# Patient Record
Sex: MALE | Race: ASIAN | NOT HISPANIC OR LATINO | URBAN - METROPOLITAN AREA
[De-identification: names, ages, dates, MRNs, and addresses within clinical notes are randomized per-mention and may not be internally consistent; named-entity substitution may affect disease eponyms.]

---

## 2017-07-10 ENCOUNTER — EMERGENCY (EMERGENCY)
Facility: HOSPITAL | Age: 52
LOS: 0 days | Discharge: HOME | End: 2017-07-10

## 2017-07-10 DIAGNOSIS — R50.9 FEVER, UNSPECIFIED: ICD-10-CM

## 2017-07-10 DIAGNOSIS — I10 ESSENTIAL (PRIMARY) HYPERTENSION: ICD-10-CM

## 2017-07-10 DIAGNOSIS — Z79.899 OTHER LONG TERM (CURRENT) DRUG THERAPY: ICD-10-CM

## 2017-07-10 DIAGNOSIS — S30.860A INSECT BITE (NONVENOMOUS) OF LOWER BACK AND PELVIS, INITIAL ENCOUNTER: ICD-10-CM
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
## 2017-07-10 DIAGNOSIS — Z79.84 LONG TERM (CURRENT) USE OF ORAL HYPOGLYCEMIC DRUGS: ICD-10-CM
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
## 2017-07-10 DIAGNOSIS — E11.9 TYPE 2 DIABETES MELLITUS WITHOUT COMPLICATIONS: ICD-10-CM

## 2020-01-01 ENCOUNTER — INPATIENT (INPATIENT)
Facility: HOSPITAL | Age: 55
LOS: 30 days | End: 2020-05-14
Attending: INTERNAL MEDICINE | Admitting: INTERNAL MEDICINE
Payer: COMMERCIAL

## 2020-01-01 VITALS
HEART RATE: 104 BPM | TEMPERATURE: 98 F | SYSTOLIC BLOOD PRESSURE: 154 MMHG | RESPIRATION RATE: 28 BRPM | DIASTOLIC BLOOD PRESSURE: 78 MMHG | OXYGEN SATURATION: 92 %

## 2020-01-01 VITALS — HEART RATE: 90 BPM | OXYGEN SATURATION: 97 %

## 2020-01-01 DIAGNOSIS — Z51.5 ENCOUNTER FOR PALLIATIVE CARE: ICD-10-CM

## 2020-01-01 DIAGNOSIS — A41.89 OTHER SPECIFIED SEPSIS: ICD-10-CM

## 2020-01-01 DIAGNOSIS — R65.21 SEVERE SEPSIS WITH SEPTIC SHOCK: ICD-10-CM

## 2020-01-01 DIAGNOSIS — Z00.6 ENCOUNTER FOR EXAMINATION FOR NORMAL COMPARISON AND CONTROL IN CLINICAL RESEARCH PROGRAM: ICD-10-CM

## 2020-01-01 DIAGNOSIS — U07.1 COVID-19: ICD-10-CM

## 2020-01-01 DIAGNOSIS — J80 ACUTE RESPIRATORY DISTRESS SYNDROME: ICD-10-CM

## 2020-01-01 DIAGNOSIS — E87.2 ACIDOSIS: ICD-10-CM

## 2020-01-01 DIAGNOSIS — Z87.891 PERSONAL HISTORY OF NICOTINE DEPENDENCE: ICD-10-CM

## 2020-01-01 DIAGNOSIS — J93.9 PNEUMOTHORAX, UNSPECIFIED: ICD-10-CM

## 2020-01-01 DIAGNOSIS — Z79.84 LONG TERM (CURRENT) USE OF ORAL HYPOGLYCEMIC DRUGS: ICD-10-CM

## 2020-01-01 DIAGNOSIS — E11.9 TYPE 2 DIABETES MELLITUS WITHOUT COMPLICATIONS: ICD-10-CM

## 2020-01-01 DIAGNOSIS — E87.5 HYPERKALEMIA: ICD-10-CM

## 2020-01-01 DIAGNOSIS — Z66 DO NOT RESUSCITATE: ICD-10-CM

## 2020-01-01 DIAGNOSIS — R74.0 NONSPECIFIC ELEVATION OF LEVELS OF TRANSAMINASE AND LACTIC ACID DEHYDROGENASE [LDH]: ICD-10-CM

## 2020-01-01 DIAGNOSIS — E87.0 HYPEROSMOLALITY AND HYPERNATREMIA: ICD-10-CM

## 2020-01-01 DIAGNOSIS — J98.2 INTERSTITIAL EMPHYSEMA: ICD-10-CM

## 2020-01-01 DIAGNOSIS — N17.9 ACUTE KIDNEY FAILURE, UNSPECIFIED: ICD-10-CM

## 2020-01-01 DIAGNOSIS — J12.89 OTHER VIRAL PNEUMONIA: ICD-10-CM

## 2020-01-01 DIAGNOSIS — R06.02 SHORTNESS OF BREATH: ICD-10-CM

## 2020-01-01 DIAGNOSIS — I10 ESSENTIAL (PRIMARY) HYPERTENSION: ICD-10-CM

## 2020-01-01 LAB
-  COAGULASE NEGATIVE STAPHYLOCOCCUS: SIGNIFICANT CHANGE UP
A1C WITH ESTIMATED AVERAGE GLUCOSE RESULT: 11.6 % — HIGH (ref 4–5.6)
ALBUMIN SERPL ELPH-MCNC: 1.5 G/DL — LOW (ref 3.5–5.2)
ALBUMIN SERPL ELPH-MCNC: 1.7 G/DL — LOW (ref 3.5–5.2)
ALBUMIN SERPL ELPH-MCNC: 1.8 G/DL — LOW (ref 3.5–5.2)
ALBUMIN SERPL ELPH-MCNC: 1.9 G/DL — LOW (ref 3.5–5.2)
ALBUMIN SERPL ELPH-MCNC: 1.9 G/DL — LOW (ref 3.5–5.2)
ALBUMIN SERPL ELPH-MCNC: 2 G/DL — LOW (ref 3.5–5.2)
ALBUMIN SERPL ELPH-MCNC: 2.1 G/DL — LOW (ref 3.5–5.2)
ALBUMIN SERPL ELPH-MCNC: 2.2 G/DL — LOW (ref 3.5–5.2)
ALBUMIN SERPL ELPH-MCNC: 2.3 G/DL — LOW (ref 3.5–5.2)
ALBUMIN SERPL ELPH-MCNC: 2.3 G/DL — LOW (ref 3.5–5.2)
ALBUMIN SERPL ELPH-MCNC: 2.4 G/DL — LOW (ref 3.5–5.2)
ALBUMIN SERPL ELPH-MCNC: 2.4 G/DL — LOW (ref 3.5–5.2)
ALBUMIN SERPL ELPH-MCNC: 2.5 G/DL — LOW (ref 3.5–5.2)
ALBUMIN SERPL ELPH-MCNC: 2.6 G/DL — LOW (ref 3.5–5.2)
ALBUMIN SERPL ELPH-MCNC: 2.7 G/DL — LOW (ref 3.5–5.2)
ALBUMIN SERPL ELPH-MCNC: 2.9 G/DL — LOW (ref 3.5–5.2)
ALBUMIN SERPL ELPH-MCNC: 3 G/DL — LOW (ref 3.5–5.2)
ALBUMIN SERPL ELPH-MCNC: 3.2 G/DL — LOW (ref 3.5–5.2)
ALBUMIN SERPL ELPH-MCNC: 3.2 G/DL — LOW (ref 3.5–5.2)
ALBUMIN SERPL ELPH-MCNC: 3.4 G/DL — LOW (ref 3.5–5.2)
ALBUMIN SERPL ELPH-MCNC: 3.6 G/DL — SIGNIFICANT CHANGE UP (ref 3.5–5.2)
ALP SERPL-CCNC: 100 U/L — SIGNIFICANT CHANGE UP (ref 30–115)
ALP SERPL-CCNC: 100 U/L — SIGNIFICANT CHANGE UP (ref 30–115)
ALP SERPL-CCNC: 107 U/L — SIGNIFICANT CHANGE UP (ref 30–115)
ALP SERPL-CCNC: 108 U/L — SIGNIFICANT CHANGE UP (ref 30–115)
ALP SERPL-CCNC: 112 U/L — SIGNIFICANT CHANGE UP (ref 30–115)
ALP SERPL-CCNC: 116 U/L — HIGH (ref 30–115)
ALP SERPL-CCNC: 122 U/L — HIGH (ref 30–115)
ALP SERPL-CCNC: 124 U/L — HIGH (ref 30–115)
ALP SERPL-CCNC: 124 U/L — HIGH (ref 30–115)
ALP SERPL-CCNC: 159 U/L — HIGH (ref 30–115)
ALP SERPL-CCNC: 167 U/L — HIGH (ref 30–115)
ALP SERPL-CCNC: 176 U/L — HIGH (ref 30–115)
ALP SERPL-CCNC: 188 U/L — HIGH (ref 30–115)
ALP SERPL-CCNC: 237 U/L — HIGH (ref 30–115)
ALP SERPL-CCNC: 290 U/L — HIGH (ref 30–115)
ALP SERPL-CCNC: 291 U/L — HIGH (ref 30–115)
ALP SERPL-CCNC: 305 U/L — HIGH (ref 30–115)
ALP SERPL-CCNC: 62 U/L — SIGNIFICANT CHANGE UP (ref 30–115)
ALP SERPL-CCNC: 65 U/L — SIGNIFICANT CHANGE UP (ref 30–115)
ALP SERPL-CCNC: 72 U/L — SIGNIFICANT CHANGE UP (ref 30–115)
ALP SERPL-CCNC: 76 U/L — SIGNIFICANT CHANGE UP (ref 30–115)
ALP SERPL-CCNC: 76 U/L — SIGNIFICANT CHANGE UP (ref 30–115)
ALP SERPL-CCNC: 77 U/L — SIGNIFICANT CHANGE UP (ref 30–115)
ALP SERPL-CCNC: 78 U/L — SIGNIFICANT CHANGE UP (ref 30–115)
ALP SERPL-CCNC: 80 U/L — SIGNIFICANT CHANGE UP (ref 30–115)
ALP SERPL-CCNC: 80 U/L — SIGNIFICANT CHANGE UP (ref 30–115)
ALP SERPL-CCNC: 83 U/L — SIGNIFICANT CHANGE UP (ref 30–115)
ALP SERPL-CCNC: 88 U/L — SIGNIFICANT CHANGE UP (ref 30–115)
ALP SERPL-CCNC: 92 U/L — SIGNIFICANT CHANGE UP (ref 30–115)
ALP SERPL-CCNC: 92 U/L — SIGNIFICANT CHANGE UP (ref 30–115)
ALP SERPL-CCNC: 95 U/L — SIGNIFICANT CHANGE UP (ref 30–115)
ALP SERPL-CCNC: 97 U/L — SIGNIFICANT CHANGE UP (ref 30–115)
ALT FLD-CCNC: 106 U/L — HIGH (ref 0–41)
ALT FLD-CCNC: 110 U/L — HIGH (ref 0–41)
ALT FLD-CCNC: 117 U/L — HIGH (ref 0–41)
ALT FLD-CCNC: 118 U/L — HIGH (ref 0–41)
ALT FLD-CCNC: 120 U/L — HIGH (ref 0–41)
ALT FLD-CCNC: 122 U/L — HIGH (ref 0–41)
ALT FLD-CCNC: 154 U/L — HIGH (ref 0–41)
ALT FLD-CCNC: 164 U/L — HIGH (ref 0–41)
ALT FLD-CCNC: 168 U/L — HIGH (ref 0–41)
ALT FLD-CCNC: 19 U/L — SIGNIFICANT CHANGE UP (ref 0–41)
ALT FLD-CCNC: 20 U/L — SIGNIFICANT CHANGE UP (ref 0–41)
ALT FLD-CCNC: 20 U/L — SIGNIFICANT CHANGE UP (ref 0–41)
ALT FLD-CCNC: 21 U/L — SIGNIFICANT CHANGE UP (ref 0–41)
ALT FLD-CCNC: 22 U/L — SIGNIFICANT CHANGE UP (ref 0–41)
ALT FLD-CCNC: 22 U/L — SIGNIFICANT CHANGE UP (ref 0–41)
ALT FLD-CCNC: 28 U/L — SIGNIFICANT CHANGE UP (ref 0–41)
ALT FLD-CCNC: 28 U/L — SIGNIFICANT CHANGE UP (ref 0–41)
ALT FLD-CCNC: 30 U/L — SIGNIFICANT CHANGE UP (ref 0–41)
ALT FLD-CCNC: 35 U/L — SIGNIFICANT CHANGE UP (ref 0–41)
ALT FLD-CCNC: 35 U/L — SIGNIFICANT CHANGE UP (ref 0–41)
ALT FLD-CCNC: 38 U/L — SIGNIFICANT CHANGE UP (ref 0–41)
ALT FLD-CCNC: 44 U/L — HIGH (ref 0–41)
ALT FLD-CCNC: 47 U/L — HIGH (ref 0–41)
ALT FLD-CCNC: 63 U/L — HIGH (ref 0–41)
ALT FLD-CCNC: 73 U/L — HIGH (ref 0–41)
ALT FLD-CCNC: 76 U/L — HIGH (ref 0–41)
ALT FLD-CCNC: 84 U/L — HIGH (ref 0–41)
ALT FLD-CCNC: 84 U/L — HIGH (ref 0–41)
ALT FLD-CCNC: 85 U/L — HIGH (ref 0–41)
ALT FLD-CCNC: 97 U/L — HIGH (ref 0–41)
ALT FLD-CCNC: 97 U/L — HIGH (ref 0–41)
ALT FLD-CCNC: 99 U/L — HIGH (ref 0–41)
ANION GAP SERPL CALC-SCNC: 11 MMOL/L — SIGNIFICANT CHANGE UP (ref 7–14)
ANION GAP SERPL CALC-SCNC: 11 MMOL/L — SIGNIFICANT CHANGE UP (ref 7–14)
ANION GAP SERPL CALC-SCNC: 12 MMOL/L — SIGNIFICANT CHANGE UP (ref 7–14)
ANION GAP SERPL CALC-SCNC: 13 MMOL/L — SIGNIFICANT CHANGE UP (ref 7–14)
ANION GAP SERPL CALC-SCNC: 14 MMOL/L — SIGNIFICANT CHANGE UP (ref 7–14)
ANION GAP SERPL CALC-SCNC: 15 MMOL/L — HIGH (ref 7–14)
ANION GAP SERPL CALC-SCNC: 16 MMOL/L — HIGH (ref 7–14)
ANION GAP SERPL CALC-SCNC: 17 MMOL/L — HIGH (ref 7–14)
ANION GAP SERPL CALC-SCNC: 18 MMOL/L — HIGH (ref 7–14)
ANION GAP SERPL CALC-SCNC: 19 MMOL/L — HIGH (ref 7–14)
ANION GAP SERPL CALC-SCNC: 20 MMOL/L — HIGH (ref 7–14)
ANION GAP SERPL CALC-SCNC: 21 MMOL/L — HIGH (ref 7–14)
ANION GAP SERPL CALC-SCNC: 21 MMOL/L — HIGH (ref 7–14)
ANION GAP SERPL CALC-SCNC: 22 MMOL/L — HIGH (ref 7–14)
ANION GAP SERPL CALC-SCNC: 22 MMOL/L — HIGH (ref 7–14)
ANION GAP SERPL CALC-SCNC: 23 MMOL/L — HIGH (ref 7–14)
ANION GAP SERPL CALC-SCNC: 24 MMOL/L — HIGH (ref 7–14)
ANION GAP SERPL CALC-SCNC: 25 MMOL/L — HIGH (ref 7–14)
ANION GAP SERPL CALC-SCNC: 28 MMOL/L — HIGH (ref 7–14)
ANISOCYTOSIS BLD QL: SLIGHT — SIGNIFICANT CHANGE UP
APPEARANCE UR: ABNORMAL
APTT BLD: 111.8 SEC — CRITICAL HIGH (ref 27–39.2)
APTT BLD: 124.8 SEC — CRITICAL HIGH (ref 27–39.2)
APTT BLD: 29.5 SEC — SIGNIFICANT CHANGE UP (ref 27–39.2)
APTT BLD: 33 SEC — SIGNIFICANT CHANGE UP (ref 27–39.2)
APTT BLD: 35.2 SEC — SIGNIFICANT CHANGE UP (ref 27–39.2)
APTT BLD: 35.2 SEC — SIGNIFICANT CHANGE UP (ref 27–39.2)
APTT BLD: 36.1 SEC — SIGNIFICANT CHANGE UP (ref 27–39.2)
APTT BLD: 46.2 SEC — HIGH (ref 27–39.2)
APTT BLD: 48.9 SEC — HIGH (ref 27–39.2)
APTT BLD: 49.1 SEC — HIGH (ref 27–39.2)
APTT BLD: 53.6 SEC — HIGH (ref 27–39.2)
APTT BLD: 54.8 SEC — HIGH (ref 27–39.2)
APTT BLD: 55 SEC — HIGH (ref 27–39.2)
APTT BLD: 56.6 SEC — HIGH (ref 27–39.2)
APTT BLD: 58.9 SEC — HIGH (ref 27–39.2)
APTT BLD: 64.7 SEC — HIGH (ref 27–39.2)
APTT BLD: 67 SEC — HIGH (ref 27–39.2)
APTT BLD: 67.7 SEC — HIGH (ref 27–39.2)
APTT BLD: 70.1 SEC — CRITICAL HIGH (ref 27–39.2)
APTT BLD: 75.3 SEC — CRITICAL HIGH (ref 27–39.2)
APTT BLD: 75.3 SEC — CRITICAL HIGH (ref 27–39.2)
APTT BLD: 75.7 SEC — CRITICAL HIGH (ref 27–39.2)
APTT BLD: 78.2 SEC — CRITICAL HIGH (ref 27–39.2)
APTT BLD: 79.7 SEC — CRITICAL HIGH (ref 27–39.2)
APTT BLD: 82.4 SEC — CRITICAL HIGH (ref 27–39.2)
APTT BLD: 83.5 SEC — CRITICAL HIGH (ref 27–39.2)
APTT BLD: 83.9 SEC — CRITICAL HIGH (ref 27–39.2)
APTT BLD: 90.1 SEC — CRITICAL HIGH (ref 27–39.2)
APTT BLD: 92.1 SEC — CRITICAL HIGH (ref 27–39.2)
AST SERPL-CCNC: 100 U/L — HIGH (ref 0–41)
AST SERPL-CCNC: 102 U/L — HIGH (ref 0–41)
AST SERPL-CCNC: 117 U/L — HIGH (ref 0–41)
AST SERPL-CCNC: 120 U/L — HIGH (ref 0–41)
AST SERPL-CCNC: 141 U/L — HIGH (ref 0–41)
AST SERPL-CCNC: 143 U/L — HIGH (ref 0–41)
AST SERPL-CCNC: 150 U/L — HIGH (ref 0–41)
AST SERPL-CCNC: 156 U/L — HIGH (ref 0–41)
AST SERPL-CCNC: 186 U/L — HIGH (ref 0–41)
AST SERPL-CCNC: 27 U/L — SIGNIFICANT CHANGE UP (ref 0–41)
AST SERPL-CCNC: 31 U/L — SIGNIFICANT CHANGE UP (ref 0–41)
AST SERPL-CCNC: 32 U/L — SIGNIFICANT CHANGE UP (ref 0–41)
AST SERPL-CCNC: 32 U/L — SIGNIFICANT CHANGE UP (ref 0–41)
AST SERPL-CCNC: 33 U/L — SIGNIFICANT CHANGE UP (ref 0–41)
AST SERPL-CCNC: 35 U/L — SIGNIFICANT CHANGE UP (ref 0–41)
AST SERPL-CCNC: 35 U/L — SIGNIFICANT CHANGE UP (ref 0–41)
AST SERPL-CCNC: 38 U/L — SIGNIFICANT CHANGE UP (ref 0–41)
AST SERPL-CCNC: 39 U/L — SIGNIFICANT CHANGE UP (ref 0–41)
AST SERPL-CCNC: 40 U/L — SIGNIFICANT CHANGE UP (ref 0–41)
AST SERPL-CCNC: 45 U/L — HIGH (ref 0–41)
AST SERPL-CCNC: 47 U/L — HIGH (ref 0–41)
AST SERPL-CCNC: 51 U/L — HIGH (ref 0–41)
AST SERPL-CCNC: 52 U/L — HIGH (ref 0–41)
AST SERPL-CCNC: 53 U/L — HIGH (ref 0–41)
AST SERPL-CCNC: 55 U/L — HIGH (ref 0–41)
AST SERPL-CCNC: 66 U/L — HIGH (ref 0–41)
AST SERPL-CCNC: 75 U/L — HIGH (ref 0–41)
AST SERPL-CCNC: 77 U/L — HIGH (ref 0–41)
AST SERPL-CCNC: 83 U/L — HIGH (ref 0–41)
AST SERPL-CCNC: 89 U/L — HIGH (ref 0–41)
AST SERPL-CCNC: 95 U/L — HIGH (ref 0–41)
AST SERPL-CCNC: 97 U/L — HIGH (ref 0–41)
BACTERIA # UR AUTO: ABNORMAL
BASE EXCESS BLDA CALC-SCNC: -0.2 MMOL/L — SIGNIFICANT CHANGE UP (ref -2–2)
BASE EXCESS BLDA CALC-SCNC: -0.6 MMOL/L — SIGNIFICANT CHANGE UP (ref -2–2)
BASE EXCESS BLDA CALC-SCNC: -2 MMOL/L — SIGNIFICANT CHANGE UP (ref -2–2)
BASE EXCESS BLDA CALC-SCNC: -2.8 MMOL/L — LOW (ref -2–2)
BASE EXCESS BLDA CALC-SCNC: -6 MMOL/L — LOW (ref -2–2)
BASE EXCESS BLDA CALC-SCNC: -6.8 MMOL/L — LOW (ref -2–2)
BASE EXCESS BLDA CALC-SCNC: 0.2 MMOL/L — SIGNIFICANT CHANGE UP (ref -2–2)
BASE EXCESS BLDA CALC-SCNC: 1 MMOL/L — SIGNIFICANT CHANGE UP (ref -2–2)
BASE EXCESS BLDA CALC-SCNC: 1.5 MMOL/L — SIGNIFICANT CHANGE UP (ref -2–2)
BASE EXCESS BLDA CALC-SCNC: 3.6 MMOL/L — HIGH (ref -2–2)
BASOPHILS # BLD AUTO: 0 K/UL — SIGNIFICANT CHANGE UP (ref 0–0.2)
BASOPHILS # BLD AUTO: 0.01 K/UL — SIGNIFICANT CHANGE UP (ref 0–0.2)
BASOPHILS # BLD AUTO: 0.02 K/UL — SIGNIFICANT CHANGE UP (ref 0–0.2)
BASOPHILS # BLD AUTO: 0.03 K/UL — SIGNIFICANT CHANGE UP (ref 0–0.2)
BASOPHILS # BLD AUTO: 0.04 K/UL — SIGNIFICANT CHANGE UP (ref 0–0.2)
BASOPHILS # BLD AUTO: 0.04 K/UL — SIGNIFICANT CHANGE UP (ref 0–0.2)
BASOPHILS # BLD AUTO: 0.05 K/UL — SIGNIFICANT CHANGE UP (ref 0–0.2)
BASOPHILS # BLD AUTO: 0.06 K/UL — SIGNIFICANT CHANGE UP (ref 0–0.2)
BASOPHILS # BLD AUTO: 0.06 K/UL — SIGNIFICANT CHANGE UP (ref 0–0.2)
BASOPHILS # BLD AUTO: 0.07 K/UL — SIGNIFICANT CHANGE UP (ref 0–0.2)
BASOPHILS # BLD AUTO: 0.08 K/UL — SIGNIFICANT CHANGE UP (ref 0–0.2)
BASOPHILS # BLD AUTO: 0.09 K/UL — SIGNIFICANT CHANGE UP (ref 0–0.2)
BASOPHILS NFR BLD AUTO: 0 % — SIGNIFICANT CHANGE UP (ref 0–1)
BASOPHILS NFR BLD AUTO: 0.1 % — SIGNIFICANT CHANGE UP (ref 0–1)
BASOPHILS NFR BLD AUTO: 0.2 % — SIGNIFICANT CHANGE UP (ref 0–1)
BASOPHILS NFR BLD AUTO: 0.3 % — SIGNIFICANT CHANGE UP (ref 0–1)
BASOPHILS NFR BLD AUTO: 0.4 % — SIGNIFICANT CHANGE UP (ref 0–1)
BASOPHILS NFR BLD AUTO: 0.4 % — SIGNIFICANT CHANGE UP (ref 0–1)
BASOPHILS NFR BLD AUTO: 0.5 % — SIGNIFICANT CHANGE UP (ref 0–1)
BASOPHILS NFR BLD AUTO: 0.5 % — SIGNIFICANT CHANGE UP (ref 0–1)
BILIRUB SERPL-MCNC: 0.3 MG/DL — SIGNIFICANT CHANGE UP (ref 0.2–1.2)
BILIRUB SERPL-MCNC: 0.3 MG/DL — SIGNIFICANT CHANGE UP (ref 0.2–1.2)
BILIRUB SERPL-MCNC: 0.4 MG/DL — SIGNIFICANT CHANGE UP (ref 0.2–1.2)
BILIRUB SERPL-MCNC: 0.5 MG/DL — SIGNIFICANT CHANGE UP (ref 0.2–1.2)
BILIRUB SERPL-MCNC: 0.6 MG/DL — SIGNIFICANT CHANGE UP (ref 0.2–1.2)
BILIRUB SERPL-MCNC: 0.7 MG/DL — SIGNIFICANT CHANGE UP (ref 0.2–1.2)
BILIRUB SERPL-MCNC: 0.8 MG/DL — SIGNIFICANT CHANGE UP (ref 0.2–1.2)
BILIRUB SERPL-MCNC: 1 MG/DL — SIGNIFICANT CHANGE UP (ref 0.2–1.2)
BILIRUB SERPL-MCNC: 1.4 MG/DL — HIGH (ref 0.2–1.2)
BILIRUB SERPL-MCNC: 1.9 MG/DL — HIGH (ref 0.2–1.2)
BILIRUB SERPL-MCNC: 2.4 MG/DL — HIGH (ref 0.2–1.2)
BILIRUB UR-MCNC: NEGATIVE — SIGNIFICANT CHANGE UP
BLD GP AB SCN SERPL QL: SIGNIFICANT CHANGE UP
BUN SERPL-MCNC: 107 MG/DL — CRITICAL HIGH (ref 10–20)
BUN SERPL-MCNC: 115 MG/DL — CRITICAL HIGH (ref 10–20)
BUN SERPL-MCNC: 122 MG/DL — CRITICAL HIGH (ref 10–20)
BUN SERPL-MCNC: 125 MG/DL — CRITICAL HIGH (ref 10–20)
BUN SERPL-MCNC: 132 MG/DL — CRITICAL HIGH (ref 10–20)
BUN SERPL-MCNC: 134 MG/DL — CRITICAL HIGH (ref 10–20)
BUN SERPL-MCNC: 136 MG/DL — CRITICAL HIGH (ref 10–20)
BUN SERPL-MCNC: 138 MG/DL — CRITICAL HIGH (ref 10–20)
BUN SERPL-MCNC: 14 MG/DL — SIGNIFICANT CHANGE UP (ref 10–20)
BUN SERPL-MCNC: 141 MG/DL — CRITICAL HIGH (ref 10–20)
BUN SERPL-MCNC: 144 MG/DL — CRITICAL HIGH (ref 10–20)
BUN SERPL-MCNC: 146 MG/DL — CRITICAL HIGH (ref 10–20)
BUN SERPL-MCNC: 148 MG/DL — CRITICAL HIGH (ref 10–20)
BUN SERPL-MCNC: 150 MG/DL — CRITICAL HIGH (ref 10–20)
BUN SERPL-MCNC: 151 MG/DL — CRITICAL HIGH (ref 10–20)
BUN SERPL-MCNC: 154 MG/DL — CRITICAL HIGH (ref 10–20)
BUN SERPL-MCNC: 155 MG/DL — CRITICAL HIGH (ref 10–20)
BUN SERPL-MCNC: 157 MG/DL — CRITICAL HIGH (ref 10–20)
BUN SERPL-MCNC: 158 MG/DL — CRITICAL HIGH (ref 10–20)
BUN SERPL-MCNC: 16 MG/DL — SIGNIFICANT CHANGE UP (ref 10–20)
BUN SERPL-MCNC: 160 MG/DL — CRITICAL HIGH (ref 10–20)
BUN SERPL-MCNC: 160 MG/DL — CRITICAL HIGH (ref 10–20)
BUN SERPL-MCNC: 164 MG/DL — CRITICAL HIGH (ref 10–20)
BUN SERPL-MCNC: 166 MG/DL — CRITICAL HIGH (ref 10–20)
BUN SERPL-MCNC: 169 MG/DL — CRITICAL HIGH (ref 10–20)
BUN SERPL-MCNC: 17 MG/DL — SIGNIFICANT CHANGE UP (ref 10–20)
BUN SERPL-MCNC: 170 MG/DL — CRITICAL HIGH (ref 10–20)
BUN SERPL-MCNC: 173 MG/DL — CRITICAL HIGH (ref 10–20)
BUN SERPL-MCNC: 176 MG/DL — CRITICAL HIGH (ref 10–20)
BUN SERPL-MCNC: 178 MG/DL — CRITICAL HIGH (ref 10–20)
BUN SERPL-MCNC: 190 MG/DL — CRITICAL HIGH (ref 10–20)
BUN SERPL-MCNC: 191 MG/DL — CRITICAL HIGH (ref 10–20)
BUN SERPL-MCNC: 24 MG/DL — HIGH (ref 10–20)
BUN SERPL-MCNC: 25 MG/DL — HIGH (ref 10–20)
BUN SERPL-MCNC: 35 MG/DL — HIGH (ref 10–20)
BUN SERPL-MCNC: 39 MG/DL — HIGH (ref 10–20)
BUN SERPL-MCNC: 45 MG/DL — HIGH (ref 10–20)
BUN SERPL-MCNC: 48 MG/DL — HIGH (ref 10–20)
BUN SERPL-MCNC: 60 MG/DL — HIGH (ref 10–20)
BUN SERPL-MCNC: 65 MG/DL — CRITICAL HIGH (ref 10–20)
BUN SERPL-MCNC: 71 MG/DL — CRITICAL HIGH (ref 10–20)
BUN SERPL-MCNC: 80 MG/DL — CRITICAL HIGH (ref 10–20)
BUN SERPL-MCNC: 80 MG/DL — CRITICAL HIGH (ref 10–20)
BUN SERPL-MCNC: 95 MG/DL — CRITICAL HIGH (ref 10–20)
BUN SERPL-MCNC: 96 MG/DL — CRITICAL HIGH (ref 10–20)
C DIFF BY PCR RESULT: NEGATIVE — SIGNIFICANT CHANGE UP
C DIFF TOX GENS STL QL NAA+PROBE: SIGNIFICANT CHANGE UP
CALCIUM SERPL-MCNC: 6.4 MG/DL — LOW (ref 8.5–10.1)
CALCIUM SERPL-MCNC: 6.4 MG/DL — LOW (ref 8.5–10.1)
CALCIUM SERPL-MCNC: 6.7 MG/DL — LOW (ref 8.5–10.1)
CALCIUM SERPL-MCNC: 6.7 MG/DL — LOW (ref 8.5–10.1)
CALCIUM SERPL-MCNC: 6.8 MG/DL — LOW (ref 8.5–10.1)
CALCIUM SERPL-MCNC: 7 MG/DL — LOW (ref 8.5–10.1)
CALCIUM SERPL-MCNC: 7 MG/DL — LOW (ref 8.5–10.1)
CALCIUM SERPL-MCNC: 7.1 MG/DL — LOW (ref 8.5–10.1)
CALCIUM SERPL-MCNC: 7.2 MG/DL — LOW (ref 8.5–10.1)
CALCIUM SERPL-MCNC: 7.3 MG/DL — LOW (ref 8.5–10.1)
CALCIUM SERPL-MCNC: 7.5 MG/DL — LOW (ref 8.5–10.1)
CALCIUM SERPL-MCNC: 7.5 MG/DL — LOW (ref 8.5–10.1)
CALCIUM SERPL-MCNC: 7.6 MG/DL — LOW (ref 8.5–10.1)
CALCIUM SERPL-MCNC: 7.7 MG/DL — LOW (ref 8.5–10.1)
CALCIUM SERPL-MCNC: 7.8 MG/DL — LOW (ref 8.5–10.1)
CALCIUM SERPL-MCNC: 7.8 MG/DL — LOW (ref 8.5–10.1)
CALCIUM SERPL-MCNC: 7.9 MG/DL — LOW (ref 8.5–10.1)
CALCIUM SERPL-MCNC: 8 MG/DL — LOW (ref 8.5–10.1)
CALCIUM SERPL-MCNC: 8.1 MG/DL — LOW (ref 8.5–10.1)
CALCIUM SERPL-MCNC: 8.2 MG/DL — LOW (ref 8.5–10.1)
CALCIUM SERPL-MCNC: 8.3 MG/DL — LOW (ref 8.5–10.1)
CALCIUM SERPL-MCNC: 8.3 MG/DL — LOW (ref 8.5–10.1)
CALCIUM SERPL-MCNC: 8.4 MG/DL — LOW (ref 8.5–10.1)
CALCIUM SERPL-MCNC: 8.4 MG/DL — LOW (ref 8.5–10.1)
CALCIUM SERPL-MCNC: 8.5 MG/DL — SIGNIFICANT CHANGE UP (ref 8.5–10.1)
CALCIUM SERPL-MCNC: 8.6 MG/DL — SIGNIFICANT CHANGE UP (ref 8.5–10.1)
CALCIUM SERPL-MCNC: 8.8 MG/DL — SIGNIFICANT CHANGE UP (ref 8.5–10.1)
CALCIUM SERPL-MCNC: 8.8 MG/DL — SIGNIFICANT CHANGE UP (ref 8.5–10.1)
CALCIUM SERPL-MCNC: 8.9 MG/DL — SIGNIFICANT CHANGE UP (ref 8.5–10.1)
CHLORIDE SERPL-SCNC: 100 MMOL/L — SIGNIFICANT CHANGE UP (ref 98–110)
CHLORIDE SERPL-SCNC: 100 MMOL/L — SIGNIFICANT CHANGE UP (ref 98–110)
CHLORIDE SERPL-SCNC: 101 MMOL/L — SIGNIFICANT CHANGE UP (ref 98–110)
CHLORIDE SERPL-SCNC: 102 MMOL/L — SIGNIFICANT CHANGE UP (ref 98–110)
CHLORIDE SERPL-SCNC: 103 MMOL/L — SIGNIFICANT CHANGE UP (ref 98–110)
CHLORIDE SERPL-SCNC: 104 MMOL/L — SIGNIFICANT CHANGE UP (ref 98–110)
CHLORIDE SERPL-SCNC: 104 MMOL/L — SIGNIFICANT CHANGE UP (ref 98–110)
CHLORIDE SERPL-SCNC: 106 MMOL/L — SIGNIFICANT CHANGE UP (ref 98–110)
CHLORIDE SERPL-SCNC: 108 MMOL/L — SIGNIFICANT CHANGE UP (ref 98–110)
CHLORIDE SERPL-SCNC: 110 MMOL/L — SIGNIFICANT CHANGE UP (ref 98–110)
CHLORIDE SERPL-SCNC: 110 MMOL/L — SIGNIFICANT CHANGE UP (ref 98–110)
CHLORIDE SERPL-SCNC: 112 MMOL/L — HIGH (ref 98–110)
CHLORIDE SERPL-SCNC: 93 MMOL/L — LOW (ref 98–110)
CHLORIDE SERPL-SCNC: 93 MMOL/L — LOW (ref 98–110)
CHLORIDE SERPL-SCNC: 95 MMOL/L — LOW (ref 98–110)
CHLORIDE SERPL-SCNC: 96 MMOL/L — LOW (ref 98–110)
CHLORIDE SERPL-SCNC: 97 MMOL/L — LOW (ref 98–110)
CHLORIDE SERPL-SCNC: 98 MMOL/L — SIGNIFICANT CHANGE UP (ref 98–110)
CHLORIDE SERPL-SCNC: 99 MMOL/L — SIGNIFICANT CHANGE UP (ref 98–110)
CK SERPL-CCNC: 227 U/L — HIGH (ref 0–225)
CK SERPL-CCNC: 384 U/L — HIGH (ref 0–225)
CK SERPL-CCNC: 390 U/L — HIGH (ref 0–225)
CO2 SERPL-SCNC: 17 MMOL/L — SIGNIFICANT CHANGE UP (ref 17–32)
CO2 SERPL-SCNC: 17 MMOL/L — SIGNIFICANT CHANGE UP (ref 17–32)
CO2 SERPL-SCNC: 18 MMOL/L — SIGNIFICANT CHANGE UP (ref 17–32)
CO2 SERPL-SCNC: 19 MMOL/L — SIGNIFICANT CHANGE UP (ref 17–32)
CO2 SERPL-SCNC: 21 MMOL/L — SIGNIFICANT CHANGE UP (ref 17–32)
CO2 SERPL-SCNC: 22 MMOL/L — SIGNIFICANT CHANGE UP (ref 17–32)
CO2 SERPL-SCNC: 23 MMOL/L — SIGNIFICANT CHANGE UP (ref 17–32)
CO2 SERPL-SCNC: 24 MMOL/L — SIGNIFICANT CHANGE UP (ref 17–32)
CO2 SERPL-SCNC: 25 MMOL/L — SIGNIFICANT CHANGE UP (ref 17–32)
CO2 SERPL-SCNC: 26 MMOL/L — SIGNIFICANT CHANGE UP (ref 17–32)
CO2 SERPL-SCNC: 27 MMOL/L — SIGNIFICANT CHANGE UP (ref 17–32)
CO2 SERPL-SCNC: 28 MMOL/L — SIGNIFICANT CHANGE UP (ref 17–32)
CO2 SERPL-SCNC: 28 MMOL/L — SIGNIFICANT CHANGE UP (ref 17–32)
CO2 SERPL-SCNC: 30 MMOL/L — SIGNIFICANT CHANGE UP (ref 17–32)
CO2 SERPL-SCNC: 31 MMOL/L — SIGNIFICANT CHANGE UP (ref 17–32)
COLOR SPEC: YELLOW — SIGNIFICANT CHANGE UP
CREAT ?TM UR-MCNC: 135 MG/DL — SIGNIFICANT CHANGE UP
CREAT SERPL-MCNC: 0.7 MG/DL — SIGNIFICANT CHANGE UP (ref 0.7–1.5)
CREAT SERPL-MCNC: 0.7 MG/DL — SIGNIFICANT CHANGE UP (ref 0.7–1.5)
CREAT SERPL-MCNC: 0.8 MG/DL — SIGNIFICANT CHANGE UP (ref 0.7–1.5)
CREAT SERPL-MCNC: 0.8 MG/DL — SIGNIFICANT CHANGE UP (ref 0.7–1.5)
CREAT SERPL-MCNC: 1.1 MG/DL — SIGNIFICANT CHANGE UP (ref 0.7–1.5)
CREAT SERPL-MCNC: 1.4 MG/DL — SIGNIFICANT CHANGE UP (ref 0.7–1.5)
CREAT SERPL-MCNC: 1.6 MG/DL — HIGH (ref 0.7–1.5)
CREAT SERPL-MCNC: 1.7 MG/DL — HIGH (ref 0.7–1.5)
CREAT SERPL-MCNC: 1.9 MG/DL — HIGH (ref 0.7–1.5)
CREAT SERPL-MCNC: 10.4 MG/DL — CRITICAL HIGH (ref 0.7–1.5)
CREAT SERPL-MCNC: 2 MG/DL — HIGH (ref 0.7–1.5)
CREAT SERPL-MCNC: 2.5 MG/DL — HIGH (ref 0.7–1.5)
CREAT SERPL-MCNC: 2.5 MG/DL — HIGH (ref 0.7–1.5)
CREAT SERPL-MCNC: 2.8 MG/DL — HIGH (ref 0.7–1.5)
CREAT SERPL-MCNC: 2.9 MG/DL — HIGH (ref 0.7–1.5)
CREAT SERPL-MCNC: 2.9 MG/DL — HIGH (ref 0.7–1.5)
CREAT SERPL-MCNC: 3 MG/DL — HIGH (ref 0.7–1.5)
CREAT SERPL-MCNC: 3.1 MG/DL — HIGH (ref 0.7–1.5)
CREAT SERPL-MCNC: 3.4 MG/DL — HIGH (ref 0.7–1.5)
CREAT SERPL-MCNC: 3.6 MG/DL — HIGH (ref 0.7–1.5)
CREAT SERPL-MCNC: 3.6 MG/DL — HIGH (ref 0.7–1.5)
CREAT SERPL-MCNC: 3.9 MG/DL — HIGH (ref 0.7–1.5)
CREAT SERPL-MCNC: 4.1 MG/DL — CRITICAL HIGH (ref 0.7–1.5)
CREAT SERPL-MCNC: 4.2 MG/DL — CRITICAL HIGH (ref 0.7–1.5)
CREAT SERPL-MCNC: 4.3 MG/DL — CRITICAL HIGH (ref 0.7–1.5)
CREAT SERPL-MCNC: 4.4 MG/DL — CRITICAL HIGH (ref 0.7–1.5)
CREAT SERPL-MCNC: 4.5 MG/DL — CRITICAL HIGH (ref 0.7–1.5)
CREAT SERPL-MCNC: 4.6 MG/DL — CRITICAL HIGH (ref 0.7–1.5)
CREAT SERPL-MCNC: 4.7 MG/DL — CRITICAL HIGH (ref 0.7–1.5)
CREAT SERPL-MCNC: 4.7 MG/DL — CRITICAL HIGH (ref 0.7–1.5)
CREAT SERPL-MCNC: 4.9 MG/DL — CRITICAL HIGH (ref 0.7–1.5)
CREAT SERPL-MCNC: 4.9 MG/DL — CRITICAL HIGH (ref 0.7–1.5)
CREAT SERPL-MCNC: 5.1 MG/DL — CRITICAL HIGH (ref 0.7–1.5)
CREAT SERPL-MCNC: 5.2 MG/DL — CRITICAL HIGH (ref 0.7–1.5)
CREAT SERPL-MCNC: 5.3 MG/DL — CRITICAL HIGH (ref 0.7–1.5)
CREAT SERPL-MCNC: 6.1 MG/DL — CRITICAL HIGH (ref 0.7–1.5)
CREAT SERPL-MCNC: 6.5 MG/DL — CRITICAL HIGH (ref 0.7–1.5)
CREAT SERPL-MCNC: 6.7 MG/DL — CRITICAL HIGH (ref 0.7–1.5)
CREAT SERPL-MCNC: 7.3 MG/DL — CRITICAL HIGH (ref 0.7–1.5)
CREAT SERPL-MCNC: 7.4 MG/DL — CRITICAL HIGH (ref 0.7–1.5)
CREAT SERPL-MCNC: 8 MG/DL — CRITICAL HIGH (ref 0.7–1.5)
CREAT SERPL-MCNC: 8.9 MG/DL — CRITICAL HIGH (ref 0.7–1.5)
CREAT SERPL-MCNC: 9 MG/DL — CRITICAL HIGH (ref 0.7–1.5)
CREAT SERPL-MCNC: 9.4 MG/DL — CRITICAL HIGH (ref 0.7–1.5)
CREAT SERPL-MCNC: 9.7 MG/DL — CRITICAL HIGH (ref 0.7–1.5)
CRP SERPL-MCNC: 11.42 MG/DL — HIGH (ref 0–0.4)
CRP SERPL-MCNC: 11.81 MG/DL — HIGH (ref 0–0.4)
CRP SERPL-MCNC: 12.99 MG/DL — HIGH (ref 0–0.4)
CRP SERPL-MCNC: 17.38 MG/DL — HIGH (ref 0–0.4)
CRP SERPL-MCNC: 21.48 MG/DL — HIGH (ref 0–0.4)
CRP SERPL-MCNC: 21.57 MG/DL — HIGH (ref 0–0.4)
CRP SERPL-MCNC: 25.87 MG/DL — HIGH (ref 0–0.4)
CRP SERPL-MCNC: 30.75 MG/DL — HIGH (ref 0–0.4)
CRP SERPL-MCNC: 36.04 MG/DL — HIGH (ref 0–0.4)
CRP SERPL-MCNC: 5.08 MG/DL — HIGH (ref 0–0.4)
CRP SERPL-MCNC: 6.77 MG/DL — HIGH (ref 0–0.4)
CRP SERPL-MCNC: 9.68 MG/DL — HIGH (ref 0–0.4)
CULTURE RESULTS: NO GROWTH — SIGNIFICANT CHANGE UP
CULTURE RESULTS: SIGNIFICANT CHANGE UP
D DIMER BLD IA.RAPID-MCNC: 1950 NG/ML DDU — HIGH (ref 0–230)
D DIMER BLD IA.RAPID-MCNC: 2026 NG/ML DDU — HIGH (ref 0–230)
D DIMER BLD IA.RAPID-MCNC: 2976 NG/ML DDU — HIGH (ref 0–230)
D DIMER BLD IA.RAPID-MCNC: 3068 NG/ML DDU — HIGH (ref 0–230)
D DIMER BLD IA.RAPID-MCNC: 3483 NG/ML DDU — HIGH (ref 0–230)
D DIMER BLD IA.RAPID-MCNC: 3548 NG/ML DDU — HIGH (ref 0–230)
D DIMER BLD IA.RAPID-MCNC: 3944 NG/ML DDU — HIGH (ref 0–230)
D DIMER BLD IA.RAPID-MCNC: 4178 NG/ML DDU — HIGH (ref 0–230)
D DIMER BLD IA.RAPID-MCNC: 626 NG/ML DDU — HIGH (ref 0–230)
D DIMER BLD IA.RAPID-MCNC: 7612 NG/ML DDU — HIGH (ref 0–230)
D DIMER BLD IA.RAPID-MCNC: 9235 NG/ML DDU — HIGH (ref 0–230)
D DIMER BLD IA.RAPID-MCNC: 940 NG/ML DDU — HIGH (ref 0–230)
DIFF PNL FLD: ABNORMAL
EOSINOPHIL # BLD AUTO: 0 K/UL — SIGNIFICANT CHANGE UP (ref 0–0.7)
EOSINOPHIL # BLD AUTO: 0.01 K/UL — SIGNIFICANT CHANGE UP (ref 0–0.7)
EOSINOPHIL # BLD AUTO: 0.01 K/UL — SIGNIFICANT CHANGE UP (ref 0–0.7)
EOSINOPHIL # BLD AUTO: 0.03 K/UL — SIGNIFICANT CHANGE UP (ref 0–0.7)
EOSINOPHIL # BLD AUTO: 0.06 K/UL — SIGNIFICANT CHANGE UP (ref 0–0.7)
EOSINOPHIL # BLD AUTO: 0.13 K/UL — SIGNIFICANT CHANGE UP (ref 0–0.7)
EOSINOPHIL # BLD AUTO: 0.14 K/UL — SIGNIFICANT CHANGE UP (ref 0–0.7)
EOSINOPHIL # BLD AUTO: 0.18 K/UL — SIGNIFICANT CHANGE UP (ref 0–0.7)
EOSINOPHIL # BLD AUTO: 0.19 K/UL — SIGNIFICANT CHANGE UP (ref 0–0.7)
EOSINOPHIL # BLD AUTO: 0.2 K/UL — SIGNIFICANT CHANGE UP (ref 0–0.7)
EOSINOPHIL # BLD AUTO: 0.38 K/UL — SIGNIFICANT CHANGE UP (ref 0–0.7)
EOSINOPHIL # BLD AUTO: 0.39 K/UL — SIGNIFICANT CHANGE UP (ref 0–0.7)
EOSINOPHIL # BLD AUTO: 0.41 K/UL — SIGNIFICANT CHANGE UP (ref 0–0.7)
EOSINOPHIL # BLD AUTO: 0.44 K/UL — SIGNIFICANT CHANGE UP (ref 0–0.7)
EOSINOPHIL # BLD AUTO: 1.21 K/UL — HIGH (ref 0–0.7)
EOSINOPHIL NFR BLD AUTO: 0 % — SIGNIFICANT CHANGE UP (ref 0–8)
EOSINOPHIL NFR BLD AUTO: 0.2 % — SIGNIFICANT CHANGE UP (ref 0–8)
EOSINOPHIL NFR BLD AUTO: 0.3 % — SIGNIFICANT CHANGE UP (ref 0–8)
EOSINOPHIL NFR BLD AUTO: 0.7 % — SIGNIFICANT CHANGE UP (ref 0–8)
EOSINOPHIL NFR BLD AUTO: 0.7 % — SIGNIFICANT CHANGE UP (ref 0–8)
EOSINOPHIL NFR BLD AUTO: 1.1 % — SIGNIFICANT CHANGE UP (ref 0–8)
EOSINOPHIL NFR BLD AUTO: 1.3 % — SIGNIFICANT CHANGE UP (ref 0–8)
EOSINOPHIL NFR BLD AUTO: 1.4 % — SIGNIFICANT CHANGE UP (ref 0–8)
EOSINOPHIL NFR BLD AUTO: 2 % — SIGNIFICANT CHANGE UP (ref 0–8)
EOSINOPHIL NFR BLD AUTO: 2.7 % — SIGNIFICANT CHANGE UP (ref 0–8)
EOSINOPHIL NFR BLD AUTO: 2.8 % — SIGNIFICANT CHANGE UP (ref 0–8)
EOSINOPHIL NFR BLD AUTO: 3.4 % — SIGNIFICANT CHANGE UP (ref 0–8)
EOSINOPHIL NFR BLD AUTO: 4.4 % — SIGNIFICANT CHANGE UP (ref 0–8)
EPI CELLS # UR: 6 /HPF — HIGH (ref 0–5)
ESTIMATED AVERAGE GLUCOSE: 286 MG/DL — HIGH (ref 68–114)
FERRITIN SERPL-MCNC: 1375 NG/ML — HIGH (ref 30–400)
FERRITIN SERPL-MCNC: 1787 NG/ML — HIGH (ref 30–400)
FERRITIN SERPL-MCNC: 2335 NG/ML — HIGH (ref 30–400)
FERRITIN SERPL-MCNC: 4158 NG/ML — HIGH (ref 30–400)
FERRITIN SERPL-MCNC: 4770 NG/ML — HIGH (ref 30–400)
FERRITIN SERPL-MCNC: 4923 NG/ML — HIGH (ref 30–400)
FERRITIN SERPL-MCNC: 5116 NG/ML — HIGH (ref 30–400)
FERRITIN SERPL-MCNC: 5523 NG/ML — HIGH (ref 30–400)
FERRITIN SERPL-MCNC: 7678 NG/ML — HIGH (ref 30–400)
FIBRINOGEN PPP-MCNC: 583 MG/DL — HIGH (ref 204.4–570.6)
FIBRINOGEN PPP-MCNC: 604 MG/DL — HIGH (ref 204.4–570.6)
FIBRINOGEN PPP-MCNC: 626 MG/DL — HIGH (ref 204.4–570.6)
FIBRINOGEN PPP-MCNC: 661 MG/DL — HIGH (ref 204.4–570.6)
FIBRINOGEN PPP-MCNC: 663 MG/DL — HIGH (ref 204.4–570.6)
FIBRINOGEN PPP-MCNC: >700 MG/DL — HIGH (ref 204.4–570.6)
FIBRINOGEN PPP-MCNC: >700 MG/DL — HIGH (ref 204.4–570.6)
FUNGITELL: 58 PG/ML — SIGNIFICANT CHANGE UP
FUNGITELL: 60 PG/ML — SIGNIFICANT CHANGE UP
GAS PNL BLDA: SIGNIFICANT CHANGE UP
GIANT PLATELETS BLD QL SMEAR: PRESENT — SIGNIFICANT CHANGE UP
GLUCOSE BLDC GLUCOMTR-MCNC: 101 MG/DL — HIGH (ref 70–99)
GLUCOSE BLDC GLUCOMTR-MCNC: 101 MG/DL — HIGH (ref 70–99)
GLUCOSE BLDC GLUCOMTR-MCNC: 106 MG/DL — HIGH (ref 70–99)
GLUCOSE BLDC GLUCOMTR-MCNC: 106 MG/DL — HIGH (ref 70–99)
GLUCOSE BLDC GLUCOMTR-MCNC: 107 MG/DL — HIGH (ref 70–99)
GLUCOSE BLDC GLUCOMTR-MCNC: 107 MG/DL — HIGH (ref 70–99)
GLUCOSE BLDC GLUCOMTR-MCNC: 108 MG/DL — HIGH (ref 70–99)
GLUCOSE BLDC GLUCOMTR-MCNC: 108 MG/DL — HIGH (ref 70–99)
GLUCOSE BLDC GLUCOMTR-MCNC: 109 MG/DL — HIGH (ref 70–99)
GLUCOSE BLDC GLUCOMTR-MCNC: 109 MG/DL — HIGH (ref 70–99)
GLUCOSE BLDC GLUCOMTR-MCNC: 110 MG/DL — HIGH (ref 70–99)
GLUCOSE BLDC GLUCOMTR-MCNC: 110 MG/DL — HIGH (ref 70–99)
GLUCOSE BLDC GLUCOMTR-MCNC: 111 MG/DL — HIGH (ref 70–99)
GLUCOSE BLDC GLUCOMTR-MCNC: 112 MG/DL — HIGH (ref 70–99)
GLUCOSE BLDC GLUCOMTR-MCNC: 113 MG/DL — HIGH (ref 70–99)
GLUCOSE BLDC GLUCOMTR-MCNC: 114 MG/DL — HIGH (ref 70–99)
GLUCOSE BLDC GLUCOMTR-MCNC: 116 MG/DL — HIGH (ref 70–99)
GLUCOSE BLDC GLUCOMTR-MCNC: 119 MG/DL — HIGH (ref 70–99)
GLUCOSE BLDC GLUCOMTR-MCNC: 121 MG/DL — HIGH (ref 70–99)
GLUCOSE BLDC GLUCOMTR-MCNC: 122 MG/DL — HIGH (ref 70–99)
GLUCOSE BLDC GLUCOMTR-MCNC: 122 MG/DL — HIGH (ref 70–99)
GLUCOSE BLDC GLUCOMTR-MCNC: 124 MG/DL — HIGH (ref 70–99)
GLUCOSE BLDC GLUCOMTR-MCNC: 126 MG/DL — HIGH (ref 70–99)
GLUCOSE BLDC GLUCOMTR-MCNC: 127 MG/DL — HIGH (ref 70–99)
GLUCOSE BLDC GLUCOMTR-MCNC: 129 MG/DL — HIGH (ref 70–99)
GLUCOSE BLDC GLUCOMTR-MCNC: 130 MG/DL — HIGH (ref 70–99)
GLUCOSE BLDC GLUCOMTR-MCNC: 131 MG/DL — HIGH (ref 70–99)
GLUCOSE BLDC GLUCOMTR-MCNC: 131 MG/DL — HIGH (ref 70–99)
GLUCOSE BLDC GLUCOMTR-MCNC: 132 MG/DL — HIGH (ref 70–99)
GLUCOSE BLDC GLUCOMTR-MCNC: 132 MG/DL — HIGH (ref 70–99)
GLUCOSE BLDC GLUCOMTR-MCNC: 133 MG/DL — HIGH (ref 70–99)
GLUCOSE BLDC GLUCOMTR-MCNC: 134 MG/DL — HIGH (ref 70–99)
GLUCOSE BLDC GLUCOMTR-MCNC: 134 MG/DL — HIGH (ref 70–99)
GLUCOSE BLDC GLUCOMTR-MCNC: 135 MG/DL — HIGH (ref 70–99)
GLUCOSE BLDC GLUCOMTR-MCNC: 136 MG/DL — HIGH (ref 70–99)
GLUCOSE BLDC GLUCOMTR-MCNC: 137 MG/DL — HIGH (ref 70–99)
GLUCOSE BLDC GLUCOMTR-MCNC: 137 MG/DL — HIGH (ref 70–99)
GLUCOSE BLDC GLUCOMTR-MCNC: 138 MG/DL — HIGH (ref 70–99)
GLUCOSE BLDC GLUCOMTR-MCNC: 139 MG/DL — HIGH (ref 70–99)
GLUCOSE BLDC GLUCOMTR-MCNC: 139 MG/DL — HIGH (ref 70–99)
GLUCOSE BLDC GLUCOMTR-MCNC: 140 MG/DL — HIGH (ref 70–99)
GLUCOSE BLDC GLUCOMTR-MCNC: 141 MG/DL — HIGH (ref 70–99)
GLUCOSE BLDC GLUCOMTR-MCNC: 141 MG/DL — HIGH (ref 70–99)
GLUCOSE BLDC GLUCOMTR-MCNC: 142 MG/DL — HIGH (ref 70–99)
GLUCOSE BLDC GLUCOMTR-MCNC: 143 MG/DL — HIGH (ref 70–99)
GLUCOSE BLDC GLUCOMTR-MCNC: 144 MG/DL — HIGH (ref 70–99)
GLUCOSE BLDC GLUCOMTR-MCNC: 145 MG/DL — HIGH (ref 70–99)
GLUCOSE BLDC GLUCOMTR-MCNC: 146 MG/DL — HIGH (ref 70–99)
GLUCOSE BLDC GLUCOMTR-MCNC: 146 MG/DL — HIGH (ref 70–99)
GLUCOSE BLDC GLUCOMTR-MCNC: 147 MG/DL — HIGH (ref 70–99)
GLUCOSE BLDC GLUCOMTR-MCNC: 148 MG/DL — HIGH (ref 70–99)
GLUCOSE BLDC GLUCOMTR-MCNC: 149 MG/DL — HIGH (ref 70–99)
GLUCOSE BLDC GLUCOMTR-MCNC: 150 MG/DL — HIGH (ref 70–99)
GLUCOSE BLDC GLUCOMTR-MCNC: 150 MG/DL — HIGH (ref 70–99)
GLUCOSE BLDC GLUCOMTR-MCNC: 151 MG/DL — HIGH (ref 70–99)
GLUCOSE BLDC GLUCOMTR-MCNC: 152 MG/DL — HIGH (ref 70–99)
GLUCOSE BLDC GLUCOMTR-MCNC: 153 MG/DL — HIGH (ref 70–99)
GLUCOSE BLDC GLUCOMTR-MCNC: 153 MG/DL — HIGH (ref 70–99)
GLUCOSE BLDC GLUCOMTR-MCNC: 154 MG/DL — HIGH (ref 70–99)
GLUCOSE BLDC GLUCOMTR-MCNC: 156 MG/DL — HIGH (ref 70–99)
GLUCOSE BLDC GLUCOMTR-MCNC: 157 MG/DL — HIGH (ref 70–99)
GLUCOSE BLDC GLUCOMTR-MCNC: 158 MG/DL — HIGH (ref 70–99)
GLUCOSE BLDC GLUCOMTR-MCNC: 158 MG/DL — HIGH (ref 70–99)
GLUCOSE BLDC GLUCOMTR-MCNC: 159 MG/DL — HIGH (ref 70–99)
GLUCOSE BLDC GLUCOMTR-MCNC: 159 MG/DL — HIGH (ref 70–99)
GLUCOSE BLDC GLUCOMTR-MCNC: 160 MG/DL — HIGH (ref 70–99)
GLUCOSE BLDC GLUCOMTR-MCNC: 162 MG/DL — HIGH (ref 70–99)
GLUCOSE BLDC GLUCOMTR-MCNC: 163 MG/DL — HIGH (ref 70–99)
GLUCOSE BLDC GLUCOMTR-MCNC: 164 MG/DL — HIGH (ref 70–99)
GLUCOSE BLDC GLUCOMTR-MCNC: 164 MG/DL — HIGH (ref 70–99)
GLUCOSE BLDC GLUCOMTR-MCNC: 165 MG/DL — HIGH (ref 70–99)
GLUCOSE BLDC GLUCOMTR-MCNC: 167 MG/DL — HIGH (ref 70–99)
GLUCOSE BLDC GLUCOMTR-MCNC: 168 MG/DL — HIGH (ref 70–99)
GLUCOSE BLDC GLUCOMTR-MCNC: 169 MG/DL — HIGH (ref 70–99)
GLUCOSE BLDC GLUCOMTR-MCNC: 170 MG/DL — HIGH (ref 70–99)
GLUCOSE BLDC GLUCOMTR-MCNC: 171 MG/DL — HIGH (ref 70–99)
GLUCOSE BLDC GLUCOMTR-MCNC: 171 MG/DL — HIGH (ref 70–99)
GLUCOSE BLDC GLUCOMTR-MCNC: 172 MG/DL — HIGH (ref 70–99)
GLUCOSE BLDC GLUCOMTR-MCNC: 172 MG/DL — HIGH (ref 70–99)
GLUCOSE BLDC GLUCOMTR-MCNC: 173 MG/DL — HIGH (ref 70–99)
GLUCOSE BLDC GLUCOMTR-MCNC: 174 MG/DL — HIGH (ref 70–99)
GLUCOSE BLDC GLUCOMTR-MCNC: 174 MG/DL — HIGH (ref 70–99)
GLUCOSE BLDC GLUCOMTR-MCNC: 175 MG/DL — HIGH (ref 70–99)
GLUCOSE BLDC GLUCOMTR-MCNC: 176 MG/DL — HIGH (ref 70–99)
GLUCOSE BLDC GLUCOMTR-MCNC: 177 MG/DL — HIGH (ref 70–99)
GLUCOSE BLDC GLUCOMTR-MCNC: 177 MG/DL — HIGH (ref 70–99)
GLUCOSE BLDC GLUCOMTR-MCNC: 178 MG/DL — HIGH (ref 70–99)
GLUCOSE BLDC GLUCOMTR-MCNC: 179 MG/DL — HIGH (ref 70–99)
GLUCOSE BLDC GLUCOMTR-MCNC: 179 MG/DL — HIGH (ref 70–99)
GLUCOSE BLDC GLUCOMTR-MCNC: 180 MG/DL — HIGH (ref 70–99)
GLUCOSE BLDC GLUCOMTR-MCNC: 181 MG/DL — HIGH (ref 70–99)
GLUCOSE BLDC GLUCOMTR-MCNC: 182 MG/DL — HIGH (ref 70–99)
GLUCOSE BLDC GLUCOMTR-MCNC: 183 MG/DL — HIGH (ref 70–99)
GLUCOSE BLDC GLUCOMTR-MCNC: 184 MG/DL — HIGH (ref 70–99)
GLUCOSE BLDC GLUCOMTR-MCNC: 184 MG/DL — HIGH (ref 70–99)
GLUCOSE BLDC GLUCOMTR-MCNC: 185 MG/DL — HIGH (ref 70–99)
GLUCOSE BLDC GLUCOMTR-MCNC: 185 MG/DL — HIGH (ref 70–99)
GLUCOSE BLDC GLUCOMTR-MCNC: 186 MG/DL — HIGH (ref 70–99)
GLUCOSE BLDC GLUCOMTR-MCNC: 187 MG/DL — HIGH (ref 70–99)
GLUCOSE BLDC GLUCOMTR-MCNC: 188 MG/DL — HIGH (ref 70–99)
GLUCOSE BLDC GLUCOMTR-MCNC: 188 MG/DL — HIGH (ref 70–99)
GLUCOSE BLDC GLUCOMTR-MCNC: 189 MG/DL — HIGH (ref 70–99)
GLUCOSE BLDC GLUCOMTR-MCNC: 189 MG/DL — HIGH (ref 70–99)
GLUCOSE BLDC GLUCOMTR-MCNC: 190 MG/DL — HIGH (ref 70–99)
GLUCOSE BLDC GLUCOMTR-MCNC: 190 MG/DL — HIGH (ref 70–99)
GLUCOSE BLDC GLUCOMTR-MCNC: 191 MG/DL — HIGH (ref 70–99)
GLUCOSE BLDC GLUCOMTR-MCNC: 191 MG/DL — HIGH (ref 70–99)
GLUCOSE BLDC GLUCOMTR-MCNC: 192 MG/DL — HIGH (ref 70–99)
GLUCOSE BLDC GLUCOMTR-MCNC: 192 MG/DL — HIGH (ref 70–99)
GLUCOSE BLDC GLUCOMTR-MCNC: 193 MG/DL — HIGH (ref 70–99)
GLUCOSE BLDC GLUCOMTR-MCNC: 193 MG/DL — HIGH (ref 70–99)
GLUCOSE BLDC GLUCOMTR-MCNC: 194 MG/DL — HIGH (ref 70–99)
GLUCOSE BLDC GLUCOMTR-MCNC: 194 MG/DL — HIGH (ref 70–99)
GLUCOSE BLDC GLUCOMTR-MCNC: 195 MG/DL — HIGH (ref 70–99)
GLUCOSE BLDC GLUCOMTR-MCNC: 195 MG/DL — HIGH (ref 70–99)
GLUCOSE BLDC GLUCOMTR-MCNC: 196 MG/DL — HIGH (ref 70–99)
GLUCOSE BLDC GLUCOMTR-MCNC: 197 MG/DL — HIGH (ref 70–99)
GLUCOSE BLDC GLUCOMTR-MCNC: 198 MG/DL — HIGH (ref 70–99)
GLUCOSE BLDC GLUCOMTR-MCNC: 199 MG/DL — HIGH (ref 70–99)
GLUCOSE BLDC GLUCOMTR-MCNC: 200 MG/DL — HIGH (ref 70–99)
GLUCOSE BLDC GLUCOMTR-MCNC: 201 MG/DL — HIGH (ref 70–99)
GLUCOSE BLDC GLUCOMTR-MCNC: 202 MG/DL — HIGH (ref 70–99)
GLUCOSE BLDC GLUCOMTR-MCNC: 202 MG/DL — HIGH (ref 70–99)
GLUCOSE BLDC GLUCOMTR-MCNC: 203 MG/DL — HIGH (ref 70–99)
GLUCOSE BLDC GLUCOMTR-MCNC: 204 MG/DL — HIGH (ref 70–99)
GLUCOSE BLDC GLUCOMTR-MCNC: 205 MG/DL — HIGH (ref 70–99)
GLUCOSE BLDC GLUCOMTR-MCNC: 205 MG/DL — HIGH (ref 70–99)
GLUCOSE BLDC GLUCOMTR-MCNC: 206 MG/DL — HIGH (ref 70–99)
GLUCOSE BLDC GLUCOMTR-MCNC: 206 MG/DL — HIGH (ref 70–99)
GLUCOSE BLDC GLUCOMTR-MCNC: 207 MG/DL — HIGH (ref 70–99)
GLUCOSE BLDC GLUCOMTR-MCNC: 207 MG/DL — HIGH (ref 70–99)
GLUCOSE BLDC GLUCOMTR-MCNC: 208 MG/DL — HIGH (ref 70–99)
GLUCOSE BLDC GLUCOMTR-MCNC: 209 MG/DL — HIGH (ref 70–99)
GLUCOSE BLDC GLUCOMTR-MCNC: 211 MG/DL — HIGH (ref 70–99)
GLUCOSE BLDC GLUCOMTR-MCNC: 214 MG/DL — HIGH (ref 70–99)
GLUCOSE BLDC GLUCOMTR-MCNC: 214 MG/DL — HIGH (ref 70–99)
GLUCOSE BLDC GLUCOMTR-MCNC: 215 MG/DL — HIGH (ref 70–99)
GLUCOSE BLDC GLUCOMTR-MCNC: 217 MG/DL — HIGH (ref 70–99)
GLUCOSE BLDC GLUCOMTR-MCNC: 217 MG/DL — HIGH (ref 70–99)
GLUCOSE BLDC GLUCOMTR-MCNC: 218 MG/DL — HIGH (ref 70–99)
GLUCOSE BLDC GLUCOMTR-MCNC: 218 MG/DL — HIGH (ref 70–99)
GLUCOSE BLDC GLUCOMTR-MCNC: 219 MG/DL — HIGH (ref 70–99)
GLUCOSE BLDC GLUCOMTR-MCNC: 220 MG/DL — HIGH (ref 70–99)
GLUCOSE BLDC GLUCOMTR-MCNC: 222 MG/DL — HIGH (ref 70–99)
GLUCOSE BLDC GLUCOMTR-MCNC: 222 MG/DL — HIGH (ref 70–99)
GLUCOSE BLDC GLUCOMTR-MCNC: 223 MG/DL — HIGH (ref 70–99)
GLUCOSE BLDC GLUCOMTR-MCNC: 224 MG/DL — HIGH (ref 70–99)
GLUCOSE BLDC GLUCOMTR-MCNC: 224 MG/DL — HIGH (ref 70–99)
GLUCOSE BLDC GLUCOMTR-MCNC: 225 MG/DL — HIGH (ref 70–99)
GLUCOSE BLDC GLUCOMTR-MCNC: 225 MG/DL — HIGH (ref 70–99)
GLUCOSE BLDC GLUCOMTR-MCNC: 226 MG/DL — HIGH (ref 70–99)
GLUCOSE BLDC GLUCOMTR-MCNC: 226 MG/DL — HIGH (ref 70–99)
GLUCOSE BLDC GLUCOMTR-MCNC: 227 MG/DL — HIGH (ref 70–99)
GLUCOSE BLDC GLUCOMTR-MCNC: 227 MG/DL — HIGH (ref 70–99)
GLUCOSE BLDC GLUCOMTR-MCNC: 228 MG/DL — HIGH (ref 70–99)
GLUCOSE BLDC GLUCOMTR-MCNC: 228 MG/DL — HIGH (ref 70–99)
GLUCOSE BLDC GLUCOMTR-MCNC: 229 MG/DL — HIGH (ref 70–99)
GLUCOSE BLDC GLUCOMTR-MCNC: 230 MG/DL — HIGH (ref 70–99)
GLUCOSE BLDC GLUCOMTR-MCNC: 231 MG/DL — HIGH (ref 70–99)
GLUCOSE BLDC GLUCOMTR-MCNC: 231 MG/DL — HIGH (ref 70–99)
GLUCOSE BLDC GLUCOMTR-MCNC: 234 MG/DL — HIGH (ref 70–99)
GLUCOSE BLDC GLUCOMTR-MCNC: 235 MG/DL — HIGH (ref 70–99)
GLUCOSE BLDC GLUCOMTR-MCNC: 237 MG/DL — HIGH (ref 70–99)
GLUCOSE BLDC GLUCOMTR-MCNC: 237 MG/DL — HIGH (ref 70–99)
GLUCOSE BLDC GLUCOMTR-MCNC: 243 MG/DL — HIGH (ref 70–99)
GLUCOSE BLDC GLUCOMTR-MCNC: 248 MG/DL — HIGH (ref 70–99)
GLUCOSE BLDC GLUCOMTR-MCNC: 249 MG/DL — HIGH (ref 70–99)
GLUCOSE BLDC GLUCOMTR-MCNC: 250 MG/DL — HIGH (ref 70–99)
GLUCOSE BLDC GLUCOMTR-MCNC: 254 MG/DL — HIGH (ref 70–99)
GLUCOSE BLDC GLUCOMTR-MCNC: 256 MG/DL — HIGH (ref 70–99)
GLUCOSE BLDC GLUCOMTR-MCNC: 256 MG/DL — HIGH (ref 70–99)
GLUCOSE BLDC GLUCOMTR-MCNC: 258 MG/DL — HIGH (ref 70–99)
GLUCOSE BLDC GLUCOMTR-MCNC: 262 MG/DL — HIGH (ref 70–99)
GLUCOSE BLDC GLUCOMTR-MCNC: 264 MG/DL — HIGH (ref 70–99)
GLUCOSE BLDC GLUCOMTR-MCNC: 267 MG/DL — HIGH (ref 70–99)
GLUCOSE BLDC GLUCOMTR-MCNC: 268 MG/DL — HIGH (ref 70–99)
GLUCOSE BLDC GLUCOMTR-MCNC: 271 MG/DL — HIGH (ref 70–99)
GLUCOSE BLDC GLUCOMTR-MCNC: 273 MG/DL — HIGH (ref 70–99)
GLUCOSE BLDC GLUCOMTR-MCNC: 275 MG/DL — HIGH (ref 70–99)
GLUCOSE BLDC GLUCOMTR-MCNC: 277 MG/DL — HIGH (ref 70–99)
GLUCOSE BLDC GLUCOMTR-MCNC: 283 MG/DL — HIGH (ref 70–99)
GLUCOSE BLDC GLUCOMTR-MCNC: 283 MG/DL — HIGH (ref 70–99)
GLUCOSE BLDC GLUCOMTR-MCNC: 288 MG/DL — HIGH (ref 70–99)
GLUCOSE BLDC GLUCOMTR-MCNC: 289 MG/DL — HIGH (ref 70–99)
GLUCOSE BLDC GLUCOMTR-MCNC: 289 MG/DL — HIGH (ref 70–99)
GLUCOSE BLDC GLUCOMTR-MCNC: 291 MG/DL — HIGH (ref 70–99)
GLUCOSE BLDC GLUCOMTR-MCNC: 295 MG/DL — HIGH (ref 70–99)
GLUCOSE BLDC GLUCOMTR-MCNC: 297 MG/DL — HIGH (ref 70–99)
GLUCOSE BLDC GLUCOMTR-MCNC: 302 MG/DL — HIGH (ref 70–99)
GLUCOSE BLDC GLUCOMTR-MCNC: 303 MG/DL — HIGH (ref 70–99)
GLUCOSE BLDC GLUCOMTR-MCNC: 303 MG/DL — HIGH (ref 70–99)
GLUCOSE BLDC GLUCOMTR-MCNC: 308 MG/DL — HIGH (ref 70–99)
GLUCOSE BLDC GLUCOMTR-MCNC: 310 MG/DL — HIGH (ref 70–99)
GLUCOSE BLDC GLUCOMTR-MCNC: 314 MG/DL — HIGH (ref 70–99)
GLUCOSE BLDC GLUCOMTR-MCNC: 316 MG/DL — HIGH (ref 70–99)
GLUCOSE BLDC GLUCOMTR-MCNC: 317 MG/DL — HIGH (ref 70–99)
GLUCOSE BLDC GLUCOMTR-MCNC: 320 MG/DL — HIGH (ref 70–99)
GLUCOSE BLDC GLUCOMTR-MCNC: 322 MG/DL — HIGH (ref 70–99)
GLUCOSE BLDC GLUCOMTR-MCNC: 323 MG/DL — HIGH (ref 70–99)
GLUCOSE BLDC GLUCOMTR-MCNC: 340 MG/DL — HIGH (ref 70–99)
GLUCOSE BLDC GLUCOMTR-MCNC: 343 MG/DL — HIGH (ref 70–99)
GLUCOSE BLDC GLUCOMTR-MCNC: 346 MG/DL — HIGH (ref 70–99)
GLUCOSE BLDC GLUCOMTR-MCNC: 358 MG/DL — HIGH (ref 70–99)
GLUCOSE BLDC GLUCOMTR-MCNC: 373 MG/DL — HIGH (ref 70–99)
GLUCOSE BLDC GLUCOMTR-MCNC: 378 MG/DL — HIGH (ref 70–99)
GLUCOSE BLDC GLUCOMTR-MCNC: 406 MG/DL — HIGH (ref 70–99)
GLUCOSE BLDC GLUCOMTR-MCNC: 66 MG/DL — LOW (ref 70–99)
GLUCOSE BLDC GLUCOMTR-MCNC: 75 MG/DL — SIGNIFICANT CHANGE UP (ref 70–99)
GLUCOSE BLDC GLUCOMTR-MCNC: 79 MG/DL — SIGNIFICANT CHANGE UP (ref 70–99)
GLUCOSE BLDC GLUCOMTR-MCNC: 83 MG/DL — SIGNIFICANT CHANGE UP (ref 70–99)
GLUCOSE BLDC GLUCOMTR-MCNC: 89 MG/DL — SIGNIFICANT CHANGE UP (ref 70–99)
GLUCOSE BLDC GLUCOMTR-MCNC: 89 MG/DL — SIGNIFICANT CHANGE UP (ref 70–99)
GLUCOSE BLDC GLUCOMTR-MCNC: 90 MG/DL — SIGNIFICANT CHANGE UP (ref 70–99)
GLUCOSE BLDC GLUCOMTR-MCNC: 92 MG/DL — SIGNIFICANT CHANGE UP (ref 70–99)
GLUCOSE BLDC GLUCOMTR-MCNC: 95 MG/DL — SIGNIFICANT CHANGE UP (ref 70–99)
GLUCOSE BLDC GLUCOMTR-MCNC: 95 MG/DL — SIGNIFICANT CHANGE UP (ref 70–99)
GLUCOSE BLDC GLUCOMTR-MCNC: 96 MG/DL — SIGNIFICANT CHANGE UP (ref 70–99)
GLUCOSE BLDC GLUCOMTR-MCNC: 97 MG/DL — SIGNIFICANT CHANGE UP (ref 70–99)
GLUCOSE BLDC GLUCOMTR-MCNC: 97 MG/DL — SIGNIFICANT CHANGE UP (ref 70–99)
GLUCOSE BLDC GLUCOMTR-MCNC: 98 MG/DL — SIGNIFICANT CHANGE UP (ref 70–99)
GLUCOSE BLDC GLUCOMTR-MCNC: 99 MG/DL — SIGNIFICANT CHANGE UP (ref 70–99)
GLUCOSE SERPL-MCNC: 112 MG/DL — HIGH (ref 70–99)
GLUCOSE SERPL-MCNC: 115 MG/DL — HIGH (ref 70–99)
GLUCOSE SERPL-MCNC: 126 MG/DL — HIGH (ref 70–99)
GLUCOSE SERPL-MCNC: 127 MG/DL — HIGH (ref 70–99)
GLUCOSE SERPL-MCNC: 128 MG/DL — HIGH (ref 70–99)
GLUCOSE SERPL-MCNC: 144 MG/DL — HIGH (ref 70–99)
GLUCOSE SERPL-MCNC: 146 MG/DL — HIGH (ref 70–99)
GLUCOSE SERPL-MCNC: 148 MG/DL — HIGH (ref 70–99)
GLUCOSE SERPL-MCNC: 151 MG/DL — HIGH (ref 70–99)
GLUCOSE SERPL-MCNC: 159 MG/DL — HIGH (ref 70–99)
GLUCOSE SERPL-MCNC: 160 MG/DL — HIGH (ref 70–99)
GLUCOSE SERPL-MCNC: 161 MG/DL — HIGH (ref 70–99)
GLUCOSE SERPL-MCNC: 161 MG/DL — HIGH (ref 70–99)
GLUCOSE SERPL-MCNC: 162 MG/DL — HIGH (ref 70–99)
GLUCOSE SERPL-MCNC: 170 MG/DL — HIGH (ref 70–99)
GLUCOSE SERPL-MCNC: 172 MG/DL — HIGH (ref 70–99)
GLUCOSE SERPL-MCNC: 173 MG/DL — HIGH (ref 70–99)
GLUCOSE SERPL-MCNC: 176 MG/DL — HIGH (ref 70–99)
GLUCOSE SERPL-MCNC: 177 MG/DL — HIGH (ref 70–99)
GLUCOSE SERPL-MCNC: 184 MG/DL — HIGH (ref 70–99)
GLUCOSE SERPL-MCNC: 184 MG/DL — HIGH (ref 70–99)
GLUCOSE SERPL-MCNC: 186 MG/DL — HIGH (ref 70–99)
GLUCOSE SERPL-MCNC: 186 MG/DL — HIGH (ref 70–99)
GLUCOSE SERPL-MCNC: 197 MG/DL — HIGH (ref 70–99)
GLUCOSE SERPL-MCNC: 197 MG/DL — HIGH (ref 70–99)
GLUCOSE SERPL-MCNC: 213 MG/DL — HIGH (ref 70–99)
GLUCOSE SERPL-MCNC: 214 MG/DL — HIGH (ref 70–99)
GLUCOSE SERPL-MCNC: 232 MG/DL — HIGH (ref 70–99)
GLUCOSE SERPL-MCNC: 238 MG/DL — HIGH (ref 70–99)
GLUCOSE SERPL-MCNC: 239 MG/DL — HIGH (ref 70–99)
GLUCOSE SERPL-MCNC: 240 MG/DL — HIGH (ref 70–99)
GLUCOSE SERPL-MCNC: 243 MG/DL — HIGH (ref 70–99)
GLUCOSE SERPL-MCNC: 246 MG/DL — HIGH (ref 70–99)
GLUCOSE SERPL-MCNC: 254 MG/DL — HIGH (ref 70–99)
GLUCOSE SERPL-MCNC: 254 MG/DL — HIGH (ref 70–99)
GLUCOSE SERPL-MCNC: 287 MG/DL — HIGH (ref 70–99)
GLUCOSE SERPL-MCNC: 293 MG/DL — HIGH (ref 70–99)
GLUCOSE SERPL-MCNC: 302 MG/DL — HIGH (ref 70–99)
GLUCOSE SERPL-MCNC: 304 MG/DL — HIGH (ref 70–99)
GLUCOSE SERPL-MCNC: 340 MG/DL — HIGH (ref 70–99)
GLUCOSE SERPL-MCNC: 351 MG/DL — HIGH (ref 70–99)
GLUCOSE SERPL-MCNC: 351 MG/DL — HIGH (ref 70–99)
GLUCOSE SERPL-MCNC: 416 MG/DL — HIGH (ref 70–99)
GLUCOSE SERPL-MCNC: 73 MG/DL — SIGNIFICANT CHANGE UP (ref 70–99)
GLUCOSE SERPL-MCNC: 77 MG/DL — SIGNIFICANT CHANGE UP (ref 70–99)
GLUCOSE UR QL: NEGATIVE — SIGNIFICANT CHANGE UP
GRAM STN FLD: SIGNIFICANT CHANGE UP
HCO3 BLDA-SCNC: 19 MMOL/L — LOW (ref 23–27)
HCO3 BLDA-SCNC: 20 MMOL/L — LOW (ref 23–27)
HCO3 BLDA-SCNC: 22 MMOL/L — SIGNIFICANT CHANGE UP (ref 21–29)
HCO3 BLDA-SCNC: 24 MMOL/L — SIGNIFICANT CHANGE UP (ref 21–29)
HCO3 BLDA-SCNC: 24 MMOL/L — SIGNIFICANT CHANGE UP (ref 23–27)
HCO3 BLDA-SCNC: 24 MMOL/L — SIGNIFICANT CHANGE UP (ref 23–27)
HCO3 BLDA-SCNC: 26 MMOL/L — SIGNIFICANT CHANGE UP (ref 21–29)
HCO3 BLDA-SCNC: 26 MMOL/L — SIGNIFICANT CHANGE UP (ref 23–27)
HCO3 BLDA-SCNC: 28 MMOL/L — HIGH (ref 23–27)
HCO3 BLDA-SCNC: 29 MMOL/L — HIGH (ref 23–27)
HCT VFR BLD CALC: 19.4 % — LOW (ref 42–52)
HCT VFR BLD CALC: 21.3 % — LOW (ref 42–52)
HCT VFR BLD CALC: 21.8 % — LOW (ref 42–52)
HCT VFR BLD CALC: 22.2 % — LOW (ref 42–52)
HCT VFR BLD CALC: 23 % — LOW (ref 42–52)
HCT VFR BLD CALC: 23.1 % — LOW (ref 42–52)
HCT VFR BLD CALC: 23.5 % — LOW (ref 42–52)
HCT VFR BLD CALC: 23.6 % — LOW (ref 42–52)
HCT VFR BLD CALC: 24.2 % — LOW (ref 42–52)
HCT VFR BLD CALC: 24.4 % — LOW (ref 42–52)
HCT VFR BLD CALC: 24.8 % — LOW (ref 42–52)
HCT VFR BLD CALC: 25.2 % — LOW (ref 42–52)
HCT VFR BLD CALC: 25.7 % — LOW (ref 42–52)
HCT VFR BLD CALC: 26.1 % — LOW (ref 42–52)
HCT VFR BLD CALC: 26.3 % — LOW (ref 42–52)
HCT VFR BLD CALC: 26.4 % — LOW (ref 42–52)
HCT VFR BLD CALC: 26.4 % — LOW (ref 42–52)
HCT VFR BLD CALC: 26.9 % — LOW (ref 42–52)
HCT VFR BLD CALC: 27.9 % — LOW (ref 42–52)
HCT VFR BLD CALC: 27.9 % — LOW (ref 42–52)
HCT VFR BLD CALC: 29.6 % — LOW (ref 42–52)
HCT VFR BLD CALC: 30.8 % — LOW (ref 42–52)
HCT VFR BLD CALC: 32.7 % — LOW (ref 42–52)
HCT VFR BLD CALC: 33.3 % — LOW (ref 42–52)
HCT VFR BLD CALC: 33.6 % — LOW (ref 42–52)
HCT VFR BLD CALC: 34.2 % — LOW (ref 42–52)
HCT VFR BLD CALC: 34.6 % — LOW (ref 42–52)
HCT VFR BLD CALC: 35 % — LOW (ref 42–52)
HCT VFR BLD CALC: 36.6 % — LOW (ref 42–52)
HCT VFR BLD CALC: 36.6 % — LOW (ref 42–52)
HCT VFR BLD CALC: 38.7 % — LOW (ref 42–52)
HCT VFR BLD CALC: 38.7 % — LOW (ref 42–52)
HCT VFR BLD CALC: 38.8 % — LOW (ref 42–52)
HCT VFR BLD CALC: 39.4 % — LOW (ref 42–52)
HCT VFR BLD CALC: 40.3 % — LOW (ref 42–52)
HCT VFR BLD CALC: 40.6 % — LOW (ref 42–52)
HCT VFR BLD CALC: 40.6 % — LOW (ref 42–52)
HCV AB S/CO SERPL IA: 0.03 COI — SIGNIFICANT CHANGE UP
HCV AB SERPL-IMP: SIGNIFICANT CHANGE UP
HGB BLD-MCNC: 10.1 G/DL — LOW (ref 14–18)
HGB BLD-MCNC: 10.6 G/DL — LOW (ref 14–18)
HGB BLD-MCNC: 10.7 G/DL — LOW (ref 14–18)
HGB BLD-MCNC: 10.9 G/DL — LOW (ref 14–18)
HGB BLD-MCNC: 11.2 G/DL — LOW (ref 14–18)
HGB BLD-MCNC: 11.4 G/DL — LOW (ref 14–18)
HGB BLD-MCNC: 11.6 G/DL — LOW (ref 14–18)
HGB BLD-MCNC: 11.7 G/DL — LOW (ref 14–18)
HGB BLD-MCNC: 12.1 G/DL — LOW (ref 14–18)
HGB BLD-MCNC: 12.5 G/DL — LOW (ref 14–18)
HGB BLD-MCNC: 12.9 G/DL — LOW (ref 14–18)
HGB BLD-MCNC: 13.1 G/DL — LOW (ref 14–18)
HGB BLD-MCNC: 13.3 G/DL — LOW (ref 14–18)
HGB BLD-MCNC: 13.5 G/DL — LOW (ref 14–18)
HGB BLD-MCNC: 13.6 G/DL — LOW (ref 14–18)
HGB BLD-MCNC: 13.6 G/DL — LOW (ref 14–18)
HGB BLD-MCNC: 6.1 G/DL — CRITICAL LOW (ref 14–18)
HGB BLD-MCNC: 6.7 G/DL — CRITICAL LOW (ref 14–18)
HGB BLD-MCNC: 6.9 G/DL — CRITICAL LOW (ref 14–18)
HGB BLD-MCNC: 7 G/DL — LOW (ref 14–18)
HGB BLD-MCNC: 7.1 G/DL — LOW (ref 14–18)
HGB BLD-MCNC: 7.2 G/DL — LOW (ref 14–18)
HGB BLD-MCNC: 7.3 G/DL — LOW (ref 14–18)
HGB BLD-MCNC: 7.3 G/DL — LOW (ref 14–18)
HGB BLD-MCNC: 7.6 G/DL — LOW (ref 14–18)
HGB BLD-MCNC: 7.6 G/DL — LOW (ref 14–18)
HGB BLD-MCNC: 7.7 G/DL — LOW (ref 14–18)
HGB BLD-MCNC: 7.8 G/DL — LOW (ref 14–18)
HGB BLD-MCNC: 7.8 G/DL — LOW (ref 14–18)
HGB BLD-MCNC: 7.9 G/DL — LOW (ref 14–18)
HGB BLD-MCNC: 8.1 G/DL — LOW (ref 14–18)
HGB BLD-MCNC: 8.3 G/DL — LOW (ref 14–18)
HGB BLD-MCNC: 8.4 G/DL — LOW (ref 14–18)
HGB BLD-MCNC: 8.5 G/DL — LOW (ref 14–18)
HGB BLD-MCNC: 8.7 G/DL — LOW (ref 14–18)
HGB BLD-MCNC: 9.1 G/DL — LOW (ref 14–18)
HGB BLD-MCNC: 9.5 G/DL — LOW (ref 14–18)
HOROWITZ INDEX BLDA+IHG-RTO: 21 — SIGNIFICANT CHANGE UP
HOROWITZ INDEX BLDA+IHG-RTO: 50 — SIGNIFICANT CHANGE UP
HOROWITZ INDEX BLDA+IHG-RTO: 60 — SIGNIFICANT CHANGE UP
HOROWITZ INDEX BLDA+IHG-RTO: 70 — SIGNIFICANT CHANGE UP
HOROWITZ INDEX BLDA+IHG-RTO: 80 — SIGNIFICANT CHANGE UP
HYALINE CASTS # UR AUTO: 16 /LPF — HIGH (ref 0–7)
IMM GRANULOCYTES NFR BLD AUTO: 0.8 % — HIGH (ref 0.1–0.3)
IMM GRANULOCYTES NFR BLD AUTO: 0.9 % — HIGH (ref 0.1–0.3)
IMM GRANULOCYTES NFR BLD AUTO: 1 % — HIGH (ref 0.1–0.3)
IMM GRANULOCYTES NFR BLD AUTO: 1.5 % — HIGH (ref 0.1–0.3)
IMM GRANULOCYTES NFR BLD AUTO: 1.6 % — HIGH (ref 0.1–0.3)
IMM GRANULOCYTES NFR BLD AUTO: 1.7 % — HIGH (ref 0.1–0.3)
IMM GRANULOCYTES NFR BLD AUTO: 1.9 % — HIGH (ref 0.1–0.3)
IMM GRANULOCYTES NFR BLD AUTO: 11.1 % — HIGH (ref 0.1–0.3)
IMM GRANULOCYTES NFR BLD AUTO: 2.4 % — HIGH (ref 0.1–0.3)
IMM GRANULOCYTES NFR BLD AUTO: 2.4 % — HIGH (ref 0.1–0.3)
IMM GRANULOCYTES NFR BLD AUTO: 2.5 % — HIGH (ref 0.1–0.3)
IMM GRANULOCYTES NFR BLD AUTO: 2.5 % — HIGH (ref 0.1–0.3)
IMM GRANULOCYTES NFR BLD AUTO: 3.4 % — HIGH (ref 0.1–0.3)
IMM GRANULOCYTES NFR BLD AUTO: 4.7 % — HIGH (ref 0.1–0.3)
IMM GRANULOCYTES NFR BLD AUTO: 5 % — HIGH (ref 0.1–0.3)
IMM GRANULOCYTES NFR BLD AUTO: 5.6 % — HIGH (ref 0.1–0.3)
IMM GRANULOCYTES NFR BLD AUTO: 5.7 % — HIGH (ref 0.1–0.3)
IMM GRANULOCYTES NFR BLD AUTO: 6 % — HIGH (ref 0.1–0.3)
IMM GRANULOCYTES NFR BLD AUTO: 7.9 % — HIGH (ref 0.1–0.3)
IMM GRANULOCYTES NFR BLD AUTO: 9.3 % — HIGH (ref 0.1–0.3)
INR BLD: 0.97 RATIO — SIGNIFICANT CHANGE UP (ref 0.65–1.3)
INR BLD: 1 RATIO — SIGNIFICANT CHANGE UP (ref 0.65–1.3)
INR BLD: 1.05 RATIO — SIGNIFICANT CHANGE UP (ref 0.65–1.3)
INR BLD: 1.09 RATIO — SIGNIFICANT CHANGE UP (ref 0.65–1.3)
INR BLD: 1.23 RATIO — SIGNIFICANT CHANGE UP (ref 0.65–1.3)
INR BLD: 1.23 RATIO — SIGNIFICANT CHANGE UP (ref 0.65–1.3)
KETONES UR-MCNC: NEGATIVE — SIGNIFICANT CHANGE UP
LDH SERPL L TO P-CCNC: 1056 U/L — HIGH (ref 50–242)
LDH SERPL L TO P-CCNC: 1118 U/L — HIGH (ref 50–242)
LDH SERPL L TO P-CCNC: 1134 U/L — HIGH (ref 50–242)
LDH SERPL L TO P-CCNC: 1223 U/L — HIGH (ref 50–242)
LDH SERPL L TO P-CCNC: 1312 U/L — HIGH (ref 50–242)
LDH SERPL L TO P-CCNC: 1384 U/L — HIGH (ref 50–242)
LDH SERPL L TO P-CCNC: 611 U/L — HIGH (ref 50–242)
LDH SERPL L TO P-CCNC: 963 U/L — HIGH (ref 50–242)
LEUKOCYTE ESTERASE UR-ACNC: NEGATIVE — SIGNIFICANT CHANGE UP
LYMPHOCYTES # BLD AUTO: 0.23 K/UL — LOW (ref 1.2–3.4)
LYMPHOCYTES # BLD AUTO: 0.34 K/UL — LOW (ref 1.2–3.4)
LYMPHOCYTES # BLD AUTO: 0.36 K/UL — LOW (ref 1.2–3.4)
LYMPHOCYTES # BLD AUTO: 0.47 K/UL — LOW (ref 1.2–3.4)
LYMPHOCYTES # BLD AUTO: 0.5 K/UL — LOW (ref 1.2–3.4)
LYMPHOCYTES # BLD AUTO: 0.52 K/UL — LOW (ref 1.2–3.4)
LYMPHOCYTES # BLD AUTO: 0.65 K/UL — LOW (ref 1.2–3.4)
LYMPHOCYTES # BLD AUTO: 0.68 K/UL — LOW (ref 1.2–3.4)
LYMPHOCYTES # BLD AUTO: 0.69 K/UL — LOW (ref 1.2–3.4)
LYMPHOCYTES # BLD AUTO: 0.73 K/UL — LOW (ref 1.2–3.4)
LYMPHOCYTES # BLD AUTO: 0.75 K/UL — LOW (ref 1.2–3.4)
LYMPHOCYTES # BLD AUTO: 0.75 K/UL — LOW (ref 1.2–3.4)
LYMPHOCYTES # BLD AUTO: 0.82 K/UL — LOW (ref 1.2–3.4)
LYMPHOCYTES # BLD AUTO: 0.85 K/UL — LOW (ref 1.2–3.4)
LYMPHOCYTES # BLD AUTO: 0.87 K/UL — LOW (ref 1.2–3.4)
LYMPHOCYTES # BLD AUTO: 0.92 K/UL — LOW (ref 1.2–3.4)
LYMPHOCYTES # BLD AUTO: 0.93 K/UL — LOW (ref 1.2–3.4)
LYMPHOCYTES # BLD AUTO: 1.01 K/UL — LOW (ref 1.2–3.4)
LYMPHOCYTES # BLD AUTO: 1.14 K/UL — LOW (ref 1.2–3.4)
LYMPHOCYTES # BLD AUTO: 1.16 K/UL — LOW (ref 1.2–3.4)
LYMPHOCYTES # BLD AUTO: 1.19 K/UL — LOW (ref 1.2–3.4)
LYMPHOCYTES # BLD AUTO: 1.19 K/UL — LOW (ref 1.2–3.4)
LYMPHOCYTES # BLD AUTO: 1.22 K/UL — SIGNIFICANT CHANGE UP (ref 1.2–3.4)
LYMPHOCYTES # BLD AUTO: 1.51 K/UL — SIGNIFICANT CHANGE UP (ref 1.2–3.4)
LYMPHOCYTES # BLD AUTO: 1.7 K/UL — SIGNIFICANT CHANGE UP (ref 1.2–3.4)
LYMPHOCYTES # BLD AUTO: 10.2 % — LOW (ref 20.5–51.1)
LYMPHOCYTES # BLD AUTO: 12.6 % — LOW (ref 20.5–51.1)
LYMPHOCYTES # BLD AUTO: 17.2 % — LOW (ref 20.5–51.1)
LYMPHOCYTES # BLD AUTO: 2.17 K/UL — SIGNIFICANT CHANGE UP (ref 1.2–3.4)
LYMPHOCYTES # BLD AUTO: 2.6 % — LOW (ref 20.5–51.1)
LYMPHOCYTES # BLD AUTO: 3.2 % — LOW (ref 20.5–51.1)
LYMPHOCYTES # BLD AUTO: 3.2 % — LOW (ref 20.5–51.1)
LYMPHOCYTES # BLD AUTO: 3.5 % — LOW (ref 20.5–51.1)
LYMPHOCYTES # BLD AUTO: 3.7 % — LOW (ref 20.5–51.1)
LYMPHOCYTES # BLD AUTO: 3.8 % — LOW (ref 20.5–51.1)
LYMPHOCYTES # BLD AUTO: 4.3 % — LOW (ref 20.5–51.1)
LYMPHOCYTES # BLD AUTO: 4.3 % — LOW (ref 20.5–51.1)
LYMPHOCYTES # BLD AUTO: 4.4 % — LOW (ref 20.5–51.1)
LYMPHOCYTES # BLD AUTO: 4.5 % — LOW (ref 20.5–51.1)
LYMPHOCYTES # BLD AUTO: 4.6 % — LOW (ref 20.5–51.1)
LYMPHOCYTES # BLD AUTO: 4.7 % — LOW (ref 20.5–51.1)
LYMPHOCYTES # BLD AUTO: 5.2 % — LOW (ref 20.5–51.1)
LYMPHOCYTES # BLD AUTO: 5.9 % — LOW (ref 20.5–51.1)
LYMPHOCYTES # BLD AUTO: 6.2 % — LOW (ref 20.5–51.1)
LYMPHOCYTES # BLD AUTO: 6.3 % — LOW (ref 20.5–51.1)
LYMPHOCYTES # BLD AUTO: 6.9 % — LOW (ref 20.5–51.1)
LYMPHOCYTES # BLD AUTO: 7.1 % — LOW (ref 20.5–51.1)
LYMPHOCYTES # BLD AUTO: 7.7 % — LOW (ref 20.5–51.1)
LYMPHOCYTES # BLD AUTO: 8.3 % — LOW (ref 20.5–51.1)
LYMPHOCYTES # BLD AUTO: 8.7 % — LOW (ref 20.5–51.1)
MAGNESIUM SERPL-MCNC: 2.2 MG/DL — SIGNIFICANT CHANGE UP (ref 1.8–2.4)
MAGNESIUM SERPL-MCNC: 2.3 MG/DL — SIGNIFICANT CHANGE UP (ref 1.8–2.4)
MAGNESIUM SERPL-MCNC: 2.4 MG/DL — SIGNIFICANT CHANGE UP (ref 1.8–2.4)
MAGNESIUM SERPL-MCNC: 2.4 MG/DL — SIGNIFICANT CHANGE UP (ref 1.8–2.4)
MAGNESIUM SERPL-MCNC: 2.5 MG/DL — HIGH (ref 1.8–2.4)
MAGNESIUM SERPL-MCNC: 2.6 MG/DL — HIGH (ref 1.8–2.4)
MAGNESIUM SERPL-MCNC: 2.6 MG/DL — HIGH (ref 1.8–2.4)
MAGNESIUM SERPL-MCNC: 2.7 MG/DL — HIGH (ref 1.8–2.4)
MAGNESIUM SERPL-MCNC: 3 MG/DL — HIGH (ref 1.8–2.4)
MAGNESIUM SERPL-MCNC: 3.1 MG/DL — CRITICAL HIGH (ref 1.8–2.4)
MAGNESIUM SERPL-MCNC: 3.3 MG/DL — CRITICAL HIGH (ref 1.8–2.4)
MAGNESIUM SERPL-MCNC: 3.4 MG/DL — CRITICAL HIGH (ref 1.8–2.4)
MAGNESIUM SERPL-MCNC: 3.4 MG/DL — CRITICAL HIGH (ref 1.8–2.4)
MAGNESIUM SERPL-MCNC: 3.6 MG/DL — CRITICAL HIGH (ref 1.8–2.4)
MANUAL SMEAR VERIFICATION: SIGNIFICANT CHANGE UP
MCHC RBC-ENTMCNC: 27.4 PG — SIGNIFICANT CHANGE UP (ref 27–31)
MCHC RBC-ENTMCNC: 27.4 PG — SIGNIFICANT CHANGE UP (ref 27–31)
MCHC RBC-ENTMCNC: 27.5 PG — SIGNIFICANT CHANGE UP (ref 27–31)
MCHC RBC-ENTMCNC: 27.6 PG — SIGNIFICANT CHANGE UP (ref 27–31)
MCHC RBC-ENTMCNC: 27.7 PG — SIGNIFICANT CHANGE UP (ref 27–31)
MCHC RBC-ENTMCNC: 27.8 PG — SIGNIFICANT CHANGE UP (ref 27–31)
MCHC RBC-ENTMCNC: 28 PG — SIGNIFICANT CHANGE UP (ref 27–31)
MCHC RBC-ENTMCNC: 28 PG — SIGNIFICANT CHANGE UP (ref 27–31)
MCHC RBC-ENTMCNC: 28.2 PG — SIGNIFICANT CHANGE UP (ref 27–31)
MCHC RBC-ENTMCNC: 28.2 PG — SIGNIFICANT CHANGE UP (ref 27–31)
MCHC RBC-ENTMCNC: 28.3 PG — SIGNIFICANT CHANGE UP (ref 27–31)
MCHC RBC-ENTMCNC: 28.7 PG — SIGNIFICANT CHANGE UP (ref 27–31)
MCHC RBC-ENTMCNC: 28.8 PG — SIGNIFICANT CHANGE UP (ref 27–31)
MCHC RBC-ENTMCNC: 28.8 PG — SIGNIFICANT CHANGE UP (ref 27–31)
MCHC RBC-ENTMCNC: 29 PG — SIGNIFICANT CHANGE UP (ref 27–31)
MCHC RBC-ENTMCNC: 29.1 PG — SIGNIFICANT CHANGE UP (ref 27–31)
MCHC RBC-ENTMCNC: 29.2 PG — SIGNIFICANT CHANGE UP (ref 27–31)
MCHC RBC-ENTMCNC: 29.2 PG — SIGNIFICANT CHANGE UP (ref 27–31)
MCHC RBC-ENTMCNC: 29.3 PG — SIGNIFICANT CHANGE UP (ref 27–31)
MCHC RBC-ENTMCNC: 29.3 PG — SIGNIFICANT CHANGE UP (ref 27–31)
MCHC RBC-ENTMCNC: 29.4 G/DL — LOW (ref 32–37)
MCHC RBC-ENTMCNC: 29.9 G/DL — LOW (ref 32–37)
MCHC RBC-ENTMCNC: 29.9 G/DL — LOW (ref 32–37)
MCHC RBC-ENTMCNC: 30.5 G/DL — LOW (ref 32–37)
MCHC RBC-ENTMCNC: 30.5 G/DL — LOW (ref 32–37)
MCHC RBC-ENTMCNC: 30.8 G/DL — LOW (ref 32–37)
MCHC RBC-ENTMCNC: 30.9 G/DL — LOW (ref 32–37)
MCHC RBC-ENTMCNC: 31 G/DL — LOW (ref 32–37)
MCHC RBC-ENTMCNC: 31.1 G/DL — LOW (ref 32–37)
MCHC RBC-ENTMCNC: 31.1 G/DL — LOW (ref 32–37)
MCHC RBC-ENTMCNC: 31.4 G/DL — LOW (ref 32–37)
MCHC RBC-ENTMCNC: 31.5 G/DL — LOW (ref 32–37)
MCHC RBC-ENTMCNC: 31.5 G/DL — LOW (ref 32–37)
MCHC RBC-ENTMCNC: 31.7 G/DL — LOW (ref 32–37)
MCHC RBC-ENTMCNC: 31.7 G/DL — LOW (ref 32–37)
MCHC RBC-ENTMCNC: 31.8 G/DL — LOW (ref 32–37)
MCHC RBC-ENTMCNC: 32 G/DL — SIGNIFICANT CHANGE UP (ref 32–37)
MCHC RBC-ENTMCNC: 32.1 G/DL — SIGNIFICANT CHANGE UP (ref 32–37)
MCHC RBC-ENTMCNC: 32.3 G/DL — SIGNIFICANT CHANGE UP (ref 32–37)
MCHC RBC-ENTMCNC: 32.4 G/DL — SIGNIFICANT CHANGE UP (ref 32–37)
MCHC RBC-ENTMCNC: 32.4 G/DL — SIGNIFICANT CHANGE UP (ref 32–37)
MCHC RBC-ENTMCNC: 32.6 G/DL — SIGNIFICANT CHANGE UP (ref 32–37)
MCHC RBC-ENTMCNC: 32.6 G/DL — SIGNIFICANT CHANGE UP (ref 32–37)
MCHC RBC-ENTMCNC: 32.7 G/DL — SIGNIFICANT CHANGE UP (ref 32–37)
MCHC RBC-ENTMCNC: 32.8 G/DL — SIGNIFICANT CHANGE UP (ref 32–37)
MCHC RBC-ENTMCNC: 32.9 G/DL — SIGNIFICANT CHANGE UP (ref 32–37)
MCHC RBC-ENTMCNC: 33.1 G/DL — SIGNIFICANT CHANGE UP (ref 32–37)
MCHC RBC-ENTMCNC: 33.2 G/DL — SIGNIFICANT CHANGE UP (ref 32–37)
MCHC RBC-ENTMCNC: 33.5 G/DL — SIGNIFICANT CHANGE UP (ref 32–37)
MCHC RBC-ENTMCNC: 33.5 G/DL — SIGNIFICANT CHANGE UP (ref 32–37)
MCHC RBC-ENTMCNC: 33.9 G/DL — SIGNIFICANT CHANGE UP (ref 32–37)
MCHC RBC-ENTMCNC: 34.4 G/DL — SIGNIFICANT CHANGE UP (ref 32–37)
MCHC RBC-ENTMCNC: 35.1 G/DL — SIGNIFICANT CHANGE UP (ref 32–37)
MCV RBC AUTO: 81.9 FL — SIGNIFICANT CHANGE UP (ref 80–94)
MCV RBC AUTO: 82.9 FL — SIGNIFICANT CHANGE UP (ref 80–94)
MCV RBC AUTO: 83.3 FL — SIGNIFICANT CHANGE UP (ref 80–94)
MCV RBC AUTO: 83.4 FL — SIGNIFICANT CHANGE UP (ref 80–94)
MCV RBC AUTO: 84.5 FL — SIGNIFICANT CHANGE UP (ref 80–94)
MCV RBC AUTO: 85.2 FL — SIGNIFICANT CHANGE UP (ref 80–94)
MCV RBC AUTO: 85.8 FL — SIGNIFICANT CHANGE UP (ref 80–94)
MCV RBC AUTO: 85.9 FL — SIGNIFICANT CHANGE UP (ref 80–94)
MCV RBC AUTO: 85.9 FL — SIGNIFICANT CHANGE UP (ref 80–94)
MCV RBC AUTO: 86 FL — SIGNIFICANT CHANGE UP (ref 80–94)
MCV RBC AUTO: 86.3 FL — SIGNIFICANT CHANGE UP (ref 80–94)
MCV RBC AUTO: 86.8 FL — SIGNIFICANT CHANGE UP (ref 80–94)
MCV RBC AUTO: 86.8 FL — SIGNIFICANT CHANGE UP (ref 80–94)
MCV RBC AUTO: 88 FL — SIGNIFICANT CHANGE UP (ref 80–94)
MCV RBC AUTO: 88.7 FL — SIGNIFICANT CHANGE UP (ref 80–94)
MCV RBC AUTO: 88.8 FL — SIGNIFICANT CHANGE UP (ref 80–94)
MCV RBC AUTO: 89 FL — SIGNIFICANT CHANGE UP (ref 80–94)
MCV RBC AUTO: 89 FL — SIGNIFICANT CHANGE UP (ref 80–94)
MCV RBC AUTO: 89.1 FL — SIGNIFICANT CHANGE UP (ref 80–94)
MCV RBC AUTO: 89.2 FL — SIGNIFICANT CHANGE UP (ref 80–94)
MCV RBC AUTO: 89.3 FL — SIGNIFICANT CHANGE UP (ref 80–94)
MCV RBC AUTO: 89.4 FL — SIGNIFICANT CHANGE UP (ref 80–94)
MCV RBC AUTO: 89.4 FL — SIGNIFICANT CHANGE UP (ref 80–94)
MCV RBC AUTO: 89.7 FL — SIGNIFICANT CHANGE UP (ref 80–94)
MCV RBC AUTO: 89.8 FL — SIGNIFICANT CHANGE UP (ref 80–94)
MCV RBC AUTO: 90.2 FL — SIGNIFICANT CHANGE UP (ref 80–94)
MCV RBC AUTO: 90.6 FL — SIGNIFICANT CHANGE UP (ref 80–94)
MCV RBC AUTO: 90.8 FL — SIGNIFICANT CHANGE UP (ref 80–94)
MCV RBC AUTO: 91 FL — SIGNIFICANT CHANGE UP (ref 80–94)
MCV RBC AUTO: 91 FL — SIGNIFICANT CHANGE UP (ref 80–94)
MCV RBC AUTO: 91.1 FL — SIGNIFICANT CHANGE UP (ref 80–94)
MCV RBC AUTO: 92.3 FL — SIGNIFICANT CHANGE UP (ref 80–94)
MCV RBC AUTO: 92.6 FL — SIGNIFICANT CHANGE UP (ref 80–94)
MCV RBC AUTO: 93 FL — SIGNIFICANT CHANGE UP (ref 80–94)
MCV RBC AUTO: 94.3 FL — HIGH (ref 80–94)
METAMYELOCYTES # FLD: 1.7 % — HIGH (ref 0–0)
METHOD TYPE: SIGNIFICANT CHANGE UP
METHOD TYPE: SIGNIFICANT CHANGE UP
MONOCYTES # BLD AUTO: 0 K/UL — LOW (ref 0.1–0.6)
MONOCYTES # BLD AUTO: 0.07 K/UL — LOW (ref 0.1–0.6)
MONOCYTES # BLD AUTO: 0.21 K/UL — SIGNIFICANT CHANGE UP (ref 0.1–0.6)
MONOCYTES # BLD AUTO: 0.23 K/UL — SIGNIFICANT CHANGE UP (ref 0.1–0.6)
MONOCYTES # BLD AUTO: 0.23 K/UL — SIGNIFICANT CHANGE UP (ref 0.1–0.6)
MONOCYTES # BLD AUTO: 0.28 K/UL — SIGNIFICANT CHANGE UP (ref 0.1–0.6)
MONOCYTES # BLD AUTO: 0.29 K/UL — SIGNIFICANT CHANGE UP (ref 0.1–0.6)
MONOCYTES # BLD AUTO: 0.29 K/UL — SIGNIFICANT CHANGE UP (ref 0.1–0.6)
MONOCYTES # BLD AUTO: 0.3 K/UL — SIGNIFICANT CHANGE UP (ref 0.1–0.6)
MONOCYTES # BLD AUTO: 0.34 K/UL — SIGNIFICANT CHANGE UP (ref 0.1–0.6)
MONOCYTES # BLD AUTO: 0.34 K/UL — SIGNIFICANT CHANGE UP (ref 0.1–0.6)
MONOCYTES # BLD AUTO: 0.36 K/UL — SIGNIFICANT CHANGE UP (ref 0.1–0.6)
MONOCYTES # BLD AUTO: 0.4 K/UL — SIGNIFICANT CHANGE UP (ref 0.1–0.6)
MONOCYTES # BLD AUTO: 0.4 K/UL — SIGNIFICANT CHANGE UP (ref 0.1–0.6)
MONOCYTES # BLD AUTO: 0.43 K/UL — SIGNIFICANT CHANGE UP (ref 0.1–0.6)
MONOCYTES # BLD AUTO: 0.53 K/UL — SIGNIFICANT CHANGE UP (ref 0.1–0.6)
MONOCYTES # BLD AUTO: 0.56 K/UL — SIGNIFICANT CHANGE UP (ref 0.1–0.6)
MONOCYTES # BLD AUTO: 0.6 K/UL — SIGNIFICANT CHANGE UP (ref 0.1–0.6)
MONOCYTES # BLD AUTO: 0.61 K/UL — HIGH (ref 0.1–0.6)
MONOCYTES # BLD AUTO: 0.73 K/UL — HIGH (ref 0.1–0.6)
MONOCYTES # BLD AUTO: 0.79 K/UL — HIGH (ref 0.1–0.6)
MONOCYTES # BLD AUTO: 0.8 K/UL — HIGH (ref 0.1–0.6)
MONOCYTES # BLD AUTO: 1.16 K/UL — HIGH (ref 0.1–0.6)
MONOCYTES # BLD AUTO: 2.14 K/UL — HIGH (ref 0.1–0.6)
MONOCYTES # BLD AUTO: 2.36 K/UL — HIGH (ref 0.1–0.6)
MONOCYTES # BLD AUTO: 2.65 K/UL — HIGH (ref 0.1–0.6)
MONOCYTES NFR BLD AUTO: 0 % — LOW (ref 1.7–9.3)
MONOCYTES NFR BLD AUTO: 0.9 % — LOW (ref 1.7–9.3)
MONOCYTES NFR BLD AUTO: 1.5 % — LOW (ref 1.7–9.3)
MONOCYTES NFR BLD AUTO: 1.5 % — LOW (ref 1.7–9.3)
MONOCYTES NFR BLD AUTO: 1.7 % — SIGNIFICANT CHANGE UP (ref 1.7–9.3)
MONOCYTES NFR BLD AUTO: 1.8 % — SIGNIFICANT CHANGE UP (ref 1.7–9.3)
MONOCYTES NFR BLD AUTO: 1.9 % — SIGNIFICANT CHANGE UP (ref 1.7–9.3)
MONOCYTES NFR BLD AUTO: 10.3 % — HIGH (ref 1.7–9.3)
MONOCYTES NFR BLD AUTO: 2 % — SIGNIFICANT CHANGE UP (ref 1.7–9.3)
MONOCYTES NFR BLD AUTO: 2.2 % — SIGNIFICANT CHANGE UP (ref 1.7–9.3)
MONOCYTES NFR BLD AUTO: 2.9 % — SIGNIFICANT CHANGE UP (ref 1.7–9.3)
MONOCYTES NFR BLD AUTO: 3.2 % — SIGNIFICANT CHANGE UP (ref 1.7–9.3)
MONOCYTES NFR BLD AUTO: 3.2 % — SIGNIFICANT CHANGE UP (ref 1.7–9.3)
MONOCYTES NFR BLD AUTO: 3.6 % — SIGNIFICANT CHANGE UP (ref 1.7–9.3)
MONOCYTES NFR BLD AUTO: 3.8 % — SIGNIFICANT CHANGE UP (ref 1.7–9.3)
MONOCYTES NFR BLD AUTO: 3.9 % — SIGNIFICANT CHANGE UP (ref 1.7–9.3)
MONOCYTES NFR BLD AUTO: 4.1 % — SIGNIFICANT CHANGE UP (ref 1.7–9.3)
MONOCYTES NFR BLD AUTO: 4.3 % — SIGNIFICANT CHANGE UP (ref 1.7–9.3)
MONOCYTES NFR BLD AUTO: 4.3 % — SIGNIFICANT CHANGE UP (ref 1.7–9.3)
MONOCYTES NFR BLD AUTO: 4.6 % — SIGNIFICANT CHANGE UP (ref 1.7–9.3)
MONOCYTES NFR BLD AUTO: 5.7 % — SIGNIFICANT CHANGE UP (ref 1.7–9.3)
MONOCYTES NFR BLD AUTO: 6 % — SIGNIFICANT CHANGE UP (ref 1.7–9.3)
MONOCYTES NFR BLD AUTO: 6.1 % — SIGNIFICANT CHANGE UP (ref 1.7–9.3)
MONOCYTES NFR BLD AUTO: 7 % — SIGNIFICANT CHANGE UP (ref 1.7–9.3)
MONOCYTES NFR BLD AUTO: 7.5 % — SIGNIFICANT CHANGE UP (ref 1.7–9.3)
MONOCYTES NFR BLD AUTO: 9.1 % — SIGNIFICANT CHANGE UP (ref 1.7–9.3)
MRSA PCR RESULT.: NEGATIVE — SIGNIFICANT CHANGE UP
MYELOCYTES NFR BLD: 0.9 % — HIGH (ref 0–0)
MYELOCYTES NFR BLD: 2.6 % — HIGH (ref 0–0)
NEUTROPHILS # BLD AUTO: 10.66 K/UL — HIGH (ref 1.4–6.5)
NEUTROPHILS # BLD AUTO: 10.96 K/UL — HIGH (ref 1.4–6.5)
NEUTROPHILS # BLD AUTO: 12 K/UL — HIGH (ref 1.4–6.5)
NEUTROPHILS # BLD AUTO: 12.24 K/UL — HIGH (ref 1.4–6.5)
NEUTROPHILS # BLD AUTO: 13.32 K/UL — HIGH (ref 1.4–6.5)
NEUTROPHILS # BLD AUTO: 13.73 K/UL — HIGH (ref 1.4–6.5)
NEUTROPHILS # BLD AUTO: 13.95 K/UL — HIGH (ref 1.4–6.5)
NEUTROPHILS # BLD AUTO: 13.96 K/UL — HIGH (ref 1.4–6.5)
NEUTROPHILS # BLD AUTO: 16.96 K/UL — HIGH (ref 1.4–6.5)
NEUTROPHILS # BLD AUTO: 17.45 K/UL — HIGH (ref 1.4–6.5)
NEUTROPHILS # BLD AUTO: 17.72 K/UL — HIGH (ref 1.4–6.5)
NEUTROPHILS # BLD AUTO: 18.5 K/UL — HIGH (ref 1.4–6.5)
NEUTROPHILS # BLD AUTO: 18.6 K/UL — HIGH (ref 1.4–6.5)
NEUTROPHILS # BLD AUTO: 18.61 K/UL — HIGH (ref 1.4–6.5)
NEUTROPHILS # BLD AUTO: 20.17 K/UL — HIGH (ref 1.4–6.5)
NEUTROPHILS # BLD AUTO: 23.2 K/UL — HIGH (ref 1.4–6.5)
NEUTROPHILS # BLD AUTO: 24.91 K/UL — HIGH (ref 1.4–6.5)
NEUTROPHILS # BLD AUTO: 25.28 K/UL — HIGH (ref 1.4–6.5)
NEUTROPHILS # BLD AUTO: 3.97 K/UL — SIGNIFICANT CHANGE UP (ref 1.4–6.5)
NEUTROPHILS # BLD AUTO: 4.79 K/UL — SIGNIFICANT CHANGE UP (ref 1.4–6.5)
NEUTROPHILS # BLD AUTO: 5.46 K/UL — SIGNIFICANT CHANGE UP (ref 1.4–6.5)
NEUTROPHILS # BLD AUTO: 5.92 K/UL — SIGNIFICANT CHANGE UP (ref 1.4–6.5)
NEUTROPHILS # BLD AUTO: 6.92 K/UL — HIGH (ref 1.4–6.5)
NEUTROPHILS # BLD AUTO: 7.04 K/UL — HIGH (ref 1.4–6.5)
NEUTROPHILS # BLD AUTO: 9.48 K/UL — HIGH (ref 1.4–6.5)
NEUTROPHILS # BLD AUTO: 9.87 K/UL — HIGH (ref 1.4–6.5)
NEUTROPHILS NFR BLD AUTO: 72.5 % — SIGNIFICANT CHANGE UP (ref 42.2–75.2)
NEUTROPHILS NFR BLD AUTO: 74 % — SIGNIFICANT CHANGE UP (ref 42.2–75.2)
NEUTROPHILS NFR BLD AUTO: 78 % — HIGH (ref 42.2–75.2)
NEUTROPHILS NFR BLD AUTO: 80.7 % — HIGH (ref 42.2–75.2)
NEUTROPHILS NFR BLD AUTO: 81 % — HIGH (ref 42.2–75.2)
NEUTROPHILS NFR BLD AUTO: 81.1 % — HIGH (ref 42.2–75.2)
NEUTROPHILS NFR BLD AUTO: 82.3 % — HIGH (ref 42.2–75.2)
NEUTROPHILS NFR BLD AUTO: 82.7 % — HIGH (ref 42.2–75.2)
NEUTROPHILS NFR BLD AUTO: 82.7 % — HIGH (ref 42.2–75.2)
NEUTROPHILS NFR BLD AUTO: 82.9 % — HIGH (ref 42.2–75.2)
NEUTROPHILS NFR BLD AUTO: 83.1 % — HIGH (ref 42.2–75.2)
NEUTROPHILS NFR BLD AUTO: 83.4 % — HIGH (ref 42.2–75.2)
NEUTROPHILS NFR BLD AUTO: 84.5 % — HIGH (ref 42.2–75.2)
NEUTROPHILS NFR BLD AUTO: 86.1 % — HIGH (ref 42.2–75.2)
NEUTROPHILS NFR BLD AUTO: 86.6 % — HIGH (ref 42.2–75.2)
NEUTROPHILS NFR BLD AUTO: 86.8 % — HIGH (ref 42.2–75.2)
NEUTROPHILS NFR BLD AUTO: 88.3 % — HIGH (ref 42.2–75.2)
NEUTROPHILS NFR BLD AUTO: 88.7 % — HIGH (ref 42.2–75.2)
NEUTROPHILS NFR BLD AUTO: 89.8 % — HIGH (ref 42.2–75.2)
NEUTROPHILS NFR BLD AUTO: 90 % — HIGH (ref 42.2–75.2)
NEUTROPHILS NFR BLD AUTO: 90.4 % — HIGH (ref 42.2–75.2)
NEUTROPHILS NFR BLD AUTO: 90.9 % — HIGH (ref 42.2–75.2)
NEUTROPHILS NFR BLD AUTO: 91.7 % — HIGH (ref 42.2–75.2)
NEUTROPHILS NFR BLD AUTO: 92.6 % — HIGH (ref 42.2–75.2)
NEUTROPHILS NFR BLD AUTO: 93.2 % — HIGH (ref 42.2–75.2)
NEUTROPHILS NFR BLD AUTO: 94.5 % — HIGH (ref 42.2–75.2)
NITRITE UR-MCNC: NEGATIVE — SIGNIFICANT CHANGE UP
NRBC # BLD: 0 /100 WBCS — SIGNIFICANT CHANGE UP (ref 0–0)
NRBC # BLD: 1 /100 — HIGH (ref 0–0)
NRBC # BLD: 11 /100 WBCS — HIGH (ref 0–0)
NRBC # BLD: 16 /100 WBCS — HIGH (ref 0–0)
NRBC # BLD: 3 /100 WBCS — HIGH (ref 0–0)
NRBC # BLD: 3 /100 — HIGH (ref 0–0)
NRBC # BLD: 6 /100 WBCS — HIGH (ref 0–0)
NRBC # BLD: SIGNIFICANT CHANGE UP /100 WBCS (ref 0–0)
NRBC # BLD: SIGNIFICANT CHANGE UP /100 WBCS (ref 0–0)
NT-PROBNP SERPL-SCNC: 398 PG/ML — HIGH (ref 0–300)
ORGANISM # SPEC MICROSCOPIC CNT: SIGNIFICANT CHANGE UP
OSMOLALITY SERPL: 299 MOSMOL/KG — SIGNIFICANT CHANGE UP (ref 275–300)
OSMOLALITY UR: 384 MOS/KG — SIGNIFICANT CHANGE UP (ref 50–1400)
PCO2 BLDA: 35 MMHG — LOW (ref 38–42)
PCO2 BLDA: 39 MMHG — SIGNIFICANT CHANGE UP (ref 38–42)
PCO2 BLDA: 39 MMHG — SIGNIFICANT CHANGE UP (ref 38–42)
PCO2 BLDA: 40 MMHG — SIGNIFICANT CHANGE UP (ref 38–42)
PCO2 BLDA: 41 MMHG — SIGNIFICANT CHANGE UP (ref 38–42)
PCO2 BLDA: 42 MMHG — SIGNIFICANT CHANGE UP (ref 38–42)
PCO2 BLDA: 42 MMHG — SIGNIFICANT CHANGE UP (ref 38–42)
PCO2 BLDA: 46 MMHG — HIGH (ref 38–42)
PCO2 BLDA: 51 MMHG — HIGH (ref 38–42)
PCO2 BLDA: 51 MMHG — HIGH (ref 38–42)
PH BLDA: 7.29 — LOW (ref 7.38–7.42)
PH BLDA: 7.3 — LOW (ref 7.38–7.42)
PH BLDA: 7.34 — LOW (ref 7.38–7.42)
PH BLDA: 7.35 — LOW (ref 7.38–7.42)
PH BLDA: 7.36 — LOW (ref 7.38–7.42)
PH BLDA: 7.37 — LOW (ref 7.38–7.42)
PH BLDA: 7.4 — SIGNIFICANT CHANGE UP (ref 7.38–7.42)
PH BLDA: 7.41 — SIGNIFICANT CHANGE UP (ref 7.38–7.42)
PH UR: 5.5 — SIGNIFICANT CHANGE UP (ref 5–8)
PHOSPHATE SERPL-MCNC: 11.2 MG/DL — HIGH (ref 2.1–4.9)
PHOSPHATE SERPL-MCNC: 11.6 MG/DL — HIGH (ref 2.1–4.9)
PHOSPHATE SERPL-MCNC: 11.9 MG/DL — HIGH (ref 2.1–4.9)
PHOSPHATE SERPL-MCNC: 2.6 MG/DL — SIGNIFICANT CHANGE UP (ref 2.1–4.9)
PHOSPHATE SERPL-MCNC: 3 MG/DL — SIGNIFICANT CHANGE UP (ref 2.1–4.9)
PHOSPHATE SERPL-MCNC: 3.3 MG/DL — SIGNIFICANT CHANGE UP (ref 2.1–4.9)
PHOSPHATE SERPL-MCNC: 5.8 MG/DL — HIGH (ref 2.1–4.9)
PHOSPHATE SERPL-MCNC: 6.3 MG/DL — HIGH (ref 2.1–4.9)
PHOSPHATE SERPL-MCNC: 6.4 MG/DL — HIGH (ref 2.1–4.9)
PHOSPHATE SERPL-MCNC: 6.5 MG/DL — HIGH (ref 2.1–4.9)
PHOSPHATE SERPL-MCNC: 7.2 MG/DL — HIGH (ref 2.1–4.9)
PHOSPHATE SERPL-MCNC: 7.6 MG/DL — HIGH (ref 2.1–4.9)
PHOSPHATE SERPL-MCNC: 8.3 MG/DL — HIGH (ref 2.1–4.9)
PHOSPHATE SERPL-MCNC: 8.6 MG/DL — HIGH (ref 2.1–4.9)
PHOSPHATE SERPL-MCNC: 8.9 MG/DL — HIGH (ref 2.1–4.9)
PHOSPHATE SERPL-MCNC: 9.2 MG/DL — HIGH (ref 2.1–4.9)
PLAT MORPH BLD: ABNORMAL
PLAT MORPH BLD: NORMAL — SIGNIFICANT CHANGE UP
PLATELET # BLD AUTO: 120 K/UL — LOW (ref 130–400)
PLATELET # BLD AUTO: 151 K/UL — SIGNIFICANT CHANGE UP (ref 130–400)
PLATELET # BLD AUTO: 155 K/UL — SIGNIFICANT CHANGE UP (ref 130–400)
PLATELET # BLD AUTO: 160 K/UL — SIGNIFICANT CHANGE UP (ref 130–400)
PLATELET # BLD AUTO: 164 K/UL — SIGNIFICANT CHANGE UP (ref 130–400)
PLATELET # BLD AUTO: 169 K/UL — SIGNIFICANT CHANGE UP (ref 130–400)
PLATELET # BLD AUTO: 169 K/UL — SIGNIFICANT CHANGE UP (ref 130–400)
PLATELET # BLD AUTO: 173 K/UL — SIGNIFICANT CHANGE UP (ref 130–400)
PLATELET # BLD AUTO: 180 K/UL — SIGNIFICANT CHANGE UP (ref 130–400)
PLATELET # BLD AUTO: 182 K/UL — SIGNIFICANT CHANGE UP (ref 130–400)
PLATELET # BLD AUTO: 187 K/UL — SIGNIFICANT CHANGE UP (ref 130–400)
PLATELET # BLD AUTO: 193 K/UL — SIGNIFICANT CHANGE UP (ref 130–400)
PLATELET # BLD AUTO: 198 K/UL — SIGNIFICANT CHANGE UP (ref 130–400)
PLATELET # BLD AUTO: 204 K/UL — SIGNIFICANT CHANGE UP (ref 130–400)
PLATELET # BLD AUTO: 210 K/UL — SIGNIFICANT CHANGE UP (ref 130–400)
PLATELET # BLD AUTO: 211 K/UL — SIGNIFICANT CHANGE UP (ref 130–400)
PLATELET # BLD AUTO: 214 K/UL — SIGNIFICANT CHANGE UP (ref 130–400)
PLATELET # BLD AUTO: 215 K/UL — SIGNIFICANT CHANGE UP (ref 130–400)
PLATELET # BLD AUTO: 216 K/UL — SIGNIFICANT CHANGE UP (ref 130–400)
PLATELET # BLD AUTO: 218 K/UL — SIGNIFICANT CHANGE UP (ref 130–400)
PLATELET # BLD AUTO: 220 K/UL — SIGNIFICANT CHANGE UP (ref 130–400)
PLATELET # BLD AUTO: 220 K/UL — SIGNIFICANT CHANGE UP (ref 130–400)
PLATELET # BLD AUTO: 236 K/UL — SIGNIFICANT CHANGE UP (ref 130–400)
PLATELET # BLD AUTO: 243 K/UL — SIGNIFICANT CHANGE UP (ref 130–400)
PLATELET # BLD AUTO: 254 K/UL — SIGNIFICANT CHANGE UP (ref 130–400)
PLATELET # BLD AUTO: 254 K/UL — SIGNIFICANT CHANGE UP (ref 130–400)
PLATELET # BLD AUTO: 256 K/UL — SIGNIFICANT CHANGE UP (ref 130–400)
PLATELET # BLD AUTO: 273 K/UL — SIGNIFICANT CHANGE UP (ref 130–400)
PLATELET # BLD AUTO: 276 K/UL — SIGNIFICANT CHANGE UP (ref 130–400)
PLATELET # BLD AUTO: 293 K/UL — SIGNIFICANT CHANGE UP (ref 130–400)
PLATELET # BLD AUTO: 298 K/UL — SIGNIFICANT CHANGE UP (ref 130–400)
PLATELET # BLD AUTO: 299 K/UL — SIGNIFICANT CHANGE UP (ref 130–400)
PLATELET # BLD AUTO: 302 K/UL — SIGNIFICANT CHANGE UP (ref 130–400)
PLATELET # BLD AUTO: 303 K/UL — SIGNIFICANT CHANGE UP (ref 130–400)
PLATELET # BLD AUTO: 306 K/UL — SIGNIFICANT CHANGE UP (ref 130–400)
PO2 BLDA: 117 MMHG — HIGH (ref 78–95)
PO2 BLDA: 59 MMHG — LOW (ref 78–95)
PO2 BLDA: 63 MMHG — LOW (ref 78–95)
PO2 BLDA: 74 MMHG — LOW (ref 78–95)
PO2 BLDA: 77 MMHG — LOW (ref 78–95)
PO2 BLDA: 77 MMHG — LOW (ref 78–95)
PO2 BLDA: 84 MMHG — SIGNIFICANT CHANGE UP (ref 78–95)
PO2 BLDA: 84 MMHG — SIGNIFICANT CHANGE UP (ref 78–95)
PO2 BLDA: 85 MMHG — SIGNIFICANT CHANGE UP (ref 78–95)
PO2 BLDA: 97 MMHG — HIGH (ref 78–95)
POLYCHROMASIA BLD QL SMEAR: SLIGHT — SIGNIFICANT CHANGE UP
POTASSIUM SERPL-MCNC: 2.9 MMOL/L — LOW (ref 3.5–5)
POTASSIUM SERPL-MCNC: 3 MMOL/L — LOW (ref 3.5–5)
POTASSIUM SERPL-MCNC: 3.1 MMOL/L — LOW (ref 3.5–5)
POTASSIUM SERPL-MCNC: 3.6 MMOL/L — SIGNIFICANT CHANGE UP (ref 3.5–5)
POTASSIUM SERPL-MCNC: 3.6 MMOL/L — SIGNIFICANT CHANGE UP (ref 3.5–5)
POTASSIUM SERPL-MCNC: 3.7 MMOL/L — SIGNIFICANT CHANGE UP (ref 3.5–5)
POTASSIUM SERPL-MCNC: 3.8 MMOL/L — SIGNIFICANT CHANGE UP (ref 3.5–5)
POTASSIUM SERPL-MCNC: 3.8 MMOL/L — SIGNIFICANT CHANGE UP (ref 3.5–5)
POTASSIUM SERPL-MCNC: 3.9 MMOL/L — SIGNIFICANT CHANGE UP (ref 3.5–5)
POTASSIUM SERPL-MCNC: 4 MMOL/L — SIGNIFICANT CHANGE UP (ref 3.5–5)
POTASSIUM SERPL-MCNC: 4.2 MMOL/L — SIGNIFICANT CHANGE UP (ref 3.5–5)
POTASSIUM SERPL-MCNC: 4.2 MMOL/L — SIGNIFICANT CHANGE UP (ref 3.5–5)
POTASSIUM SERPL-MCNC: 4.3 MMOL/L — SIGNIFICANT CHANGE UP (ref 3.5–5)
POTASSIUM SERPL-MCNC: 4.4 MMOL/L — SIGNIFICANT CHANGE UP (ref 3.5–5)
POTASSIUM SERPL-MCNC: 4.5 MMOL/L — SIGNIFICANT CHANGE UP (ref 3.5–5)
POTASSIUM SERPL-MCNC: 4.5 MMOL/L — SIGNIFICANT CHANGE UP (ref 3.5–5)
POTASSIUM SERPL-MCNC: 4.6 MMOL/L — SIGNIFICANT CHANGE UP (ref 3.5–5)
POTASSIUM SERPL-MCNC: 4.7 MMOL/L — SIGNIFICANT CHANGE UP (ref 3.5–5)
POTASSIUM SERPL-MCNC: 4.8 MMOL/L — SIGNIFICANT CHANGE UP (ref 3.5–5)
POTASSIUM SERPL-MCNC: 4.8 MMOL/L — SIGNIFICANT CHANGE UP (ref 3.5–5)
POTASSIUM SERPL-MCNC: 4.9 MMOL/L — SIGNIFICANT CHANGE UP (ref 3.5–5)
POTASSIUM SERPL-MCNC: 5 MMOL/L — SIGNIFICANT CHANGE UP (ref 3.5–5)
POTASSIUM SERPL-MCNC: 5.3 MMOL/L — HIGH (ref 3.5–5)
POTASSIUM SERPL-MCNC: 5.5 MMOL/L — HIGH (ref 3.5–5)
POTASSIUM SERPL-MCNC: 5.6 MMOL/L — HIGH (ref 3.5–5)
POTASSIUM SERPL-MCNC: 5.7 MMOL/L — HIGH (ref 3.5–5)
POTASSIUM SERPL-MCNC: 5.7 MMOL/L — HIGH (ref 3.5–5)
POTASSIUM SERPL-MCNC: 5.9 MMOL/L — HIGH (ref 3.5–5)
POTASSIUM SERPL-MCNC: 6 MMOL/L — CRITICAL HIGH (ref 3.5–5)
POTASSIUM SERPL-MCNC: 6.1 MMOL/L — CRITICAL HIGH (ref 3.5–5)
POTASSIUM SERPL-MCNC: 6.3 MMOL/L — CRITICAL HIGH (ref 3.5–5)
POTASSIUM SERPL-SCNC: 2.9 MMOL/L — LOW (ref 3.5–5)
POTASSIUM SERPL-SCNC: 3 MMOL/L — LOW (ref 3.5–5)
POTASSIUM SERPL-SCNC: 3.1 MMOL/L — LOW (ref 3.5–5)
POTASSIUM SERPL-SCNC: 3.6 MMOL/L — SIGNIFICANT CHANGE UP (ref 3.5–5)
POTASSIUM SERPL-SCNC: 3.6 MMOL/L — SIGNIFICANT CHANGE UP (ref 3.5–5)
POTASSIUM SERPL-SCNC: 3.7 MMOL/L — SIGNIFICANT CHANGE UP (ref 3.5–5)
POTASSIUM SERPL-SCNC: 3.8 MMOL/L — SIGNIFICANT CHANGE UP (ref 3.5–5)
POTASSIUM SERPL-SCNC: 3.8 MMOL/L — SIGNIFICANT CHANGE UP (ref 3.5–5)
POTASSIUM SERPL-SCNC: 3.9 MMOL/L — SIGNIFICANT CHANGE UP (ref 3.5–5)
POTASSIUM SERPL-SCNC: 4 MMOL/L — SIGNIFICANT CHANGE UP (ref 3.5–5)
POTASSIUM SERPL-SCNC: 4.2 MMOL/L — SIGNIFICANT CHANGE UP (ref 3.5–5)
POTASSIUM SERPL-SCNC: 4.2 MMOL/L — SIGNIFICANT CHANGE UP (ref 3.5–5)
POTASSIUM SERPL-SCNC: 4.3 MMOL/L — SIGNIFICANT CHANGE UP (ref 3.5–5)
POTASSIUM SERPL-SCNC: 4.4 MMOL/L — SIGNIFICANT CHANGE UP (ref 3.5–5)
POTASSIUM SERPL-SCNC: 4.5 MMOL/L — SIGNIFICANT CHANGE UP (ref 3.5–5)
POTASSIUM SERPL-SCNC: 4.5 MMOL/L — SIGNIFICANT CHANGE UP (ref 3.5–5)
POTASSIUM SERPL-SCNC: 4.6 MMOL/L — SIGNIFICANT CHANGE UP (ref 3.5–5)
POTASSIUM SERPL-SCNC: 4.7 MMOL/L — SIGNIFICANT CHANGE UP (ref 3.5–5)
POTASSIUM SERPL-SCNC: 4.8 MMOL/L — SIGNIFICANT CHANGE UP (ref 3.5–5)
POTASSIUM SERPL-SCNC: 4.8 MMOL/L — SIGNIFICANT CHANGE UP (ref 3.5–5)
POTASSIUM SERPL-SCNC: 4.9 MMOL/L — SIGNIFICANT CHANGE UP (ref 3.5–5)
POTASSIUM SERPL-SCNC: 5 MMOL/L — SIGNIFICANT CHANGE UP (ref 3.5–5)
POTASSIUM SERPL-SCNC: 5.3 MMOL/L — HIGH (ref 3.5–5)
POTASSIUM SERPL-SCNC: 5.5 MMOL/L — HIGH (ref 3.5–5)
POTASSIUM SERPL-SCNC: 5.6 MMOL/L — HIGH (ref 3.5–5)
POTASSIUM SERPL-SCNC: 5.7 MMOL/L — HIGH (ref 3.5–5)
POTASSIUM SERPL-SCNC: 5.7 MMOL/L — HIGH (ref 3.5–5)
POTASSIUM SERPL-SCNC: 5.9 MMOL/L — HIGH (ref 3.5–5)
POTASSIUM SERPL-SCNC: 6 MMOL/L — CRITICAL HIGH (ref 3.5–5)
POTASSIUM SERPL-SCNC: 6.1 MMOL/L — CRITICAL HIGH (ref 3.5–5)
POTASSIUM SERPL-SCNC: 6.3 MMOL/L — CRITICAL HIGH (ref 3.5–5)
PROCALCITONIN SERPL-MCNC: 0.53 NG/ML — HIGH (ref 0.02–0.1)
PROCALCITONIN SERPL-MCNC: 14.4 NG/ML — HIGH (ref 0.02–0.1)
PROCALCITONIN SERPL-MCNC: 17.9 NG/ML — HIGH (ref 0.02–0.1)
PROCALCITONIN SERPL-MCNC: 29.4 NG/ML — HIGH (ref 0.02–0.1)
PROCALCITONIN SERPL-MCNC: 3.53 NG/ML — HIGH (ref 0.02–0.1)
PROCALCITONIN SERPL-MCNC: 36.4 NG/ML — HIGH (ref 0.02–0.1)
PROCALCITONIN SERPL-MCNC: 6.08 NG/ML — HIGH (ref 0.02–0.1)
PROCALCITONIN SERPL-MCNC: 6.62 NG/ML — HIGH (ref 0.02–0.1)
PROCALCITONIN SERPL-MCNC: 8.42 NG/ML — HIGH (ref 0.02–0.1)
PROCALCITONIN SERPL-MCNC: 8.9 NG/ML — HIGH (ref 0.02–0.1)
PROCALCITONIN SERPL-MCNC: 9.61 NG/ML — HIGH (ref 0.02–0.1)
PROT ?TM UR-MCNC: 115 MG/DLG/24H — SIGNIFICANT CHANGE UP
PROT SERPL-MCNC: 4.1 G/DL — LOW (ref 6–8)
PROT SERPL-MCNC: 4.6 G/DL — LOW (ref 6–8)
PROT SERPL-MCNC: 5 G/DL — LOW (ref 6–8)
PROT SERPL-MCNC: 5.1 G/DL — LOW (ref 6–8)
PROT SERPL-MCNC: 5.3 G/DL — LOW (ref 6–8)
PROT SERPL-MCNC: 5.3 G/DL — LOW (ref 6–8)
PROT SERPL-MCNC: 5.4 G/DL — LOW (ref 6–8)
PROT SERPL-MCNC: 5.4 G/DL — LOW (ref 6–8)
PROT SERPL-MCNC: 5.5 G/DL — LOW (ref 6–8)
PROT SERPL-MCNC: 5.6 G/DL — LOW (ref 6–8)
PROT SERPL-MCNC: 5.6 G/DL — LOW (ref 6–8)
PROT SERPL-MCNC: 5.7 G/DL — LOW (ref 6–8)
PROT SERPL-MCNC: 5.7 G/DL — LOW (ref 6–8)
PROT SERPL-MCNC: 5.8 G/DL — LOW (ref 6–8)
PROT SERPL-MCNC: 5.9 G/DL — LOW (ref 6–8)
PROT SERPL-MCNC: 5.9 G/DL — LOW (ref 6–8)
PROT SERPL-MCNC: 6.1 G/DL — SIGNIFICANT CHANGE UP (ref 6–8)
PROT SERPL-MCNC: 6.2 G/DL — SIGNIFICANT CHANGE UP (ref 6–8)
PROT SERPL-MCNC: 6.4 G/DL — SIGNIFICANT CHANGE UP (ref 6–8)
PROT SERPL-MCNC: 6.5 G/DL — SIGNIFICANT CHANGE UP (ref 6–8)
PROT SERPL-MCNC: 6.6 G/DL — SIGNIFICANT CHANGE UP (ref 6–8)
PROT SERPL-MCNC: 6.8 G/DL — SIGNIFICANT CHANGE UP (ref 6–8)
PROT UR-MCNC: ABNORMAL
PROT/CREAT UR-RTO: 0.9 RATIO — HIGH (ref 0–0.2)
PROTHROM AB SERPL-ACNC: 11.1 SEC — SIGNIFICANT CHANGE UP (ref 9.95–12.87)
PROTHROM AB SERPL-ACNC: 11.5 SEC — SIGNIFICANT CHANGE UP (ref 9.95–12.87)
PROTHROM AB SERPL-ACNC: 12.1 SEC — SIGNIFICANT CHANGE UP (ref 9.95–12.87)
PROTHROM AB SERPL-ACNC: 12.5 SEC — SIGNIFICANT CHANGE UP (ref 9.95–12.87)
PROTHROM AB SERPL-ACNC: 14.1 SEC — HIGH (ref 9.95–12.87)
PROTHROM AB SERPL-ACNC: 14.2 SEC — HIGH (ref 9.95–12.87)
RBC # BLD: 2.16 M/UL — LOW (ref 4.7–6.1)
RBC # BLD: 2.29 M/UL — LOW (ref 4.7–6.1)
RBC # BLD: 2.4 M/UL — LOW (ref 4.7–6.1)
RBC # BLD: 2.44 M/UL — LOW (ref 4.7–6.1)
RBC # BLD: 2.44 M/UL — LOW (ref 4.7–6.1)
RBC # BLD: 2.59 M/UL — LOW (ref 4.7–6.1)
RBC # BLD: 2.59 M/UL — LOW (ref 4.7–6.1)
RBC # BLD: 2.63 M/UL — LOW (ref 4.7–6.1)
RBC # BLD: 2.63 M/UL — LOW (ref 4.7–6.1)
RBC # BLD: 2.72 M/UL — LOW (ref 4.7–6.1)
RBC # BLD: 2.74 M/UL — LOW (ref 4.7–6.1)
RBC # BLD: 2.79 M/UL — LOW (ref 4.7–6.1)
RBC # BLD: 2.82 M/UL — LOW (ref 4.7–6.1)
RBC # BLD: 2.83 M/UL — LOW (ref 4.7–6.1)
RBC # BLD: 2.85 M/UL — LOW (ref 4.7–6.1)
RBC # BLD: 2.86 M/UL — LOW (ref 4.7–6.1)
RBC # BLD: 2.9 M/UL — LOW (ref 4.7–6.1)
RBC # BLD: 3.03 M/UL — LOW (ref 4.7–6.1)
RBC # BLD: 3.08 M/UL — LOW (ref 4.7–6.1)
RBC # BLD: 3.12 M/UL — LOW (ref 4.7–6.1)
RBC # BLD: 3.3 M/UL — LOW (ref 4.7–6.1)
RBC # BLD: 3.58 M/UL — LOW (ref 4.7–6.1)
RBC # BLD: 3.73 M/UL — LOW (ref 4.7–6.1)
RBC # BLD: 3.81 M/UL — LOW (ref 4.7–6.1)
RBC # BLD: 3.9 M/UL — LOW (ref 4.7–6.1)
RBC # BLD: 3.91 M/UL — LOW (ref 4.7–6.1)
RBC # BLD: 3.98 M/UL — LOW (ref 4.7–6.1)
RBC # BLD: 4.11 M/UL — LOW (ref 4.7–6.1)
RBC # BLD: 4.16 M/UL — LOW (ref 4.7–6.1)
RBC # BLD: 4.24 M/UL — LOW (ref 4.7–6.1)
RBC # BLD: 4.54 M/UL — LOW (ref 4.7–6.1)
RBC # BLD: 4.58 M/UL — LOW (ref 4.7–6.1)
RBC # BLD: 4.66 M/UL — LOW (ref 4.7–6.1)
RBC # BLD: 4.67 M/UL — LOW (ref 4.7–6.1)
RBC # BLD: 4.68 M/UL — LOW (ref 4.7–6.1)
RBC # BLD: 4.83 M/UL — SIGNIFICANT CHANGE UP (ref 4.7–6.1)
RBC # BLD: 4.96 M/UL — SIGNIFICANT CHANGE UP (ref 4.7–6.1)
RBC # FLD: 12.2 % — SIGNIFICANT CHANGE UP (ref 11.5–14.5)
RBC # FLD: 12.4 % — SIGNIFICANT CHANGE UP (ref 11.5–14.5)
RBC # FLD: 12.4 % — SIGNIFICANT CHANGE UP (ref 11.5–14.5)
RBC # FLD: 12.5 % — SIGNIFICANT CHANGE UP (ref 11.5–14.5)
RBC # FLD: 12.6 % — SIGNIFICANT CHANGE UP (ref 11.5–14.5)
RBC # FLD: 12.8 % — SIGNIFICANT CHANGE UP (ref 11.5–14.5)
RBC # FLD: 13.1 % — SIGNIFICANT CHANGE UP (ref 11.5–14.5)
RBC # FLD: 13.3 % — SIGNIFICANT CHANGE UP (ref 11.5–14.5)
RBC # FLD: 13.3 % — SIGNIFICANT CHANGE UP (ref 11.5–14.5)
RBC # FLD: 13.5 % — SIGNIFICANT CHANGE UP (ref 11.5–14.5)
RBC # FLD: 13.6 % — SIGNIFICANT CHANGE UP (ref 11.5–14.5)
RBC # FLD: 13.8 % — SIGNIFICANT CHANGE UP (ref 11.5–14.5)
RBC # FLD: 14 % — SIGNIFICANT CHANGE UP (ref 11.5–14.5)
RBC # FLD: 14.3 % — SIGNIFICANT CHANGE UP (ref 11.5–14.5)
RBC # FLD: 14.3 % — SIGNIFICANT CHANGE UP (ref 11.5–14.5)
RBC # FLD: 14.5 % — SIGNIFICANT CHANGE UP (ref 11.5–14.5)
RBC # FLD: 14.5 % — SIGNIFICANT CHANGE UP (ref 11.5–14.5)
RBC # FLD: 14.6 % — HIGH (ref 11.5–14.5)
RBC # FLD: 14.8 % — HIGH (ref 11.5–14.5)
RBC # FLD: 14.8 % — HIGH (ref 11.5–14.5)
RBC # FLD: 15.4 % — HIGH (ref 11.5–14.5)
RBC # FLD: 15.5 % — HIGH (ref 11.5–14.5)
RBC # FLD: 15.6 % — HIGH (ref 11.5–14.5)
RBC # FLD: 15.6 % — HIGH (ref 11.5–14.5)
RBC # FLD: 15.7 % — HIGH (ref 11.5–14.5)
RBC # FLD: 15.9 % — HIGH (ref 11.5–14.5)
RBC # FLD: 15.9 % — HIGH (ref 11.5–14.5)
RBC # FLD: 16.4 % — HIGH (ref 11.5–14.5)
RBC # FLD: 16.4 % — HIGH (ref 11.5–14.5)
RBC # FLD: 16.7 % — HIGH (ref 11.5–14.5)
RBC # FLD: 17 % — HIGH (ref 11.5–14.5)
RBC # FLD: 17.2 % — HIGH (ref 11.5–14.5)
RBC # FLD: 17.2 % — HIGH (ref 11.5–14.5)
RBC # FLD: 17.3 % — HIGH (ref 11.5–14.5)
RBC BLD AUTO: ABNORMAL
RBC BLD AUTO: NORMAL — SIGNIFICANT CHANGE UP
RBC CASTS # UR COMP ASSIST: 46 /HPF — HIGH (ref 0–4)
SAO2 % BLDA: 89 % — LOW (ref 92–96)
SAO2 % BLDA: 90 % — LOW (ref 92–96)
SAO2 % BLDA: 94 % — SIGNIFICANT CHANGE UP (ref 92–96)
SAO2 % BLDA: 94 % — SIGNIFICANT CHANGE UP (ref 94–98)
SAO2 % BLDA: 95 % — SIGNIFICANT CHANGE UP (ref 94–98)
SAO2 % BLDA: 95 % — SIGNIFICANT CHANGE UP (ref 94–98)
SAO2 % BLDA: 96 % — SIGNIFICANT CHANGE UP (ref 94–98)
SAO2 % BLDA: 97 % — SIGNIFICANT CHANGE UP (ref 94–98)
SAO2 % BLDA: 97 % — SIGNIFICANT CHANGE UP (ref 94–98)
SAO2 % BLDA: 99 % — HIGH (ref 94–98)
SARS-COV-2 RNA SPEC QL NAA+PROBE: DETECTED
SMUDGE CELLS # BLD: PRESENT — SIGNIFICANT CHANGE UP
SMUDGE CELLS # BLD: PRESENT — SIGNIFICANT CHANGE UP
SODIUM SERPL-SCNC: 132 MMOL/L — LOW (ref 135–146)
SODIUM SERPL-SCNC: 134 MMOL/L — LOW (ref 135–146)
SODIUM SERPL-SCNC: 134 MMOL/L — LOW (ref 135–146)
SODIUM SERPL-SCNC: 135 MMOL/L — SIGNIFICANT CHANGE UP (ref 135–146)
SODIUM SERPL-SCNC: 136 MMOL/L — SIGNIFICANT CHANGE UP (ref 135–146)
SODIUM SERPL-SCNC: 137 MMOL/L — SIGNIFICANT CHANGE UP (ref 135–146)
SODIUM SERPL-SCNC: 138 MMOL/L — SIGNIFICANT CHANGE UP (ref 135–146)
SODIUM SERPL-SCNC: 139 MMOL/L — SIGNIFICANT CHANGE UP (ref 135–146)
SODIUM SERPL-SCNC: 140 MMOL/L — SIGNIFICANT CHANGE UP (ref 135–146)
SODIUM SERPL-SCNC: 141 MMOL/L — SIGNIFICANT CHANGE UP (ref 135–146)
SODIUM SERPL-SCNC: 142 MMOL/L — SIGNIFICANT CHANGE UP (ref 135–146)
SODIUM SERPL-SCNC: 143 MMOL/L — SIGNIFICANT CHANGE UP (ref 135–146)
SODIUM SERPL-SCNC: 143 MMOL/L — SIGNIFICANT CHANGE UP (ref 135–146)
SODIUM SERPL-SCNC: 144 MMOL/L — SIGNIFICANT CHANGE UP (ref 135–146)
SODIUM SERPL-SCNC: 144 MMOL/L — SIGNIFICANT CHANGE UP (ref 135–146)
SODIUM SERPL-SCNC: 145 MMOL/L — SIGNIFICANT CHANGE UP (ref 135–146)
SODIUM SERPL-SCNC: 146 MMOL/L — SIGNIFICANT CHANGE UP (ref 135–146)
SODIUM SERPL-SCNC: 146 MMOL/L — SIGNIFICANT CHANGE UP (ref 135–146)
SODIUM SERPL-SCNC: 147 MMOL/L — HIGH (ref 135–146)
SODIUM SERPL-SCNC: 148 MMOL/L — HIGH (ref 135–146)
SODIUM SERPL-SCNC: 149 MMOL/L — HIGH (ref 135–146)
SODIUM SERPL-SCNC: 150 MMOL/L — HIGH (ref 135–146)
SODIUM SERPL-SCNC: 153 MMOL/L — HIGH (ref 135–146)
SODIUM SERPL-SCNC: 153 MMOL/L — HIGH (ref 135–146)
SODIUM UR-SCNC: 44 MMOL/L — SIGNIFICANT CHANGE UP
SP GR SPEC: 1.02 — SIGNIFICANT CHANGE UP (ref 1.01–1.02)
SPECIMEN SOURCE: SIGNIFICANT CHANGE UP
TRIGL SERPL-MCNC: 137 MG/DL — SIGNIFICANT CHANGE UP (ref 10–149)
TRIGL SERPL-MCNC: 311 MG/DL — HIGH (ref 10–149)
TRIGL SERPL-MCNC: 337 MG/DL — HIGH (ref 10–149)
TRIGL SERPL-MCNC: 384 MG/DL — HIGH (ref 10–149)
TRIGL SERPL-MCNC: 483 MG/DL — HIGH (ref 10–149)
TROPONIN T SERPL-MCNC: <0.01 NG/ML — SIGNIFICANT CHANGE UP
TROPONIN T SERPL-MCNC: <0.01 NG/ML — SIGNIFICANT CHANGE UP
UROBILINOGEN FLD QL: SIGNIFICANT CHANGE UP
VANCOMYCIN FLD-MCNC: 38.3 UG/ML — HIGH (ref 5–10)
VANCOMYCIN TROUGH SERPL-MCNC: 11.6 UG/ML — HIGH (ref 5–10)
VANCOMYCIN TROUGH SERPL-MCNC: 15 UG/ML — HIGH (ref 5–10)
VARIANT LYMPHS # BLD: 0.9 % — SIGNIFICANT CHANGE UP (ref 0–5)
VARIANT LYMPHS # BLD: 3.8 % — SIGNIFICANT CHANGE UP (ref 0–5)
WBC # BLD: 11.21 K/UL — HIGH (ref 4.8–10.8)
WBC # BLD: 11.37 K/UL — HIGH (ref 4.8–10.8)
WBC # BLD: 11.76 K/UL — HIGH (ref 4.8–10.8)
WBC # BLD: 12.47 K/UL — HIGH (ref 4.8–10.8)
WBC # BLD: 12.64 K/UL — HIGH (ref 4.8–10.8)
WBC # BLD: 13.14 K/UL — HIGH (ref 4.8–10.8)
WBC # BLD: 13.15 K/UL — HIGH (ref 4.8–10.8)
WBC # BLD: 13.86 K/UL — HIGH (ref 4.8–10.8)
WBC # BLD: 14.1 K/UL — HIGH (ref 4.8–10.8)
WBC # BLD: 14.78 K/UL — HIGH (ref 4.8–10.8)
WBC # BLD: 14.91 K/UL — HIGH (ref 4.8–10.8)
WBC # BLD: 15.22 K/UL — HIGH (ref 4.8–10.8)
WBC # BLD: 15.28 K/UL — HIGH (ref 4.8–10.8)
WBC # BLD: 15.52 K/UL — HIGH (ref 4.8–10.8)
WBC # BLD: 16.24 K/UL — HIGH (ref 4.8–10.8)
WBC # BLD: 16.42 K/UL — HIGH (ref 4.8–10.8)
WBC # BLD: 16.89 K/UL — HIGH (ref 4.8–10.8)
WBC # BLD: 18.84 K/UL — HIGH (ref 4.8–10.8)
WBC # BLD: 19.49 K/UL — HIGH (ref 4.8–10.8)
WBC # BLD: 19.64 K/UL — HIGH (ref 4.8–10.8)
WBC # BLD: 19.86 K/UL — HIGH (ref 4.8–10.8)
WBC # BLD: 20.29 K/UL — HIGH (ref 4.8–10.8)
WBC # BLD: 20.41 K/UL — HIGH (ref 4.8–10.8)
WBC # BLD: 25.64 K/UL — HIGH (ref 4.8–10.8)
WBC # BLD: 25.67 K/UL — HIGH (ref 4.8–10.8)
WBC # BLD: 25.83 K/UL — HIGH (ref 4.8–10.8)
WBC # BLD: 25.87 K/UL — HIGH (ref 4.8–10.8)
WBC # BLD: 26.94 K/UL — HIGH (ref 4.8–10.8)
WBC # BLD: 27.56 K/UL — HIGH (ref 4.8–10.8)
WBC # BLD: 30.7 K/UL — HIGH (ref 4.8–10.8)
WBC # BLD: 31.02 K/UL — HIGH (ref 4.8–10.8)
WBC # BLD: 5.36 K/UL — SIGNIFICANT CHANGE UP (ref 4.8–10.8)
WBC # BLD: 5.94 K/UL — SIGNIFICANT CHANGE UP (ref 4.8–10.8)
WBC # BLD: 6.16 K/UL — SIGNIFICANT CHANGE UP (ref 4.8–10.8)
WBC # BLD: 7.16 K/UL — SIGNIFICANT CHANGE UP (ref 4.8–10.8)
WBC # BLD: 7.32 K/UL — SIGNIFICANT CHANGE UP (ref 4.8–10.8)
WBC # BLD: 7.97 K/UL — SIGNIFICANT CHANGE UP (ref 4.8–10.8)
WBC # FLD AUTO: 11.21 K/UL — HIGH (ref 4.8–10.8)
WBC # FLD AUTO: 11.37 K/UL — HIGH (ref 4.8–10.8)
WBC # FLD AUTO: 11.76 K/UL — HIGH (ref 4.8–10.8)
WBC # FLD AUTO: 12.47 K/UL — HIGH (ref 4.8–10.8)
WBC # FLD AUTO: 12.64 K/UL — HIGH (ref 4.8–10.8)
WBC # FLD AUTO: 13.14 K/UL — HIGH (ref 4.8–10.8)
WBC # FLD AUTO: 13.15 K/UL — HIGH (ref 4.8–10.8)
WBC # FLD AUTO: 13.86 K/UL — HIGH (ref 4.8–10.8)
WBC # FLD AUTO: 14.1 K/UL — HIGH (ref 4.8–10.8)
WBC # FLD AUTO: 14.78 K/UL — HIGH (ref 4.8–10.8)
WBC # FLD AUTO: 14.91 K/UL — HIGH (ref 4.8–10.8)
WBC # FLD AUTO: 15.22 K/UL — HIGH (ref 4.8–10.8)
WBC # FLD AUTO: 15.28 K/UL — HIGH (ref 4.8–10.8)
WBC # FLD AUTO: 15.52 K/UL — HIGH (ref 4.8–10.8)
WBC # FLD AUTO: 16.24 K/UL — HIGH (ref 4.8–10.8)
WBC # FLD AUTO: 16.42 K/UL — HIGH (ref 4.8–10.8)
WBC # FLD AUTO: 16.89 K/UL — HIGH (ref 4.8–10.8)
WBC # FLD AUTO: 18.84 K/UL — HIGH (ref 4.8–10.8)
WBC # FLD AUTO: 19.49 K/UL — HIGH (ref 4.8–10.8)
WBC # FLD AUTO: 19.64 K/UL — HIGH (ref 4.8–10.8)
WBC # FLD AUTO: 19.86 K/UL — HIGH (ref 4.8–10.8)
WBC # FLD AUTO: 20.29 K/UL — HIGH (ref 4.8–10.8)
WBC # FLD AUTO: 20.41 K/UL — HIGH (ref 4.8–10.8)
WBC # FLD AUTO: 25.64 K/UL — HIGH (ref 4.8–10.8)
WBC # FLD AUTO: 25.67 K/UL — HIGH (ref 4.8–10.8)
WBC # FLD AUTO: 25.83 K/UL — HIGH (ref 4.8–10.8)
WBC # FLD AUTO: 25.87 K/UL — HIGH (ref 4.8–10.8)
WBC # FLD AUTO: 26.94 K/UL — HIGH (ref 4.8–10.8)
WBC # FLD AUTO: 27.56 K/UL — HIGH (ref 4.8–10.8)
WBC # FLD AUTO: 30.7 K/UL — HIGH (ref 4.8–10.8)
WBC # FLD AUTO: 31.02 K/UL — HIGH (ref 4.8–10.8)
WBC # FLD AUTO: 5.36 K/UL — SIGNIFICANT CHANGE UP (ref 4.8–10.8)
WBC # FLD AUTO: 5.94 K/UL — SIGNIFICANT CHANGE UP (ref 4.8–10.8)
WBC # FLD AUTO: 6.16 K/UL — SIGNIFICANT CHANGE UP (ref 4.8–10.8)
WBC # FLD AUTO: 7.16 K/UL — SIGNIFICANT CHANGE UP (ref 4.8–10.8)
WBC # FLD AUTO: 7.32 K/UL — SIGNIFICANT CHANGE UP (ref 4.8–10.8)
WBC # FLD AUTO: 7.97 K/UL — SIGNIFICANT CHANGE UP (ref 4.8–10.8)
WBC UR QL: 24 /HPF — HIGH (ref 0–5)

## 2020-01-01 PROCEDURE — 71045 X-RAY EXAM CHEST 1 VIEW: CPT | Mod: 26

## 2020-01-01 PROCEDURE — 71045 X-RAY EXAM CHEST 1 VIEW: CPT | Mod: 26,77

## 2020-01-01 PROCEDURE — 99232 SBSQ HOSP IP/OBS MODERATE 35: CPT

## 2020-01-01 PROCEDURE — 99233 SBSQ HOSP IP/OBS HIGH 50: CPT

## 2020-01-01 PROCEDURE — 99231 SBSQ HOSP IP/OBS SF/LOW 25: CPT

## 2020-01-01 PROCEDURE — 93010 ELECTROCARDIOGRAM REPORT: CPT

## 2020-01-01 PROCEDURE — 70450 CT HEAD/BRAIN W/O DYE: CPT | Mod: 26

## 2020-01-01 PROCEDURE — 99285 EMERGENCY DEPT VISIT HI MDM: CPT | Mod: 25

## 2020-01-01 PROCEDURE — 71045 X-RAY EXAM CHEST 1 VIEW: CPT | Mod: 26,76

## 2020-01-01 PROCEDURE — 99253 IP/OBS CNSLTJ NEW/EST LOW 45: CPT

## 2020-01-01 PROCEDURE — 99233 SBSQ HOSP IP/OBS HIGH 50: CPT | Mod: GC

## 2020-01-01 PROCEDURE — 31500 INSERT EMERGENCY AIRWAY: CPT

## 2020-01-01 PROCEDURE — 99497 ADVNCD CARE PLAN 30 MIN: CPT | Mod: 25

## 2020-01-01 PROCEDURE — 99497 ADVNCD CARE PLAN 30 MIN: CPT

## 2020-01-01 PROCEDURE — 93970 EXTREMITY STUDY: CPT | Mod: 26

## 2020-01-01 PROCEDURE — 99223 1ST HOSP IP/OBS HIGH 75: CPT

## 2020-01-01 PROCEDURE — 99221 1ST HOSP IP/OBS SF/LOW 40: CPT

## 2020-01-01 PROCEDURE — 36556 INSERT NON-TUNNEL CV CATH: CPT

## 2020-01-01 RX ORDER — POTASSIUM CHLORIDE 20 MEQ
20 PACKET (EA) ORAL
Refills: 0 | Status: COMPLETED | OUTPATIENT
Start: 2020-01-01 | End: 2020-01-01

## 2020-01-01 RX ORDER — MORPHINE SULFATE 50 MG/1
5 CAPSULE, EXTENDED RELEASE ORAL
Qty: 100 | Refills: 0 | Status: DISCONTINUED | OUTPATIENT
Start: 2020-01-01 | End: 2020-01-01

## 2020-01-01 RX ORDER — ENOXAPARIN SODIUM 100 MG/ML
80 INJECTION SUBCUTANEOUS EVERY 12 HOURS
Refills: 0 | Status: DISCONTINUED | OUTPATIENT
Start: 2020-01-01 | End: 2020-01-01

## 2020-01-01 RX ORDER — PROPOFOL 10 MG/ML
10 INJECTION, EMULSION INTRAVENOUS
Qty: 500 | Refills: 0 | Status: DISCONTINUED | OUTPATIENT
Start: 2020-01-01 | End: 2020-01-01

## 2020-01-01 RX ORDER — HEPARIN SODIUM 5000 [USP'U]/ML
5000 INJECTION INTRAVENOUS; SUBCUTANEOUS EVERY 8 HOURS
Refills: 0 | Status: DISCONTINUED | OUTPATIENT
Start: 2020-01-01 | End: 2020-01-01

## 2020-01-01 RX ORDER — ENOXAPARIN SODIUM 100 MG/ML
40 INJECTION SUBCUTANEOUS DAILY
Refills: 0 | Status: DISCONTINUED | OUTPATIENT
Start: 2020-01-01 | End: 2020-01-01

## 2020-01-01 RX ORDER — SODIUM BICARBONATE 1 MEQ/ML
650 SYRINGE (ML) INTRAVENOUS THREE TIMES A DAY
Refills: 0 | Status: DISCONTINUED | OUTPATIENT
Start: 2020-01-01 | End: 2020-01-01

## 2020-01-01 RX ORDER — VANCOMYCIN HCL 1 G
1000 VIAL (EA) INTRAVENOUS ONCE
Refills: 0 | Status: COMPLETED | OUTPATIENT
Start: 2020-01-01 | End: 2020-01-01

## 2020-01-01 RX ORDER — GLUCAGON INJECTION, SOLUTION 0.5 MG/.1ML
1 INJECTION, SOLUTION SUBCUTANEOUS ONCE
Refills: 0 | Status: DISCONTINUED | OUTPATIENT
Start: 2020-01-01 | End: 2020-01-01

## 2020-01-01 RX ORDER — PROPOFOL 10 MG/ML
10 INJECTION, EMULSION INTRAVENOUS
Qty: 1000 | Refills: 0 | Status: DISCONTINUED | OUTPATIENT
Start: 2020-01-01 | End: 2020-01-01

## 2020-01-01 RX ORDER — FENTANYL CITRATE 50 UG/ML
0.5 INJECTION INTRAVENOUS
Qty: 2500 | Refills: 0 | Status: DISCONTINUED | OUTPATIENT
Start: 2020-01-01 | End: 2020-01-01

## 2020-01-01 RX ORDER — ROCURONIUM BROMIDE 10 MG/ML
50 VIAL (ML) INTRAVENOUS ONCE
Refills: 0 | Status: COMPLETED | OUTPATIENT
Start: 2020-01-01 | End: 2020-01-01

## 2020-01-01 RX ORDER — MEROPENEM 1 G/30ML
500 INJECTION INTRAVENOUS EVERY 8 HOURS
Refills: 0 | Status: DISCONTINUED | OUTPATIENT
Start: 2020-01-01 | End: 2020-01-01

## 2020-01-01 RX ORDER — CASPOFUNGIN ACETATE 7 MG/ML
70 INJECTION, POWDER, LYOPHILIZED, FOR SOLUTION INTRAVENOUS ONCE
Refills: 0 | Status: COMPLETED | OUTPATIENT
Start: 2020-01-01 | End: 2020-01-01

## 2020-01-01 RX ORDER — ONDANSETRON 8 MG/1
4 TABLET, FILM COATED ORAL EVERY 8 HOURS
Refills: 0 | Status: DISCONTINUED | OUTPATIENT
Start: 2020-01-01 | End: 2020-01-01

## 2020-01-01 RX ORDER — DEXTROSE 50 % IN WATER 50 %
12.5 SYRINGE (ML) INTRAVENOUS ONCE
Refills: 0 | Status: DISCONTINUED | OUTPATIENT
Start: 2020-01-01 | End: 2020-01-01

## 2020-01-01 RX ORDER — CHLORHEXIDINE GLUCONATE 213 G/1000ML
15 SOLUTION TOPICAL EVERY 12 HOURS
Refills: 0 | Status: DISCONTINUED | OUTPATIENT
Start: 2020-01-01 | End: 2020-01-01

## 2020-01-01 RX ORDER — MORPHINE SULFATE 50 MG/1
10 CAPSULE, EXTENDED RELEASE ORAL
Refills: 0 | Status: DISCONTINUED | OUTPATIENT
Start: 2020-01-01 | End: 2020-01-01

## 2020-01-01 RX ORDER — MEROPENEM 1 G/30ML
500 INJECTION INTRAVENOUS EVERY 12 HOURS
Refills: 0 | Status: DISCONTINUED | OUTPATIENT
Start: 2020-01-01 | End: 2020-01-01

## 2020-01-01 RX ORDER — HYDROXYCHLOROQUINE SULFATE 200 MG
800 TABLET ORAL EVERY 24 HOURS
Refills: 0 | Status: COMPLETED | OUTPATIENT
Start: 2020-01-01 | End: 2020-01-01

## 2020-01-01 RX ORDER — LACTULOSE 10 G/15ML
20 SOLUTION ORAL
Refills: 0 | Status: DISCONTINUED | OUTPATIENT
Start: 2020-01-01 | End: 2020-01-01

## 2020-01-01 RX ORDER — ROCURONIUM BROMIDE 10 MG/ML
100 VIAL (ML) INTRAVENOUS ONCE
Refills: 0 | Status: COMPLETED | OUTPATIENT
Start: 2020-01-01 | End: 2020-01-01

## 2020-01-01 RX ORDER — FUROSEMIDE 40 MG
80 TABLET ORAL ONCE
Refills: 0 | Status: COMPLETED | OUTPATIENT
Start: 2020-01-01 | End: 2020-01-01

## 2020-01-01 RX ORDER — SODIUM BICARBONATE 1 MEQ/ML
650 SYRINGE (ML) INTRAVENOUS EVERY 12 HOURS
Refills: 0 | Status: DISCONTINUED | OUTPATIENT
Start: 2020-01-01 | End: 2020-01-01

## 2020-01-01 RX ORDER — VASOPRESSIN 20 [USP'U]/ML
0.02 INJECTION INTRAVENOUS
Qty: 50 | Refills: 0 | Status: DISCONTINUED | OUTPATIENT
Start: 2020-01-01 | End: 2020-01-01

## 2020-01-01 RX ORDER — ROBINUL 0.2 MG/ML
0.4 INJECTION INTRAMUSCULAR; INTRAVENOUS ONCE
Refills: 0 | Status: COMPLETED | OUTPATIENT
Start: 2020-01-01 | End: 2020-01-01

## 2020-01-01 RX ORDER — CASPOFUNGIN ACETATE 7 MG/ML
INJECTION, POWDER, LYOPHILIZED, FOR SOLUTION INTRAVENOUS
Refills: 0 | Status: DISCONTINUED | OUTPATIENT
Start: 2020-01-01 | End: 2020-01-01

## 2020-01-01 RX ORDER — HYDROXYCHLOROQUINE SULFATE 200 MG
400 TABLET ORAL EVERY 24 HOURS
Refills: 0 | Status: COMPLETED | OUTPATIENT
Start: 2020-01-01 | End: 2020-01-01

## 2020-01-01 RX ORDER — FUROSEMIDE 40 MG
80 TABLET ORAL ONCE
Refills: 0 | Status: DISCONTINUED | OUTPATIENT
Start: 2020-01-01 | End: 2020-01-01

## 2020-01-01 RX ORDER — SODIUM ZIRCONIUM CYCLOSILICATE 10 G/10G
10 POWDER, FOR SUSPENSION ORAL
Refills: 0 | Status: DISCONTINUED | OUTPATIENT
Start: 2020-01-01 | End: 2020-01-01

## 2020-01-01 RX ORDER — SENNA PLUS 8.6 MG/1
2 TABLET ORAL AT BEDTIME
Refills: 0 | Status: DISCONTINUED | OUTPATIENT
Start: 2020-01-01 | End: 2020-01-01

## 2020-01-01 RX ORDER — HYDROXYCHLOROQUINE SULFATE 200 MG
TABLET ORAL
Refills: 0 | Status: COMPLETED | OUTPATIENT
Start: 2020-01-01 | End: 2020-01-01

## 2020-01-01 RX ORDER — SODIUM CHLORIDE 9 MG/ML
1000 INJECTION, SOLUTION INTRAVENOUS
Refills: 0 | Status: DISCONTINUED | OUTPATIENT
Start: 2020-01-01 | End: 2020-01-01

## 2020-01-01 RX ORDER — HEPARIN SODIUM 5000 [USP'U]/ML
7500 INJECTION INTRAVENOUS; SUBCUTANEOUS EVERY 8 HOURS
Refills: 0 | Status: DISCONTINUED | OUTPATIENT
Start: 2020-01-01 | End: 2020-01-01

## 2020-01-01 RX ORDER — MORPHINE SULFATE 50 MG/1
4 CAPSULE, EXTENDED RELEASE ORAL
Refills: 0 | Status: DISCONTINUED | OUTPATIENT
Start: 2020-01-01 | End: 2020-01-01

## 2020-01-01 RX ORDER — INSULIN GLARGINE 100 [IU]/ML
14 INJECTION, SOLUTION SUBCUTANEOUS AT BEDTIME
Refills: 0 | Status: DISCONTINUED | OUTPATIENT
Start: 2020-01-01 | End: 2020-01-01

## 2020-01-01 RX ORDER — CALCIUM ACETATE 667 MG
1334 TABLET ORAL
Refills: 0 | Status: DISCONTINUED | OUTPATIENT
Start: 2020-01-01 | End: 2020-01-01

## 2020-01-01 RX ORDER — INSULIN LISPRO 100/ML
VIAL (ML) SUBCUTANEOUS
Refills: 0 | Status: DISCONTINUED | OUTPATIENT
Start: 2020-01-01 | End: 2020-01-01

## 2020-01-01 RX ORDER — VECURONIUM BROMIDE 20 MG/1
10 INJECTION, POWDER, FOR SOLUTION INTRAVENOUS ONCE
Refills: 0 | Status: COMPLETED | OUTPATIENT
Start: 2020-01-01 | End: 2020-01-01

## 2020-01-01 RX ORDER — INSULIN LISPRO 100/ML
4 VIAL (ML) SUBCUTANEOUS ONCE
Refills: 0 | Status: COMPLETED | OUTPATIENT
Start: 2020-01-01 | End: 2020-01-01

## 2020-01-01 RX ORDER — LACTULOSE 10 G/15ML
10 SOLUTION ORAL DAILY
Refills: 0 | Status: DISCONTINUED | OUTPATIENT
Start: 2020-01-01 | End: 2020-01-01

## 2020-01-01 RX ORDER — LACTULOSE 10 G/15ML
20 SOLUTION ORAL EVERY 12 HOURS
Refills: 0 | Status: DISCONTINUED | OUTPATIENT
Start: 2020-01-01 | End: 2020-01-01

## 2020-01-01 RX ORDER — PANTOPRAZOLE SODIUM 20 MG/1
40 TABLET, DELAYED RELEASE ORAL DAILY
Refills: 0 | Status: DISCONTINUED | OUTPATIENT
Start: 2020-01-01 | End: 2020-01-01

## 2020-01-01 RX ORDER — SODIUM BICARBONATE 1 MEQ/ML
50 SYRINGE (ML) INTRAVENOUS
Refills: 0 | Status: COMPLETED | OUTPATIENT
Start: 2020-01-01 | End: 2020-01-01

## 2020-01-01 RX ORDER — SODIUM ZIRCONIUM CYCLOSILICATE 10 G/10G
10 POWDER, FOR SUSPENSION ORAL ONCE
Refills: 0 | Status: COMPLETED | OUTPATIENT
Start: 2020-01-01 | End: 2020-01-01

## 2020-01-01 RX ORDER — DEXMEDETOMIDINE HYDROCHLORIDE IN 0.9% SODIUM CHLORIDE 4 UG/ML
0.2 INJECTION INTRAVENOUS
Qty: 400 | Refills: 0 | Status: DISCONTINUED | OUTPATIENT
Start: 2020-01-01 | End: 2020-01-01

## 2020-01-01 RX ORDER — DEXTROSE 50 % IN WATER 50 %
15 SYRINGE (ML) INTRAVENOUS ONCE
Refills: 0 | Status: DISCONTINUED | OUTPATIENT
Start: 2020-01-01 | End: 2020-01-01

## 2020-01-01 RX ORDER — AZITHROMYCIN 500 MG/1
500 TABLET, FILM COATED ORAL DAILY
Refills: 0 | Status: DISCONTINUED | OUTPATIENT
Start: 2020-01-01 | End: 2020-01-01

## 2020-01-01 RX ORDER — MIDAZOLAM HYDROCHLORIDE 1 MG/ML
0.02 INJECTION, SOLUTION INTRAMUSCULAR; INTRAVENOUS
Qty: 100 | Refills: 0 | Status: DISCONTINUED | OUTPATIENT
Start: 2020-01-01 | End: 2020-01-01

## 2020-01-01 RX ORDER — HEPARIN SODIUM 5000 [USP'U]/ML
1500 INJECTION INTRAVENOUS; SUBCUTANEOUS
Qty: 25000 | Refills: 0 | Status: DISCONTINUED | OUTPATIENT
Start: 2020-01-01 | End: 2020-01-01

## 2020-01-01 RX ORDER — DEXMEDETOMIDINE HYDROCHLORIDE IN 0.9% SODIUM CHLORIDE 4 UG/ML
0.05 INJECTION INTRAVENOUS
Qty: 200 | Refills: 0 | Status: DISCONTINUED | OUTPATIENT
Start: 2020-01-01 | End: 2020-01-01

## 2020-01-01 RX ORDER — MEROPENEM 1 G/30ML
500 INJECTION INTRAVENOUS EVERY 24 HOURS
Refills: 0 | Status: DISCONTINUED | OUTPATIENT
Start: 2020-01-01 | End: 2020-01-01

## 2020-01-01 RX ORDER — HEPARIN SODIUM 5000 [USP'U]/ML
1200 INJECTION INTRAVENOUS; SUBCUTANEOUS
Qty: 25000 | Refills: 0 | Status: DISCONTINUED | OUTPATIENT
Start: 2020-01-01 | End: 2020-01-01

## 2020-01-01 RX ORDER — CISATRACURIUM BESYLATE 2 MG/ML
10 INJECTION INTRAVENOUS ONCE
Refills: 0 | Status: COMPLETED | OUTPATIENT
Start: 2020-01-01 | End: 2020-01-01

## 2020-01-01 RX ORDER — HEPARIN SODIUM 5000 [USP'U]/ML
1000 INJECTION INTRAVENOUS; SUBCUTANEOUS
Qty: 25000 | Refills: 0 | Status: DISCONTINUED | OUTPATIENT
Start: 2020-01-01 | End: 2020-01-01

## 2020-01-01 RX ORDER — SODIUM CHLORIDE 9 MG/ML
1000 INJECTION INTRAMUSCULAR; INTRAVENOUS; SUBCUTANEOUS
Refills: 0 | Status: DISCONTINUED | OUTPATIENT
Start: 2020-01-01 | End: 2020-01-01

## 2020-01-01 RX ORDER — MEROPENEM 1 G/30ML
500 INJECTION INTRAVENOUS ONCE
Refills: 0 | Status: COMPLETED | OUTPATIENT
Start: 2020-01-01 | End: 2020-01-01

## 2020-01-01 RX ORDER — LOSARTAN POTASSIUM 100 MG/1
1 TABLET, FILM COATED ORAL
Qty: 0 | Refills: 0 | DISCHARGE

## 2020-01-01 RX ORDER — MEROPENEM 1 G/30ML
1000 INJECTION INTRAVENOUS EVERY 12 HOURS
Refills: 0 | Status: DISCONTINUED | OUTPATIENT
Start: 2020-01-01 | End: 2020-01-01

## 2020-01-01 RX ORDER — CHLORHEXIDINE GLUCONATE 213 G/1000ML
1 SOLUTION TOPICAL
Refills: 0 | Status: DISCONTINUED | OUTPATIENT
Start: 2020-01-01 | End: 2020-01-01

## 2020-01-01 RX ORDER — VANCOMYCIN HCL 1 G
1250 VIAL (EA) INTRAVENOUS EVERY 24 HOURS
Refills: 0 | Status: DISCONTINUED | OUTPATIENT
Start: 2020-01-01 | End: 2020-01-01

## 2020-01-01 RX ORDER — INSULIN GLARGINE 100 [IU]/ML
10 INJECTION, SOLUTION SUBCUTANEOUS AT BEDTIME
Refills: 0 | Status: DISCONTINUED | OUTPATIENT
Start: 2020-01-01 | End: 2020-01-01

## 2020-01-01 RX ORDER — SODIUM POLYSTYRENE SULFONATE 4.1 MEQ/G
30 POWDER, FOR SUSPENSION ORAL ONCE
Refills: 0 | Status: COMPLETED | OUTPATIENT
Start: 2020-01-01 | End: 2020-01-01

## 2020-01-01 RX ORDER — NOREPINEPHRINE BITARTRATE/D5W 8 MG/250ML
0.05 PLASTIC BAG, INJECTION (ML) INTRAVENOUS
Qty: 8 | Refills: 0 | Status: DISCONTINUED | OUTPATIENT
Start: 2020-01-01 | End: 2020-01-01

## 2020-01-01 RX ORDER — VANCOMYCIN HCL 1 G
1750 VIAL (EA) INTRAVENOUS ONCE
Refills: 0 | Status: DISCONTINUED | OUTPATIENT
Start: 2020-01-01 | End: 2020-01-01

## 2020-01-01 RX ORDER — INSULIN LISPRO 100/ML
7 VIAL (ML) SUBCUTANEOUS ONCE
Refills: 0 | Status: COMPLETED | OUTPATIENT
Start: 2020-01-01 | End: 2020-01-01

## 2020-01-01 RX ORDER — METFORMIN HYDROCHLORIDE 850 MG/1
1 TABLET ORAL
Qty: 0 | Refills: 0 | DISCHARGE

## 2020-01-01 RX ORDER — FUROSEMIDE 40 MG
40 TABLET ORAL
Refills: 0 | Status: DISCONTINUED | OUTPATIENT
Start: 2020-01-01 | End: 2020-01-01

## 2020-01-01 RX ORDER — MIDAZOLAM HYDROCHLORIDE 1 MG/ML
0.06 INJECTION, SOLUTION INTRAMUSCULAR; INTRAVENOUS
Qty: 100 | Refills: 0 | Status: DISCONTINUED | OUTPATIENT
Start: 2020-01-01 | End: 2020-01-01

## 2020-01-01 RX ORDER — VANCOMYCIN HCL 1 G
VIAL (EA) INTRAVENOUS
Refills: 0 | Status: DISCONTINUED | OUTPATIENT
Start: 2020-01-01 | End: 2020-01-01

## 2020-01-01 RX ORDER — MEROPENEM 1 G/30ML
750 INJECTION INTRAVENOUS EVERY 24 HOURS
Refills: 0 | Status: DISCONTINUED | OUTPATIENT
Start: 2020-01-01 | End: 2020-01-01

## 2020-01-01 RX ORDER — MEROPENEM 1 G/30ML
1000 INJECTION INTRAVENOUS EVERY 8 HOURS
Refills: 0 | Status: DISCONTINUED | OUTPATIENT
Start: 2020-01-01 | End: 2020-01-01

## 2020-01-01 RX ORDER — CASPOFUNGIN ACETATE 7 MG/ML
50 INJECTION, POWDER, LYOPHILIZED, FOR SOLUTION INTRAVENOUS EVERY 24 HOURS
Refills: 0 | Status: DISCONTINUED | OUTPATIENT
Start: 2020-01-01 | End: 2020-01-01

## 2020-01-01 RX ORDER — ESMOLOL HCL 100MG/10ML
50 VIAL (ML) INTRAVENOUS
Qty: 2500 | Refills: 0 | Status: DISCONTINUED | OUTPATIENT
Start: 2020-01-01 | End: 2020-01-01

## 2020-01-01 RX ORDER — ENOXAPARIN SODIUM 100 MG/ML
40 INJECTION SUBCUTANEOUS
Refills: 0 | Status: DISCONTINUED | OUTPATIENT
Start: 2020-01-01 | End: 2020-01-01

## 2020-01-01 RX ORDER — DEXTROSE 50 % IN WATER 50 %
25 SYRINGE (ML) INTRAVENOUS ONCE
Refills: 0 | Status: DISCONTINUED | OUTPATIENT
Start: 2020-01-01 | End: 2020-01-01

## 2020-01-01 RX ORDER — INSULIN LISPRO 100/ML
4 VIAL (ML) SUBCUTANEOUS
Refills: 0 | Status: DISCONTINUED | OUTPATIENT
Start: 2020-01-01 | End: 2020-01-01

## 2020-01-01 RX ORDER — DEXMEDETOMIDINE HYDROCHLORIDE IN 0.9% SODIUM CHLORIDE 4 UG/ML
1.5 INJECTION INTRAVENOUS
Qty: 200 | Refills: 0 | Status: DISCONTINUED | OUTPATIENT
Start: 2020-01-01 | End: 2020-01-01

## 2020-01-01 RX ORDER — LINEZOLID 600 MG/300ML
600 INJECTION, SOLUTION INTRAVENOUS EVERY 12 HOURS
Refills: 0 | Status: DISCONTINUED | OUTPATIENT
Start: 2020-01-01 | End: 2020-01-01

## 2020-01-01 RX ORDER — MEROPENEM 1 G/30ML
INJECTION INTRAVENOUS
Refills: 0 | Status: DISCONTINUED | OUTPATIENT
Start: 2020-01-01 | End: 2020-01-01

## 2020-01-01 RX ORDER — ACETAMINOPHEN 500 MG
650 TABLET ORAL EVERY 6 HOURS
Refills: 0 | Status: DISCONTINUED | OUTPATIENT
Start: 2020-01-01 | End: 2020-01-01

## 2020-01-01 RX ORDER — POTASSIUM CHLORIDE 20 MEQ
40 PACKET (EA) ORAL ONCE
Refills: 0 | Status: COMPLETED | OUTPATIENT
Start: 2020-01-01 | End: 2020-01-01

## 2020-01-01 RX ORDER — SODIUM ZIRCONIUM CYCLOSILICATE 10 G/10G
10 POWDER, FOR SUSPENSION ORAL EVERY 12 HOURS
Refills: 0 | Status: DISCONTINUED | OUTPATIENT
Start: 2020-01-01 | End: 2020-01-01

## 2020-01-01 RX ORDER — SEVELAMER CARBONATE 2400 MG/1
1600 POWDER, FOR SUSPENSION ORAL
Refills: 0 | Status: DISCONTINUED | OUTPATIENT
Start: 2020-01-01 | End: 2020-01-01

## 2020-01-01 RX ORDER — MIDAZOLAM HYDROCHLORIDE 1 MG/ML
2 INJECTION, SOLUTION INTRAMUSCULAR; INTRAVENOUS ONCE
Refills: 0 | Status: DISCONTINUED | OUTPATIENT
Start: 2020-01-01 | End: 2020-01-01

## 2020-01-01 RX ORDER — SEVELAMER CARBONATE 2400 MG/1
2400 POWDER, FOR SUSPENSION ORAL
Refills: 0 | Status: DISCONTINUED | OUTPATIENT
Start: 2020-01-01 | End: 2020-01-01

## 2020-01-01 RX ORDER — NOREPINEPHRINE BITARTRATE/D5W 8 MG/250ML
0.05 PLASTIC BAG, INJECTION (ML) INTRAVENOUS
Qty: 16 | Refills: 0 | Status: DISCONTINUED | OUTPATIENT
Start: 2020-01-01 | End: 2020-01-01

## 2020-01-01 RX ORDER — HEPARIN SODIUM 5000 [USP'U]/ML
1600 INJECTION INTRAVENOUS; SUBCUTANEOUS
Qty: 25000 | Refills: 0 | Status: DISCONTINUED | OUTPATIENT
Start: 2020-01-01 | End: 2020-01-01

## 2020-01-01 RX ORDER — LOSARTAN POTASSIUM 100 MG/1
50 TABLET, FILM COATED ORAL DAILY
Refills: 0 | Status: DISCONTINUED | OUTPATIENT
Start: 2020-01-01 | End: 2020-01-01

## 2020-01-01 RX ORDER — MIDAZOLAM HYDROCHLORIDE 1 MG/ML
4 INJECTION, SOLUTION INTRAMUSCULAR; INTRAVENOUS ONCE
Refills: 0 | Status: DISCONTINUED | OUTPATIENT
Start: 2020-01-01 | End: 2020-01-01

## 2020-01-01 RX ORDER — INSULIN HUMAN 100 [IU]/ML
3 INJECTION, SOLUTION SUBCUTANEOUS
Qty: 100 | Refills: 0 | Status: DISCONTINUED | OUTPATIENT
Start: 2020-01-01 | End: 2020-01-01

## 2020-01-01 RX ORDER — ANAKINRA 100MG/0.67
SYRINGE (ML) SUBCUTANEOUS
Refills: 0 | Status: DISCONTINUED | OUTPATIENT
Start: 2020-01-01 | End: 2020-01-01

## 2020-01-01 RX ORDER — DEXMEDETOMIDINE HYDROCHLORIDE IN 0.9% SODIUM CHLORIDE 4 UG/ML
88 INJECTION INTRAVENOUS ONCE
Refills: 0 | Status: COMPLETED | OUTPATIENT
Start: 2020-01-01 | End: 2020-01-01

## 2020-01-01 RX ORDER — ANAKINRA 100MG/0.67
100 SYRINGE (ML) SUBCUTANEOUS EVERY 6 HOURS
Refills: 0 | Status: DISCONTINUED | OUTPATIENT
Start: 2020-01-01 | End: 2020-01-01

## 2020-01-01 RX ORDER — FUROSEMIDE 40 MG
40 TABLET ORAL ONCE
Refills: 0 | Status: COMPLETED | OUTPATIENT
Start: 2020-01-01 | End: 2020-01-01

## 2020-01-01 RX ORDER — INSULIN HUMAN 100 [IU]/ML
10 INJECTION, SOLUTION SUBCUTANEOUS ONCE
Refills: 0 | Status: COMPLETED | OUTPATIENT
Start: 2020-01-01 | End: 2020-01-01

## 2020-01-01 RX ORDER — CISATRACURIUM BESYLATE 2 MG/ML
3 INJECTION INTRAVENOUS
Qty: 200 | Refills: 0 | Status: DISCONTINUED | OUTPATIENT
Start: 2020-01-01 | End: 2020-01-01

## 2020-01-01 RX ORDER — MEROPENEM 1 G/30ML
1000 INJECTION INTRAVENOUS ONCE
Refills: 0 | Status: COMPLETED | OUTPATIENT
Start: 2020-01-01 | End: 2020-01-01

## 2020-01-01 RX ORDER — PROPOFOL 10 MG/ML
25 INJECTION, EMULSION INTRAVENOUS ONCE
Refills: 0 | Status: COMPLETED | OUTPATIENT
Start: 2020-01-01 | End: 2020-01-01

## 2020-01-01 RX ORDER — SODIUM BICARBONATE 1 MEQ/ML
650 SYRINGE (ML) INTRAVENOUS EVERY 6 HOURS
Refills: 0 | Status: DISCONTINUED | OUTPATIENT
Start: 2020-01-01 | End: 2020-01-01

## 2020-01-01 RX ORDER — MEROPENEM 1 G/30ML
1000 INJECTION INTRAVENOUS EVERY 24 HOURS
Refills: 0 | Status: DISCONTINUED | OUTPATIENT
Start: 2020-01-01 | End: 2020-01-01

## 2020-01-01 RX ORDER — CISATRACURIUM BESYLATE 2 MG/ML
2 INJECTION INTRAVENOUS
Qty: 200 | Refills: 0 | Status: DISCONTINUED | OUTPATIENT
Start: 2020-01-01 | End: 2020-01-01

## 2020-01-01 RX ORDER — ROCURONIUM BROMIDE 10 MG/ML
50 VIAL (ML) INTRAVENOUS ONCE
Refills: 0 | Status: DISCONTINUED | OUTPATIENT
Start: 2020-01-01 | End: 2020-01-01

## 2020-01-01 RX ORDER — SODIUM ZIRCONIUM CYCLOSILICATE 10 G/10G
10 POWDER, FOR SUSPENSION ORAL EVERY 8 HOURS
Refills: 0 | Status: DISCONTINUED | OUTPATIENT
Start: 2020-01-01 | End: 2020-01-01

## 2020-01-01 RX ORDER — SODIUM CHLORIDE 9 MG/ML
500 INJECTION INTRAMUSCULAR; INTRAVENOUS; SUBCUTANEOUS ONCE
Refills: 0 | Status: COMPLETED | OUTPATIENT
Start: 2020-01-01 | End: 2020-01-01

## 2020-01-01 RX ORDER — SODIUM ZIRCONIUM CYCLOSILICATE 10 G/10G
10 POWDER, FOR SUSPENSION ORAL DAILY
Refills: 0 | Status: DISCONTINUED | OUTPATIENT
Start: 2020-01-01 | End: 2020-01-01

## 2020-01-01 RX ORDER — DEXTROSE 10 % IN WATER 10 %
250 INTRAVENOUS SOLUTION INTRAVENOUS
Refills: 0 | Status: COMPLETED | OUTPATIENT
Start: 2020-01-01 | End: 2020-01-01

## 2020-01-01 RX ORDER — CHLORHEXIDINE GLUCONATE 213 G/1000ML
15 SOLUTION TOPICAL
Refills: 0 | Status: DISCONTINUED | OUTPATIENT
Start: 2020-01-01 | End: 2020-01-01

## 2020-01-01 RX ORDER — ALBUTEROL 90 UG/1
2 AEROSOL, METERED ORAL EVERY 6 HOURS
Refills: 0 | Status: DISCONTINUED | OUTPATIENT
Start: 2020-01-01 | End: 2020-01-01

## 2020-01-01 RX ORDER — SODIUM CHLORIDE 9 MG/ML
500 INJECTION, SOLUTION INTRAVENOUS ONCE
Refills: 0 | Status: DISCONTINUED | OUTPATIENT
Start: 2020-01-01 | End: 2020-01-01

## 2020-01-01 RX ORDER — VANCOMYCIN HCL 1 G
1000 VIAL (EA) INTRAVENOUS EVERY 12 HOURS
Refills: 0 | Status: DISCONTINUED | OUTPATIENT
Start: 2020-01-01 | End: 2020-01-01

## 2020-01-01 RX ORDER — CISATRACURIUM BESYLATE 2 MG/ML
15 INJECTION INTRAVENOUS ONCE
Refills: 0 | Status: COMPLETED | OUTPATIENT
Start: 2020-01-01 | End: 2020-01-01

## 2020-01-01 RX ORDER — SODIUM BICARBONATE 1 MEQ/ML
650 SYRINGE (ML) INTRAVENOUS EVERY 8 HOURS
Refills: 0 | Status: DISCONTINUED | OUTPATIENT
Start: 2020-01-01 | End: 2020-01-01

## 2020-01-01 RX ORDER — VANCOMYCIN HCL 1 G
1250 VIAL (EA) INTRAVENOUS ONCE
Refills: 0 | Status: COMPLETED | OUTPATIENT
Start: 2020-01-01 | End: 2020-01-01

## 2020-01-01 RX ORDER — SODIUM CHLORIDE 9 MG/ML
500 INJECTION, SOLUTION INTRAVENOUS ONCE
Refills: 0 | Status: COMPLETED | OUTPATIENT
Start: 2020-01-01 | End: 2020-01-01

## 2020-01-01 RX ORDER — MORPHINE SULFATE 50 MG/1
4 CAPSULE, EXTENDED RELEASE ORAL ONCE
Refills: 0 | Status: DISCONTINUED | OUTPATIENT
Start: 2020-01-01 | End: 2020-01-01

## 2020-01-01 RX ORDER — FUROSEMIDE 40 MG
60 TABLET ORAL ONCE
Refills: 0 | Status: COMPLETED | OUTPATIENT
Start: 2020-01-01 | End: 2020-01-01

## 2020-01-01 RX ORDER — ANAKINRA 100MG/0.67
100 SYRINGE (ML) SUBCUTANEOUS EVERY 6 HOURS
Refills: 0 | Status: COMPLETED | OUTPATIENT
Start: 2020-01-01 | End: 2020-01-01

## 2020-01-01 RX ORDER — MORPHINE SULFATE 50 MG/1
10 CAPSULE, EXTENDED RELEASE ORAL ONCE
Refills: 0 | Status: DISCONTINUED | OUTPATIENT
Start: 2020-01-01 | End: 2020-01-01

## 2020-01-01 RX ORDER — ALBUTEROL 90 UG/1
2 AEROSOL, METERED ORAL ONCE
Refills: 0 | Status: COMPLETED | OUTPATIENT
Start: 2020-01-01 | End: 2020-01-01

## 2020-01-01 RX ORDER — PROPOFOL 10 MG/ML
4 INJECTION, EMULSION INTRAVENOUS ONCE
Refills: 0 | Status: COMPLETED | OUTPATIENT
Start: 2020-01-01 | End: 2020-01-01

## 2020-01-01 RX ORDER — COLLAGENASE CLOSTRIDIUM HIST. 250 UNIT/G
1 OINTMENT (GRAM) TOPICAL EVERY 12 HOURS
Refills: 0 | Status: DISCONTINUED | OUTPATIENT
Start: 2020-01-01 | End: 2020-01-01

## 2020-01-01 RX ORDER — CALCIUM ACETATE 667 MG
2001 TABLET ORAL
Refills: 0 | Status: DISCONTINUED | OUTPATIENT
Start: 2020-01-01 | End: 2020-01-01

## 2020-01-01 RX ORDER — BACITRACIN ZINC 500 UNIT/G
1 OINTMENT IN PACKET (EA) TOPICAL
Refills: 0 | Status: DISCONTINUED | OUTPATIENT
Start: 2020-01-01 | End: 2020-01-01

## 2020-01-01 RX ORDER — PROPOFOL 10 MG/ML
2 INJECTION, EMULSION INTRAVENOUS ONCE
Refills: 0 | Status: COMPLETED | OUTPATIENT
Start: 2020-01-01 | End: 2020-01-01

## 2020-01-01 RX ORDER — ASPIRIN/CALCIUM CARB/MAGNESIUM 324 MG
81 TABLET ORAL DAILY
Refills: 0 | Status: DISCONTINUED | OUTPATIENT
Start: 2020-01-01 | End: 2020-01-01

## 2020-01-01 RX ORDER — VANCOMYCIN HCL 1 G
125 VIAL (EA) INTRAVENOUS EVERY 6 HOURS
Refills: 0 | Status: DISCONTINUED | OUTPATIENT
Start: 2020-01-01 | End: 2020-01-01

## 2020-01-01 RX ORDER — PHENYLEPHRINE HYDROCHLORIDE 10 MG/ML
0.1 INJECTION INTRAVENOUS
Qty: 160 | Refills: 0 | Status: DISCONTINUED | OUTPATIENT
Start: 2020-01-01 | End: 2020-01-01

## 2020-01-01 RX ORDER — CISATRACURIUM BESYLATE 2 MG/ML
4 INJECTION INTRAVENOUS
Qty: 200 | Refills: 0 | Status: DISCONTINUED | OUTPATIENT
Start: 2020-01-01 | End: 2020-01-01

## 2020-01-01 RX ORDER — BUDESONIDE AND FORMOTEROL FUMARATE DIHYDRATE 160; 4.5 UG/1; UG/1
2 AEROSOL RESPIRATORY (INHALATION)
Refills: 0 | Status: DISCONTINUED | OUTPATIENT
Start: 2020-01-01 | End: 2020-01-01

## 2020-01-01 RX ORDER — LACTULOSE 10 G/15ML
20 SOLUTION ORAL THREE TIMES A DAY
Refills: 0 | Status: DISCONTINUED | OUTPATIENT
Start: 2020-01-01 | End: 2020-01-01

## 2020-01-01 RX ORDER — CASPOFUNGIN ACETATE 7 MG/ML
50 INJECTION, POWDER, LYOPHILIZED, FOR SOLUTION INTRAVENOUS ONCE
Refills: 0 | Status: COMPLETED | OUTPATIENT
Start: 2020-01-01 | End: 2020-01-01

## 2020-01-01 RX ORDER — VANCOMYCIN HCL 1 G
750 VIAL (EA) INTRAVENOUS ONCE
Refills: 0 | Status: COMPLETED | OUTPATIENT
Start: 2020-01-01 | End: 2020-01-01

## 2020-01-01 RX ADMIN — CHLORHEXIDINE GLUCONATE 15 MILLILITER(S): 213 SOLUTION TOPICAL at 17:11

## 2020-01-01 RX ADMIN — SEVELAMER CARBONATE 2400 MILLIGRAM(S): 2400 POWDER, FOR SUSPENSION ORAL at 12:43

## 2020-01-01 RX ADMIN — Medication 1 APPLICATION(S): at 06:06

## 2020-01-01 RX ADMIN — MEROPENEM 100 MILLIGRAM(S): 1 INJECTION INTRAVENOUS at 23:30

## 2020-01-01 RX ADMIN — Medication 1 APPLICATION(S): at 09:52

## 2020-01-01 RX ADMIN — Medication 100 MILLIGRAM(S): at 23:21

## 2020-01-01 RX ADMIN — VECURONIUM BROMIDE 10 MILLIGRAM(S): 20 INJECTION, POWDER, FOR SOLUTION INTRAVENOUS at 18:25

## 2020-01-01 RX ADMIN — Medication 1 APPLICATION(S): at 21:14

## 2020-01-01 RX ADMIN — Medication 1: at 12:43

## 2020-01-01 RX ADMIN — HEPARIN SODIUM 5000 UNIT(S): 5000 INJECTION INTRAVENOUS; SUBCUTANEOUS at 15:59

## 2020-01-01 RX ADMIN — Medication 1 APPLICATION(S): at 21:18

## 2020-01-01 RX ADMIN — Medication 650 MILLIGRAM(S): at 17:54

## 2020-01-01 RX ADMIN — MIDAZOLAM HYDROCHLORIDE 4 MILLIGRAM(S): 1 INJECTION, SOLUTION INTRAMUSCULAR; INTRAVENOUS at 10:55

## 2020-01-01 RX ADMIN — Medication 2001 MILLIGRAM(S): at 05:49

## 2020-01-01 RX ADMIN — MORPHINE SULFATE 4 MILLIGRAM(S): 50 CAPSULE, EXTENDED RELEASE ORAL at 15:44

## 2020-01-01 RX ADMIN — SEVELAMER CARBONATE 2400 MILLIGRAM(S): 2400 POWDER, FOR SUSPENSION ORAL at 12:17

## 2020-01-01 RX ADMIN — Medication 2001 MILLIGRAM(S): at 12:15

## 2020-01-01 RX ADMIN — Medication 2001 MILLIGRAM(S): at 04:29

## 2020-01-01 RX ADMIN — Medication 650 MILLIGRAM(S): at 21:33

## 2020-01-01 RX ADMIN — Medication 60 MILLIGRAM(S): at 10:57

## 2020-01-01 RX ADMIN — SODIUM ZIRCONIUM CYCLOSILICATE 10 GRAM(S): 10 POWDER, FOR SUSPENSION ORAL at 05:17

## 2020-01-01 RX ADMIN — Medication 4.13 MICROGRAM(S)/KG/MIN: at 09:58

## 2020-01-01 RX ADMIN — SODIUM ZIRCONIUM CYCLOSILICATE 10 GRAM(S): 10 POWDER, FOR SUSPENSION ORAL at 17:47

## 2020-01-01 RX ADMIN — SENNA PLUS 2 TABLET(S): 8.6 TABLET ORAL at 21:13

## 2020-01-01 RX ADMIN — SEVELAMER CARBONATE 2400 MILLIGRAM(S): 2400 POWDER, FOR SUSPENSION ORAL at 08:49

## 2020-01-01 RX ADMIN — HEPARIN SODIUM 5000 UNIT(S): 5000 INJECTION INTRAVENOUS; SUBCUTANEOUS at 13:44

## 2020-01-01 RX ADMIN — Medication 2001 MILLIGRAM(S): at 11:57

## 2020-01-01 RX ADMIN — Medication 80 MILLIGRAM(S): at 10:07

## 2020-01-01 RX ADMIN — Medication 100 MILLIGRAM(S): at 11:31

## 2020-01-01 RX ADMIN — Medication 81 MILLIGRAM(S): at 11:37

## 2020-01-01 RX ADMIN — Medication 4 UNIT(S): at 08:28

## 2020-01-01 RX ADMIN — MEROPENEM 100 MILLIGRAM(S): 1 INJECTION INTRAVENOUS at 21:15

## 2020-01-01 RX ADMIN — Medication 2001 MILLIGRAM(S): at 17:25

## 2020-01-01 RX ADMIN — SEVELAMER CARBONATE 1600 MILLIGRAM(S): 2400 POWDER, FOR SUSPENSION ORAL at 17:35

## 2020-01-01 RX ADMIN — Medication 650 MILLIGRAM(S): at 22:23

## 2020-01-01 RX ADMIN — Medication 2001 MILLIGRAM(S): at 05:16

## 2020-01-01 RX ADMIN — Medication 3: at 06:08

## 2020-01-01 RX ADMIN — Medication 40 MILLIGRAM(S): at 12:49

## 2020-01-01 RX ADMIN — LACTULOSE 20 GRAM(S): 10 SOLUTION ORAL at 04:16

## 2020-01-01 RX ADMIN — CHLORHEXIDINE GLUCONATE 15 MILLILITER(S): 213 SOLUTION TOPICAL at 05:34

## 2020-01-01 RX ADMIN — Medication 2001 MILLIGRAM(S): at 12:02

## 2020-01-01 RX ADMIN — CHLORHEXIDINE GLUCONATE 15 MILLILITER(S): 213 SOLUTION TOPICAL at 05:07

## 2020-01-01 RX ADMIN — SEVELAMER CARBONATE 2400 MILLIGRAM(S): 2400 POWDER, FOR SUSPENSION ORAL at 16:39

## 2020-01-01 RX ADMIN — LACTULOSE 20 GRAM(S): 10 SOLUTION ORAL at 06:04

## 2020-01-01 RX ADMIN — CHLORHEXIDINE GLUCONATE 1 APPLICATION(S): 213 SOLUTION TOPICAL at 05:09

## 2020-01-01 RX ADMIN — SODIUM ZIRCONIUM CYCLOSILICATE 10 GRAM(S): 10 POWDER, FOR SUSPENSION ORAL at 08:57

## 2020-01-01 RX ADMIN — Medication 650 MILLIGRAM(S): at 05:55

## 2020-01-01 RX ADMIN — PROPOFOL 5.28 MICROGRAM(S)/KG/MIN: 10 INJECTION, EMULSION INTRAVENOUS at 21:49

## 2020-01-01 RX ADMIN — CHLORHEXIDINE GLUCONATE 15 MILLILITER(S): 213 SOLUTION TOPICAL at 17:33

## 2020-01-01 RX ADMIN — MEROPENEM 100 MILLIGRAM(S): 1 INJECTION INTRAVENOUS at 06:05

## 2020-01-01 RX ADMIN — SODIUM ZIRCONIUM CYCLOSILICATE 10 GRAM(S): 10 POWDER, FOR SUSPENSION ORAL at 05:09

## 2020-01-01 RX ADMIN — DEXMEDETOMIDINE HYDROCHLORIDE IN 0.9% SODIUM CHLORIDE 33 MICROGRAM(S)/KG/HR: 4 INJECTION INTRAVENOUS at 16:34

## 2020-01-01 RX ADMIN — PANTOPRAZOLE SODIUM 40 MILLIGRAM(S): 20 TABLET, DELAYED RELEASE ORAL at 11:38

## 2020-01-01 RX ADMIN — FENTANYL CITRATE 4.4 MICROGRAM(S)/KG/HR: 50 INJECTION INTRAVENOUS at 11:26

## 2020-01-01 RX ADMIN — Medication 650 MILLIGRAM(S): at 12:16

## 2020-01-01 RX ADMIN — HEPARIN SODIUM 7500 UNIT(S): 5000 INJECTION INTRAVENOUS; SUBCUTANEOUS at 05:09

## 2020-01-01 RX ADMIN — CASPOFUNGIN ACETATE 260 MILLIGRAM(S): 7 INJECTION, POWDER, LYOPHILIZED, FOR SOLUTION INTRAVENOUS at 15:59

## 2020-01-01 RX ADMIN — PANTOPRAZOLE SODIUM 40 MILLIGRAM(S): 20 TABLET, DELAYED RELEASE ORAL at 11:37

## 2020-01-01 RX ADMIN — Medication 1 APPLICATION(S): at 21:06

## 2020-01-01 RX ADMIN — SODIUM ZIRCONIUM CYCLOSILICATE 10 GRAM(S): 10 POWDER, FOR SUSPENSION ORAL at 10:15

## 2020-01-01 RX ADMIN — CHLORHEXIDINE GLUCONATE 15 MILLILITER(S): 213 SOLUTION TOPICAL at 05:53

## 2020-01-01 RX ADMIN — FENTANYL CITRATE 4.4 MICROGRAM(S)/KG/HR: 50 INJECTION INTRAVENOUS at 18:45

## 2020-01-01 RX ADMIN — Medication 250 MILLIGRAM(S): at 18:03

## 2020-01-01 RX ADMIN — PANTOPRAZOLE SODIUM 40 MILLIGRAM(S): 20 TABLET, DELAYED RELEASE ORAL at 12:42

## 2020-01-01 RX ADMIN — SODIUM CHLORIDE 100 MILLILITER(S): 9 INJECTION, SOLUTION INTRAVENOUS at 09:13

## 2020-01-01 RX ADMIN — Medication 250 MILLIGRAM(S): at 03:39

## 2020-01-01 RX ADMIN — Medication 81 MILLIGRAM(S): at 12:43

## 2020-01-01 RX ADMIN — SENNA PLUS 2 TABLET(S): 8.6 TABLET ORAL at 20:28

## 2020-01-01 RX ADMIN — CHLORHEXIDINE GLUCONATE 1 APPLICATION(S): 213 SOLUTION TOPICAL at 05:53

## 2020-01-01 RX ADMIN — Medication 2001 MILLIGRAM(S): at 17:20

## 2020-01-01 RX ADMIN — ENOXAPARIN SODIUM 80 MILLIGRAM(S): 100 INJECTION SUBCUTANEOUS at 06:19

## 2020-01-01 RX ADMIN — Medication 60 MILLIGRAM(S): at 09:09

## 2020-01-01 RX ADMIN — MIDAZOLAM HYDROCHLORIDE 5 MG/KG/HR: 1 INJECTION, SOLUTION INTRAMUSCULAR; INTRAVENOUS at 10:21

## 2020-01-01 RX ADMIN — CHLORHEXIDINE GLUCONATE 15 MILLILITER(S): 213 SOLUTION TOPICAL at 17:27

## 2020-01-01 RX ADMIN — PROPOFOL 5.28 MICROGRAM(S)/KG/MIN: 10 INJECTION, EMULSION INTRAVENOUS at 11:07

## 2020-01-01 RX ADMIN — PANTOPRAZOLE SODIUM 40 MILLIGRAM(S): 20 TABLET, DELAYED RELEASE ORAL at 12:46

## 2020-01-01 RX ADMIN — LACTULOSE 20 GRAM(S): 10 SOLUTION ORAL at 04:36

## 2020-01-01 RX ADMIN — SEVELAMER CARBONATE 2400 MILLIGRAM(S): 2400 POWDER, FOR SUSPENSION ORAL at 18:21

## 2020-01-01 RX ADMIN — MEROPENEM 100 MILLIGRAM(S): 1 INJECTION INTRAVENOUS at 05:37

## 2020-01-01 RX ADMIN — Medication 1334 MILLIGRAM(S): at 18:13

## 2020-01-01 RX ADMIN — Medication 40 MILLIGRAM(S): at 05:54

## 2020-01-01 RX ADMIN — LACTULOSE 20 GRAM(S): 10 SOLUTION ORAL at 17:04

## 2020-01-01 RX ADMIN — CHLORHEXIDINE GLUCONATE 1 APPLICATION(S): 213 SOLUTION TOPICAL at 06:02

## 2020-01-01 RX ADMIN — Medication 650 MILLIGRAM(S): at 23:27

## 2020-01-01 RX ADMIN — Medication 650 MILLIGRAM(S): at 17:22

## 2020-01-01 RX ADMIN — SEVELAMER CARBONATE 2400 MILLIGRAM(S): 2400 POWDER, FOR SUSPENSION ORAL at 17:10

## 2020-01-01 RX ADMIN — MIDAZOLAM HYDROCHLORIDE 5 MG/KG/HR: 1 INJECTION, SOLUTION INTRAMUSCULAR; INTRAVENOUS at 01:11

## 2020-01-01 RX ADMIN — BUDESONIDE AND FORMOTEROL FUMARATE DIHYDRATE 2 PUFF(S): 160; 4.5 AEROSOL RESPIRATORY (INHALATION) at 01:44

## 2020-01-01 RX ADMIN — CHLORHEXIDINE GLUCONATE 1 APPLICATION(S): 213 SOLUTION TOPICAL at 04:36

## 2020-01-01 RX ADMIN — CASPOFUNGIN ACETATE 260 MILLIGRAM(S): 7 INJECTION, POWDER, LYOPHILIZED, FOR SOLUTION INTRAVENOUS at 06:14

## 2020-01-01 RX ADMIN — Medication 800 MILLIGRAM(S): at 18:42

## 2020-01-01 RX ADMIN — Medication 60 MILLIGRAM(S): at 05:10

## 2020-01-01 RX ADMIN — PANTOPRAZOLE SODIUM 40 MILLIGRAM(S): 20 TABLET, DELAYED RELEASE ORAL at 11:20

## 2020-01-01 RX ADMIN — Medication 166.67 MILLIGRAM(S): at 12:02

## 2020-01-01 RX ADMIN — Medication 60 MILLIGRAM(S): at 17:08

## 2020-01-01 RX ADMIN — SENNA PLUS 2 TABLET(S): 8.6 TABLET ORAL at 21:24

## 2020-01-01 RX ADMIN — Medication 40 MILLIGRAM(S): at 05:10

## 2020-01-01 RX ADMIN — MEROPENEM 100 MILLIGRAM(S): 1 INJECTION INTRAVENOUS at 12:43

## 2020-01-01 RX ADMIN — Medication 2001 MILLIGRAM(S): at 17:07

## 2020-01-01 RX ADMIN — SEVELAMER CARBONATE 2400 MILLIGRAM(S): 2400 POWDER, FOR SUSPENSION ORAL at 05:08

## 2020-01-01 RX ADMIN — Medication 400 MILLIGRAM(S): at 20:39

## 2020-01-01 RX ADMIN — SENNA PLUS 2 TABLET(S): 8.6 TABLET ORAL at 21:41

## 2020-01-01 RX ADMIN — FENTANYL CITRATE 4.4 MICROGRAM(S)/KG/HR: 50 INJECTION INTRAVENOUS at 12:24

## 2020-01-01 RX ADMIN — Medication 100 MILLIEQUIVALENT(S): at 09:51

## 2020-01-01 RX ADMIN — Medication 60 MILLIGRAM(S): at 18:12

## 2020-01-01 RX ADMIN — SEVELAMER CARBONATE 2400 MILLIGRAM(S): 2400 POWDER, FOR SUSPENSION ORAL at 17:25

## 2020-01-01 RX ADMIN — Medication 650 MILLIGRAM(S): at 04:22

## 2020-01-01 RX ADMIN — HEPARIN SODIUM 5000 UNIT(S): 5000 INJECTION INTRAVENOUS; SUBCUTANEOUS at 21:06

## 2020-01-01 RX ADMIN — Medication 40 MILLIGRAM(S): at 17:06

## 2020-01-01 RX ADMIN — CISATRACURIUM BESYLATE 21.1 MICROGRAM(S)/KG/MIN: 2 INJECTION INTRAVENOUS at 10:47

## 2020-01-01 RX ADMIN — Medication 250 MILLIGRAM(S): at 15:43

## 2020-01-01 RX ADMIN — Medication 60 MILLIGRAM(S): at 05:55

## 2020-01-01 RX ADMIN — CHLORHEXIDINE GLUCONATE 1 APPLICATION(S): 213 SOLUTION TOPICAL at 05:04

## 2020-01-01 RX ADMIN — Medication 650 MILLIGRAM(S): at 04:17

## 2020-01-01 RX ADMIN — INSULIN GLARGINE 14 UNIT(S): 100 INJECTION, SOLUTION SUBCUTANEOUS at 21:11

## 2020-01-01 RX ADMIN — FENTANYL CITRATE 4.4 MICROGRAM(S)/KG/HR: 50 INJECTION INTRAVENOUS at 21:54

## 2020-01-01 RX ADMIN — SEVELAMER CARBONATE 2400 MILLIGRAM(S): 2400 POWDER, FOR SUSPENSION ORAL at 04:29

## 2020-01-01 RX ADMIN — MEROPENEM 100 MILLIGRAM(S): 1 INJECTION INTRAVENOUS at 06:08

## 2020-01-01 RX ADMIN — LACTULOSE 20 GRAM(S): 10 SOLUTION ORAL at 11:37

## 2020-01-01 RX ADMIN — CHLORHEXIDINE GLUCONATE 15 MILLILITER(S): 213 SOLUTION TOPICAL at 05:21

## 2020-01-01 RX ADMIN — Medication 2001 MILLIGRAM(S): at 17:33

## 2020-01-01 RX ADMIN — SENNA PLUS 2 TABLET(S): 8.6 TABLET ORAL at 21:14

## 2020-01-01 RX ADMIN — Medication 81 MILLIGRAM(S): at 12:42

## 2020-01-01 RX ADMIN — CHLORHEXIDINE GLUCONATE 15 MILLILITER(S): 213 SOLUTION TOPICAL at 05:09

## 2020-01-01 RX ADMIN — SODIUM ZIRCONIUM CYCLOSILICATE 10 GRAM(S): 10 POWDER, FOR SUSPENSION ORAL at 21:25

## 2020-01-01 RX ADMIN — SENNA PLUS 2 TABLET(S): 8.6 TABLET ORAL at 21:19

## 2020-01-01 RX ADMIN — MORPHINE SULFATE 5 MG/HR: 50 CAPSULE, EXTENDED RELEASE ORAL at 15:44

## 2020-01-01 RX ADMIN — MEROPENEM 100 MILLIGRAM(S): 1 INJECTION INTRAVENOUS at 17:32

## 2020-01-01 RX ADMIN — Medication 2001 MILLIGRAM(S): at 11:37

## 2020-01-01 RX ADMIN — LACTULOSE 20 GRAM(S): 10 SOLUTION ORAL at 16:35

## 2020-01-01 RX ADMIN — MEROPENEM 100 MILLIGRAM(S): 1 INJECTION INTRAVENOUS at 17:55

## 2020-01-01 RX ADMIN — Medication 60 MILLIGRAM(S): at 18:18

## 2020-01-01 RX ADMIN — Medication 100 MILLIGRAM(S): at 05:51

## 2020-01-01 RX ADMIN — PHENYLEPHRINE HYDROCHLORIDE 1.65 MICROGRAM(S)/KG/MIN: 10 INJECTION INTRAVENOUS at 21:14

## 2020-01-01 RX ADMIN — Medication 2001 MILLIGRAM(S): at 06:05

## 2020-01-01 RX ADMIN — CHLORHEXIDINE GLUCONATE 1 APPLICATION(S): 213 SOLUTION TOPICAL at 04:28

## 2020-01-01 RX ADMIN — Medication 400 MILLIGRAM(S): at 17:05

## 2020-01-01 RX ADMIN — BUDESONIDE AND FORMOTEROL FUMARATE DIHYDRATE 2 PUFF(S): 160; 4.5 AEROSOL RESPIRATORY (INHALATION) at 19:44

## 2020-01-01 RX ADMIN — Medication 650 MILLIGRAM(S): at 05:43

## 2020-01-01 RX ADMIN — Medication 40 MILLIGRAM(S): at 05:16

## 2020-01-01 RX ADMIN — CHLORHEXIDINE GLUCONATE 15 MILLILITER(S): 213 SOLUTION TOPICAL at 05:57

## 2020-01-01 RX ADMIN — SODIUM CHLORIDE 75 MILLILITER(S): 9 INJECTION, SOLUTION INTRAVENOUS at 11:45

## 2020-01-01 RX ADMIN — MIDAZOLAM HYDROCHLORIDE 2 MILLIGRAM(S): 1 INJECTION, SOLUTION INTRAMUSCULAR; INTRAVENOUS at 10:56

## 2020-01-01 RX ADMIN — BUDESONIDE AND FORMOTEROL FUMARATE DIHYDRATE 2 PUFF(S): 160; 4.5 AEROSOL RESPIRATORY (INHALATION) at 22:48

## 2020-01-01 RX ADMIN — Medication 650 MILLIGRAM(S): at 05:50

## 2020-01-01 RX ADMIN — INSULIN HUMAN 3 UNIT(S)/HR: 100 INJECTION, SOLUTION SUBCUTANEOUS at 09:48

## 2020-01-01 RX ADMIN — PANTOPRAZOLE SODIUM 40 MILLIGRAM(S): 20 TABLET, DELAYED RELEASE ORAL at 14:23

## 2020-01-01 RX ADMIN — PANTOPRAZOLE SODIUM 40 MILLIGRAM(S): 20 TABLET, DELAYED RELEASE ORAL at 11:57

## 2020-01-01 RX ADMIN — SENNA PLUS 2 TABLET(S): 8.6 TABLET ORAL at 21:03

## 2020-01-01 RX ADMIN — Medication 1 APPLICATION(S): at 12:30

## 2020-01-01 RX ADMIN — SEVELAMER CARBONATE 2400 MILLIGRAM(S): 2400 POWDER, FOR SUSPENSION ORAL at 17:33

## 2020-01-01 RX ADMIN — CHLORHEXIDINE GLUCONATE 15 MILLILITER(S): 213 SOLUTION TOPICAL at 17:32

## 2020-01-01 RX ADMIN — PANTOPRAZOLE SODIUM 40 MILLIGRAM(S): 20 TABLET, DELAYED RELEASE ORAL at 12:02

## 2020-01-01 RX ADMIN — Medication 40 MILLIGRAM(S): at 05:50

## 2020-01-01 RX ADMIN — Medication 650 MILLIGRAM(S): at 04:28

## 2020-01-01 RX ADMIN — HEPARIN SODIUM 7500 UNIT(S): 5000 INJECTION INTRAVENOUS; SUBCUTANEOUS at 16:12

## 2020-01-01 RX ADMIN — Medication 4: at 18:05

## 2020-01-01 RX ADMIN — Medication 60 MILLIGRAM(S): at 05:25

## 2020-01-01 RX ADMIN — Medication 650 MILLIGRAM(S): at 04:34

## 2020-01-01 RX ADMIN — LACTULOSE 20 GRAM(S): 10 SOLUTION ORAL at 05:17

## 2020-01-01 RX ADMIN — Medication 125 MILLIGRAM(S): at 05:33

## 2020-01-01 RX ADMIN — Medication 650 MILLIGRAM(S): at 17:21

## 2020-01-01 RX ADMIN — Medication 100 MILLIGRAM(S): at 22:48

## 2020-01-01 RX ADMIN — ENOXAPARIN SODIUM 80 MILLIGRAM(S): 100 INJECTION SUBCUTANEOUS at 18:34

## 2020-01-01 RX ADMIN — Medication 100 MILLIEQUIVALENT(S): at 08:39

## 2020-01-01 RX ADMIN — LACTULOSE 20 GRAM(S): 10 SOLUTION ORAL at 05:55

## 2020-01-01 RX ADMIN — Medication 81 MILLIGRAM(S): at 12:30

## 2020-01-01 RX ADMIN — PANTOPRAZOLE SODIUM 40 MILLIGRAM(S): 20 TABLET, DELAYED RELEASE ORAL at 11:26

## 2020-01-01 RX ADMIN — Medication 2001 MILLIGRAM(S): at 09:05

## 2020-01-01 RX ADMIN — CHLORHEXIDINE GLUCONATE 15 MILLILITER(S): 213 SOLUTION TOPICAL at 04:36

## 2020-01-01 RX ADMIN — Medication 650 MILLIGRAM(S): at 12:12

## 2020-01-01 RX ADMIN — CHLORHEXIDINE GLUCONATE 15 MILLILITER(S): 213 SOLUTION TOPICAL at 05:16

## 2020-01-01 RX ADMIN — ENOXAPARIN SODIUM 40 MILLIGRAM(S): 100 INJECTION SUBCUTANEOUS at 11:03

## 2020-01-01 RX ADMIN — LACTULOSE 20 GRAM(S): 10 SOLUTION ORAL at 05:23

## 2020-01-01 RX ADMIN — LACTULOSE 20 GRAM(S): 10 SOLUTION ORAL at 05:40

## 2020-01-01 RX ADMIN — MEROPENEM 100 MILLIGRAM(S): 1 INJECTION INTRAVENOUS at 21:08

## 2020-01-01 RX ADMIN — SODIUM ZIRCONIUM CYCLOSILICATE 10 GRAM(S): 10 POWDER, FOR SUSPENSION ORAL at 05:43

## 2020-01-01 RX ADMIN — SODIUM ZIRCONIUM CYCLOSILICATE 10 GRAM(S): 10 POWDER, FOR SUSPENSION ORAL at 11:58

## 2020-01-01 RX ADMIN — PANTOPRAZOLE SODIUM 40 MILLIGRAM(S): 20 TABLET, DELAYED RELEASE ORAL at 12:03

## 2020-01-01 RX ADMIN — HEPARIN SODIUM 5000 UNIT(S): 5000 INJECTION INTRAVENOUS; SUBCUTANEOUS at 05:54

## 2020-01-01 RX ADMIN — CHLORHEXIDINE GLUCONATE 15 MILLILITER(S): 213 SOLUTION TOPICAL at 05:43

## 2020-01-01 RX ADMIN — LOSARTAN POTASSIUM 50 MILLIGRAM(S): 100 TABLET, FILM COATED ORAL at 05:10

## 2020-01-01 RX ADMIN — HEPARIN SODIUM 14 UNIT(S)/HR: 5000 INJECTION INTRAVENOUS; SUBCUTANEOUS at 20:29

## 2020-01-01 RX ADMIN — Medication 4: at 08:28

## 2020-01-01 RX ADMIN — LACTULOSE 20 GRAM(S): 10 SOLUTION ORAL at 05:54

## 2020-01-01 RX ADMIN — HEPARIN SODIUM 5000 UNIT(S): 5000 INJECTION INTRAVENOUS; SUBCUTANEOUS at 05:43

## 2020-01-01 RX ADMIN — SENNA PLUS 2 TABLET(S): 8.6 TABLET ORAL at 21:59

## 2020-01-01 RX ADMIN — Medication 81 MILLIGRAM(S): at 12:26

## 2020-01-01 RX ADMIN — LACTULOSE 20 GRAM(S): 10 SOLUTION ORAL at 05:49

## 2020-01-01 RX ADMIN — Medication 1334 MILLIGRAM(S): at 08:49

## 2020-01-01 RX ADMIN — LACTULOSE 20 GRAM(S): 10 SOLUTION ORAL at 17:07

## 2020-01-01 RX ADMIN — SODIUM CHLORIDE 200 MILLILITER(S): 9 INJECTION, SOLUTION INTRAVENOUS at 17:50

## 2020-01-01 RX ADMIN — MIDAZOLAM HYDROCHLORIDE 5 MG/KG/HR: 1 INJECTION, SOLUTION INTRAMUSCULAR; INTRAVENOUS at 05:43

## 2020-01-01 RX ADMIN — Medication 81 MILLIGRAM(S): at 10:49

## 2020-01-01 RX ADMIN — SEVELAMER CARBONATE 2400 MILLIGRAM(S): 2400 POWDER, FOR SUSPENSION ORAL at 17:32

## 2020-01-01 RX ADMIN — CHLORHEXIDINE GLUCONATE 15 MILLILITER(S): 213 SOLUTION TOPICAL at 18:01

## 2020-01-01 RX ADMIN — DEXMEDETOMIDINE HYDROCHLORIDE IN 0.9% SODIUM CHLORIDE 4.4 MICROGRAM(S)/KG/HR: 4 INJECTION INTRAVENOUS at 20:21

## 2020-01-01 RX ADMIN — CHLORHEXIDINE GLUCONATE 15 MILLILITER(S): 213 SOLUTION TOPICAL at 04:10

## 2020-01-01 RX ADMIN — VECURONIUM BROMIDE 10 MILLIGRAM(S): 20 INJECTION, POWDER, FOR SOLUTION INTRAVENOUS at 02:20

## 2020-01-01 RX ADMIN — CHLORHEXIDINE GLUCONATE 15 MILLILITER(S): 213 SOLUTION TOPICAL at 17:01

## 2020-01-01 RX ADMIN — SEVELAMER CARBONATE 1600 MILLIGRAM(S): 2400 POWDER, FOR SUSPENSION ORAL at 11:33

## 2020-01-01 RX ADMIN — Medication 60 MILLIGRAM(S): at 17:26

## 2020-01-01 RX ADMIN — HEPARIN SODIUM 5000 UNIT(S): 5000 INJECTION INTRAVENOUS; SUBCUTANEOUS at 14:23

## 2020-01-01 RX ADMIN — SODIUM CHLORIDE 75 MILLILITER(S): 9 INJECTION, SOLUTION INTRAVENOUS at 11:44

## 2020-01-01 RX ADMIN — PANTOPRAZOLE SODIUM 40 MILLIGRAM(S): 20 TABLET, DELAYED RELEASE ORAL at 11:28

## 2020-01-01 RX ADMIN — Medication 60 MILLIGRAM(S): at 17:36

## 2020-01-01 RX ADMIN — Medication 81 MILLIGRAM(S): at 12:16

## 2020-01-01 RX ADMIN — SODIUM ZIRCONIUM CYCLOSILICATE 10 GRAM(S): 10 POWDER, FOR SUSPENSION ORAL at 12:18

## 2020-01-01 RX ADMIN — CHLORHEXIDINE GLUCONATE 1 APPLICATION(S): 213 SOLUTION TOPICAL at 04:14

## 2020-01-01 RX ADMIN — Medication 40 MILLIEQUIVALENT(S): at 22:21

## 2020-01-01 RX ADMIN — LACTULOSE 20 GRAM(S): 10 SOLUTION ORAL at 05:09

## 2020-01-01 RX ADMIN — CHLORHEXIDINE GLUCONATE 15 MILLILITER(S): 213 SOLUTION TOPICAL at 16:55

## 2020-01-01 RX ADMIN — CASPOFUNGIN ACETATE 257.14 MILLIGRAM(S): 7 INJECTION, POWDER, LYOPHILIZED, FOR SOLUTION INTRAVENOUS at 12:06

## 2020-01-01 RX ADMIN — LACTULOSE 20 GRAM(S): 10 SOLUTION ORAL at 14:21

## 2020-01-01 RX ADMIN — Medication 650 MILLIGRAM(S): at 05:53

## 2020-01-01 RX ADMIN — MEROPENEM 100 MILLIGRAM(S): 1 INJECTION INTRAVENOUS at 05:26

## 2020-01-01 RX ADMIN — Medication 650 MILLIGRAM(S): at 02:12

## 2020-01-01 RX ADMIN — PANTOPRAZOLE SODIUM 40 MILLIGRAM(S): 20 TABLET, DELAYED RELEASE ORAL at 12:43

## 2020-01-01 RX ADMIN — HEPARIN SODIUM 7500 UNIT(S): 5000 INJECTION INTRAVENOUS; SUBCUTANEOUS at 04:24

## 2020-01-01 RX ADMIN — SODIUM ZIRCONIUM CYCLOSILICATE 10 GRAM(S): 10 POWDER, FOR SUSPENSION ORAL at 17:11

## 2020-01-01 RX ADMIN — Medication 100 MILLIGRAM(S): at 12:36

## 2020-01-01 RX ADMIN — LACTULOSE 20 GRAM(S): 10 SOLUTION ORAL at 04:21

## 2020-01-01 RX ADMIN — SEVELAMER CARBONATE 2400 MILLIGRAM(S): 2400 POWDER, FOR SUSPENSION ORAL at 17:07

## 2020-01-01 RX ADMIN — SEVELAMER CARBONATE 2400 MILLIGRAM(S): 2400 POWDER, FOR SUSPENSION ORAL at 17:02

## 2020-01-01 RX ADMIN — Medication 50 MILLIEQUIVALENT(S): at 14:30

## 2020-01-01 RX ADMIN — HEPARIN SODIUM 14 UNIT(S)/HR: 5000 INJECTION INTRAVENOUS; SUBCUTANEOUS at 20:39

## 2020-01-01 RX ADMIN — Medication 650 MILLIGRAM(S): at 17:03

## 2020-01-01 RX ADMIN — INSULIN HUMAN 10 UNIT(S): 100 INJECTION, SOLUTION SUBCUTANEOUS at 09:02

## 2020-01-01 RX ADMIN — Medication 125 MILLIGRAM(S): at 01:43

## 2020-01-01 RX ADMIN — INSULIN HUMAN 3 UNIT(S)/HR: 100 INJECTION, SOLUTION SUBCUTANEOUS at 13:00

## 2020-01-01 RX ADMIN — SODIUM ZIRCONIUM CYCLOSILICATE 10 GRAM(S): 10 POWDER, FOR SUSPENSION ORAL at 10:52

## 2020-01-01 RX ADMIN — INSULIN GLARGINE 14 UNIT(S): 100 INJECTION, SOLUTION SUBCUTANEOUS at 23:40

## 2020-01-01 RX ADMIN — Medication 100 MILLIGRAM(S): at 23:58

## 2020-01-01 RX ADMIN — HEPARIN SODIUM 5000 UNIT(S): 5000 INJECTION INTRAVENOUS; SUBCUTANEOUS at 21:18

## 2020-01-01 RX ADMIN — MEROPENEM 100 MILLIGRAM(S): 1 INJECTION INTRAVENOUS at 11:26

## 2020-01-01 RX ADMIN — CISATRACURIUM BESYLATE 10 MILLIGRAM(S): 2 INJECTION INTRAVENOUS at 08:45

## 2020-01-01 RX ADMIN — CHLORHEXIDINE GLUCONATE 1 APPLICATION(S): 213 SOLUTION TOPICAL at 05:17

## 2020-01-01 RX ADMIN — Medication 81 MILLIGRAM(S): at 11:56

## 2020-01-01 RX ADMIN — SODIUM CHLORIDE 75 MILLILITER(S): 9 INJECTION, SOLUTION INTRAVENOUS at 17:07

## 2020-01-01 RX ADMIN — LACTULOSE 20 GRAM(S): 10 SOLUTION ORAL at 21:24

## 2020-01-01 RX ADMIN — SEVELAMER CARBONATE 2400 MILLIGRAM(S): 2400 POWDER, FOR SUSPENSION ORAL at 11:27

## 2020-01-01 RX ADMIN — INSULIN HUMAN 3 UNIT(S)/HR: 100 INJECTION, SOLUTION SUBCUTANEOUS at 05:38

## 2020-01-01 RX ADMIN — SODIUM ZIRCONIUM CYCLOSILICATE 10 GRAM(S): 10 POWDER, FOR SUSPENSION ORAL at 05:16

## 2020-01-01 RX ADMIN — CHLORHEXIDINE GLUCONATE 15 MILLILITER(S): 213 SOLUTION TOPICAL at 17:24

## 2020-01-01 RX ADMIN — SEVELAMER CARBONATE 2400 MILLIGRAM(S): 2400 POWDER, FOR SUSPENSION ORAL at 05:17

## 2020-01-01 RX ADMIN — SEVELAMER CARBONATE 2400 MILLIGRAM(S): 2400 POWDER, FOR SUSPENSION ORAL at 12:30

## 2020-01-01 RX ADMIN — PROPOFOL 4 MILLIGRAM(S): 10 INJECTION, EMULSION INTRAVENOUS at 11:17

## 2020-01-01 RX ADMIN — Medication 400 MILLIGRAM(S): at 17:40

## 2020-01-01 RX ADMIN — PANTOPRAZOLE SODIUM 40 MILLIGRAM(S): 20 TABLET, DELAYED RELEASE ORAL at 12:33

## 2020-01-01 RX ADMIN — Medication 4.13 MICROGRAM(S)/KG/MIN: at 21:57

## 2020-01-01 RX ADMIN — LACTULOSE 20 GRAM(S): 10 SOLUTION ORAL at 05:02

## 2020-01-01 RX ADMIN — SODIUM CHLORIDE 375 MILLILITER(S): 9 INJECTION, SOLUTION INTRAVENOUS at 09:59

## 2020-01-01 RX ADMIN — CHLORHEXIDINE GLUCONATE 1 APPLICATION(S): 213 SOLUTION TOPICAL at 06:05

## 2020-01-01 RX ADMIN — Medication 650 MILLIGRAM(S): at 05:17

## 2020-01-01 RX ADMIN — Medication 2001 MILLIGRAM(S): at 10:51

## 2020-01-01 RX ADMIN — MEROPENEM 100 MILLIGRAM(S): 1 INJECTION INTRAVENOUS at 11:45

## 2020-01-01 RX ADMIN — SEVELAMER CARBONATE 2400 MILLIGRAM(S): 2400 POWDER, FOR SUSPENSION ORAL at 06:24

## 2020-01-01 RX ADMIN — SEVELAMER CARBONATE 2400 MILLIGRAM(S): 2400 POWDER, FOR SUSPENSION ORAL at 14:00

## 2020-01-01 RX ADMIN — Medication 2001 MILLIGRAM(S): at 06:00

## 2020-01-01 RX ADMIN — HEPARIN SODIUM 16 UNIT(S)/HR: 5000 INJECTION INTRAVENOUS; SUBCUTANEOUS at 09:20

## 2020-01-01 RX ADMIN — Medication 2001 MILLIGRAM(S): at 17:24

## 2020-01-01 RX ADMIN — Medication 100 MILLIGRAM(S): at 05:09

## 2020-01-01 RX ADMIN — SODIUM CHLORIDE 375 MILLILITER(S): 9 INJECTION, SOLUTION INTRAVENOUS at 01:11

## 2020-01-01 RX ADMIN — PANTOPRAZOLE SODIUM 40 MILLIGRAM(S): 20 TABLET, DELAYED RELEASE ORAL at 12:06

## 2020-01-01 RX ADMIN — Medication 40 MILLIGRAM(S): at 08:53

## 2020-01-01 RX ADMIN — AZITHROMYCIN 500 MILLIGRAM(S): 500 TABLET, FILM COATED ORAL at 12:35

## 2020-01-01 RX ADMIN — MEROPENEM 100 MILLIGRAM(S): 1 INJECTION INTRAVENOUS at 17:15

## 2020-01-01 RX ADMIN — Medication 650 MILLIGRAM(S): at 05:44

## 2020-01-01 RX ADMIN — SEVELAMER CARBONATE 2400 MILLIGRAM(S): 2400 POWDER, FOR SUSPENSION ORAL at 17:03

## 2020-01-01 RX ADMIN — Medication 650 MILLIGRAM(S): at 03:30

## 2020-01-01 RX ADMIN — CHLORHEXIDINE GLUCONATE 15 MILLILITER(S): 213 SOLUTION TOPICAL at 17:07

## 2020-01-01 RX ADMIN — MEROPENEM 100 MILLIGRAM(S): 1 INJECTION INTRAVENOUS at 06:02

## 2020-01-01 RX ADMIN — Medication 1 APPLICATION(S): at 10:21

## 2020-01-01 RX ADMIN — Medication 650 MILLIGRAM(S): at 17:58

## 2020-01-01 RX ADMIN — Medication 4 UNIT(S): at 12:43

## 2020-01-01 RX ADMIN — Medication 1: at 10:14

## 2020-01-01 RX ADMIN — FENTANYL CITRATE 4.4 MICROGRAM(S)/KG/HR: 50 INJECTION INTRAVENOUS at 20:30

## 2020-01-01 RX ADMIN — CHLORHEXIDINE GLUCONATE 15 MILLILITER(S): 213 SOLUTION TOPICAL at 17:14

## 2020-01-01 RX ADMIN — Medication 2001 MILLIGRAM(S): at 17:58

## 2020-01-01 RX ADMIN — VECURONIUM BROMIDE 10 MILLIGRAM(S): 20 INJECTION, POWDER, FOR SOLUTION INTRAVENOUS at 07:43

## 2020-01-01 RX ADMIN — HEPARIN SODIUM 7500 UNIT(S): 5000 INJECTION INTRAVENOUS; SUBCUTANEOUS at 05:54

## 2020-01-01 RX ADMIN — Medication 650 MILLIGRAM(S): at 21:57

## 2020-01-01 RX ADMIN — SEVELAMER CARBONATE 2400 MILLIGRAM(S): 2400 POWDER, FOR SUSPENSION ORAL at 04:35

## 2020-01-01 RX ADMIN — VECURONIUM BROMIDE 10 MILLIGRAM(S): 20 INJECTION, POWDER, FOR SOLUTION INTRAVENOUS at 22:01

## 2020-01-01 RX ADMIN — PHENYLEPHRINE HYDROCHLORIDE 1.65 MICROGRAM(S)/KG/MIN: 10 INJECTION INTRAVENOUS at 05:08

## 2020-01-01 RX ADMIN — INSULIN HUMAN 3 UNIT(S)/HR: 100 INJECTION, SOLUTION SUBCUTANEOUS at 08:58

## 2020-01-01 RX ADMIN — Medication 650 MILLIGRAM(S): at 12:06

## 2020-01-01 RX ADMIN — Medication 2001 MILLIGRAM(S): at 12:03

## 2020-01-01 RX ADMIN — Medication 40 MILLIGRAM(S): at 06:00

## 2020-01-01 RX ADMIN — SEVELAMER CARBONATE 2400 MILLIGRAM(S): 2400 POWDER, FOR SUSPENSION ORAL at 11:37

## 2020-01-01 RX ADMIN — Medication 2001 MILLIGRAM(S): at 18:21

## 2020-01-01 RX ADMIN — BUDESONIDE AND FORMOTEROL FUMARATE DIHYDRATE 2 PUFF(S): 160; 4.5 AEROSOL RESPIRATORY (INHALATION) at 21:19

## 2020-01-01 RX ADMIN — FENTANYL CITRATE 4.4 MICROGRAM(S)/KG/HR: 50 INJECTION INTRAVENOUS at 03:55

## 2020-01-01 RX ADMIN — Medication 650 MILLIGRAM(S): at 05:02

## 2020-01-01 RX ADMIN — Medication 60 MILLIGRAM(S): at 17:10

## 2020-01-01 RX ADMIN — PANTOPRAZOLE SODIUM 40 MILLIGRAM(S): 20 TABLET, DELAYED RELEASE ORAL at 10:48

## 2020-01-01 RX ADMIN — Medication 4.13 MICROGRAM(S)/KG/MIN: at 01:11

## 2020-01-01 RX ADMIN — SEVELAMER CARBONATE 2400 MILLIGRAM(S): 2400 POWDER, FOR SUSPENSION ORAL at 05:48

## 2020-01-01 RX ADMIN — Medication 81 MILLIGRAM(S): at 11:57

## 2020-01-01 RX ADMIN — Medication 4.13 MICROGRAM(S)/KG/MIN: at 05:36

## 2020-01-01 RX ADMIN — PROPOFOL 5.28 MICROGRAM(S)/KG/MIN: 10 INJECTION, EMULSION INTRAVENOUS at 13:00

## 2020-01-01 RX ADMIN — SEVELAMER CARBONATE 2400 MILLIGRAM(S): 2400 POWDER, FOR SUSPENSION ORAL at 12:44

## 2020-01-01 RX ADMIN — Medication 40 MILLIGRAM(S): at 04:10

## 2020-01-01 RX ADMIN — DEXMEDETOMIDINE HYDROCHLORIDE IN 0.9% SODIUM CHLORIDE 4.4 MICROGRAM(S)/KG/HR: 4 INJECTION INTRAVENOUS at 22:15

## 2020-01-01 RX ADMIN — Medication 50 MILLIGRAM(S): at 13:18

## 2020-01-01 RX ADMIN — Medication 2001 MILLIGRAM(S): at 11:36

## 2020-01-01 RX ADMIN — PANTOPRAZOLE SODIUM 40 MILLIGRAM(S): 20 TABLET, DELAYED RELEASE ORAL at 12:15

## 2020-01-01 RX ADMIN — BUDESONIDE AND FORMOTEROL FUMARATE DIHYDRATE 2 PUFF(S): 160; 4.5 AEROSOL RESPIRATORY (INHALATION) at 08:26

## 2020-01-01 RX ADMIN — Medication 500 MILLILITER(S): at 09:04

## 2020-01-01 RX ADMIN — MEROPENEM 100 MILLIGRAM(S): 1 INJECTION INTRAVENOUS at 17:04

## 2020-01-01 RX ADMIN — HEPARIN SODIUM 5000 UNIT(S): 5000 INJECTION INTRAVENOUS; SUBCUTANEOUS at 21:16

## 2020-01-01 RX ADMIN — Medication 2001 MILLIGRAM(S): at 12:29

## 2020-01-01 RX ADMIN — HEPARIN SODIUM 5000 UNIT(S): 5000 INJECTION INTRAVENOUS; SUBCUTANEOUS at 23:59

## 2020-01-01 RX ADMIN — LACTULOSE 20 GRAM(S): 10 SOLUTION ORAL at 18:12

## 2020-01-01 RX ADMIN — MORPHINE SULFATE 10 MILLIGRAM(S): 50 CAPSULE, EXTENDED RELEASE ORAL at 14:58

## 2020-01-01 RX ADMIN — CISATRACURIUM BESYLATE 10.2 MICROGRAM(S)/KG/MIN: 2 INJECTION INTRAVENOUS at 17:01

## 2020-01-01 RX ADMIN — DEXMEDETOMIDINE HYDROCHLORIDE IN 0.9% SODIUM CHLORIDE 1.1 MICROGRAM(S)/KG/HR: 4 INJECTION INTRAVENOUS at 04:13

## 2020-01-01 RX ADMIN — PANTOPRAZOLE SODIUM 40 MILLIGRAM(S): 20 TABLET, DELAYED RELEASE ORAL at 12:26

## 2020-01-01 RX ADMIN — HEPARIN SODIUM 5000 UNIT(S): 5000 INJECTION INTRAVENOUS; SUBCUTANEOUS at 06:00

## 2020-01-01 RX ADMIN — Medication 1 APPLICATION(S): at 09:54

## 2020-01-01 RX ADMIN — CHLORHEXIDINE GLUCONATE 15 MILLILITER(S): 213 SOLUTION TOPICAL at 05:54

## 2020-01-01 RX ADMIN — CHLORHEXIDINE GLUCONATE 15 MILLILITER(S): 213 SOLUTION TOPICAL at 04:17

## 2020-01-01 RX ADMIN — LACTULOSE 20 GRAM(S): 10 SOLUTION ORAL at 04:08

## 2020-01-01 RX ADMIN — PROPOFOL 5.28 MICROGRAM(S)/KG/MIN: 10 INJECTION, EMULSION INTRAVENOUS at 20:29

## 2020-01-01 RX ADMIN — LOSARTAN POTASSIUM 50 MILLIGRAM(S): 100 TABLET, FILM COATED ORAL at 05:33

## 2020-01-01 RX ADMIN — Medication 4.13 MICROGRAM(S)/KG/MIN: at 03:30

## 2020-01-01 RX ADMIN — SODIUM POLYSTYRENE SULFONATE 30 GRAM(S): 4.1 POWDER, FOR SUSPENSION ORAL at 05:53

## 2020-01-01 RX ADMIN — MEROPENEM 100 MILLIGRAM(S): 1 INJECTION INTRAVENOUS at 12:51

## 2020-01-01 RX ADMIN — SEVELAMER CARBONATE 2400 MILLIGRAM(S): 2400 POWDER, FOR SUSPENSION ORAL at 12:12

## 2020-01-01 RX ADMIN — Medication 40 MILLIGRAM(S): at 05:17

## 2020-01-01 RX ADMIN — CHLORHEXIDINE GLUCONATE 1 APPLICATION(S): 213 SOLUTION TOPICAL at 05:54

## 2020-01-01 RX ADMIN — INSULIN HUMAN 3 UNIT(S)/HR: 100 INJECTION, SOLUTION SUBCUTANEOUS at 20:29

## 2020-01-01 RX ADMIN — PANTOPRAZOLE SODIUM 40 MILLIGRAM(S): 20 TABLET, DELAYED RELEASE ORAL at 13:08

## 2020-01-01 RX ADMIN — MEROPENEM 100 MILLIGRAM(S): 1 INJECTION INTRAVENOUS at 11:38

## 2020-01-01 RX ADMIN — CHLORHEXIDINE GLUCONATE 15 MILLILITER(S): 213 SOLUTION TOPICAL at 17:36

## 2020-01-01 RX ADMIN — LACTULOSE 20 GRAM(S): 10 SOLUTION ORAL at 23:27

## 2020-01-01 RX ADMIN — Medication 2001 MILLIGRAM(S): at 05:43

## 2020-01-01 RX ADMIN — Medication 40 MILLIGRAM(S): at 16:42

## 2020-01-01 RX ADMIN — Medication 650 MILLIGRAM(S): at 00:06

## 2020-01-01 RX ADMIN — HEPARIN SODIUM 7500 UNIT(S): 5000 INJECTION INTRAVENOUS; SUBCUTANEOUS at 21:24

## 2020-01-01 RX ADMIN — PANTOPRAZOLE SODIUM 40 MILLIGRAM(S): 20 TABLET, DELAYED RELEASE ORAL at 11:17

## 2020-01-01 RX ADMIN — BUDESONIDE AND FORMOTEROL FUMARATE DIHYDRATE 2 PUFF(S): 160; 4.5 AEROSOL RESPIRATORY (INHALATION) at 10:44

## 2020-01-01 RX ADMIN — CHLORHEXIDINE GLUCONATE 1 APPLICATION(S): 213 SOLUTION TOPICAL at 05:06

## 2020-01-01 RX ADMIN — Medication 81 MILLIGRAM(S): at 11:33

## 2020-01-01 RX ADMIN — Medication 2: at 17:38

## 2020-01-01 RX ADMIN — HEPARIN SODIUM 7500 UNIT(S): 5000 INJECTION INTRAVENOUS; SUBCUTANEOUS at 13:26

## 2020-01-01 RX ADMIN — CHLORHEXIDINE GLUCONATE 15 MILLILITER(S): 213 SOLUTION TOPICAL at 17:22

## 2020-01-01 RX ADMIN — CHLORHEXIDINE GLUCONATE 15 MILLILITER(S): 213 SOLUTION TOPICAL at 17:35

## 2020-01-01 RX ADMIN — VASOPRESSIN 1.2 UNIT(S)/MIN: 20 INJECTION INTRAVENOUS at 06:22

## 2020-01-01 RX ADMIN — Medication 60 MILLIGRAM(S): at 05:54

## 2020-01-01 RX ADMIN — Medication 3: at 16:30

## 2020-01-01 RX ADMIN — CHLORHEXIDINE GLUCONATE 15 MILLILITER(S): 213 SOLUTION TOPICAL at 04:13

## 2020-01-01 RX ADMIN — Medication 650 MILLIGRAM(S): at 17:07

## 2020-01-01 RX ADMIN — ENOXAPARIN SODIUM 80 MILLIGRAM(S): 100 INJECTION SUBCUTANEOUS at 11:46

## 2020-01-01 RX ADMIN — SEVELAMER CARBONATE 2400 MILLIGRAM(S): 2400 POWDER, FOR SUSPENSION ORAL at 05:49

## 2020-01-01 RX ADMIN — Medication 1334 MILLIGRAM(S): at 17:02

## 2020-01-01 RX ADMIN — SEVELAMER CARBONATE 2400 MILLIGRAM(S): 2400 POWDER, FOR SUSPENSION ORAL at 09:05

## 2020-01-01 RX ADMIN — CHLORHEXIDINE GLUCONATE 1 APPLICATION(S): 213 SOLUTION TOPICAL at 05:44

## 2020-01-01 RX ADMIN — SODIUM ZIRCONIUM CYCLOSILICATE 10 GRAM(S): 10 POWDER, FOR SUSPENSION ORAL at 17:36

## 2020-01-01 RX ADMIN — Medication 2001 MILLIGRAM(S): at 17:14

## 2020-01-01 RX ADMIN — MEROPENEM 100 MILLIGRAM(S): 1 INJECTION INTRAVENOUS at 16:40

## 2020-01-01 RX ADMIN — Medication 50 MILLIGRAM(S): at 16:55

## 2020-01-01 RX ADMIN — Medication 650 MILLIGRAM(S): at 12:31

## 2020-01-01 RX ADMIN — SENNA PLUS 2 TABLET(S): 8.6 TABLET ORAL at 21:27

## 2020-01-01 RX ADMIN — Medication 2001 MILLIGRAM(S): at 05:53

## 2020-01-01 RX ADMIN — Medication 60 MILLIGRAM(S): at 17:13

## 2020-01-01 RX ADMIN — Medication 80 MILLIGRAM(S): at 09:14

## 2020-01-01 RX ADMIN — DEXMEDETOMIDINE HYDROCHLORIDE IN 0.9% SODIUM CHLORIDE 1.1 MICROGRAM(S)/KG/HR: 4 INJECTION INTRAVENOUS at 05:53

## 2020-01-01 RX ADMIN — Medication 81 MILLIGRAM(S): at 12:15

## 2020-01-01 RX ADMIN — PROPOFOL 5.28 MICROGRAM(S)/KG/MIN: 10 INJECTION, EMULSION INTRAVENOUS at 02:45

## 2020-01-01 RX ADMIN — Medication 125 MILLIGRAM(S): at 12:35

## 2020-01-01 RX ADMIN — CHLORHEXIDINE GLUCONATE 15 MILLILITER(S): 213 SOLUTION TOPICAL at 16:35

## 2020-01-01 RX ADMIN — Medication 60 MILLIGRAM(S): at 08:18

## 2020-01-01 RX ADMIN — Medication 100 MILLIGRAM(S): at 18:06

## 2020-01-01 RX ADMIN — HEPARIN SODIUM 16 UNIT(S)/HR: 5000 INJECTION INTRAVENOUS; SUBCUTANEOUS at 09:21

## 2020-01-01 RX ADMIN — SEVELAMER CARBONATE 2400 MILLIGRAM(S): 2400 POWDER, FOR SUSPENSION ORAL at 11:39

## 2020-01-01 RX ADMIN — DEXMEDETOMIDINE HYDROCHLORIDE IN 0.9% SODIUM CHLORIDE 1.1 MICROGRAM(S)/KG/HR: 4 INJECTION INTRAVENOUS at 08:58

## 2020-01-01 RX ADMIN — CHLORHEXIDINE GLUCONATE 15 MILLILITER(S): 213 SOLUTION TOPICAL at 04:28

## 2020-01-01 RX ADMIN — SEVELAMER CARBONATE 2400 MILLIGRAM(S): 2400 POWDER, FOR SUSPENSION ORAL at 06:06

## 2020-01-01 RX ADMIN — Medication 1 APPLICATION(S): at 13:34

## 2020-01-01 RX ADMIN — CHLORHEXIDINE GLUCONATE 1 APPLICATION(S): 213 SOLUTION TOPICAL at 04:25

## 2020-01-01 RX ADMIN — Medication 650 MILLIGRAM(S): at 21:26

## 2020-01-01 RX ADMIN — LACTULOSE 20 GRAM(S): 10 SOLUTION ORAL at 21:03

## 2020-01-01 RX ADMIN — SEVELAMER CARBONATE 2400 MILLIGRAM(S): 2400 POWDER, FOR SUSPENSION ORAL at 05:43

## 2020-01-01 RX ADMIN — SODIUM CHLORIDE 75 MILLILITER(S): 9 INJECTION, SOLUTION INTRAVENOUS at 00:42

## 2020-01-01 RX ADMIN — Medication 40 MILLIEQUIVALENT(S): at 09:51

## 2020-01-01 RX ADMIN — Medication 125 MILLIGRAM(S): at 12:43

## 2020-01-01 RX ADMIN — CISATRACURIUM BESYLATE 21.1 MICROGRAM(S)/KG/MIN: 2 INJECTION INTRAVENOUS at 19:21

## 2020-01-01 RX ADMIN — MEROPENEM 100 MILLIGRAM(S): 1 INJECTION INTRAVENOUS at 12:28

## 2020-01-01 RX ADMIN — INSULIN GLARGINE 10 UNIT(S): 100 INJECTION, SOLUTION SUBCUTANEOUS at 21:18

## 2020-01-01 RX ADMIN — CHLORHEXIDINE GLUCONATE 15 MILLILITER(S): 213 SOLUTION TOPICAL at 05:55

## 2020-01-01 RX ADMIN — HEPARIN SODIUM 5000 UNIT(S): 5000 INJECTION INTRAVENOUS; SUBCUTANEOUS at 04:36

## 2020-01-01 RX ADMIN — SENNA PLUS 2 TABLET(S): 8.6 TABLET ORAL at 00:00

## 2020-01-01 RX ADMIN — SODIUM CHLORIDE 100 MILLILITER(S): 9 INJECTION, SOLUTION INTRAVENOUS at 21:21

## 2020-01-01 RX ADMIN — INSULIN HUMAN 3 UNIT(S)/HR: 100 INJECTION, SOLUTION SUBCUTANEOUS at 15:34

## 2020-01-01 RX ADMIN — LACTULOSE 20 GRAM(S): 10 SOLUTION ORAL at 14:32

## 2020-01-01 RX ADMIN — DEXMEDETOMIDINE HYDROCHLORIDE IN 0.9% SODIUM CHLORIDE 4.4 MICROGRAM(S)/KG/HR: 4 INJECTION INTRAVENOUS at 02:36

## 2020-01-01 RX ADMIN — INSULIN GLARGINE 14 UNIT(S): 100 INJECTION, SOLUTION SUBCUTANEOUS at 21:10

## 2020-01-01 RX ADMIN — SEVELAMER CARBONATE 2400 MILLIGRAM(S): 2400 POWDER, FOR SUSPENSION ORAL at 12:03

## 2020-01-01 RX ADMIN — LACTULOSE 20 GRAM(S): 10 SOLUTION ORAL at 17:14

## 2020-01-01 RX ADMIN — CISATRACURIUM BESYLATE 21.1 MICROGRAM(S)/KG/MIN: 2 INJECTION INTRAVENOUS at 09:49

## 2020-01-01 RX ADMIN — Medication 4 UNIT(S): at 09:13

## 2020-01-01 RX ADMIN — CHLORHEXIDINE GLUCONATE 15 MILLILITER(S): 213 SOLUTION TOPICAL at 17:21

## 2020-01-01 RX ADMIN — Medication 100 MILLIGRAM(S): at 12:49

## 2020-01-01 RX ADMIN — Medication 650 MILLIGRAM(S): at 17:13

## 2020-01-01 RX ADMIN — MIDAZOLAM HYDROCHLORIDE 1.76 MG/KG/HR: 1 INJECTION, SOLUTION INTRAMUSCULAR; INTRAVENOUS at 07:48

## 2020-01-01 RX ADMIN — SEVELAMER CARBONATE 2400 MILLIGRAM(S): 2400 POWDER, FOR SUSPENSION ORAL at 17:21

## 2020-01-01 RX ADMIN — Medication 60 MILLIGRAM(S): at 17:55

## 2020-01-01 RX ADMIN — Medication 80 MILLIGRAM(S): at 08:43

## 2020-01-01 RX ADMIN — CHLORHEXIDINE GLUCONATE 1 APPLICATION(S): 213 SOLUTION TOPICAL at 04:19

## 2020-01-01 RX ADMIN — Medication 60 MILLIGRAM(S): at 05:02

## 2020-01-01 RX ADMIN — Medication 125 MILLIGRAM(S): at 17:34

## 2020-01-01 RX ADMIN — Medication 2001 MILLIGRAM(S): at 17:10

## 2020-01-01 RX ADMIN — Medication 650 MILLIGRAM(S): at 04:10

## 2020-01-01 RX ADMIN — PROPOFOL 2 MILLIGRAM(S): 10 INJECTION, EMULSION INTRAVENOUS at 11:16

## 2020-01-01 RX ADMIN — Medication 650 MILLIGRAM(S): at 12:36

## 2020-01-01 RX ADMIN — HEPARIN SODIUM 7500 UNIT(S): 5000 INJECTION INTRAVENOUS; SUBCUTANEOUS at 21:58

## 2020-01-01 RX ADMIN — CHLORHEXIDINE GLUCONATE 15 MILLILITER(S): 213 SOLUTION TOPICAL at 17:10

## 2020-01-01 RX ADMIN — SEVELAMER CARBONATE 2400 MILLIGRAM(S): 2400 POWDER, FOR SUSPENSION ORAL at 11:05

## 2020-01-01 RX ADMIN — FENTANYL CITRATE 4.4 MICROGRAM(S)/KG/HR: 50 INJECTION INTRAVENOUS at 20:40

## 2020-01-01 RX ADMIN — DEXMEDETOMIDINE HYDROCHLORIDE IN 0.9% SODIUM CHLORIDE 4.4 MICROGRAM(S)/KG/HR: 4 INJECTION INTRAVENOUS at 14:00

## 2020-01-01 RX ADMIN — Medication 8 MICROGRAM(S)/KG/MIN: at 16:38

## 2020-01-01 RX ADMIN — CASPOFUNGIN ACETATE 260 MILLIGRAM(S): 7 INJECTION, POWDER, LYOPHILIZED, FOR SOLUTION INTRAVENOUS at 14:30

## 2020-01-01 RX ADMIN — CHLORHEXIDINE GLUCONATE 1 APPLICATION(S): 213 SOLUTION TOPICAL at 05:35

## 2020-01-01 RX ADMIN — ENOXAPARIN SODIUM 40 MILLIGRAM(S): 100 INJECTION SUBCUTANEOUS at 12:34

## 2020-01-01 RX ADMIN — HEPARIN SODIUM 5000 UNIT(S): 5000 INJECTION INTRAVENOUS; SUBCUTANEOUS at 12:43

## 2020-01-01 RX ADMIN — Medication 4 UNIT(S): at 21:28

## 2020-01-01 RX ADMIN — Medication 60 MILLIGRAM(S): at 17:03

## 2020-01-01 RX ADMIN — MEROPENEM 100 MILLIGRAM(S): 1 INJECTION INTRAVENOUS at 05:55

## 2020-01-01 RX ADMIN — Medication 650 MILLIGRAM(S): at 16:34

## 2020-01-01 RX ADMIN — MEROPENEM 100 MILLIGRAM(S): 1 INJECTION INTRAVENOUS at 17:41

## 2020-01-01 RX ADMIN — PHENYLEPHRINE HYDROCHLORIDE 1.65 MICROGRAM(S)/KG/MIN: 10 INJECTION INTRAVENOUS at 07:45

## 2020-01-01 RX ADMIN — ENOXAPARIN SODIUM 40 MILLIGRAM(S): 100 INJECTION SUBCUTANEOUS at 12:49

## 2020-01-01 RX ADMIN — Medication 50 MILLIGRAM(S): at 19:57

## 2020-01-01 RX ADMIN — DEXMEDETOMIDINE HYDROCHLORIDE IN 0.9% SODIUM CHLORIDE 4.4 MICROGRAM(S)/KG/HR: 4 INJECTION INTRAVENOUS at 06:10

## 2020-01-01 RX ADMIN — SENNA PLUS 2 TABLET(S): 8.6 TABLET ORAL at 22:00

## 2020-01-01 RX ADMIN — SODIUM ZIRCONIUM CYCLOSILICATE 10 GRAM(S): 10 POWDER, FOR SUSPENSION ORAL at 15:35

## 2020-01-01 RX ADMIN — INSULIN GLARGINE 10 UNIT(S): 100 INJECTION, SOLUTION SUBCUTANEOUS at 22:48

## 2020-01-01 RX ADMIN — Medication 2001 MILLIGRAM(S): at 17:11

## 2020-01-01 RX ADMIN — Medication 50 MILLIEQUIVALENT(S): at 14:53

## 2020-01-01 RX ADMIN — SEVELAMER CARBONATE 2400 MILLIGRAM(S): 2400 POWDER, FOR SUSPENSION ORAL at 17:22

## 2020-01-01 RX ADMIN — Medication 60 MILLIGRAM(S): at 09:05

## 2020-01-01 RX ADMIN — HEPARIN SODIUM 16 UNIT(S)/HR: 5000 INJECTION INTRAVENOUS; SUBCUTANEOUS at 10:00

## 2020-01-01 RX ADMIN — DEXMEDETOMIDINE HYDROCHLORIDE IN 0.9% SODIUM CHLORIDE 4.4 MICROGRAM(S)/KG/HR: 4 INJECTION INTRAVENOUS at 18:21

## 2020-01-01 RX ADMIN — Medication 2001 MILLIGRAM(S): at 05:09

## 2020-01-01 RX ADMIN — PROPOFOL 5.12 MICROGRAM(S)/KG/MIN: 10 INJECTION, EMULSION INTRAVENOUS at 15:44

## 2020-01-01 RX ADMIN — Medication 2001 MILLIGRAM(S): at 17:09

## 2020-01-01 RX ADMIN — LACTULOSE 20 GRAM(S): 10 SOLUTION ORAL at 13:44

## 2020-01-01 RX ADMIN — Medication 81 MILLIGRAM(S): at 11:29

## 2020-01-01 RX ADMIN — SEVELAMER CARBONATE 2400 MILLIGRAM(S): 2400 POWDER, FOR SUSPENSION ORAL at 17:24

## 2020-01-01 RX ADMIN — SEVELAMER CARBONATE 2400 MILLIGRAM(S): 2400 POWDER, FOR SUSPENSION ORAL at 18:01

## 2020-01-01 RX ADMIN — DEXMEDETOMIDINE HYDROCHLORIDE IN 0.9% SODIUM CHLORIDE 4.4 MICROGRAM(S)/KG/HR: 4 INJECTION INTRAVENOUS at 15:34

## 2020-01-01 RX ADMIN — Medication 4.13 MICROGRAM(S)/KG/MIN: at 21:41

## 2020-01-01 RX ADMIN — PROPOFOL 5.28 MICROGRAM(S)/KG/MIN: 10 INJECTION, EMULSION INTRAVENOUS at 09:47

## 2020-01-01 RX ADMIN — FENTANYL CITRATE 4.4 MICROGRAM(S)/KG/HR: 50 INJECTION INTRAVENOUS at 11:16

## 2020-01-01 RX ADMIN — HEPARIN SODIUM 5000 UNIT(S): 5000 INJECTION INTRAVENOUS; SUBCUTANEOUS at 05:16

## 2020-01-01 RX ADMIN — Medication 2001 MILLIGRAM(S): at 11:27

## 2020-01-01 RX ADMIN — Medication 2001 MILLIGRAM(S): at 12:07

## 2020-01-01 RX ADMIN — Medication 26.4 MICROGRAM(S)/KG/MIN: at 20:30

## 2020-01-01 RX ADMIN — PROPOFOL 5.28 MICROGRAM(S)/KG/MIN: 10 INJECTION, EMULSION INTRAVENOUS at 21:02

## 2020-01-01 RX ADMIN — FENTANYL CITRATE 4.4 MICROGRAM(S)/KG/HR: 50 INJECTION INTRAVENOUS at 09:48

## 2020-01-01 RX ADMIN — Medication 650 MILLIGRAM(S): at 17:33

## 2020-01-01 RX ADMIN — SODIUM ZIRCONIUM CYCLOSILICATE 10 GRAM(S): 10 POWDER, FOR SUSPENSION ORAL at 08:49

## 2020-01-01 RX ADMIN — CHLORHEXIDINE GLUCONATE 1 APPLICATION(S): 213 SOLUTION TOPICAL at 05:10

## 2020-01-01 RX ADMIN — DEXMEDETOMIDINE HYDROCHLORIDE IN 0.9% SODIUM CHLORIDE 33 MICROGRAM(S)/KG/HR: 4 INJECTION INTRAVENOUS at 09:00

## 2020-01-01 RX ADMIN — LACTULOSE 20 GRAM(S): 10 SOLUTION ORAL at 17:10

## 2020-01-01 RX ADMIN — Medication 60 MILLIGRAM(S): at 05:16

## 2020-01-01 RX ADMIN — Medication 1 APPLICATION(S): at 08:39

## 2020-01-01 RX ADMIN — DEXMEDETOMIDINE HYDROCHLORIDE IN 0.9% SODIUM CHLORIDE 4.4 MICROGRAM(S)/KG/HR: 4 INJECTION INTRAVENOUS at 12:24

## 2020-01-01 RX ADMIN — CISATRACURIUM BESYLATE 21.1 MICROGRAM(S)/KG/MIN: 2 INJECTION INTRAVENOUS at 21:14

## 2020-01-01 RX ADMIN — Medication 2001 MILLIGRAM(S): at 17:46

## 2020-01-01 RX ADMIN — Medication 166.67 MILLIGRAM(S): at 11:28

## 2020-01-01 RX ADMIN — SENNA PLUS 2 TABLET(S): 8.6 TABLET ORAL at 21:15

## 2020-01-01 RX ADMIN — Medication 650 MILLIGRAM(S): at 18:19

## 2020-01-01 RX ADMIN — Medication 81 MILLIGRAM(S): at 12:02

## 2020-01-01 RX ADMIN — Medication 650 MILLIGRAM(S): at 10:49

## 2020-01-01 RX ADMIN — Medication 250 MILLIGRAM(S): at 15:34

## 2020-01-01 RX ADMIN — Medication 650 MILLIGRAM(S): at 13:07

## 2020-01-01 RX ADMIN — MEROPENEM 100 MILLIGRAM(S): 1 INJECTION INTRAVENOUS at 04:11

## 2020-01-01 RX ADMIN — SEVELAMER CARBONATE 2400 MILLIGRAM(S): 2400 POWDER, FOR SUSPENSION ORAL at 05:24

## 2020-01-01 RX ADMIN — FENTANYL CITRATE 4.4 MICROGRAM(S)/KG/HR: 50 INJECTION INTRAVENOUS at 17:50

## 2020-01-01 RX ADMIN — CISATRACURIUM BESYLATE 21.1 MICROGRAM(S)/KG/MIN: 2 INJECTION INTRAVENOUS at 04:39

## 2020-01-01 RX ADMIN — Medication 81 MILLIGRAM(S): at 10:51

## 2020-01-01 RX ADMIN — Medication 650 MILLIGRAM(S): at 12:00

## 2020-01-01 RX ADMIN — MEROPENEM 100 MILLIGRAM(S): 1 INJECTION INTRAVENOUS at 17:07

## 2020-01-01 RX ADMIN — LACTULOSE 20 GRAM(S): 10 SOLUTION ORAL at 21:41

## 2020-01-01 RX ADMIN — LACTULOSE 20 GRAM(S): 10 SOLUTION ORAL at 16:12

## 2020-01-01 RX ADMIN — HEPARIN SODIUM 7500 UNIT(S): 5000 INJECTION INTRAVENOUS; SUBCUTANEOUS at 04:09

## 2020-01-01 RX ADMIN — PANTOPRAZOLE SODIUM 40 MILLIGRAM(S): 20 TABLET, DELAYED RELEASE ORAL at 12:30

## 2020-01-01 RX ADMIN — SODIUM ZIRCONIUM CYCLOSILICATE 10 GRAM(S): 10 POWDER, FOR SUSPENSION ORAL at 10:21

## 2020-01-01 RX ADMIN — CHLORHEXIDINE GLUCONATE 15 MILLILITER(S): 213 SOLUTION TOPICAL at 18:20

## 2020-01-01 RX ADMIN — INSULIN HUMAN 10 UNIT(S): 100 INJECTION, SOLUTION SUBCUTANEOUS at 07:55

## 2020-01-01 RX ADMIN — Medication 400 MILLIGRAM(S): at 18:26

## 2020-01-01 RX ADMIN — LACTULOSE 20 GRAM(S): 10 SOLUTION ORAL at 05:06

## 2020-01-01 RX ADMIN — Medication 81 MILLIGRAM(S): at 14:00

## 2020-01-01 RX ADMIN — Medication 2001 MILLIGRAM(S): at 05:56

## 2020-01-01 RX ADMIN — Medication 1 APPLICATION(S): at 20:47

## 2020-01-01 RX ADMIN — Medication 60 MILLIGRAM(S): at 09:49

## 2020-01-01 RX ADMIN — PROPOFOL 5.28 MICROGRAM(S)/KG/MIN: 10 INJECTION, EMULSION INTRAVENOUS at 08:30

## 2020-01-01 RX ADMIN — SEVELAMER CARBONATE 2400 MILLIGRAM(S): 2400 POWDER, FOR SUSPENSION ORAL at 17:46

## 2020-01-01 RX ADMIN — SODIUM CHLORIDE 375 MILLILITER(S): 9 INJECTION, SOLUTION INTRAVENOUS at 21:15

## 2020-01-01 RX ADMIN — Medication 60 MILLIGRAM(S): at 08:49

## 2020-01-01 RX ADMIN — CHLORHEXIDINE GLUCONATE 15 MILLILITER(S): 213 SOLUTION TOPICAL at 05:26

## 2020-01-01 RX ADMIN — SEVELAMER CARBONATE 2400 MILLIGRAM(S): 2400 POWDER, FOR SUSPENSION ORAL at 05:55

## 2020-01-01 RX ADMIN — SENNA PLUS 2 TABLET(S): 8.6 TABLET ORAL at 22:49

## 2020-01-01 RX ADMIN — HEPARIN SODIUM 5000 UNIT(S): 5000 INJECTION INTRAVENOUS; SUBCUTANEOUS at 21:14

## 2020-01-01 RX ADMIN — Medication 650 MILLIGRAM(S): at 21:25

## 2020-01-01 RX ADMIN — FENTANYL CITRATE 4.4 MICROGRAM(S)/KG/HR: 50 INJECTION INTRAVENOUS at 00:59

## 2020-01-01 RX ADMIN — SODIUM CHLORIDE 1000 MILLILITER(S): 9 INJECTION, SOLUTION INTRAVENOUS at 10:11

## 2020-01-01 RX ADMIN — Medication 650 MILLIGRAM(S): at 02:02

## 2020-01-01 RX ADMIN — SODIUM CHLORIDE 200 MILLILITER(S): 9 INJECTION, SOLUTION INTRAVENOUS at 09:48

## 2020-01-01 RX ADMIN — LACTULOSE 20 GRAM(S): 10 SOLUTION ORAL at 05:25

## 2020-01-01 RX ADMIN — Medication 650 MILLIGRAM(S): at 10:03

## 2020-01-01 RX ADMIN — SEVELAMER CARBONATE 2400 MILLIGRAM(S): 2400 POWDER, FOR SUSPENSION ORAL at 06:01

## 2020-01-01 RX ADMIN — Medication 4 UNIT(S): at 17:37

## 2020-01-01 RX ADMIN — Medication 650 MILLIGRAM(S): at 05:41

## 2020-01-01 RX ADMIN — MEROPENEM 100 MILLIGRAM(S): 1 INJECTION INTRAVENOUS at 11:34

## 2020-01-01 RX ADMIN — MIDAZOLAM HYDROCHLORIDE 5 MG/KG/HR: 1 INJECTION, SOLUTION INTRAMUSCULAR; INTRAVENOUS at 11:01

## 2020-01-01 RX ADMIN — CHLORHEXIDINE GLUCONATE 15 MILLILITER(S): 213 SOLUTION TOPICAL at 05:08

## 2020-01-01 RX ADMIN — Medication 650 MILLIGRAM(S): at 05:21

## 2020-01-01 RX ADMIN — SEVELAMER CARBONATE 2400 MILLIGRAM(S): 2400 POWDER, FOR SUSPENSION ORAL at 11:40

## 2020-01-01 RX ADMIN — HEPARIN SODIUM 5000 UNIT(S): 5000 INJECTION INTRAVENOUS; SUBCUTANEOUS at 21:02

## 2020-01-01 RX ADMIN — Medication 2: at 10:43

## 2020-01-01 RX ADMIN — MEROPENEM 100 MILLIGRAM(S): 1 INJECTION INTRAVENOUS at 04:28

## 2020-01-01 RX ADMIN — Medication 60 MILLIGRAM(S): at 05:53

## 2020-01-01 RX ADMIN — HEPARIN SODIUM 12 UNIT(S)/HR: 5000 INJECTION INTRAVENOUS; SUBCUTANEOUS at 12:58

## 2020-01-01 RX ADMIN — SEVELAMER CARBONATE 1600 MILLIGRAM(S): 2400 POWDER, FOR SUSPENSION ORAL at 08:30

## 2020-01-01 RX ADMIN — HEPARIN SODIUM 12 UNIT(S)/HR: 5000 INJECTION INTRAVENOUS; SUBCUTANEOUS at 02:41

## 2020-01-01 RX ADMIN — SEVELAMER CARBONATE 2400 MILLIGRAM(S): 2400 POWDER, FOR SUSPENSION ORAL at 04:19

## 2020-01-01 RX ADMIN — Medication 1 APPLICATION(S): at 21:15

## 2020-01-01 RX ADMIN — Medication 100 MILLIGRAM(S): at 05:16

## 2020-01-01 RX ADMIN — Medication 40 MILLIGRAM(S): at 05:53

## 2020-01-01 RX ADMIN — FENTANYL CITRATE 4.4 MICROGRAM(S)/KG/HR: 50 INJECTION INTRAVENOUS at 02:12

## 2020-01-01 RX ADMIN — LACTULOSE 20 GRAM(S): 10 SOLUTION ORAL at 23:02

## 2020-01-01 RX ADMIN — DEXMEDETOMIDINE HYDROCHLORIDE IN 0.9% SODIUM CHLORIDE 1.1 MICROGRAM(S)/KG/HR: 4 INJECTION INTRAVENOUS at 21:56

## 2020-01-01 RX ADMIN — Medication 2001 MILLIGRAM(S): at 17:02

## 2020-01-01 RX ADMIN — INSULIN HUMAN 3 UNIT(S)/HR: 100 INJECTION, SOLUTION SUBCUTANEOUS at 15:00

## 2020-01-01 RX ADMIN — Medication 60 MILLIGRAM(S): at 10:52

## 2020-01-01 RX ADMIN — PANTOPRAZOLE SODIUM 40 MILLIGRAM(S): 20 TABLET, DELAYED RELEASE ORAL at 11:01

## 2020-01-01 RX ADMIN — CHLORHEXIDINE GLUCONATE 1 APPLICATION(S): 213 SOLUTION TOPICAL at 06:18

## 2020-01-01 RX ADMIN — BUDESONIDE AND FORMOTEROL FUMARATE DIHYDRATE 2 PUFF(S): 160; 4.5 AEROSOL RESPIRATORY (INHALATION) at 11:45

## 2020-01-01 RX ADMIN — DEXMEDETOMIDINE HYDROCHLORIDE IN 0.9% SODIUM CHLORIDE 4.4 MICROGRAM(S)/KG/HR: 4 INJECTION INTRAVENOUS at 05:38

## 2020-01-01 RX ADMIN — HEPARIN SODIUM 7500 UNIT(S): 5000 INJECTION INTRAVENOUS; SUBCUTANEOUS at 04:12

## 2020-01-01 RX ADMIN — CHLORHEXIDINE GLUCONATE 1 APPLICATION(S): 213 SOLUTION TOPICAL at 05:24

## 2020-01-01 RX ADMIN — VECURONIUM BROMIDE 10 MILLIGRAM(S): 20 INJECTION, POWDER, FOR SOLUTION INTRAVENOUS at 15:48

## 2020-01-01 RX ADMIN — PANTOPRAZOLE SODIUM 40 MILLIGRAM(S): 20 TABLET, DELAYED RELEASE ORAL at 12:12

## 2020-01-01 RX ADMIN — Medication 2001 MILLIGRAM(S): at 04:22

## 2020-01-01 RX ADMIN — SENNA PLUS 2 TABLET(S): 8.6 TABLET ORAL at 22:07

## 2020-01-01 RX ADMIN — Medication 40 MILLIGRAM(S): at 18:27

## 2020-01-01 RX ADMIN — Medication 40 MILLIGRAM(S): at 04:36

## 2020-01-01 RX ADMIN — Medication 60 MILLIGRAM(S): at 06:19

## 2020-01-01 RX ADMIN — Medication 4 UNIT(S): at 06:08

## 2020-01-01 RX ADMIN — SEVELAMER CARBONATE 2400 MILLIGRAM(S): 2400 POWDER, FOR SUSPENSION ORAL at 13:11

## 2020-01-01 RX ADMIN — Medication 81 MILLIGRAM(S): at 12:44

## 2020-01-01 RX ADMIN — HEPARIN SODIUM 5000 UNIT(S): 5000 INJECTION INTRAVENOUS; SUBCUTANEOUS at 13:38

## 2020-01-01 RX ADMIN — DEXMEDETOMIDINE HYDROCHLORIDE IN 0.9% SODIUM CHLORIDE 137.28 MICROGRAM(S): 4 INJECTION INTRAVENOUS at 10:50

## 2020-01-01 RX ADMIN — SODIUM CHLORIDE 75 MILLILITER(S): 9 INJECTION, SOLUTION INTRAVENOUS at 05:53

## 2020-01-01 RX ADMIN — Medication 40 MILLIGRAM(S): at 18:18

## 2020-01-01 RX ADMIN — FENTANYL CITRATE 4.4 MICROGRAM(S)/KG/HR: 50 INJECTION INTRAVENOUS at 01:40

## 2020-01-01 RX ADMIN — HEPARIN SODIUM 7500 UNIT(S): 5000 INJECTION INTRAVENOUS; SUBCUTANEOUS at 14:21

## 2020-01-01 RX ADMIN — SODIUM ZIRCONIUM CYCLOSILICATE 10 GRAM(S): 10 POWDER, FOR SUSPENSION ORAL at 21:13

## 2020-01-01 RX ADMIN — PROPOFOL 5.28 MICROGRAM(S)/KG/MIN: 10 INJECTION, EMULSION INTRAVENOUS at 04:13

## 2020-01-01 RX ADMIN — Medication 60 MILLIGRAM(S): at 18:33

## 2020-01-01 RX ADMIN — BUDESONIDE AND FORMOTEROL FUMARATE DIHYDRATE 2 PUFF(S): 160; 4.5 AEROSOL RESPIRATORY (INHALATION) at 08:13

## 2020-01-01 RX ADMIN — HEPARIN SODIUM 14 UNIT(S)/HR: 5000 INJECTION INTRAVENOUS; SUBCUTANEOUS at 01:40

## 2020-01-01 RX ADMIN — CHLORHEXIDINE GLUCONATE 1 APPLICATION(S): 213 SOLUTION TOPICAL at 04:11

## 2020-01-01 RX ADMIN — LACTULOSE 20 GRAM(S): 10 SOLUTION ORAL at 16:00

## 2020-01-01 RX ADMIN — Medication 650 MILLIGRAM(S): at 12:51

## 2020-01-01 RX ADMIN — Medication 4 UNIT(S): at 17:56

## 2020-01-01 RX ADMIN — INSULIN HUMAN 3 UNIT(S)/HR: 100 INJECTION, SOLUTION SUBCUTANEOUS at 17:51

## 2020-01-01 RX ADMIN — PANTOPRAZOLE SODIUM 40 MILLIGRAM(S): 20 TABLET, DELAYED RELEASE ORAL at 11:55

## 2020-01-01 RX ADMIN — Medication 2001 MILLIGRAM(S): at 05:41

## 2020-01-01 RX ADMIN — CASPOFUNGIN ACETATE 260 MILLIGRAM(S): 7 INJECTION, POWDER, LYOPHILIZED, FOR SOLUTION INTRAVENOUS at 10:10

## 2020-01-01 RX ADMIN — Medication 650 MILLIGRAM(S): at 05:10

## 2020-01-01 RX ADMIN — Medication 2001 MILLIGRAM(S): at 17:21

## 2020-01-01 RX ADMIN — SODIUM CHLORIDE 150 MILLILITER(S): 9 INJECTION, SOLUTION INTRAVENOUS at 09:27

## 2020-01-01 RX ADMIN — CHLORHEXIDINE GLUCONATE 15 MILLILITER(S): 213 SOLUTION TOPICAL at 17:05

## 2020-01-01 RX ADMIN — LACTULOSE 20 GRAM(S): 10 SOLUTION ORAL at 06:01

## 2020-01-01 RX ADMIN — Medication 2001 MILLIGRAM(S): at 16:33

## 2020-01-01 RX ADMIN — CHLORHEXIDINE GLUCONATE 15 MILLILITER(S): 213 SOLUTION TOPICAL at 05:02

## 2020-01-01 RX ADMIN — Medication 650 MILLIGRAM(S): at 16:42

## 2020-01-01 RX ADMIN — Medication 2001 MILLIGRAM(S): at 05:23

## 2020-01-01 RX ADMIN — MEROPENEM 100 MILLIGRAM(S): 1 INJECTION INTRAVENOUS at 05:54

## 2020-01-01 RX ADMIN — Medication 4 UNIT(S): at 18:26

## 2020-01-01 RX ADMIN — PROPOFOL 5.28 MICROGRAM(S)/KG/MIN: 10 INJECTION, EMULSION INTRAVENOUS at 20:40

## 2020-01-01 RX ADMIN — SEVELAMER CARBONATE 2400 MILLIGRAM(S): 2400 POWDER, FOR SUSPENSION ORAL at 05:10

## 2020-01-01 RX ADMIN — Medication 650 MILLIGRAM(S): at 17:02

## 2020-01-01 RX ADMIN — CHLORHEXIDINE GLUCONATE 15 MILLILITER(S): 213 SOLUTION TOPICAL at 18:16

## 2020-01-01 RX ADMIN — Medication 60 MILLIGRAM(S): at 05:44

## 2020-01-01 RX ADMIN — CHLORHEXIDINE GLUCONATE 1 APPLICATION(S): 213 SOLUTION TOPICAL at 05:43

## 2020-01-01 RX ADMIN — HEPARIN SODIUM 5000 UNIT(S): 5000 INJECTION INTRAVENOUS; SUBCUTANEOUS at 21:26

## 2020-01-01 RX ADMIN — PANTOPRAZOLE SODIUM 40 MILLIGRAM(S): 20 TABLET, DELAYED RELEASE ORAL at 14:00

## 2020-01-01 RX ADMIN — SENNA PLUS 2 TABLET(S): 8.6 TABLET ORAL at 21:49

## 2020-01-01 RX ADMIN — Medication 4.13 MICROGRAM(S)/KG/MIN: at 17:50

## 2020-01-01 RX ADMIN — SODIUM ZIRCONIUM CYCLOSILICATE 10 GRAM(S): 10 POWDER, FOR SUSPENSION ORAL at 16:43

## 2020-01-01 RX ADMIN — CISATRACURIUM BESYLATE 10 MILLIGRAM(S): 2 INJECTION INTRAVENOUS at 09:15

## 2020-01-01 RX ADMIN — Medication 100 MILLIGRAM(S): at 17:05

## 2020-01-01 RX ADMIN — Medication 3: at 18:26

## 2020-01-01 RX ADMIN — MEROPENEM 100 MILLIGRAM(S): 1 INJECTION INTRAVENOUS at 17:24

## 2020-01-01 RX ADMIN — SODIUM ZIRCONIUM CYCLOSILICATE 10 GRAM(S): 10 POWDER, FOR SUSPENSION ORAL at 17:09

## 2020-01-01 RX ADMIN — Medication 2001 MILLIGRAM(S): at 12:44

## 2020-01-01 RX ADMIN — LACTULOSE 20 GRAM(S): 10 SOLUTION ORAL at 17:19

## 2020-01-01 RX ADMIN — SEVELAMER CARBONATE 2400 MILLIGRAM(S): 2400 POWDER, FOR SUSPENSION ORAL at 17:14

## 2020-01-01 RX ADMIN — HEPARIN SODIUM 7500 UNIT(S): 5000 INJECTION INTRAVENOUS; SUBCUTANEOUS at 12:45

## 2020-01-01 RX ADMIN — Medication 650 MILLIGRAM(S): at 05:06

## 2020-01-01 RX ADMIN — Medication 4 UNIT(S): at 12:34

## 2020-01-01 RX ADMIN — Medication 650 MILLIGRAM(S): at 04:12

## 2020-01-01 RX ADMIN — CHLORHEXIDINE GLUCONATE 15 MILLILITER(S): 213 SOLUTION TOPICAL at 17:34

## 2020-01-01 RX ADMIN — SEVELAMER CARBONATE 2400 MILLIGRAM(S): 2400 POWDER, FOR SUSPENSION ORAL at 10:52

## 2020-01-01 RX ADMIN — LACTULOSE 20 GRAM(S): 10 SOLUTION ORAL at 21:15

## 2020-01-01 RX ADMIN — INSULIN HUMAN 3 UNIT(S)/HR: 100 INJECTION, SOLUTION SUBCUTANEOUS at 20:39

## 2020-01-01 RX ADMIN — Medication 50 MILLIGRAM(S): at 14:52

## 2020-01-01 RX ADMIN — CHLORHEXIDINE GLUCONATE 15 MILLILITER(S): 213 SOLUTION TOPICAL at 06:02

## 2020-01-01 RX ADMIN — LOSARTAN POTASSIUM 50 MILLIGRAM(S): 100 TABLET, FILM COATED ORAL at 06:19

## 2020-01-01 RX ADMIN — SEVELAMER CARBONATE 2400 MILLIGRAM(S): 2400 POWDER, FOR SUSPENSION ORAL at 10:48

## 2020-01-01 RX ADMIN — HEPARIN SODIUM 7500 UNIT(S): 5000 INJECTION INTRAVENOUS; SUBCUTANEOUS at 13:11

## 2020-01-01 RX ADMIN — Medication 4 UNIT(S): at 13:09

## 2020-01-01 RX ADMIN — INSULIN GLARGINE 14 UNIT(S): 100 INJECTION, SOLUTION SUBCUTANEOUS at 21:56

## 2020-01-01 RX ADMIN — ENOXAPARIN SODIUM 40 MILLIGRAM(S): 100 INJECTION SUBCUTANEOUS at 05:26

## 2020-01-01 RX ADMIN — CHLORHEXIDINE GLUCONATE 1 APPLICATION(S): 213 SOLUTION TOPICAL at 05:55

## 2020-01-01 RX ADMIN — Medication 81 MILLIGRAM(S): at 11:40

## 2020-01-01 RX ADMIN — Medication 250 MILLIGRAM(S): at 12:12

## 2020-01-01 RX ADMIN — Medication 650 MILLIGRAM(S): at 09:05

## 2020-01-01 RX ADMIN — Medication 2001 MILLIGRAM(S): at 04:17

## 2020-01-01 RX ADMIN — Medication 650 MILLIGRAM(S): at 17:47

## 2020-01-01 RX ADMIN — Medication 1 APPLICATION(S): at 08:40

## 2020-01-01 RX ADMIN — SENNA PLUS 2 TABLET(S): 8.6 TABLET ORAL at 21:55

## 2020-01-01 RX ADMIN — Medication 40 MILLIGRAM(S): at 01:50

## 2020-01-01 RX ADMIN — FENTANYL CITRATE 4.4 MICROGRAM(S)/KG/HR: 50 INJECTION INTRAVENOUS at 22:50

## 2020-01-01 RX ADMIN — SEVELAMER CARBONATE 2400 MILLIGRAM(S): 2400 POWDER, FOR SUSPENSION ORAL at 11:59

## 2020-01-01 RX ADMIN — Medication 40 MILLIGRAM(S): at 02:50

## 2020-01-01 RX ADMIN — SEVELAMER CARBONATE 2400 MILLIGRAM(S): 2400 POWDER, FOR SUSPENSION ORAL at 16:34

## 2020-01-01 RX ADMIN — Medication 125 MILLIGRAM(S): at 23:18

## 2020-01-01 RX ADMIN — SEVELAMER CARBONATE 2400 MILLIGRAM(S): 2400 POWDER, FOR SUSPENSION ORAL at 04:24

## 2020-01-01 RX ADMIN — HEPARIN SODIUM 7500 UNIT(S): 5000 INJECTION INTRAVENOUS; SUBCUTANEOUS at 21:13

## 2020-01-01 RX ADMIN — Medication 100 MILLIEQUIVALENT(S): at 10:39

## 2020-01-01 RX ADMIN — ENOXAPARIN SODIUM 40 MILLIGRAM(S): 100 INJECTION SUBCUTANEOUS at 17:55

## 2020-01-01 RX ADMIN — SODIUM CHLORIDE 1000 MILLILITER(S): 9 INJECTION INTRAMUSCULAR; INTRAVENOUS; SUBCUTANEOUS at 11:20

## 2020-01-01 RX ADMIN — MEROPENEM 100 MILLIGRAM(S): 1 INJECTION INTRAVENOUS at 12:01

## 2020-01-01 RX ADMIN — CHLORHEXIDINE GLUCONATE 1 APPLICATION(S): 213 SOLUTION TOPICAL at 05:40

## 2020-01-01 RX ADMIN — CHLORHEXIDINE GLUCONATE 1 APPLICATION(S): 213 SOLUTION TOPICAL at 05:56

## 2020-01-01 RX ADMIN — Medication 100 MILLIGRAM(S): at 07:00

## 2020-01-01 RX ADMIN — Medication 250 MILLIGRAM(S): at 09:47

## 2020-01-01 RX ADMIN — Medication 125 MILLIGRAM(S): at 04:10

## 2020-01-01 RX ADMIN — Medication 2001 MILLIGRAM(S): at 11:04

## 2020-01-01 RX ADMIN — MIDAZOLAM HYDROCHLORIDE 1.76 MG/KG/HR: 1 INJECTION, SOLUTION INTRAMUSCULAR; INTRAVENOUS at 09:24

## 2020-01-01 RX ADMIN — LACTULOSE 20 GRAM(S): 10 SOLUTION ORAL at 05:43

## 2020-01-01 RX ADMIN — Medication 1334 MILLIGRAM(S): at 11:17

## 2020-01-01 RX ADMIN — Medication 2001 MILLIGRAM(S): at 17:32

## 2020-01-01 RX ADMIN — Medication 100 MILLIGRAM(S): at 14:24

## 2020-01-01 RX ADMIN — SODIUM ZIRCONIUM CYCLOSILICATE 10 GRAM(S): 10 POWDER, FOR SUSPENSION ORAL at 17:02

## 2020-01-01 RX ADMIN — CASPOFUNGIN ACETATE 260 MILLIGRAM(S): 7 INJECTION, POWDER, LYOPHILIZED, FOR SOLUTION INTRAVENOUS at 10:33

## 2020-01-01 RX ADMIN — LACTULOSE 20 GRAM(S): 10 SOLUTION ORAL at 18:18

## 2020-01-01 RX ADMIN — MEROPENEM 100 MILLIGRAM(S): 1 INJECTION INTRAVENOUS at 05:41

## 2020-01-01 RX ADMIN — Medication 2001 MILLIGRAM(S): at 10:49

## 2020-01-01 RX ADMIN — Medication 7 UNIT(S): at 02:44

## 2020-01-01 RX ADMIN — CHLORHEXIDINE GLUCONATE 15 MILLILITER(S): 213 SOLUTION TOPICAL at 04:21

## 2020-01-01 RX ADMIN — SENNA PLUS 2 TABLET(S): 8.6 TABLET ORAL at 21:02

## 2020-01-01 RX ADMIN — Medication 2001 MILLIGRAM(S): at 12:12

## 2020-01-01 RX ADMIN — CHLORHEXIDINE GLUCONATE 15 MILLILITER(S): 213 SOLUTION TOPICAL at 06:05

## 2020-01-01 RX ADMIN — SODIUM ZIRCONIUM CYCLOSILICATE 10 GRAM(S): 10 POWDER, FOR SUSPENSION ORAL at 11:10

## 2020-01-01 RX ADMIN — MEROPENEM 100 MILLIGRAM(S): 1 INJECTION INTRAVENOUS at 17:36

## 2020-01-01 RX ADMIN — Medication 50 MILLIGRAM(S): at 12:12

## 2020-01-01 RX ADMIN — HEPARIN SODIUM 7500 UNIT(S): 5000 INJECTION INTRAVENOUS; SUBCUTANEOUS at 15:34

## 2020-01-01 RX ADMIN — Medication 2001 MILLIGRAM(S): at 17:36

## 2020-01-01 RX ADMIN — LACTULOSE 20 GRAM(S): 10 SOLUTION ORAL at 21:57

## 2020-01-01 RX ADMIN — Medication 1 APPLICATION(S): at 14:00

## 2020-01-01 RX ADMIN — Medication 4.13 MICROGRAM(S)/KG/MIN: at 07:48

## 2020-01-01 RX ADMIN — PANTOPRAZOLE SODIUM 40 MILLIGRAM(S): 20 TABLET, DELAYED RELEASE ORAL at 10:51

## 2020-01-01 RX ADMIN — Medication 1 APPLICATION(S): at 10:46

## 2020-01-01 RX ADMIN — Medication 60 MILLIGRAM(S): at 05:59

## 2020-01-01 RX ADMIN — HEPARIN SODIUM 7500 UNIT(S): 5000 INJECTION INTRAVENOUS; SUBCUTANEOUS at 15:42

## 2020-01-01 RX ADMIN — HEPARIN SODIUM 7500 UNIT(S): 5000 INJECTION INTRAVENOUS; SUBCUTANEOUS at 05:16

## 2020-01-01 RX ADMIN — SENNA PLUS 2 TABLET(S): 8.6 TABLET ORAL at 21:04

## 2020-01-01 RX ADMIN — SENNA PLUS 2 TABLET(S): 8.6 TABLET ORAL at 21:57

## 2020-01-01 RX ADMIN — MEROPENEM 100 MILLIGRAM(S): 1 INJECTION INTRAVENOUS at 05:56

## 2020-01-01 RX ADMIN — SODIUM CHLORIDE 75 MILLILITER(S): 9 INJECTION, SOLUTION INTRAVENOUS at 13:27

## 2020-01-01 RX ADMIN — HEPARIN SODIUM 5000 UNIT(S): 5000 INJECTION INTRAVENOUS; SUBCUTANEOUS at 04:10

## 2020-01-01 RX ADMIN — SEVELAMER CARBONATE 2400 MILLIGRAM(S): 2400 POWDER, FOR SUSPENSION ORAL at 17:11

## 2020-01-01 RX ADMIN — Medication 250 MILLIGRAM(S): at 15:00

## 2020-01-01 RX ADMIN — LACTULOSE 20 GRAM(S): 10 SOLUTION ORAL at 17:32

## 2020-01-01 RX ADMIN — MEROPENEM 100 MILLIGRAM(S): 1 INJECTION INTRAVENOUS at 05:33

## 2020-01-01 RX ADMIN — LACTULOSE 20 GRAM(S): 10 SOLUTION ORAL at 13:11

## 2020-01-01 RX ADMIN — CASPOFUNGIN ACETATE 260 MILLIGRAM(S): 7 INJECTION, POWDER, LYOPHILIZED, FOR SOLUTION INTRAVENOUS at 11:16

## 2020-01-01 RX ADMIN — LACTULOSE 20 GRAM(S): 10 SOLUTION ORAL at 04:11

## 2020-01-01 RX ADMIN — Medication 250 MILLIGRAM(S): at 11:54

## 2020-01-01 RX ADMIN — Medication 3: at 08:25

## 2020-01-01 RX ADMIN — HEPARIN SODIUM 7500 UNIT(S): 5000 INJECTION INTRAVENOUS; SUBCUTANEOUS at 21:25

## 2020-01-01 RX ADMIN — Medication 2001 MILLIGRAM(S): at 05:55

## 2020-01-01 RX ADMIN — MEROPENEM 100 MILLIGRAM(S): 1 INJECTION INTRAVENOUS at 06:17

## 2020-01-01 RX ADMIN — Medication 1 APPLICATION(S): at 17:34

## 2020-01-01 RX ADMIN — INSULIN HUMAN 3 UNIT(S)/HR: 100 INJECTION, SOLUTION SUBCUTANEOUS at 16:56

## 2020-01-01 RX ADMIN — SODIUM ZIRCONIUM CYCLOSILICATE 10 GRAM(S): 10 POWDER, FOR SUSPENSION ORAL at 10:48

## 2020-01-01 RX ADMIN — Medication 2001 MILLIGRAM(S): at 11:40

## 2020-01-01 RX ADMIN — HEPARIN SODIUM 5000 UNIT(S): 5000 INJECTION INTRAVENOUS; SUBCUTANEOUS at 15:00

## 2020-01-01 RX ADMIN — Medication 2001 MILLIGRAM(S): at 12:42

## 2020-01-01 RX ADMIN — ROBINUL 0.4 MILLIGRAM(S): 0.2 INJECTION INTRAMUSCULAR; INTRAVENOUS at 14:56

## 2020-01-01 RX ADMIN — SENNA PLUS 2 TABLET(S): 8.6 TABLET ORAL at 21:00

## 2020-01-01 RX ADMIN — Medication 2001 MILLIGRAM(S): at 14:00

## 2020-01-01 RX ADMIN — FENTANYL CITRATE 4.4 MICROGRAM(S)/KG/HR: 50 INJECTION INTRAVENOUS at 14:00

## 2020-01-01 RX ADMIN — SODIUM ZIRCONIUM CYCLOSILICATE 10 GRAM(S): 10 POWDER, FOR SUSPENSION ORAL at 04:10

## 2020-01-01 RX ADMIN — Medication 650 MILLIGRAM(S): at 04:13

## 2020-01-01 RX ADMIN — MEROPENEM 100 MILLIGRAM(S): 1 INJECTION INTRAVENOUS at 04:19

## 2020-01-01 RX ADMIN — Medication 1 APPLICATION(S): at 08:48

## 2020-01-01 RX ADMIN — CHLORHEXIDINE GLUCONATE 15 MILLILITER(S): 213 SOLUTION TOPICAL at 04:09

## 2020-01-01 RX ADMIN — DEXMEDETOMIDINE HYDROCHLORIDE IN 0.9% SODIUM CHLORIDE 4.4 MICROGRAM(S)/KG/HR: 4 INJECTION INTRAVENOUS at 23:31

## 2020-01-01 RX ADMIN — SODIUM ZIRCONIUM CYCLOSILICATE 10 GRAM(S): 10 POWDER, FOR SUSPENSION ORAL at 12:46

## 2020-01-01 RX ADMIN — CHLORHEXIDINE GLUCONATE 1 APPLICATION(S): 213 SOLUTION TOPICAL at 05:22

## 2020-01-01 RX ADMIN — Medication 2001 MILLIGRAM(S): at 04:34

## 2020-01-01 RX ADMIN — Medication 40 MILLIGRAM(S): at 04:11

## 2020-01-01 RX ADMIN — CISATRACURIUM BESYLATE 21.1 MICROGRAM(S)/KG/MIN: 2 INJECTION INTRAVENOUS at 14:52

## 2020-01-01 RX ADMIN — HEPARIN SODIUM 7500 UNIT(S): 5000 INJECTION INTRAVENOUS; SUBCUTANEOUS at 05:43

## 2020-01-01 RX ADMIN — SEVELAMER CARBONATE 2400 MILLIGRAM(S): 2400 POWDER, FOR SUSPENSION ORAL at 12:06

## 2020-01-01 RX ADMIN — PROPOFOL 25 MILLIGRAM(S): 10 INJECTION, EMULSION INTRAVENOUS at 15:44

## 2020-01-01 RX ADMIN — PANTOPRAZOLE SODIUM 40 MILLIGRAM(S): 20 TABLET, DELAYED RELEASE ORAL at 11:40

## 2020-01-01 RX ADMIN — HEPARIN SODIUM 7500 UNIT(S): 5000 INJECTION INTRAVENOUS; SUBCUTANEOUS at 23:27

## 2020-01-01 RX ADMIN — SENNA PLUS 2 TABLET(S): 8.6 TABLET ORAL at 20:40

## 2020-01-01 RX ADMIN — Medication 125 MILLIGRAM(S): at 12:26

## 2020-01-01 RX ADMIN — MEROPENEM 100 MILLIGRAM(S): 1 INJECTION INTRAVENOUS at 15:22

## 2020-01-01 RX ADMIN — CHLORHEXIDINE GLUCONATE 15 MILLILITER(S): 213 SOLUTION TOPICAL at 17:48

## 2020-01-01 RX ADMIN — Medication 650 MILLIGRAM(S): at 11:17

## 2020-01-01 RX ADMIN — SEVELAMER CARBONATE 2400 MILLIGRAM(S): 2400 POWDER, FOR SUSPENSION ORAL at 17:36

## 2020-01-01 RX ADMIN — LACTULOSE 20 GRAM(S): 10 SOLUTION ORAL at 15:43

## 2020-01-01 RX ADMIN — CHLORHEXIDINE GLUCONATE 15 MILLILITER(S): 213 SOLUTION TOPICAL at 05:40

## 2020-01-01 RX ADMIN — Medication 3: at 14:23

## 2020-01-01 RX ADMIN — HEPARIN SODIUM 14 UNIT(S)/HR: 5000 INJECTION INTRAVENOUS; SUBCUTANEOUS at 09:49

## 2020-01-01 RX ADMIN — LACTULOSE 20 GRAM(S): 10 SOLUTION ORAL at 05:16

## 2020-01-01 RX ADMIN — Medication 100 MILLIGRAM(S): at 18:27

## 2020-01-01 RX ADMIN — CISATRACURIUM BESYLATE 21.1 MICROGRAM(S)/KG/MIN: 2 INJECTION INTRAVENOUS at 05:08

## 2020-01-01 RX ADMIN — LACTULOSE 20 GRAM(S): 10 SOLUTION ORAL at 17:22

## 2020-01-01 RX ADMIN — LACTULOSE 20 GRAM(S): 10 SOLUTION ORAL at 00:01

## 2020-01-01 RX ADMIN — PANTOPRAZOLE SODIUM 40 MILLIGRAM(S): 20 TABLET, DELAYED RELEASE ORAL at 11:34

## 2020-01-01 RX ADMIN — Medication 1 MILLIGRAM(S): at 14:59

## 2020-01-01 RX ADMIN — SENNA PLUS 2 TABLET(S): 8.6 TABLET ORAL at 22:24

## 2020-01-01 RX ADMIN — MEROPENEM 100 MILLIGRAM(S): 1 INJECTION INTRAVENOUS at 13:25

## 2020-01-01 RX ADMIN — Medication 40 MILLIGRAM(S): at 17:12

## 2020-01-01 RX ADMIN — CHLORHEXIDINE GLUCONATE 1 APPLICATION(S): 213 SOLUTION TOPICAL at 04:10

## 2020-01-01 RX ADMIN — SENNA PLUS 2 TABLET(S): 8.6 TABLET ORAL at 23:27

## 2020-01-01 RX ADMIN — Medication 650 MILLIGRAM(S): at 17:14

## 2020-01-01 RX ADMIN — CHLORHEXIDINE GLUCONATE 1 APPLICATION(S): 213 SOLUTION TOPICAL at 05:16

## 2020-01-01 RX ADMIN — Medication 3: at 12:34

## 2020-01-01 RX ADMIN — CISATRACURIUM BESYLATE 15 MILLIGRAM(S): 2 INJECTION INTRAVENOUS at 16:45

## 2020-01-01 RX ADMIN — HEPARIN SODIUM 7500 UNIT(S): 5000 INJECTION INTRAVENOUS; SUBCUTANEOUS at 21:49

## 2020-01-01 RX ADMIN — Medication 650 MILLIGRAM(S): at 06:01

## 2020-01-01 RX ADMIN — Medication 40 MILLIGRAM(S): at 18:06

## 2020-01-01 RX ADMIN — Medication 81 MILLIGRAM(S): at 11:01

## 2020-01-01 RX ADMIN — MEROPENEM 100 MILLIGRAM(S): 1 INJECTION INTRAVENOUS at 05:08

## 2020-01-01 RX ADMIN — SODIUM ZIRCONIUM CYCLOSILICATE 10 GRAM(S): 10 POWDER, FOR SUSPENSION ORAL at 04:12

## 2020-01-01 RX ADMIN — Medication 125 MILLIGRAM(S): at 17:12

## 2020-01-01 RX ADMIN — Medication 4.13 MICROGRAM(S)/KG/MIN: at 09:50

## 2020-01-01 RX ADMIN — Medication 650 MILLIGRAM(S): at 11:37

## 2020-01-01 RX ADMIN — MEROPENEM 100 MILLIGRAM(S): 1 INJECTION INTRAVENOUS at 15:43

## 2020-01-01 RX ADMIN — Medication 40 MILLIGRAM(S): at 17:23

## 2020-04-13 NOTE — ED ADULT TRIAGE NOTE - CHIEF COMPLAINT QUOTE
fever and cough for the past week and SOB started last night, pt was 78 on room with ems and triage 6 Liters 92%

## 2020-04-13 NOTE — H&P ADULT - NSHPPHYSICALEXAM_GEN_ALL_CORE
General : Moderate distress, alert   Lungs : Mildly tachypneic, unable to finish sentences at times. On NC   Cardiovascular : +S1S2 tachycardic   Abdomen : Soft nontender nondistended

## 2020-04-13 NOTE — H&P ADULT - NSHPSOCIALHISTORY_GEN_ALL_CORE
Former smoker Former smoker, stopped smoking 10 years ago   Denies alcohol use Former smoker, stopped smoking 10 years ago   Denies alcohol use or illicit drub abuse

## 2020-04-13 NOTE — ED PROVIDER NOTE - OBJECTIVE STATEMENT
55 year old male w hx of HTN, NIDDM2, former smoker presents to the ED for evaluation of gradual onset, constant, moderate shortness of breath since yesterday. Pt endorses 1 week of preceding nonproductive cough and subjective fevers/chills and myalgias. States son works in health care, was also sick recently with similar symptoms. Today patient felt more shortness of breath, causing son to call EMS. Upon arrival in ED patient satting 78% on RA, improved to 90-92% on 6 LPM NC. Pt otherwise denies chest pain, abd pain, nausea, vomiting, diarrhea, rash, leg pain/swelling, hemoptysis, recent travel/immobilizations/trauma.

## 2020-04-13 NOTE — ED PROVIDER NOTE - NS ED ROS FT
CONSTITUTIONAL: (+) fevers, (+) chills, (+) malaise, (-) decreased appetite  ENT: (-) congestion, (-) rhinorrhea  CARDIO: (-) chest pain, (-) palpitations, (-) edema, (-) cyanosis  PULM: (+) cough, (-) sputum, (+) chest tightness, (+) shortness of breath, (-) wheezing, (-) hemoptysis, (-) stridor  GI: (-) nausea, (-) vomiting, (-) diarrhea, (-) abdominal pain, (-) melena, (-) hematochezia, (-) rectal bleeding  MSK: (-) back pain, (+) myalgias, (-) gait difficulty  SKIN: (-) rashes, (-) pallor  NEURO: (-) headache, (-) dizziness, (-) lightheadedness,  (-) weakness    *all other systems negative except as documented above and in the HPI*

## 2020-04-13 NOTE — ED PROVIDER NOTE - PROGRESS NOTE DETAILS
MALIK: patient states he feels improved on 6 LPM NC, still mildly tachypneic. Will admit to medicine for further management and evaluation. MAR aware.

## 2020-04-13 NOTE — ED PROVIDER NOTE - ATTENDING CONTRIBUTION TO CARE
55M PMH DM, HTN, p/w sub fever, dry cough x 1 week, assoc w sob since last night. no cp. no recent travel. son has same symptoms and has recovered, son works in hospital. no abd pain, nvdc. pt bibems, sat was 78% on RA, states he feels comfortable now on 6L NC.     on exam, AFVSS, well aris nad, ncat, eomi, perrla, mmm, lctab, tachypnea w mild inc wob, rrr nl s1s2 no mrg, abd soft ntnd, aaox3, no focal deficits, no le edema or calf ttp,     a/p; suspect covid w hypoxia, will do labs, covid testing, cxr, continue supp o2, admit

## 2020-04-13 NOTE — H&P ADULT - HISTORY OF PRESENT ILLNESS
55 year old male patient with hypertension, diabetes presenting for dyspnea.     In the ED, SpO2 was 78 % on room air and the patient was placed on 6 L NC with improvement of his SpO2 to 90 - 92 % and subjective improvement endorsed by the patient as well. Otherwise, tachycardic to 104, hemodynamically stable and afebrile. chest x ray was done and showed bilateral infiltrates (official report pending), SARS-CoV-2 PCR sent and patient was admitted for suspected COVID-19 55 year old male patient with hypertension, diabetes presenting for dyspnea.   The patient states that he has experienced non productive cough as well as low grade fever (between 90 and 100) for the past week associated with loss of appetite. These symptoms stopped last Friday. Since yesterday he has been experiencing dyspnea mostly on exertion that has been worsening which prompted him to call EMS today. His son works at a rehab center, has been experiencing the same symptoms however has been denied COVID-19 testing   In the ED, SpO2 was 78 % on room air and the patient was placed on 6 L NC with improvement of his SpO2 to 90 - 92 % and subjective improvement endorsed by the patient as well. Otherwise, tachycardic to 104, hemodynamically stable and afebrile. Chest  x ray was done and showed bilateral infiltrates (official report pending), SARS-CoV-2 PCR sent and patient was admitted for suspected COVID-19

## 2020-04-13 NOTE — ED PROVIDER NOTE - PHYSICAL EXAMINATION
VITALS:  I have reviewed the initial vital signs.  GENERAL: Well-developed, well-nourished  male in mild to respiratory distress.  HEENT: Sclera clear. No conjunctival pallor. EOMI, PERRLA. Mucous membranes moist.  NECK: supple w FROM. No JVD.  CARDIO: tachycardic, regular rhythm, nl S1 and S2. No murmurs, rubs, or gallops. No peripheral edema. 2+ radial and pedal pulses bilaterally.  PULM: +tachypneic but speaking in 4-5 word sentences. diminished breath sounds b/l w/o wrr.  MSK: No leg warmth, swelling, erythema, or ttp.  GI: Abdomen soft and non-distended. Nontender.  SKIN: Warm, dry.   NEURO: A&Ox3. Speech clear. No gross motor/sensory deficits.

## 2020-04-13 NOTE — H&P ADULT - ATTENDING COMMENTS
PHYSICAL EXAM:    CONSTITUTIONAL: NAD, able to speak in full complete sentences though increased work of breathing, placed on 100% nonrebreather currently saturating 95%  ENMT: EOMI, PERRLA, No tonsillar erythema, exudates, or enlargement, neck supple, No JVD  PSYCH: Alert & Oriented X3  RESPIRATORY: Bibasilar rales, negative wheezing   CARDIOVASCULAR: Regular rate and rhythm; No murmurs, rubs, or gallops, negative edema  GASTROINTESTINAL: Soft, Nontender, Nondistended; Bowel sounds present  EXTREMITIES:  2+ Peripheral Pulses, No clubbing, cyanosis  SKIN: No rashes or lesions    56 yo M with PMHx of Hypertension, Diabetes presented with complaint of nonproductive cough, subjective fevers for one week's duration associated with worsening shortness of breath in the past 2 days. Patient's son is a healthcare worker and has been ill for the past few weeks, though no recovered, states his son was unable to get tested however suspects is COVID-19. Patient denies chest pain, abdominal pain, nausea, vomiting or recent travel.     #Acute Hypoxemic Respiratory Failure secondary to suspected viral pneumonia: COVID-19 sent, patient saturated 78% on room air, with improvement on 6L nasal cannula, patient then desaturated to low 80s, currently on 100% nonrebreather and maintaining saturation of 95-96%, CXR revealing of bilateral patchy opacities, started on Hydroxychloroquine EKG NSR QTc 420, incentive spirometry, pulmonary toilet, anti-pyretic, started on Symbicort, f/u immune hyperactivation panel, awaiting ID evaluation    #HTN (continue losartan)/DM (monitor fingersticks, start basal bolus insulin if greater than     Disposition: Acute PHYSICAL EXAM:    CONSTITUTIONAL: NAD, able to speak in full complete sentences though increased work of breathing, placed on 100% nonrebreather currently saturating 95%  ENMT: EOMI, PERRLA, No tonsillar erythema, exudates, or enlargement, neck supple, No JVD  PSYCH: Alert & Oriented X3  RESPIRATORY: Bibasilar rales, negative wheezing   CARDIOVASCULAR: Regular rate and rhythm; No murmurs, rubs, or gallops, negative edema  GASTROINTESTINAL: Soft, Nontender, Nondistended; Bowel sounds present  EXTREMITIES:  2+ Peripheral Pulses, No clubbing, cyanosis  SKIN: No rashes or lesions    54 yo M with PMHx of Hypertension, Diabetes presented with complaint of nonproductive cough, subjective fevers for one week's duration associated with worsening shortness of breath in the past 2 days. Patient's son is a healthcare worker and has been ill for the past few weeks, though no recovered, states his son was unable to get tested however suspects is COVID-19. Patient denies chest pain, abdominal pain, nausea, vomiting or recent travel.     #Acute Hypoxemic Respiratory Failure secondary to suspected viral pneumonia: COVID-19 sent, patient saturated 78% on room air, with improvement on 6L nasal cannula, patient then desaturated to low 80s, currently on 100% nonrebreather and maintaining saturation of 95-96%, CXR revealing of bilateral patchy opacities, started on Hydroxychloroquine EKG NSR QTc 420, incentive spirometry, pulmonary toilet, anti-pyretic, started on Symbicort, f/u immune hyperactivation panel, awaiting ID evaluation    #Transaminitis likely secondary to viral illness, monitor LFTs    #HTN (continue losartan)/DM (monitor fingersticks, start basal bolus insulin if greater than     Disposition: Acute

## 2020-04-13 NOTE — H&P ADULT - ASSESSMENT
55 year old male patient with hypertension, diabetes presenting for dyspnea.     # Hypoxia and bilateral infiltrates, suspected viral pneumonia secondary to COVID-19   - Hemodynamically stable on admission, SpO2 90 - 92 % on 6L NC   - Chest x ray per my read showing bilateral infiltrates, follow-up official report   - Mild transaminitis, lymphopenia noted on labs   - Continue O2 supplementation to keep SpO2 between 90 and 92 %   - Follow-up immune hyperactivation panel : LDH, fibrinogen, ferritin, triglycerides, ESR, CRP, pro-calcitonin, blood cultures. D-dimers elevated 626  - Given hypoxia on admission, will start Hydroxychloroquine 800 mg once and 400 mg daily for 4 days    - Tylenol 650 mg q6h PRN for fever, Zofran 4mg q8h PRN for nausea and / or vomiting. Follow-up ECG for QTc   - Contact and Airborne precautions   - Consider Infectious disease and pulmonary consult if any signs of decompensation     # HTN   - Continue     # Diabetes   - Oral meds on hold   - Check FS before meals and at bedtime   - Start insulin basal bolus if FS > 180     # Miscellaneous   - DVT prophylaxis : Lovenox 40 mg daily   - GI prophylaxis : Not indicated   - Activity : Ambulate as tolerated   - Diet : DASH TLC consistent carbohydrate   - Full code 55 year old male patient with hypertension, diabetes presenting for dyspnea.     # Hypoxia and bilateral infiltrates, suspected viral pneumonia secondary to COVID-19   - Hemodynamically stable on admission, SpO2 90 - 92 % on 6L NC   - Chest x ray per my read showing bilateral infiltrates, follow-up official report   - Mild transaminitis, lymphopenia noted on labs   - Continue O2 supplementation to keep SpO2 between 90 and 92 %   - Follow-up immune hyperactivation panel : LDH, fibrinogen, ferritin, triglycerides, ESR, CRP, pro-calcitonin, blood cultures. D-dimers elevated 626  - Given hypoxia on admission, will start Hydroxychloroquine 800 mg once and 400 mg daily for 4 days    - Tylenol 650 mg q6h PRN for fever, Zofran 4mg q8h PRN for nausea and / or vomiting. QTc 420 ms   - Contact and Airborne precautions   - Consider Infectious disease and pulmonary consult if any signs of decompensation     # HTN   - Continue Losartan 40 mg daily     # Diabetes   - Metformin on hold   - Check FS before meals and at bedtime   - Start insulin basal bolus if FS > 180     # Miscellaneous   - DVT prophylaxis : Lovenox 40 mg daily   - GI prophylaxis : Not indicated   - Activity : Ambulate as tolerated   - Diet : DASH TLC consistent carbohydrate   - Full code

## 2020-04-14 NOTE — CONSULT NOTE ADULT - ASSESSMENT
55 year old male patient with hypertension, diabetes presenting for dyspnea.     IMPRESSION;   High probability of COVID 19 with Hypoxemia ( 97 % NRB with ? flow )  and CXR with b/l opacities  -elevation of inflammatory markers raises the possibility of cytokine release syndrome  -procalcitonin 0.   53 , not suggestive of a bacterial PNA.     RECOMMENDATIONS;    PLAQUENIL 800 mg PO q24h x one dose, then 400mg x once per day for 4 more days.  -if QTc<500ms then start HCQ. No monitoring or serial EKGs required.  -if QTC>500ms then monitor EKG. Use MCOT. If tele/MCOT no serial EKGs  -d/c for QTc>550  -maintain K levels>4mEq/L, Mg>2mg/dl   if still hypoxic in 24h might initiate anakinra 55 year old male patient with hypertension, diabetes presenting for dyspnea.     IMPRESSION;   High probability of COVID 19 with Hypoxemia ( 90 % NRB with 15 lit )  and CXR with b/l opacities  -elevation of inflammatory markers raises the possibility of cytokine release syndrome  -procalcitonin 0. 53 , not suggestive of a bacterial PNA.   Meets criteria for Anakinra. pO2 <92 on NRB>6H and elevated inflammatory marker     RECOMMENDATIONS;   Anakinra 100 mg SC q6h x 3d.   Could consider Solumedrol 1.0 / kg in 2 divided dosis for 3 days  Monitor oxygenation, CRP, Ferritin, Ddimers daily    PLAQUENIL 800 mg PO q24h x one dose, then 400mg x once per day for 4 more days.  -if QTc<500ms then start HCQ. No monitoring or serial EKGs required.  -if QTC>500ms then monitor EKG. Use MCOT. If tele/MCOT no serial EKGs  -d/c for QTc>550  -maintain K levels>4mEq/L, Mg>2mg/dl   if still hypoxic in 24h might initiate anakinra

## 2020-04-14 NOTE — PROGRESS NOTE ADULT - SUBJECTIVE AND OBJECTIVE BOX
SUBJECTIVE:    Patient is a 55y old Male who presents with a chief complaint of suspected COVID-19 (14 Apr 2020 09:47)    Currently admitted to medicine with the primary diagnosis of Suspected 2019 novel coronavirus infection     Today is hospital day 1d. This morning he is resting in bed, on nonrebreather mask, tachypneic but noted feeling comfortable. No chest pain.     PAST MEDICAL & SURGICAL HISTORY  Diabetes  Hypertension  No significant past surgical history    SOCIAL HISTORY:  Negative for smoking/alcohol/drug use.     ALLERGIES:  No Known Allergies    MEDICATIONS:  STANDING MEDICATIONS  anakinra Injectable   SubCutaneous   anakinra Injectable 100 milliGRAM(s) SubCutaneous every 6 hours  budesonide 160 MICROgram(s)/formoterol 4.5 MICROgram(s) Inhaler 2 Puff(s) Inhalation two times a day  chlorhexidine 4% Liquid 1 Application(s) Topical <User Schedule>  dextrose 5%. 1000 milliLiter(s) IV Continuous <Continuous>  dextrose 50% Injectable 12.5 Gram(s) IV Push once  dextrose 50% Injectable 25 Gram(s) IV Push once  dextrose 50% Injectable 25 Gram(s) IV Push once  enoxaparin Injectable 40 milliGRAM(s) SubCutaneous daily  hydroxychloroquine 400 milliGRAM(s) Oral every 24 hours  hydroxychloroquine   Oral   insulin lispro (HumaLOG) corrective regimen sliding scale   SubCutaneous three times a day before meals  losartan 50 milliGRAM(s) Oral daily  methylPREDNISolone sodium succinate Injectable 40 milliGRAM(s) IV Push every 12 hours    PRN MEDICATIONS  acetaminophen   Tablet .. 650 milliGRAM(s) Oral every 6 hours PRN  dextrose 40% Gel 15 Gram(s) Oral once PRN  glucagon  Injectable 1 milliGRAM(s) IntraMuscular once PRN  ondansetron Injectable 4 milliGRAM(s) IV Push every 8 hours PRN    VITALS:   T(F): 99.6  HR: 101  BP: 138/78  RR: 20  SpO2: 90%    LABS:                        13.5   6.16  )-----------( 193      ( 14 Apr 2020 07:01 )             40.3     04-14    138  |  97<L>  |  14  ----------------------------<  243<H>  4.8   |  23  |  0.8    Ca    8.2<L>      14 Apr 2020 07:01  Mg     2.3     04-14    TPro  6.6  /  Alb  3.4<L>  /  TBili  0.7  /  DBili  x   /  AST  100<H>  /  ALT  84<H>  /  AlkPhos  65  04-14    PT/INR - ( 14 Apr 2020 07:01 )   PT: 12.10 sec;   INR: 1.05 ratio         PTT - ( 14 Apr 2020 07:01 )  PTT:29.5 sec      Creatine Kinase, Serum: 384 U/L <H> (04-14-20 @ 07:01)  Troponin T, Serum: <0.01 ng/mL (04-13-20 @ 14:30)      CARDIAC MARKERS ( 14 Apr 2020 07:01 )  x     / x     / 384 U/L / x     / x      CARDIAC MARKERS ( 13 Apr 2020 14:30 )  x     / <0.01 ng/mL / x     / x     / x          RADIOLOGY:    Xray Chest 1 View-PORTABLE IMMEDIATE (04.13.20 @ 15:24)   Impression:      There are patchy bilateral airspace opacities, in both mid and lower lung fields, compatible with a viral pneumonia, such as COVID-19, in the appropriate clinical setting.           PHYSICAL EXAM:  GEN: tachypneic, on nonrebreather mask.  LUNGS: Bilateral crackles.   HEART: S1/S2 present. tachycardic  ABD: Soft, non-tender, non-distended.  EXT: No edema, Skin Intact.   NEURO: AAOX3

## 2020-04-14 NOTE — CONSULT NOTE ADULT - SUBJECTIVE AND OBJECTIVE BOX
RHIANNON MAHER  55y, Male  Allergy: No Known Allergies      All historical available data reviewed.    HPI:  55 year old male patient with hypertension, diabetes presenting for dyspnea.   The patient states that he has experienced non productive cough as well as low grade fever (between 90 and 100) for the past week associated with loss of appetite. These symptoms stopped last Friday. Since yesterday he has been experiencing dyspnea mostly on exertion that has been worsening which prompted him to call EMS today. His son works at a rehab center, has been experiencing the same symptoms however has been denied COVID-19 testing   In the ED, SpO2 was 78 % on room air and the patient was placed on 6 L NC with improvement of his SpO2 to 90 - 92 % and subjective improvement endorsed by the patient as well. Otherwise, tachycardic to 104, hemodynamically stable and afebrile. Chest  x ray was done and showed bilateral infiltrates (official report pending), SARS-CoV-2 PCR sent and patient was admitted for suspected COVID-19 (13 Apr 2020 15:47)    FAMILY HISTORY:    PAST MEDICAL & SURGICAL HISTORY:  Diabetes  Hypertension  No significant past surgical history        VITALS:  T(F): 99.6, Max: 100.3 (04-14-20 @ 01:42)  HR: 101  BP: 138/78  RR: 20Vital Signs Last 24 Hrs  T(C): 37.6 (14 Apr 2020 07:48), Max: 37.9 (14 Apr 2020 01:42)  T(F): 99.6 (14 Apr 2020 07:48), Max: 100.3 (14 Apr 2020 01:42)  HR: 101 (14 Apr 2020 07:48) (90 - 107)  BP: 138/78 (14 Apr 2020 07:48) (100/67 - 154/78)  BP(mean): --  RR: 20 (14 Apr 2020 07:48) (20 - 36)  SpO2: 97% (14 Apr 2020 07:48) (82% - 97%)    TESTS & MEASUREMENTS:                        13.5   6.16  )-----------( 193      ( 14 Apr 2020 07:01 )             40.3     04-14    138  |  97<L>  |  14  ----------------------------<  243<H>  4.8   |  23  |  0.8    Ca    8.2<L>      14 Apr 2020 07:01  Mg     2.3     04-14    TPro  6.6  /  Alb  3.4<L>  /  TBili  0.7  /  DBili  x   /  AST  100<H>  /  ALT  84<H>  /  AlkPhos  65  04-14    LIVER FUNCTIONS - ( 14 Apr 2020 07:01 )  Alb: 3.4 g/dL / Pro: 6.6 g/dL / ALK PHOS: 65 U/L / ALT: 84 U/L / AST: 100 U/L / GGT: x                   RADIOLOGY & ADDITIONAL TESTS:  Personal review of radiological diagnostics performed  Echo and EKG results noted when applicable.     MEDICATIONS:  acetaminophen   Tablet .. 650 milliGRAM(s) Oral every 6 hours PRN  budesonide 160 MICROgram(s)/formoterol 4.5 MICROgram(s) Inhaler 2 Puff(s) Inhalation two times a day  chlorhexidine 4% Liquid 1 Application(s) Topical <User Schedule>  dextrose 40% Gel 15 Gram(s) Oral once PRN  dextrose 5%. 1000 milliLiter(s) IV Continuous <Continuous>  dextrose 50% Injectable 12.5 Gram(s) IV Push once  dextrose 50% Injectable 25 Gram(s) IV Push once  dextrose 50% Injectable 25 Gram(s) IV Push once  enoxaparin Injectable 40 milliGRAM(s) SubCutaneous daily  glucagon  Injectable 1 milliGRAM(s) IntraMuscular once PRN  hydroxychloroquine 400 milliGRAM(s) Oral every 24 hours  hydroxychloroquine   Oral   insulin lispro (HumaLOG) corrective regimen sliding scale   SubCutaneous three times a day before meals  losartan 50 milliGRAM(s) Oral daily  ondansetron Injectable 4 milliGRAM(s) IV Push every 8 hours PRN      ANTIBIOTICS:  hydroxychloroquine 400 milliGRAM(s) Oral every 24 hours  hydroxychloroquine   Oral

## 2020-04-14 NOTE — PROGRESS NOTE ADULT - ASSESSMENT
Mr. Alford is a 55 year old male patient with hypertension, diabetes presenting on 4/13 for dyspnea.     # Acute hypoxic respiratory failure  # High suspicion of COVID-19 lower respiratory tract infection   - SpO2 90 - 92 % on 15L NRB. Been on NRB for more than 12 hours  - Chest x ray : bilateral interstitial infiltrates  - Mild transaminitis, lymphopenia noted on labs   - D-dimers elevated 626, Ferritin: > 5000, CRP: 17, procalcitonin: 0.5  - Given hypoxia on admission, patient is started on hydroxychloroquine 800 mg once and 400 mg daily for 4 days    - Baseline QTc 420 ms, no indication to monitor QTc while on hydroxychloroquine.    - Contact and Airborne precautions   - Evaluated by Infectious Diseases: patient meets criteria for starting anakinra + methylprednisone (sat < 92% whie on NRB  6 hours), elevated ferritin > 700. Will initiate anakinra + steroids  - Patient is tachypneic and in moderate respiratory distress, and might eventually require intubation. However, discussed wth patient, and he is feeling comfortable at this time, and would like to hold off intubation, until absolutely needed   - Prone position.     # HTN   - Continue Losartan 40 mg daily     # Diabetes   - Metformin on hold   - Check FS before meals and at bedtime   - Insulin sliding scale.     # Miscellaneous   - DVT prophylaxis : Lovenox 40 mg daily   - GI prophylaxis : Not indicated     - Full code

## 2020-04-15 NOTE — PROGRESS NOTE ADULT - ASSESSMENT
Mr. Alford is a 55 year old male patient with hypertension, diabetes presenting on 4/13 for dyspnea.     # Acute hypoxic respiratory failure  # COVID-19 lower respiratory tract infection   - SpO2 90 - 92 % on 15L NRB.  - 4/15: noted improvement of his SOB.   - Chest x ray : bilateral interstitial infiltrates  - Mild transaminitis, lymphopenia noted on labs   - D-dimers elevated 626, Ferritin: > 5000, CRP: 17, procalcitonin: 0.5  - Given hypoxia on admission, patient is started on hydroxychloroquine 800 mg once and 400 mg daily for 4 days (day 3)    - Baseline QTc 420 ms, no indication to monitor QTc while on hydroxychloroquine.    - Evaluated by Infectious Diseases: patient meets criteria for starting anakinra + methylprednisolone (sat < 92% while on NRB >6 hours), elevated ferritin > 700. Started on anakinra + steroids on 4/14  - Prone position.     # HTN   - Continue Losartan 40 mg daily     # Diabetes   - Metformin on hold   - Started on Lantus as patient is on steroids  - Insulin sliding scale.     # Miscellaneous   - DVT prophylaxis : Lovenox 40 mg daily   - GI prophylaxis : pantoprazole    - Full code Mr. Alford is a 55 year old male patient with hypertension, diabetes presenting on 4/13 for dyspnea.     # Acute hypoxic respiratory failure  # COVID-19 lower respiratory tract infection   - SpO2 90 - 92 % on 15L NRB.  - 4/15: noted improvement of his SOB.   - Chest x ray : bilateral interstitial infiltrates  - Mild transaminitis, lymphopenia noted on labs   - D-dimers elevated 626, Ferritin: > 5000, CRP: 17, procalcitonin: 0.5  - Given hypoxia on admission, patient is started on hydroxychloroquine 800 mg once and 400 mg daily for 4 days (day 3)    - Baseline QTc 420 ms, no indication to monitor QTc while on hydroxychloroquine.    - Evaluated by Infectious Diseases: patient meets criteria for starting anakinra + methylprednisolone (sat < 92% while on NRB >6 hours), elevated ferritin > 700. Started on anakinra + steroids on 4/14  - Prone position.     # HTN   - Continue Losartan 40 mg daily     # Diabetes   - Metformin on hold   - Started on Lantus as patient is on steroids  - Insulin sliding scale.     # Miscellaneous   - DVT prophylaxis : Lovenox 40 mg daily   - GI prophylaxis : pantoprazole    - Full code     Attending Attestation    Pt has been seen and examined in the ER. He is in prone position, AOx3, Sats 90-95% on 15L NRM. He is speaking in full sentences and in NAD.  Case and Plan discussed with Resident covering and ER Nurse.    Dx:   Acute Hypoxemic Respiratory Failure 2/2 SARS-CoV-2 PNA, Uncontrolled DM.     Plan:   Hydroxycloroquine 400mg po qd, Azithromycin 500mg po qd, Anakinra 100mg SQ q6h 1/3 days, Solumedrol 40mg po q12h. c/w 15L NRM if decompensates Intubate and send get ICU Consult.   Check pro-eitan, d-dimer, crp.   QT WNL    DM HA1C 11.5 Monitor FS qAC/HS, Cover w/ Lantus/Lispro    GI/DVT proph    FULL CODE    Dispo: Home / Acute Mr. Alford is a 55 year old male patient with hypertension, diabetes presenting on 4/13 for dyspnea.     # Acute hypoxic respiratory failure  # COVID-19 lower respiratory tract infection   - SpO2 90 - 92 % on 15L NRB.  - 4/15: noted improvement of his SOB.   - Chest x ray : bilateral interstitial infiltrates  - Mild transaminitis, lymphopenia noted on labs   - D-dimers elevated 626, Ferritin: > 5000, CRP: 17, procalcitonin: 0.5  - Given hypoxia on admission, patient is started on hydroxychloroquine 800 mg once and 400 mg daily for 4 days (day 3)    - Baseline QTc 420 ms, no indication to monitor QTc while on hydroxychloroquine.    - Evaluated by Infectious Diseases: patient meets criteria for starting anakinra + methylprednisolone (sat < 92% while on NRB >6 hours), elevated ferritin > 700. Started on anakinra + steroids on 4/14  - Prone position.     # HTN   - Continue Losartan 40 mg daily     # Diabetes   - Metformin on hold   - Started on Lantus as patient is on steroids  - Insulin sliding scale.     # Miscellaneous   - DVT prophylaxis : Lovenox 40 mg daily   - GI prophylaxis : pantoprazole    - Full code     Attending Attestation    Pt has been seen and examined in the ER. He is in prone position, AOx3, Sats 90-95% on 15L NRM. He is speaking in full sentences and in NAD.    Dx:   Acute Hypoxemic Respiratory Failure 2/2 SARS-CoV-2 PNA, Uncontrolled DM.     Plan:   Hydroxycloroquine 400mg po qd, Azithromycin 500mg po qd, Anakinra 100mg SQ q6h 1/3 days, Solumedrol 40mg po q12h. c/w 15L NRM if decompensates Intubate and send get ICU Consult.   Check pro-eitan, d-dimer, crp.   QT WNL    DM HA1C 11.5 Monitor FS qAC/HS, Cover w/ Lantus/Lispro    GI/DVT proph    FULL CODE    Case and Plan discussed with Resident covering and ER Nurse.    Dispo: Home / Acute

## 2020-04-15 NOTE — PROGRESS NOTE ADULT - SUBJECTIVE AND OBJECTIVE BOX
SUBJECTIVE:    Patient is a 55y old Male who presents with a chief complaint of suspected COVID-19 (14 Apr 2020 12:27)    Currently admitted to medicine with the primary diagnosis of Suspected 2019 novel coronavirus infection     Today is hospital day 2d. This morning he is resting comfortably in bed, still on nonrebreather mask. Noted improvement in his shortness of breath compared to yesterday, and even slept better. Saturation reported up to 98% overnight.     PAST MEDICAL & SURGICAL HISTORY  Diabetes  Hypertension  No significant past surgical history    SOCIAL HISTORY:  Negative for smoking/alcohol/drug use.     ALLERGIES:  No Known Allergies    MEDICATIONS:  STANDING MEDICATIONS  anakinra Injectable 100 milliGRAM(s) SubCutaneous every 6 hours  budesonide 160 MICROgram(s)/formoterol 4.5 MICROgram(s) Inhaler 2 Puff(s) Inhalation two times a day  chlorhexidine 4% Liquid 1 Application(s) Topical <User Schedule>  dextrose 5%. 1000 milliLiter(s) IV Continuous <Continuous>  dextrose 50% Injectable 12.5 Gram(s) IV Push once  dextrose 50% Injectable 25 Gram(s) IV Push once  dextrose 50% Injectable 25 Gram(s) IV Push once  enoxaparin Injectable 40 milliGRAM(s) SubCutaneous daily  hydroxychloroquine 400 milliGRAM(s) Oral every 24 hours  hydroxychloroquine   Oral   insulin glargine Injectable (LANTUS) 10 Unit(s) SubCutaneous at bedtime  insulin lispro (HumaLOG) corrective regimen sliding scale   SubCutaneous three times a day before meals  losartan 50 milliGRAM(s) Oral daily  methylPREDNISolone sodium succinate Injectable 40 milliGRAM(s) IV Push every 12 hours    PRN MEDICATIONS  acetaminophen   Tablet .. 650 milliGRAM(s) Oral every 6 hours PRN  dextrose 40% Gel 15 Gram(s) Oral once PRN  glucagon  Injectable 1 milliGRAM(s) IntraMuscular once PRN  ondansetron Injectable 4 milliGRAM(s) IV Push every 8 hours PRN    VITALS:   T(F): 97.9  HR: 83  BP: 105/55  RR: 18  SpO2: 92%    LABS:                        13.3   5.36  )-----------( 210      ( 15 Apr 2020 06:34 )             38.7     04-15    138  |  97<L>  |  25<H>  ----------------------------<  302<H>  4.6   |  21  |  0.8    Ca    8.3<L>      15 Apr 2020 06:34  Mg     2.7     04-15    TPro  6.4  /  Alb  3.2<L>  /  TBili  0.7  /  DBili  x   /  AST  83<H>  /  ALT  73<H>  /  AlkPhos  72  04-15    PT/INR - ( 14 Apr 2020 07:01 )   PT: 12.10 sec;   INR: 1.05 ratio         PTT - ( 14 Apr 2020 07:01 )  PTT:29.5 sec      Creatine Kinase, Serum: 227 U/L <H> (04-15-20 @ 06:34)      CARDIAC MARKERS ( 15 Apr 2020 06:34 )  x     / x     / 227 U/L / x     / x      CARDIAC MARKERS ( 14 Apr 2020 07:01 )  x     / x     / 384 U/L / x     / x      CARDIAC MARKERS ( 13 Apr 2020 14:30 )  x     / <0.01 ng/mL / x     / x     / x          COVID-19 PCR . (04.13.20 @ 14:30)    COVID-19 PCR: Detected: Methodology:  The Abbott Real Time SARS-CoV-2 assay is a real time reverse  transcriptase (RT) Polymerase Chain Reaction (PCR), test intended for the  qualitative detection of RNA from the SARS-CoV-2 in nasopharyngeal and  oropharyngeal swabs from patients suspected of COVID-19 infection.  The SARS-CoV-2 assay is performed on the Abbott m2000 system.  Reference Range:  Not Detected  This test has been validated by [facility]  Molecular lab. This assay is for in Vitro diagnostic testing under FDA  Emergency Use Authorization only.  This Test Was Performed At [facility], Molecular Lab,  [place], [place] [address]        RADIOLOGY:    < from: Xray Chest 1 View-PORTABLE IMMEDIATE (04.13.20 @ 15:24) >  Impression:      There are patchy bilateral airspace opacities, in both mid and lower lung fields, compatible with a viral pneumonia, such as COVID-19, in the appropriate clinical setting.     < end of copied text >      PHYSICAL EXAM:  GEN: No acute distress, tachypnea, on NRB 15 L/min, not in distress  LUNGS: bilateral crackles  HEART: S1/S2 present. RRR.   ABD: Soft, non-tender, non-distended  EXT: No edema, Skin Intact.   NEURO: AAOX3 SUBJECTIVE:    Patient is a 55y old Male who presents with a chief complaint of suspected COVID-19 (14 Apr 2020 12:27)    Currently admitted to medicine with the primary diagnosis of Suspected 2019 novel coronavirus infection     Today is hospital day 2d. This morning he is resting comfortably in bed, still on nonrebreather mask. Noted improvement in his shortness of breath compared to yesterday, and even slept better. Saturation reported up to 95% overnight.     PAST MEDICAL & SURGICAL HISTORY  Diabetes  Hypertension  No significant past surgical history    SOCIAL HISTORY:  Negative for smoking/alcohol/drug use.     ALLERGIES:  No Known Allergies    MEDICATIONS:  STANDING MEDICATIONS  anakinra Injectable 100 milliGRAM(s) SubCutaneous every 6 hours  budesonide 160 MICROgram(s)/formoterol 4.5 MICROgram(s) Inhaler 2 Puff(s) Inhalation two times a day  chlorhexidine 4% Liquid 1 Application(s) Topical <User Schedule>  dextrose 5%. 1000 milliLiter(s) IV Continuous <Continuous>  dextrose 50% Injectable 12.5 Gram(s) IV Push once  dextrose 50% Injectable 25 Gram(s) IV Push once  dextrose 50% Injectable 25 Gram(s) IV Push once  enoxaparin Injectable 40 milliGRAM(s) SubCutaneous daily  hydroxychloroquine 400 milliGRAM(s) Oral every 24 hours  hydroxychloroquine   Oral   insulin glargine Injectable (LANTUS) 10 Unit(s) SubCutaneous at bedtime  insulin lispro (HumaLOG) corrective regimen sliding scale   SubCutaneous three times a day before meals  losartan 50 milliGRAM(s) Oral daily  methylPREDNISolone sodium succinate Injectable 40 milliGRAM(s) IV Push every 12 hours    PRN MEDICATIONS  acetaminophen   Tablet .. 650 milliGRAM(s) Oral every 6 hours PRN  dextrose 40% Gel 15 Gram(s) Oral once PRN  glucagon  Injectable 1 milliGRAM(s) IntraMuscular once PRN  ondansetron Injectable 4 milliGRAM(s) IV Push every 8 hours PRN    VITALS:   T(F): 97.9  HR: 83  BP: 105/55  RR: 18  SpO2: 92%    LABS:                        13.3   5.36  )-----------( 210      ( 15 Apr 2020 06:34 )             38.7     04-15    138  |  97<L>  |  25<H>  ----------------------------<  302<H>  4.6   |  21  |  0.8    Ca    8.3<L>      15 Apr 2020 06:34  Mg     2.7     04-15    TPro  6.4  /  Alb  3.2<L>  /  TBili  0.7  /  DBili  x   /  AST  83<H>  /  ALT  73<H>  /  AlkPhos  72  04-15    PT/INR - ( 14 Apr 2020 07:01 )   PT: 12.10 sec;   INR: 1.05 ratio         PTT - ( 14 Apr 2020 07:01 )  PTT:29.5 sec      Creatine Kinase, Serum: 227 U/L <H> (04-15-20 @ 06:34)      CARDIAC MARKERS ( 15 Apr 2020 06:34 )  x     / x     / 227 U/L / x     / x      CARDIAC MARKERS ( 14 Apr 2020 07:01 )  x     / x     / 384 U/L / x     / x      CARDIAC MARKERS ( 13 Apr 2020 14:30 )  x     / <0.01 ng/mL / x     / x     / x          COVID-19 PCR . (04.13.20 @ 14:30)    COVID-19 PCR: Detected: Methodology:  The Abbott Real Time SARS-CoV-2 assay is a real time reverse  transcriptase (RT) Polymerase Chain Reaction (PCR), test intended for the  qualitative detection of RNA from the SARS-CoV-2 in nasopharyngeal and  oropharyngeal swabs from patients suspected of COVID-19 infection.  The SARS-CoV-2 assay is performed on the Abbott m2000 system.  Reference Range:  Not Detected  This test has been validated by [facility]  Molecular lab. This assay is for in Vitro diagnostic testing under FDA  Emergency Use Authorization only.  This Test Was Performed At [facility], Molecular Lab,  [place], [place] [address]        RADIOLOGY:    < from: Xray Chest 1 View-PORTABLE IMMEDIATE (04.13.20 @ 15:24) >  Impression:      There are patchy bilateral airspace opacities, in both mid and lower lung fields, compatible with a viral pneumonia, such as COVID-19, in the appropriate clinical setting.     < end of copied text >      PHYSICAL EXAM:  GEN: No acute distress, tachypnea, on NRB 15 L/min, not in distress  LUNGS: bilateral crackles  HEART: S1/S2 present. RRR.   ABD: Soft, non-tender, non-distended  EXT: No edema, Skin Intact.   NEURO: AAOX3

## 2020-04-15 NOTE — PROGRESS NOTE ADULT - SUBJECTIVE AND OBJECTIVE BOX
RHIANNON MAHER  55y, Male    All available historical data reviewed    OVERNIGHT EVENTS:  92% NRB 15 lit    ROS:  unable to obtain history secondary to patient's mental status and/or sedation     VITALS:  T(F): 97.9, Max: 98.4 (04-14-20 @ 19:51)  HR: 83  BP: 105/55  RR: 18Vital Signs Last 24 Hrs  T(C): 36.6 (15 Apr 2020 10:09), Max: 36.9 (14 Apr 2020 19:51)  T(F): 97.9 (15 Apr 2020 10:09), Max: 98.4 (14 Apr 2020 19:51)  HR: 83 (15 Apr 2020 10:09) (73 - 83)  BP: 105/55 (15 Apr 2020 10:09) (97/52 - 130/59)  BP(mean): --  RR: 18 (15 Apr 2020 10:09) (17 - 18)  SpO2: 92% (15 Apr 2020 10:09) (92% - 98%)    TESTS & MEASUREMENTS:                        13.3   5.36  )-----------( 210      ( 15 Apr 2020 06:34 )             38.7     04-15    138  |  97<L>  |  25<H>  ----------------------------<  302<H>  4.6   |  21  |  0.8    Ca    8.3<L>      15 Apr 2020 06:34  Mg     2.7     04-15    TPro  6.4  /  Alb  3.2<L>  /  TBili  0.7  /  DBili  x   /  AST  83<H>  /  ALT  73<H>  /  AlkPhos  72  04-15    LIVER FUNCTIONS - ( 15 Apr 2020 06:34 )  Alb: 3.2 g/dL / Pro: 6.4 g/dL / ALK PHOS: 72 U/L / ALT: 73 U/L / AST: 83 U/L / GGT: x                   RADIOLOGY & ADDITIONAL TESTS:  Personal review of radiological diagnostics performed  Echo and EKG results noted when applicable.     MEDICATIONS:  acetaminophen   Tablet .. 650 milliGRAM(s) Oral every 6 hours PRN  anakinra Injectable 100 milliGRAM(s) SubCutaneous every 6 hours  budesonide 160 MICROgram(s)/formoterol 4.5 MICROgram(s) Inhaler 2 Puff(s) Inhalation two times a day  chlorhexidine 4% Liquid 1 Application(s) Topical <User Schedule>  dextrose 40% Gel 15 Gram(s) Oral once PRN  dextrose 5%. 1000 milliLiter(s) IV Continuous <Continuous>  dextrose 50% Injectable 12.5 Gram(s) IV Push once  dextrose 50% Injectable 25 Gram(s) IV Push once  dextrose 50% Injectable 25 Gram(s) IV Push once  enoxaparin Injectable 40 milliGRAM(s) SubCutaneous daily  glucagon  Injectable 1 milliGRAM(s) IntraMuscular once PRN  hydroxychloroquine 400 milliGRAM(s) Oral every 24 hours  hydroxychloroquine   Oral   insulin glargine Injectable (LANTUS) 10 Unit(s) SubCutaneous at bedtime  insulin lispro (HumaLOG) corrective regimen sliding scale   SubCutaneous three times a day before meals  losartan 50 milliGRAM(s) Oral daily  methylPREDNISolone sodium succinate Injectable 40 milliGRAM(s) IV Push every 12 hours  ondansetron Injectable 4 milliGRAM(s) IV Push every 8 hours PRN  pantoprazole    Tablet 40 milliGRAM(s) Oral daily      ANTIBIOTICS:  hydroxychloroquine 400 milliGRAM(s) Oral every 24 hours  hydroxychloroquine   Oral

## 2020-04-15 NOTE — PROGRESS NOTE ADULT - ASSESSMENT
· Assessment		  55 year old male patient with hypertension, diabetes presenting for dyspnea.     IMPRESSION;   High probability of COVID 19 with Hypoxemia ( 90 % NRB with 15 lit )  and CXR with b/l opacities  -elevation of inflammatory markers with cytokine release syndrome  -CXR with progression of opacities b/l  -procalcitonin 0. 53 , not suggestive of a bacterial PNA.   Meets criteria for Anakinra. pO2 <92 on NRB>6H and elevated inflammatory marker     RECOMMENDATIONS;   Anakinra 100 mg SC q6h x 3d.   Could consider Solumedrol 1.0 / kg in 2 divided dosis for 3 days  Monitor oxygenation, CRP, Ferritin, Ddimers daily    PLAQUENIL 800 mg PO q24h x one dose, then 400mg x once per day for 4 more days.  -if QTc<500ms then start HCQ. No monitoring or serial EKGs required.  -if QTC>500ms then monitor EKG. Use MCOT. If tele/MCOT no serial EKGs  -d/c for QTc>550  -maintain K levels>4mEq/L, Mg>2mg/dl

## 2020-04-16 NOTE — ED ADULT NURSE REASSESSMENT NOTE - NS ED NURSE REASSESS COMMENT FT1
Patient resting comfortably on stretcher in no acute distress. Respirations easy and unlabored. He offers no complaints at this time. Assessment unchanged. Medicated patient per MD orders, see MAR. Still awaiting med surg admission bed. Vital signs stable. Airborne, contact, and droplet precautions being implemented. Patient denies need for assistance, call bell in reach, will continue to monitor patient.

## 2020-04-16 NOTE — PROGRESS NOTE ADULT - ASSESSMENT
· Assessment		  55 year old male patient with hypertension, diabetes presenting for dyspnea.     IMPRESSION;   COVID 19 with Hypoxemia ( 90 % NRB with 15 lit )  and CXR with b/l opacities  -elevation of inflammatory markers with cytokine release syndrome  -CXR with progression of opacities b/l  -procalcitonin 0. 53 , not suggestive of a bacterial PNA.   Meets criteria for Anakinra. pO2 <92 on NRB>6H and elevated inflammatory marker     RECOMMENDATIONS;   Anakinra 100 mg SC q6h x 3d.   Solumedrol for 3 days  Monitor oxygenation, CRP, Ferritin, Ddimers daily    PLAQUENIL 800 mg PO q24h x one dose, then 400mg x once per day for 4 more days.  -if QTc<500ms then start HCQ. No monitoring or serial EKGs required.  -if QTC>500ms then monitor EKG. Use MCOT. If tele/MCOT no serial EKGs  -d/c for QTc>550  -maintain K levels>4mEq/L, Mg>2mg/dl

## 2020-04-16 NOTE — PROGRESS NOTE ADULT - SUBJECTIVE AND OBJECTIVE BOX
SUBJECTIVE:    Patient is a 55y old Male who presents with a chief complaint of suspected COVID-19 (14 Apr 2020 12:27)    Currently admitted to medicine with the primary diagnosis of Suspected 2019 novel coronavirus infection     Today is hospital day 3d. This morning he is resting comfortably in bed, still on nonrebreather mask. Noted improvement in his shortness of breath every day, slo improvement of his cough.  Saturation reported up to 97% overnight.     PAST MEDICAL & SURGICAL HISTORY  Diabetes  Hypertension  No significant past surgical history    SOCIAL HISTORY:  Negative for smoking/alcohol/drug use.     ALLERGIES:  No Known Allergies    MEDICATIONS:  STANDING MEDICATIONS  anakinra Injectable 100 milliGRAM(s) SubCutaneous every 6 hours  budesonide 160 MICROgram(s)/formoterol 4.5 MICROgram(s) Inhaler 2 Puff(s) Inhalation two times a day  chlorhexidine 4% Liquid 1 Application(s) Topical <User Schedule>  dextrose 5%. 1000 milliLiter(s) IV Continuous <Continuous>  dextrose 50% Injectable 12.5 Gram(s) IV Push once  dextrose 50% Injectable 25 Gram(s) IV Push once  dextrose 50% Injectable 25 Gram(s) IV Push once  enoxaparin Injectable 40 milliGRAM(s) SubCutaneous daily  hydroxychloroquine 400 milliGRAM(s) Oral every 24 hours  hydroxychloroquine   Oral   insulin glargine Injectable (LANTUS) 10 Unit(s) SubCutaneous at bedtime  insulin lispro (HumaLOG) corrective regimen sliding scale   SubCutaneous three times a day before meals  losartan 50 milliGRAM(s) Oral daily  methylPREDNISolone sodium succinate Injectable 40 milliGRAM(s) IV Push every 12 hours    PRN MEDICATIONS  acetaminophen   Tablet .. 650 milliGRAM(s) Oral every 6 hours PRN  dextrose 40% Gel 15 Gram(s) Oral once PRN  glucagon  Injectable 1 milliGRAM(s) IntraMuscular once PRN  ondansetron Injectable 4 milliGRAM(s) IV Push every 8 hours PRN    VITALS:   T(F): 97.9  HR: 83  BP: 105/55  RR: 18  SpO2: 92%    LABS:                        13.3   5.36  )-----------( 210      ( 15 Apr 2020 06:34 )             38.7     04-15    138  |  97<L>  |  25<H>  ----------------------------<  302<H>  4.6   |  21  |  0.8    Ca    8.3<L>      15 Apr 2020 06:34  Mg     2.7     04-15    TPro  6.4  /  Alb  3.2<L>  /  TBili  0.7  /  DBili  x   /  AST  83<H>  /  ALT  73<H>  /  AlkPhos  72  04-15    PT/INR - ( 14 Apr 2020 07:01 )   PT: 12.10 sec;   INR: 1.05 ratio         PTT - ( 14 Apr 2020 07:01 )  PTT:29.5 sec      Creatine Kinase, Serum: 227 U/L <H> (04-15-20 @ 06:34)      CARDIAC MARKERS ( 15 Apr 2020 06:34 )  x     / x     / 227 U/L / x     / x      CARDIAC MARKERS ( 14 Apr 2020 07:01 )  x     / x     / 384 U/L / x     / x      CARDIAC MARKERS ( 13 Apr 2020 14:30 )  x     / <0.01 ng/mL / x     / x     / x          COVID-19 PCR . (04.13.20 @ 14:30)    COVID-19 PCR: Detected: Methodology:  The Abbott Real Time SARS-CoV-2 assay is a real time reverse  transcriptase (RT) Polymerase Chain Reaction (PCR), test intended for the  qualitative detection of RNA from the SARS-CoV-2 in nasopharyngeal and  oropharyngeal swabs from patients suspected of COVID-19 infection.  The SARS-CoV-2 assay is performed on the Abbott m2000 system.  Reference Range:  Not Detected  This test has been validated by [facility]  Molecular lab. This assay is for in Vitro diagnostic testing under FDA  Emergency Use Authorization only.  This Test Was Performed At [facility], Molecular Lab,  [place], [place] [address]        RADIOLOGY:    from: Xray Chest 1 View- PORTABLE-Routine (04.15.20 @ 12:30)   Impression:      Bilateral airspace opacities, increased.        Xray Chest 1 View-PORTABLE IMMEDIATE (04.13.20 @ 15:24)   Impression:      There are patchy bilateral airspace opacities, in both mid and lower lung fields, compatible with a viral pneumonia, such as COVID-19, in the appropriate clinical setting.         PHYSICAL EXAM:  GEN: No acute distress, tachypnea, on NRB 15 L/min, not in distress  LUNGS: bilateral crackles  HEART: S1/S2 present. RRR.   ABD: Soft, non-tender, non-distended  EXT: No edema, Skin Intact.   NEURO: AAOX3

## 2020-04-16 NOTE — PROGRESS NOTE ADULT - ASSESSMENT
Mr. Alford is a 55 year old male patient with hypertension, diabetes presenting on 4/13 for dyspnea.     # Acute hypoxic respiratory failure  # SARS-COV-2 lower respiratory tract infection   - SpO2 92-97 % on 15L NRB.  - 4/16: noted improvement of his SOB, and cough  - Tried to wean off NRB to Venti mask 50%, however desaturation noted to 86% at rest. Placed back on nonrebreather mask 100%.   - Chest x ray on 4/15 : bilateral interstitial infiltrates worsening  - CRP: 17.38----> 25.87  - Ferritin: 5523--->4923  - Given hypoxia on admission, patient is started on hydroxychloroquine 800 mg once and 400 mg daily for 4 days (day 4 today)    - Baseline QTc 420 ms, no indication to monitor QTc while on hydroxychloroquine.    - Evaluated by Infectious Diseases: patient meets criteria for starting anakinra + methylprednisolone (sat < 92% while on NRB >6 hours), elevated ferritin > 700. Started on anakinra + steroids on 4/14  - Prone position.     # HTN   - Continue Losartan 40 mg daily     # Diabetes   - HbA1c: 11.6  - Metformin on hold   - Started on Lantus + lipsro  - Uncontrolled given steroids: Lantus and lispro doses increased    # Miscellaneous   - DVT prophylaxis : Lovenox 40 mg daily   - GI prophylaxis : pantoprazole    - Full code Mr. Alford is a 55 year old male patient with hypertension, diabetes presenting on 4/13 for dyspnea.     # Acute hypoxic respiratory failure  # SARS-COV-2 lower respiratory tract infection   - SpO2 92-97 % on 15L NRB.  - 4/16: noted improvement of his SOB, and cough  - Tried to wean off NRB to Venti mask 50%, however desaturation noted to 86% at rest. Placed back on nonrebreather mask 100%.   - Chest x ray on 4/15 : bilateral interstitial infiltrates worsening  - CRP: 17.38----> 25.87  - Ferritin: 5523--->4923  - Given hypoxia on admission, patient is started on hydroxychloroquine 800 mg once and 400 mg daily for 4 days (day 4 today)    - Baseline QTc 420 ms, no indication to monitor QTc while on hydroxychloroquine.    - Evaluated by Infectious Diseases: patient meets criteria for starting anakinra + methylprednisolone (sat < 92% while on NRB >6 hours), elevated ferritin > 700. Started on anakinra + steroids on 4/14  - Prone position.     # HTN   - Continue Losartan 40 mg daily     # Diabetes   - HbA1c: 11.6  - Metformin on hold   - Started on Lantus + Lispro  - Uncontrolled given steroids: Lantus and lispro doses increased    # Miscellaneous   - DVT prophylaxis : Lovenox 40 mg daily   - GI prophylaxis : pantoprazole    - Full code     Attending Attestation    Pt has been seen and examined. Case and Plan discussed with the resident and agree with above documentation.   Pt is feeling better and looks better today. He is on 10L NRM sats 94% / Afebrile   Dx: AHRF 2/2 COVID19 PNA   Plan: Prone Position, Anakinra, Solumedrol, HCQ, Azithromycin, 100% NRM - Titrate if possible to keep sats low 90's.     GI/DVT Proph

## 2020-04-16 NOTE — PROGRESS NOTE ADULT - SUBJECTIVE AND OBJECTIVE BOX
RHIANNON MAHER  55y, Male    All available historical data reviewed    OVERNIGHT EVENTS:  90% NRB 15 lit    ROS:  unable to obtain history secondary to patient's mental status and/or sedation     VITALS:  T(F): 96.3, Max: 97.8 (04-15-20 @ 15:38)  HR: 86  BP: 107/66  RR: 17Vital Signs Last 24 Hrs  T(C): 35.7 (16 Apr 2020 07:51), Max: 36.6 (15 Apr 2020 15:38)  T(F): 96.3 (16 Apr 2020 07:51), Max: 97.8 (15 Apr 2020 15:38)  HR: 86 (16 Apr 2020 07:51) (74 - 92)  BP: 107/66 (16 Apr 2020 07:51) (97/59 - 136/74)  BP(mean): --  RR: 17 (16 Apr 2020 07:51) (17 - 18)  SpO2: 90% (16 Apr 2020 08:35) (86% - 99%)    TESTS & MEASUREMENTS:                        13.3   5.36  )-----------( 210      ( 15 Apr 2020 06:34 )             38.7     04-15    138  |  97<L>  |  25<H>  ----------------------------<  302<H>  4.6   |  21  |  0.8    Ca    8.3<L>      15 Apr 2020 06:34  Mg     2.7     04-15    TPro  6.4  /  Alb  3.2<L>  /  TBili  0.7  /  DBili  x   /  AST  83<H>  /  ALT  73<H>  /  AlkPhos  72  04-15    LIVER FUNCTIONS - ( 15 Apr 2020 06:34 )  Alb: 3.2 g/dL / Pro: 6.4 g/dL / ALK PHOS: 72 U/L / ALT: 73 U/L / AST: 83 U/L / GGT: x                   RADIOLOGY & ADDITIONAL TESTS:  Personal review of radiological diagnostics performed  Echo and EKG results noted when applicable.     MEDICATIONS:  acetaminophen   Tablet .. 650 milliGRAM(s) Oral every 6 hours PRN  anakinra Injectable 100 milliGRAM(s) SubCutaneous every 6 hours  azithromycin   Tablet 500 milliGRAM(s) Oral daily  budesonide 160 MICROgram(s)/formoterol 4.5 MICROgram(s) Inhaler 2 Puff(s) Inhalation two times a day  chlorhexidine 4% Liquid 1 Application(s) Topical <User Schedule>  dextrose 40% Gel 15 Gram(s) Oral once PRN  dextrose 5%. 1000 milliLiter(s) IV Continuous <Continuous>  dextrose 50% Injectable 12.5 Gram(s) IV Push once  dextrose 50% Injectable 25 Gram(s) IV Push once  dextrose 50% Injectable 25 Gram(s) IV Push once  enoxaparin Injectable 40 milliGRAM(s) SubCutaneous daily  glucagon  Injectable 1 milliGRAM(s) IntraMuscular once PRN  glucagon  Injectable 1 milliGRAM(s) IntraMuscular once PRN  hydroxychloroquine 400 milliGRAM(s) Oral every 24 hours  hydroxychloroquine   Oral   insulin glargine Injectable (LANTUS) 14 Unit(s) SubCutaneous at bedtime  insulin lispro (HumaLOG) corrective regimen sliding scale   SubCutaneous three times a day before meals  insulin lispro Injectable (HumaLOG) 4 Unit(s) SubCutaneous three times a day before meals  losartan 50 milliGRAM(s) Oral daily  methylPREDNISolone sodium succinate Injectable 40 milliGRAM(s) IV Push every 12 hours  ondansetron Injectable 4 milliGRAM(s) IV Push every 8 hours PRN  pantoprazole    Tablet 40 milliGRAM(s) Oral daily      ANTIBIOTICS:  azithromycin   Tablet 500 milliGRAM(s) Oral daily  hydroxychloroquine 400 milliGRAM(s) Oral every 24 hours  hydroxychloroquine   Oral

## 2020-04-16 NOTE — ED ADULT NURSE REASSESSMENT NOTE - NS ED NURSE REASSESS COMMENT FT1
Assumed care of patient at this time. Patient sitting on stretcher AAOx4 in no acute distress. Respirations easy and unlabored. He is COVID 19 positive and awaiting med surg admission bed. He is noted to be on nonrebreather at 15 lpm. Continuous cardiac monitor, blood pressure, and oxygen saturation monitoring applied. Oxygen saturation is 92% at this time. Airborne, contact, and droplet precautions being implemented. Patient denies need for assistance, call bell in reach, will continue to monitor patient.

## 2020-04-17 NOTE — ED ADULT NURSE REASSESSMENT NOTE - NS ED NURSE REASSESS COMMENT FT1
pt had hypoxic episode, destating pulse ox to 60s. rapid response called. pt intubated. emergent central line and emergent santos placed in ED. md Moreno notified and present at bed side. md [id number] at bed side. rESPIRTAORY therapist present at bed side. cardiac monitor and safety measures maintained. pt vitals stable at this time, no s/s of acute distress report given to next nurse

## 2020-04-17 NOTE — ED ADULT NURSE REASSESSMENT NOTE - NS ED NURSE REASSESS COMMENT FT1
High flow nasal cannula applied and is running 100% FiO2 at 140 L flow/minute. Oxygen saturation increased to 95%. Work of breathing and respirations have decreased. He appears to be more comfortable and in less distress. He reports feeling better at this time. Vital signs stable. Will continue to closely monitor patient.

## 2020-04-17 NOTE — ED PROCEDURE NOTE - CPROC ED INFUS LINE DETAIL1
The location was identified, and the area was draped and prepped./The catheter was placed using sterile technique./The guidewire was recovered.

## 2020-04-17 NOTE — ED ADULT NURSE REASSESSMENT NOTE - NS ED NURSE REASSESS COMMENT FT1
Patient oxygen saturation ranging from 88%-92%. He reports "I am very tired". He is noted to have some labored breathing. Notified admitting MD at 3001. She reports will come assess the patient. Will continue to closely monitor patient.

## 2020-04-17 NOTE — PROVIDER CONTACT NOTE (OTHER) - BACKGROUND
md Moreno notified and assessed pt. increased high flow to 50L. pt states he is comfortable at this time. safety measures and cardiac monitor maintained. md stats he will reassess

## 2020-04-17 NOTE — ED ADULT NURSE REASSESSMENT NOTE - NS ED NURSE REASSESS COMMENT FT1
Patient's oxygen saturation decreased to 85-87% with nonrebreather at 15 lpm. Patient placed into prone position. Called admitting MD at 3001 and notified her of change in condition. Oxygen saturation increased to 91% at this time. Patient offers no complaints and reports "I feel okay". No obvious respiratory distress or labored breathing noted. Continuous cardiac monitor, blood pressure, and oxygen saturation monitoring remains applied. Airborne, contact, and droplet precautions remain implemented. Patient denies need for assistance, call bell in reach, will continue to closely monitor patient.

## 2020-04-17 NOTE — CONSULT NOTE ADULT - ASSESSMENT
Assessment:    Acute hypoxemic respiratory failure secondary to SARS-COV-2 infection requiring invasive mechanical ventilation  ARDS  Possible superimposed bacterial infection      PLAN:  recommend intubation and invasive mechanical ventilation  CNS: Sedation with propfol to RASS of -2, morphine  for pain    HEENT:  Oral care    PULMONARY:  HOB @ 45 degrees, keep sats 92-96%  Start solumedrol 60q12(monitor FS), Vent setting Vt= 6/PBW      Peep=10     Esv2=784     RR=16  , monitor Plateau and Driving pressure, CXR post-intubation, ABGs in 30 mins.             CARDIOVASCULAR: trend trops, NT-probnp, Check QTC. Start LR at 75cc/hr. Keep I=O    GI: GI prophylaxis Protonix, bowel regimen                                         Feeding: npo for now until intubated    RENAL:  F/u  lytes.  Correct as needed. accurate I/O    INFECTIOUS DISEASE: IV abx  violetta/vanc, Continue HCQ(monitor Qtc),trend procalcitonin, LDH/CK/ferritin/CRP, Check nasal MRSA,. ID f/u for immunomodulators    HEMATOLOGICAL:  DVT prophylaxis.    ENDOCRINE:  Follow up FS.  Insulin protocol if needed.    MUSCULOSKELETAL: Bed rest for now    CODE STATUS: FULL CODE    DISPOSITION: Patient require continued monitoring in MICU Assessment:    Acute hypoxemic respiratory failure secondary to SARS-COV-2 infection requiring invasive mechanical ventilation  ARDS  Possible superimposed bacterial infection      PLAN:    CNS: avoid CNS depressant    HEENT:  Oral care    PULMONARY:  HOB @ 45 degrees, keep sats 92-96%  Start solumedrol 60q12(monitor FS), low threshold for intubation            CARDIOVASCULAR: LR at 75cc/hr.     GI: GI prophylaxis Protonix, bowel regimen                                         Feeding: npo     RENAL:  F/u  lytes.  Correct as needed. accurate I/O    INFECTIOUS DISEASE: IV abx  violetta, Continue HCQ(monitor Qtc), ID f/u for immunomodulators    HEMATOLOGICAL:  lovenox 60 q 12h    ENDOCRINE:  Follow up FS.  Insulin protocol if needed.    MUSCULOSKELETAL: Bed rest for now    CODE STATUS: FULL CODE    DISPOSITION: Patient require continued monitoring in MICU  poor prognosis

## 2020-04-17 NOTE — CONSULT NOTE ADULT - SUBJECTIVE AND OBJECTIVE BOX
Patient is a 55y old  Male who presents with a chief complaint of suspected COVID-19 (16 Apr 2020 11:34)      HPI:  55 year old male patient with hypertension, diabetes presenting for dyspnea.   The patient states that he has experienced non productive cough as well as low grade fever (between 90 and 100) for the past week associated with loss of appetite. These symptoms stopped last Friday. 1 day ptp he has been experiencing dyspnea mostly on exertion that has been worsening which prompted him to call EMS. His son works at a rehab center, has been experiencing the same symptoms however has been denied COVID-19 testing     In the ED, SpO2 was 78 % on room air and the patient was placed on 6 L NC with improvement of his SpO2 to 90 - 92 % and subjective improvement endorsed by the patient as well. Otherwise, tachycardic to 104, hemodynamically stable and afebrile. Chest  x ray was done and showed bilateral infiltrates (official report pending), SARS-CoV-2 PCR sent and patient was admitted for suspected COVID-19 (13 Apr 2020 15:47)  ICU called for worsening respiraotry status now on HFNC saturation 91%    PAST MEDICAL & SURGICAL HISTORY:  Diabetes  Hypertension  No significant past surgical history      SOCIAL HX:   Smoking    never                     ETOH          -ve                  Other  -ve    FAMILY HISTORY:  :  No known cardiovacular family hisotry     ROS:  See HPI     Allergies    No Known Allergies    Intolerances          PHYSICAL EXAM    ICU Vital Signs Last 24 Hrs  T(C): 37 (17 Apr 2020 05:23), Max: 37.6 (16 Apr 2020 18:53)  T(F): 98.6 (17 Apr 2020 05:23), Max: 99.7 (16 Apr 2020 18:53)  HR: 96 (17 Apr 2020 07:48) (85 - 104)  BP: 116/61 (17 Apr 2020 07:48) (115/60 - 142/98)  RR: 24 (17 Apr 2020 08:00) (16 - 32)  SpO2: 91% on HFNC 60L/min 100%Fio2 (17 Apr 2020 08:00) (78% - 97%)      General: In respiratory distress  HEENT:  JUAN MANUEL              Lungs: Bilateral Crackles  Cardiovascular: tachy, no LE edema  Gastrointestinal: Soft, Positive BS  Musculoskeletal: No clubbing.  Moves all extremities.  Full range of motion   Skin: Warm.  Intact  Neurological: No motor or sensory deficit         LABS:                          13.6   7.97  )-----------( 299      ( 17 Apr 2020 07:36 )             40.6                                               04-17    135  |  98  |  16  ----------------------------<  239<H>  4.7   |  22  |  0.7    Ca    8.1<L>      17 Apr 2020 07:36  Mg     2.2     04-17    TPro  6.5  /  Alb  3.2<L>  /  TBili  0.7  /  DBili  x   /  AST  150<H>  /  ALT  168<H>  /  AlkPhos  107  04-17                                                 CARDIAC MARKERS ( 17 Apr 2020 07:36 )  x     / x     / 390 U/L / x     / x                                                LIVER FUNCTIONS - ( 17 Apr 2020 07:36 )  Alb: 3.2 g/dL / Pro: 6.5 g/dL / ALK PHOS: 107 U/L / ALT: 168 U/L / AST: 150 U/L / GGT: x                                                                                                                                       X-Rays       b/l infiltrates.    MEDICATIONS  (STANDING):  anakinra Injectable 100 milliGRAM(s) SubCutaneous every 6 hours  azithromycin   Tablet 500 milliGRAM(s) Oral daily  budesonide 160 MICROgram(s)/formoterol 4.5 MICROgram(s) Inhaler 2 Puff(s) Inhalation two times a day  chlorhexidine 4% Liquid 1 Application(s) Topical <User Schedule>  dextrose 5%. 1000 milliLiter(s) (50 mL/Hr) IV Continuous <Continuous>  dextrose 50% Injectable 12.5 Gram(s) IV Push once  dextrose 50% Injectable 25 Gram(s) IV Push once  dextrose 50% Injectable 25 Gram(s) IV Push once  enoxaparin Injectable 40 milliGRAM(s) SubCutaneous daily  hydroxychloroquine 400 milliGRAM(s) Oral every 24 hours  hydroxychloroquine   Oral   insulin glargine Injectable (LANTUS) 14 Unit(s) SubCutaneous at bedtime  insulin lispro (HumaLOG) corrective regimen sliding scale   SubCutaneous three times a day before meals  insulin lispro Injectable (HumaLOG) 4 Unit(s) SubCutaneous three times a day before meals  losartan 50 milliGRAM(s) Oral daily  methylPREDNISolone sodium succinate Injectable 40 milliGRAM(s) IV Push every 12 hours  pantoprazole    Tablet 40 milliGRAM(s) Oral daily    MEDICATIONS  (PRN):  acetaminophen   Tablet .. 650 milliGRAM(s) Oral every 6 hours PRN Temp greater or equal to 38C (100.4F)  dextrose 40% Gel 15 Gram(s) Oral once PRN Blood Glucose LESS THAN 70 milliGRAM(s)/deciliter  glucagon  Injectable 1 milliGRAM(s) IntraMuscular once PRN Glucose LESS THAN 70 milligrams/deciliter  glucagon  Injectable 1 milliGRAM(s) IntraMuscular once PRN Glucose LESS THAN 70 milligrams/deciliter  ondansetron Injectable 4 milliGRAM(s) IV Push every 8 hours PRN Nausea and/or Vomiting Patient is a 55y old  Male who presents with a chief complaint of suspected COVID-19 (16 Apr 2020 11:34)      HPI:  55 year old male patient with hypertension, diabetes presenting for dyspnea.   The patient states that he has experienced non productive cough as well as low grade fever (between 90 and 100) for the past week associated with loss of appetite. These symptoms stopped last Friday. 1 day ptp he has been experiencing dyspnea mostly on exertion that has been worsening which prompted him to call EMS. His son works at a rehab center, has been experiencing the same symptoms however has been denied COVID-19 testing     In the ED, SpO2 was 78 % on room air and the patient was placed on 6 L NC with improvement of his SpO2 to 90 - 92 % and subjective improvement endorsed by the patient as well. Otherwise, tachycardic to 104, hemodynamically stable and afebrile. Chest  x ray was done and showed bilateral infiltrates (official report pending), SARS-CoV-2 PCR sent and patient was admitted for suspected COVID-19 (13 Apr 2020 15:47). covid +, was doing better, infla markers improving,   ICU called for worsening respiraotry status now on HFNC saturation 91%    PAST MEDICAL & SURGICAL HISTORY:  Diabetes  Hypertension  No significant past surgical history      SOCIAL HX:   Smoking    never                     ETOH          -ve                  Other  -ve    FAMILY HISTORY:  :  No known cardiovacular family hisotry     ROS:  See HPI     Allergies    No Known Allergies    Intolerances          PHYSICAL EXAM    ICU Vital Signs Last 24 Hrs  T(C): 37 (17 Apr 2020 05:23), Max: 37.6 (16 Apr 2020 18:53)  T(F): 98.6 (17 Apr 2020 05:23), Max: 99.7 (16 Apr 2020 18:53)  HR: 96 (17 Apr 2020 07:48) (85 - 104)  BP: 116/61 (17 Apr 2020 07:48) (115/60 - 142/98)  RR: 24 (17 Apr 2020 08:00) (16 - 32)  SpO2: 91% on HFNC 60L/min 100%Fio2 (17 Apr 2020 08:00) (78% - 97%)      General: In respiratory distress  HEENT:  JUAN MANUEL              Lungs: Bilateral Crackles  Cardiovascular: tachy, no LE edema  Gastrointestinal: Soft, Positive BS  Musculoskeletal: No clubbing.  Moves all extremities.  Full range of motion   Skin: Warm.  Intact  Neurological: No motor or sensory deficit         LABS:                          13.6   7.97  )-----------( 299      ( 17 Apr 2020 07:36 )             40.6                                               04-17    135  |  98  |  16  ----------------------------<  239<H>  4.7   |  22  |  0.7    Ca    8.1<L>      17 Apr 2020 07:36  Mg     2.2     04-17    TPro  6.5  /  Alb  3.2<L>  /  TBili  0.7  /  DBili  x   /  AST  150<H>  /  ALT  168<H>  /  AlkPhos  107  04-17                                                 CARDIAC MARKERS ( 17 Apr 2020 07:36 )  x     / x     / 390 U/L / x     / x                                                LIVER FUNCTIONS - ( 17 Apr 2020 07:36 )  Alb: 3.2 g/dL / Pro: 6.5 g/dL / ALK PHOS: 107 U/L / ALT: 168 U/L / AST: 150 U/L / GGT: x                                                                                                                                       X-Rays       b/l infiltrates.    MEDICATIONS  (STANDING):  anakinra Injectable 100 milliGRAM(s) SubCutaneous every 6 hours  azithromycin   Tablet 500 milliGRAM(s) Oral daily  budesonide 160 MICROgram(s)/formoterol 4.5 MICROgram(s) Inhaler 2 Puff(s) Inhalation two times a day  chlorhexidine 4% Liquid 1 Application(s) Topical <User Schedule>  dextrose 5%. 1000 milliLiter(s) (50 mL/Hr) IV Continuous <Continuous>  dextrose 50% Injectable 12.5 Gram(s) IV Push once  dextrose 50% Injectable 25 Gram(s) IV Push once  dextrose 50% Injectable 25 Gram(s) IV Push once  enoxaparin Injectable 40 milliGRAM(s) SubCutaneous daily  hydroxychloroquine 400 milliGRAM(s) Oral every 24 hours  hydroxychloroquine   Oral   insulin glargine Injectable (LANTUS) 14 Unit(s) SubCutaneous at bedtime  insulin lispro (HumaLOG) corrective regimen sliding scale   SubCutaneous three times a day before meals  insulin lispro Injectable (HumaLOG) 4 Unit(s) SubCutaneous three times a day before meals  losartan 50 milliGRAM(s) Oral daily  methylPREDNISolone sodium succinate Injectable 40 milliGRAM(s) IV Push every 12 hours  pantoprazole    Tablet 40 milliGRAM(s) Oral daily    MEDICATIONS  (PRN):  acetaminophen   Tablet .. 650 milliGRAM(s) Oral every 6 hours PRN Temp greater or equal to 38C (100.4F)  dextrose 40% Gel 15 Gram(s) Oral once PRN Blood Glucose LESS THAN 70 milliGRAM(s)/deciliter  glucagon  Injectable 1 milliGRAM(s) IntraMuscular once PRN Glucose LESS THAN 70 milligrams/deciliter  glucagon  Injectable 1 milliGRAM(s) IntraMuscular once PRN Glucose LESS THAN 70 milligrams/deciliter  ondansetron Injectable 4 milliGRAM(s) IV Push every 8 hours PRN Nausea and/or Vomiting

## 2020-04-17 NOTE — ED ADULT NURSE REASSESSMENT NOTE - NS ED NURSE REASSESS COMMENT FT1
Patient resting comfortably on stretcher in no acute distress. Respirations easy and unlabored. He offers no complaints at this time. Attempted to decrease oxygen to 12 lpm via nonrebreather, but oxygen saturation decreased to 86-88%. Oxygen increased to 15 lpm via nonrebreather and oxygen saturation is 94-96%. Still awaiting med surg admission bed. Vital signs stable. Airborne, contact, and droplet precautions being implemented. Patient denies need for assistance, call bell in reach, will continue to monitor patient.

## 2020-04-17 NOTE — ED PROCEDURE NOTE - NS ED ATTENDING STATEMENT MOD
Attending Only
Attending Only
I have personally performed a face to face diagnostic evaluation on this patient. I have reviewed the ACP note and agree with the history, exam and plan of care, except as noted.

## 2020-04-17 NOTE — ED ADULT NURSE REASSESSMENT NOTE - NS ED NURSE REASSESS COMMENT FT1
Patient transferred to critical care room 7 with negative pressure room. Continuous cardiac monitor, blood pressure, and oxygen saturation monitoring reapplied. Oxygen saturation remains 84%-88% with nonrebreather at 15 lpm. Respiratory therapist now at bedside setting patient up for high flow nasal cannula. Admitting Resident and Admitting Senior Resident at bedside with patient. Airborne, contact, and droplet precautions being implemented. Will continue to monitor patient closely.

## 2020-04-17 NOTE — ED ADULT NURSE REASSESSMENT NOTE - NS ED NURSE REASSESS COMMENT FT1
Oxygen saturation decreased to 85% and has increased work of breathing. He is noted to have respiratory distress with labored breathing. He continues to complain "im very tired". Patient once again placed to prone position and oxygen saturation increased to 88%-91%. He reports feeling uncomfortable. Admitting Senior Resident assessed patient and would like to place patient on high flow nasal cannula. Patient to be transferred to critical side of ED.

## 2020-04-17 NOTE — PROVIDER CONTACT NOTE (OTHER) - SITUATION
pt on high flow for SOB 40L. pulse ox 87%, destating pulse ox to 80s. pt encouraged to deep breathe. pt has increased work of breathing

## 2020-04-17 NOTE — AIRWAY PLACEMENT NOTE ADULT - POST AIRWAY PLACEMENT ASSESSMENT:
breath sounds equal/positive end tidal CO2 noted/chest excursion noted/CXR pending/breath sounds bilateral

## 2020-04-17 NOTE — PROGRESS NOTE ADULT - SUBJECTIVE AND OBJECTIVE BOX
RHIANNON MAHER  55y, Male    All available historical data reviewed    OVERNIGHT EVENTS:    92% on high flow  ROS:  General: Denies rigors, nightsweats  HEENT: Denies headache, rhinorrhea, sore throat, eye pain  CV: Denies CP, palpitations  PULM: no cough  GI: Denies hematemesis, hematochezia, melena  : Denies discharge, hematuria  MSK: Denies arthralgias, myalgias  SKIN: Denies rash, lesions  NEURO: Denies paresthesias, weakness  PSYCH: Denies depression, anxiety    VITALS:  T(F): 98.6, Max: 99.7 (04-16-20 @ 18:53)  HR: 93  BP: 123/65  RR: 24Vital Signs Last 24 Hrs  T(C): 37 (17 Apr 2020 05:23), Max: 37.6 (16 Apr 2020 18:53)  T(F): 98.6 (17 Apr 2020 05:23), Max: 99.7 (16 Apr 2020 18:53)  HR: 93 (17 Apr 2020 10:47) (88 - 104)  BP: 123/65 (17 Apr 2020 10:47) (116/61 - 142/98)  BP(mean): --  RR: 24 (17 Apr 2020 10:47) (20 - 32)  SpO2: 92% (17 Apr 2020 10:47) (78% - 97%)    TESTS & MEASUREMENTS:                        13.6   7.97  )-----------( 299      ( 17 Apr 2020 07:36 )             40.6     04-17    135  |  98  |  16  ----------------------------<  239<H>  4.7   |  22  |  0.7    Ca    8.1<L>      17 Apr 2020 07:36  Mg     2.2     04-17    TPro  6.5  /  Alb  3.2<L>  /  TBili  0.7  /  DBili  x   /  AST  150<H>  /  ALT  168<H>  /  AlkPhos  107  04-17    LIVER FUNCTIONS - ( 17 Apr 2020 07:36 )  Alb: 3.2 g/dL / Pro: 6.5 g/dL / ALK PHOS: 107 U/L / ALT: 168 U/L / AST: 150 U/L / GGT: x                   RADIOLOGY & ADDITIONAL TESTS:  Personal review of radiological diagnostics performed  Echo and EKG results noted when applicable.     MEDICATIONS:  acetaminophen   Tablet .. 650 milliGRAM(s) Oral every 6 hours PRN  ALBUTerol    90 MICROgram(s) HFA Inhaler 2 Puff(s) Inhalation every 6 hours  azithromycin   Tablet 500 milliGRAM(s) Oral daily  budesonide 160 MICROgram(s)/formoterol 4.5 MICROgram(s) Inhaler 2 Puff(s) Inhalation two times a day  chlorhexidine 4% Liquid 1 Application(s) Topical <User Schedule>  dextrose 40% Gel 15 Gram(s) Oral once PRN  dextrose 5%. 1000 milliLiter(s) IV Continuous <Continuous>  dextrose 50% Injectable 12.5 Gram(s) IV Push once  dextrose 50% Injectable 25 Gram(s) IV Push once  dextrose 50% Injectable 25 Gram(s) IV Push once  enoxaparin Injectable 80 milliGRAM(s) SubCutaneous every 12 hours  glucagon  Injectable 1 milliGRAM(s) IntraMuscular once PRN  glucagon  Injectable 1 milliGRAM(s) IntraMuscular once PRN  hydroxychloroquine 400 milliGRAM(s) Oral every 24 hours  hydroxychloroquine   Oral   insulin glargine Injectable (LANTUS) 14 Unit(s) SubCutaneous at bedtime  insulin lispro (HumaLOG) corrective regimen sliding scale   SubCutaneous three times a day before meals  insulin lispro Injectable (HumaLOG) 4 Unit(s) SubCutaneous three times a day before meals  lactated ringers. 1000 milliLiter(s) IV Continuous <Continuous>  losartan 50 milliGRAM(s) Oral daily  meropenem  IVPB      meropenem  IVPB 1000 milliGRAM(s) IV Intermittent every 8 hours  methylPREDNISolone sodium succinate Injectable 60 milliGRAM(s) IV Push every 12 hours  ondansetron Injectable 4 milliGRAM(s) IV Push every 8 hours PRN  pantoprazole    Tablet 40 milliGRAM(s) Oral daily      ANTIBIOTICS:  azithromycin   Tablet 500 milliGRAM(s) Oral daily  hydroxychloroquine 400 milliGRAM(s) Oral every 24 hours  hydroxychloroquine   Oral   meropenem  IVPB      meropenem  IVPB 1000 milliGRAM(s) IV Intermittent every 8 hours

## 2020-04-17 NOTE — CHART NOTE - NSCHARTNOTEFT_GEN_A_CORE
Mr. Alford is a 55 year old male patient with hypertension, diabetes presenting on 4/13 for dyspnea, diagnosed with SARS- COV-2 pneumonia, is on day 5 of admission, was treated with plaquenil + azithromycin + anakinra + steroids + prone position, and was improving on NRB, up until the morning of 4/17 when patient desaturated to 84% on 15L NRB and was transferred to a negative pressure room and started on high flow O2. ABG obtained, PaO2: 101, respiratory alkalosis. Patient is evaluated by ICU, recommend to continue high flow and ICU monitoring. Significant increase in D-Dimer: 940---> 3483, thus possibilty of PE, will be started on therapeutic Lovenox.     # Acute hypoxic respiratory failure  # SARS-COV-2 lower respiratory tract infection   # Cytokine storm  # Possible PE  - SpO2 88-94% on high flow O2, FiO2: 100%, Flow 50  - Chest x ray on 4/17 : stable bilateral interstitial infiltrates  - CRP: 17.38----> 25.87----->6.77  - Ferritin: 5523--->4923---> 4158  - D-Dimer: 626---> 940---> 3483  - started on hydroxychloroquine 800 mg once and 400 mg daily for 4 days (day 4 today), last dose today. Baseline QTc 420 ms, no indication to monitor QTc while on hydroxychloroquine.    - Evaluated by Infectious Diseases: patient meets criteria for starting anakinra + methylprednisolone (sat < 92% while on NRB >6 hours), elevated ferritin > 700. Started on anakinra + steroids on 4/14. (Last dose of anakinra today)  - Keep on high flow and monitor saturation at this time  - Given significant elevation in D-Dimer, started on Lovenox 80 mg sc Q 12 hours  - NPO for possible intubation if decompensation  - LR 75 cc/hour  - Started on meropenem 1 g IV Q 8 hours as per ICU recommendations.     # HTN   - Will hold off Losartan 40 mg daily, given borderline BP.      # Diabetes   - HbA1c: 11.6  - Started on Lantus + Lispro    Approved for ICU monitoring  Needs negative pressure room to continue high Flow O2.

## 2020-04-18 NOTE — PROGRESS NOTE ADULT - SUBJECTIVE AND OBJECTIVE BOX
Medicine Progress Note    Patient is a 55y old  Male who presents with a chief complaint of suspected COVID-19 (18 Apr 2020 07:49)    SUBJECTIVE/OVERNIGHT EVENTS: Upgraded to CCU yesterday after intubation 2/2 acute hypoxic respiratory failure. Noted extreme elevation in D-Dimer and new TELLO. Remains intubated and sedated; was paralyzed on sedation overnight which will be discontinued this AM.    MEDICATIONS  (STANDING):  enoxaparin Injectable 40 milliGRAM(s) SubCutaneous two times a day  insulin glargine Injectable (LANTUS) 14 Unit(s) SubCutaneous at bedtime  insulin lispro (HumaLOG) corrective regimen sliding scale   SubCutaneous three times a day before meals  insulin lispro Injectable (HumaLOG) 4 Unit(s) SubCutaneous three times a day before meals  lactulose Syrup 20 Gram(s) Oral three times a day  losartan 50 milliGRAM(s) Oral daily   meropenem  IVPB 1000 milliGRAM(s) IV Intermittent every 8 hours  methylPREDNISolone sodium succinate Injectable 60 milliGRAM(s) IV Push every 12 hours  midazolam Infusion 0.02 mG/kG/Hr (1.76 mL/Hr) IV Continuous <Continuous>  morphine  Infusion. 5 mG/Hr (5 mL/Hr) IV Continuous <Continuous>  norepinephrine Infusion 0.05 MICROgram(s)/kG/Min (8 mL/Hr) IV Continuous <Continuous>  pantoprazole    Tablet 40 milliGRAM(s) Oral daily  propofol Infusion 10 MICROgram(s)/kG/Min (5.12 mL/Hr) IV Continuous <Continuous>  senna 2 Tablet(s) Oral at bedtime  sodium chloride 0.9%. 1000 milliLiter(s) (75 mL/Hr) IV Continuous <Continuous>    MEDICATIONS  (PRN):  acetaminophen   Tablet .. 650 milliGRAM(s) Oral every 6 hours PRN Temp greater or equal to 38C (100.4F)    CAPILLARY BLOOD GLUCOSE  POCT Blood Glucose.: 174 mg/dL (18 Apr 2020 10:02)  POCT Blood Glucose.: 373 mg/dL (18 Apr 2020 04:00)  POCT Blood Glucose.: 343 mg/dL (17 Apr 2020 23:34)    I&O's Summary    17 Apr 2020 07:01  -  18 Apr 2020 07:00  --------------------------------------------------------  IN: 720 mL / OUT: 700 mL / NET: 20 mL    18 Apr 2020 07:01  -  18 Apr 2020 10:24  --------------------------------------------------------  IN: 297 mL / OUT: 20 mL / NET: 277 mL    Vital Signs Last 24 Hrs  T(C): 37.4 (18 Apr 2020 08:00), Max: 37.4 (18 Apr 2020 08:00)  T(F): 99.3 (18 Apr 2020 08:00), Max: 99.3 (18 Apr 2020 08:00)  HR: 96 (18 Apr 2020 10:00) (84 - 156)  BP: 191/115 (17 Apr 2020 13:56) (123/65 - 191/115)  RR: 26 (17 Apr 2020 19:00) (24 - 28)  SpO2: 96% (18 Apr 2020 10:00) (65% - 100%)    LABS:                        13.1   16.89 )-----------( 303      ( 18 Apr 2020 04:30 )             40.6     04-18    136  |  96<L>  |  39<H>  ----------------------------<  351<H>  6.3<HH>   |  21  |  2.5<H>    Ca    7.7<L>      18 Apr 2020 04:30  Mg     2.5     04-18    TPro  6.2  /  Alb  2.9<L>  /  TBili  0.6  /  DBili  x   /  AST  141<H>  /  ALT  164<H>  /  AlkPhos  95  04-18    CARDIAC MARKERS ( 17 Apr 2020 11:21 )  x     / <0.01 ng/mL / x     / x     / x      CARDIAC MARKERS ( 17 Apr 2020 07:36 )  x     / x     / 390 U/L / x     / x        COVID-19 PCR: Detected (13 Apr 2020 14:30)    RADIOLOGY & ADDITIONAL TESTS:  Imaging from Last 24 Hours:  < from: Xray Chest 1 View-PORTABLE IMMEDIATE (04.17.20 @ 23:02) >    EXAM:  XR CHEST PORTABLE IMMED 1V          PROCEDURE DATE:  04/17/2020      INTERPRETATION:  Clinical History / Reason for exam: Endotracheal tube placement    Comparison: Portable chest radiograph 4/17/2020 6:11 PM    Technique/Positioning:  Portable AP chest    Findings:    Support devices:     ET Tube: Endotracheal tube is in place with its tip 5.5 cm above the level of the santi.  NG tube: unchanged  Central lines:unchanged left subclavian catheter    Cardiac/mediastinum/hilum: Unchanged     Lung parenchyma/Pleura:  Stable bilateral airspace opacities.     No pneumothorax. The costophrenic angles are clear.    Skeleton/soft tissues: Unchanged.    Impression:      Stable bilateral airspace opacities.

## 2020-04-18 NOTE — CHART NOTE - NSCHARTNOTEFT_GEN_A_CORE
Attempted to contact family [phone], left a message on machine, no answer x 2.   Will attempt again tomorrow.

## 2020-04-18 NOTE — PROGRESS NOTE ADULT - ASSESSMENT
Assessment:    Acute hypoxemic respiratory failure secondary to SARS-COV-2 infection requiring invasive mechanical ventilation  ARDS  Possible superimposed bacterial infection  TELLO/ hyperkalemia      PLAN:    CNS: DC NIMBEX, VERSED, PROPOFOL, DC MORHINE    HEENT:  Oral care    PULMONARY:  HOB @ 45 degrees, keep sats 92-96%  Start solumedrol 60q12(monitor FS), DEC / increase RR 28, DEC fio2 60%, dec PEEP 12    CARDIOVASCULAR: NS 75 CC/H    GI: GI prophylaxis Protonix, bowel regimen                                         Feeding: feeding    RENAL:  F/u  lytes.  Correct as needed. accurate I/O, FENA, lokemal, renal eval, repeat CMP    INFECTIOUS DISEASE: IV abx  violetta, Continue HCQ(monitor Qtc), ID f/u for immunomodulators    HEMATOLOGICAL:  lovenox 4 q 12h    ENDOCRINE:  Follow up FS.  Insulin protocol if needed.    MUSCULOSKELETAL: Bed rest for now    CODE STATUS: FULL CODE    DISPOSITION: Patient require continued monitoring in MICU  poor prognosis

## 2020-04-18 NOTE — PROGRESS NOTE ADULT - ASSESSMENT
Mr. Alford is a 55 year old male with PMH of HTN and T2DM diabetes who presented on 4/13 for dyspnea found to have SARS-COV-2 pneumonia. Was intubated 4/17 after desaturating to 94% on 15L NRB and upgraded to CCU    # Acute hypoxic respiratory failure 2/2 SARS-COV-2 lower respiratory tract infection with evidence of cytokine release syndrome  - Intubated 4/17. Vent settings: RR 18, , FiO2 100%, PEEP 14, Plateau 36 --> Driving 22. Will change RR to 28, TV to 400 (can go to 370 if needed), decrease FiO2 to 60%, and decrease PEEP to 12  - Pressure support: none required  - Sedation: Morphine 2, Propofol 19. Discontinued nimbex gtt; if needed, go up on sedation and can add Precedex  - ID following. S/p Plaquenil and Anakinra courses. Continue IV Solumedrol 60 mg BID  - Concern for PE given significant elevation in d-dimer; was initially placed on therapeutic AC, but switched to Lovenox 40 mg BID per intensivist 4/18. F/u further recs  - C/w IV Meropenem 1 g BID (adjusted for renal function)     # TELLO, no history of CKD with noted decreased UOP and hyperkalemia  - Noted new onset 4/18  - FeNa ordered. F/u results  - Repeat CMP 11 AM. F/u results  - Received IV insulin + Lokelma for K 6.3 4/18 AM. F/u next CMP and correct PRN  - Nephrology consult placed. F/u recs    # History of HTN  - Will hold off Losartan 40 mg daily, given borderline BP  - Resume if becomes hypertensive     # Type 2 Diabetes   - HbA1c: 11.6  - Started on Lantus + Lispro  - Monitor finger sticks    #DVT ppx: Lovenox 40 mg BID  #GI ppx: Pantoprazole 40 mg suspension while intubated and on steroids  #Activity: bedrest  #Diet: NPO with tube feeds. F/u nutrition recs  #Dispo: from home, continue MICU monitoring  #Follow-up: ___Renal, 11AM CMP, resp status______

## 2020-04-18 NOTE — PROGRESS NOTE ADULT - SUBJECTIVE AND OBJECTIVE BOX
RHIANNON MAHER  55y, Male    All available historical data reviewed    OVERNIGHT EVENTS:  fio2 100%  no pressors    ROS:  unable to obtain history secondary to patient's mental status and/or sedation     VITALS:  T(F): 99.3, Max: 99.3 (04-18-20 @ 08:00)  HR: 96  BP: 191/115  RR: 26Vital Signs Last 24 Hrs  T(C): 37.4 (18 Apr 2020 08:00), Max: 37.4 (18 Apr 2020 08:00)  T(F): 99.3 (18 Apr 2020 08:00), Max: 99.3 (18 Apr 2020 08:00)  HR: 96 (18 Apr 2020 10:00) (84 - 156)  BP: 191/115 (17 Apr 2020 13:56) (180/105 - 191/115)  BP(mean): --  RR: 26 (17 Apr 2020 19:00) (24 - 28)  SpO2: 96% (18 Apr 2020 10:00) (65% - 100%)    TESTS & MEASUREMENTS:                        13.1   16.89 )-----------( 303      ( 18 Apr 2020 04:30 )             40.6     04-18    139  |  101  |  45<H>  ----------------------------<  160<H>  5.3<H>   |  21  |  3.4<H>    Ca    7.6<L>      18 Apr 2020 11:15  Mg     2.5     04-18    TPro  6.2  /  Alb  3.0<L>  /  TBili  0.5  /  DBili  x   /  AST  120<H>  /  ALT  154<H>  /  AlkPhos  92  04-18    LIVER FUNCTIONS - ( 18 Apr 2020 11:15 )  Alb: 3.0 g/dL / Pro: 6.2 g/dL / ALK PHOS: 92 U/L / ALT: 154 U/L / AST: 120 U/L / GGT: x                   RADIOLOGY & ADDITIONAL TESTS:  Personal review of radiological diagnostics performed  Echo and EKG results noted when applicable.     MEDICATIONS:  acetaminophen   Tablet .. 650 milliGRAM(s) Oral every 6 hours PRN  chlorhexidine 4% Liquid 1 Application(s) Topical <User Schedule>  dextrose 40% Gel 15 Gram(s) Oral once PRN  dextrose 5%. 1000 milliLiter(s) IV Continuous <Continuous>  dextrose 50% Injectable 12.5 Gram(s) IV Push once  dextrose 50% Injectable 25 Gram(s) IV Push once  dextrose 50% Injectable 25 Gram(s) IV Push once  enoxaparin Injectable 40 milliGRAM(s) SubCutaneous two times a day  glucagon  Injectable 1 milliGRAM(s) IntraMuscular once PRN  glucagon  Injectable 1 milliGRAM(s) IntraMuscular once PRN  insulin glargine Injectable (LANTUS) 14 Unit(s) SubCutaneous at bedtime  insulin lispro (HumaLOG) corrective regimen sliding scale   SubCutaneous three times a day before meals  insulin lispro Injectable (HumaLOG) 4 Unit(s) SubCutaneous three times a day before meals  lactulose Syrup 20 Gram(s) Oral three times a day  meropenem  IVPB 1000 milliGRAM(s) IV Intermittent every 12 hours  methylPREDNISolone sodium succinate Injectable 60 milliGRAM(s) IV Push every 12 hours  midazolam Infusion 0.02 mG/kG/Hr IV Continuous <Continuous>  morphine  Infusion. 5 mG/Hr IV Continuous <Continuous>  norepinephrine Infusion 0.05 MICROgram(s)/kG/Min IV Continuous <Continuous>  ondansetron Injectable 4 milliGRAM(s) IV Push every 8 hours PRN  pantoprazole   Suspension 40 milliGRAM(s) Oral daily  propofol Infusion 10 MICROgram(s)/kG/Min IV Continuous <Continuous>  senna 2 Tablet(s) Oral at bedtime  sodium chloride 0.9%. 1000 milliLiter(s) IV Continuous <Continuous>      ANTIBIOTICS:  meropenem  IVPB 1000 milliGRAM(s) IV Intermittent every 12 hours

## 2020-04-18 NOTE — PROGRESS NOTE ADULT - ASSESSMENT
55 year old male patient with hypertension, diabetes presenting for dyspnea.     IMPRESSION;   COVID19 with sepsis with lactic acidemia with  resp failure, mechanical ventilation with ARDS with FiO2 100%, secondary to Cytokine Release Syndrome leading to cytokine storm   -inflammatory markers significantly elevated with little response to anakinra  -end organ damage with renal failure and significant hepatitis ( both very poor prognostic markers )  -elevated Ddimer and Ferritin are also poor prognostic indicators and differentiate survivors from non survivors  -CXR with no focal consolidation  -procalcitonin 0. 53 , not suggestive of a bacterial PNA but will nevertheless treat as a bacterial superinfection    RECOMMENDATIONS;   S/p Anakinra 4/14-17  D/c Solumedrol   BCx  nares ORSA  serum fungitell assay  caspofungin 70 mg iv x once and 50 mg iv q24h starting 4/19  Meropenem 750 mg iv q24h  Monitor oxygenation, CRP, Ferritin, Ddimers daily    s/p PLAQUENIL

## 2020-04-18 NOTE — PROGRESS NOTE ADULT - SUBJECTIVE AND OBJECTIVE BOX
OVERNIGHT EVENTS: intubated 2, on Nimbex, morphine 2, propofol 19, hyperkalemia    Vital Signs Last 24 Hrs  T(C): 36.9 (18 Apr 2020 04:00), Max: 37.1 (18 Apr 2020 02:30)  T(F): 98.5 (18 Apr 2020 04:00), Max: 98.8 (18 Apr 2020 02:30)  HR: 86 (18 Apr 2020 06:00) (84 - 156)  BP: 191/115 (17 Apr 2020 13:56) (123/65 - 191/115)    RR: 26 (17 Apr 2020 19:00) (24 - 28)  SpO2: 100% (18 Apr 2020 06:00) (65% - 100%)    PHYSICAL EXAMINATION:    GENERAL: paralyzed    HEENT: Head is normocephalic and atraumatic. Extraocular muscles are intact. Mucous membranes are moist.    NECK: Supple.    LUNGS: bl crackles    HEART: Regular rate and rhythm without murmur.    ABDOMEN: Soft, nontender, and nondistended.      EXTREMITIES: Without any cyanosis, clubbing, rash, lesions or edema.    NEUROLOGIC: paralyzed    SKIN: No ulceration or induration present.      LABS:                        13.1   16.89 )-----------( 303      ( 18 Apr 2020 04:30 )             40.6     04-18    136  |  96<L>  |  39<H>  ----------------------------<  351<H>  6.3<HH>   |  21  |  2.5<H>    Ca    7.7<L>      18 Apr 2020 04:30  Mg     2.5     04-18    TPro  6.2  /  Alb  2.9<L>  /  TBili  0.6  /  DBili  x   /  AST  141<H>  /  ALT  164<H>  /  AlkPhos  95  04-18        ABG - ( 18 Apr 2020 03:00 )  pH, Arterial: 7.23  pH, Blood: x     /  pCO2: 54    /  pO2: 230   / HCO3: 23    / Base Excess: -5.5  /  SaO2: 99        18/460/100/14  plateau 36        CARDIAC MARKERS ( 17 Apr 2020 11:21 )  x     / <0.01 ng/mL / x     / x     / x      CARDIAC MARKERS ( 17 Apr 2020 07:36 )  x     / x     / 390 U/L / x     / x          D-Dimer Assay, Quantitative: 4178 ng/mL DDU (04-18-20 @ 04:30)    Serum Pro-Brain Natriuretic Peptide: 398 pg/mL (04-17-20 @ 11:21)            04-17-20 @ 07:01  -  04-18-20 @ 07:00  --------------------------------------------------------  IN: 720 mL / OUT: 700 mL / NET: 20 mL        MICROBIOLOGY:      MEDICATIONS  (STANDING):  ALBUTerol    90 MICROgram(s) HFA Inhaler 2 Puff(s) Inhalation every 6 hours  azithromycin   Tablet 500 milliGRAM(s) Oral daily  budesonide 160 MICROgram(s)/formoterol 4.5 MICROgram(s) Inhaler 2 Puff(s) Inhalation two times a day  chlorhexidine 4% Liquid 1 Application(s) Topical <User Schedule>  cisatracurium Infusion 2 MICROgram(s)/kG/Min (10.2 mL/Hr) IV Continuous <Continuous>  dextrose 5%. 1000 milliLiter(s) (50 mL/Hr) IV Continuous <Continuous>  dextrose 50% Injectable 12.5 Gram(s) IV Push once  dextrose 50% Injectable 25 Gram(s) IV Push once  dextrose 50% Injectable 25 Gram(s) IV Push once  enoxaparin Injectable 80 milliGRAM(s) SubCutaneous every 12 hours  insulin glargine Injectable (LANTUS) 14 Unit(s) SubCutaneous at bedtime  insulin lispro (HumaLOG) corrective regimen sliding scale   SubCutaneous three times a day before meals  insulin lispro Injectable (HumaLOG) 4 Unit(s) SubCutaneous three times a day before meals  insulin regular  human recombinant. 10 Unit(s) IV Push once  lactated ringers. 1000 milliLiter(s) (75 mL/Hr) IV Continuous <Continuous>  lactulose Syrup 10 Gram(s) Oral daily  losartan 50 milliGRAM(s) Oral daily  meropenem  IVPB      meropenem  IVPB 1000 milliGRAM(s) IV Intermittent every 8 hours  methylPREDNISolone sodium succinate Injectable 60 milliGRAM(s) IV Push every 12 hours  morphine  Infusion. 5 mG/Hr (5 mL/Hr) IV Continuous <Continuous>  norepinephrine Infusion 0.05 MICROgram(s)/kG/Min (8 mL/Hr) IV Continuous <Continuous>  pantoprazole    Tablet 40 milliGRAM(s) Oral daily  propofol Infusion 10 MICROgram(s)/kG/Min (5.12 mL/Hr) IV Continuous <Continuous>  senna 2 Tablet(s) Oral at bedtime    MEDICATIONS  (PRN):  acetaminophen   Tablet .. 650 milliGRAM(s) Oral every 6 hours PRN Temp greater or equal to 38C (100.4F)  dextrose 40% Gel 15 Gram(s) Oral once PRN Blood Glucose LESS THAN 70 milliGRAM(s)/deciliter  glucagon  Injectable 1 milliGRAM(s) IntraMuscular once PRN Glucose LESS THAN 70 milligrams/deciliter  glucagon  Injectable 1 milliGRAM(s) IntraMuscular once PRN Glucose LESS THAN 70 milligrams/deciliter  ondansetron Injectable 4 milliGRAM(s) IV Push every 8 hours PRN Nausea and/or Vomiting      RADIOLOGY & ADDITIONAL STUDIES:

## 2020-04-18 NOTE — CONSULT NOTE ADULT - SUBJECTIVE AND OBJECTIVE BOX
55 year old male patient with hypertension, diabetes presenting on 4/13 for dyspnea.   The patient states that he has experienced non productive cough as well as low grade fever (between 90 and 100) for the past week associated with loss of appetite. These symptoms stopped last Friday. 1 day ptp he has been experiencing dyspnea mostly on exertion that has been worsening which prompted him to call EMS. His son works at a rehab center, has been experiencing the same symptoms however has been denied COVID-19 testing.  In the ED, SpO2 was 78 % on room air and the patient was placed on 6 L NC with improvement of his SpO2 to 90 - 92 % and subjective improvement endorsed by the patient as well. Otherwise, tachycardic to 104, hemodynamically stable and afebrile. Chest  x ray was done and showed bilateral infiltrates (official report pending), SARS-CoV-2 PCR sent and patient was admitted for suspected COVID-19.   pt found to be covid 19 +, was doing better, inflam markers improving,   pt tranferred to CCU for worsening respiratory status and was intubated 4/17.   Renal function worsening rapidly over past few days - adm BUN 16/ 0.7.    ICU Vital Signs Last 24 Hrs  T(C): 38.6 (18 Apr 2020 12:00), Max: 38.6 (18 Apr 2020 12:00)  T(F): 101.4 (18 Apr 2020 12:00), Max: 101.4 (18 Apr 2020 12:00)  HR: 116 (18 Apr 2020 13:00) (84 - 138)  ABP: 96/58 (18 Apr 2020 13:00) (56/30 - 220/110)  ABP(mean): 68 (18 Apr 2020 13:00) (64 - 78)  RR: 26 (17 Apr 2020 19:00) (26 - 26)  SpO2: 94% (18 Apr 2020 13:00) (94% - 100%)  Drug Dosing Weight  Height (cm): 170.2 (18 Apr 2020 03:38)  Weight (kg): 88 (18 Apr 2020 03:38)  BMI (kg/m2): 30.4 (18 Apr 2020 03:38)  BSA (m2): 2 (18 Apr 2020 03:38)  intubated, sedated, +OG tube, abd not distended  + left sc TLC  exam per resident and crit care attending eval    MEDICATIONS  (STANDING):  chlorhexidine 4% Liquid 1 Application(s) Topical <User Schedule>  enoxaparin Injectable 40 milliGRAM(s) SubCutaneous two times a day  insulin glargine Injectable (LANTUS) 14 Unit(s) SubCutaneous at bedtime  insulin lispro (HumaLOG) corrective regimen sliding scale   SubCutaneous three times a day before meals  insulin lispro Injectable (HumaLOG) 4 Unit(s) SubCutaneous three times a day before meals  lactulose Syrup 20 Gram(s) Oral three times a day  meropenem  IVPB 1000 milliGRAM(s) IV Intermittent every 12 hours  methylPREDNISolone sodium succinate Injectable 60 milliGRAM(s) IV Push every 12 hours  midazolam Infusion 0.02 mG/kG/Hr (1.76 mL/Hr) IV Continuous <Continuous>  morphine  Infusion. 5 mG/Hr (5 mL/Hr) IV Continuous <Continuous>  norepinephrine Infusion 0.05 MICROgram(s)/kG/Min (8 mL/Hr) IV Continuous <Continuous>  pantoprazole   Suspension 40 milliGRAM(s) Oral daily  propofol Infusion 10 MICROgram(s)/kG/Min (5.12 mL/Hr) IV Continuous <Continuous>  senna 2 Tablet(s) Oral at bedtime  sodium chloride 0.9%. 1000 milliLiter(s) (75 mL/Hr) IV Continuous <Continuous>                        13.1   16.89 )-----------( 303      ( 18 Apr 2020 04:30 )             40.6   04-18    139  |  101  |  45<H>  ----------------------------<  160<H>  5.3<H>   |  21  |  3.4<H>    Ca    7.6<L>      18 Apr 2020 11:15  Mg     2.5     04-18  no phos level  TPro  6.2  /  Alb  3.0<L>  /  TBili  0.5  /  DBili  x   /  AST  120<H>  /  ALT  154<H>  /  AlkPhos  92  04-18  Hemoglobin A1C with Mean Plasma Glucose (04.15.20 @ 06:34)    A1C with Estimated Average Glucose Result: 11.6: Method: Immunoassay   ABG - ( 18 Apr 2020 03:00 )  pH, Arterial: 7.23  pH, Blood: x     /  pCO2: 54    /  pO2: 230   / HCO3: 23    / Base Excess: -5.5  /  SaO2: 99         on 100% currently      CAPILLARY BLOOD GLUCOSE  POCT Blood Glucose.: 116 mg/dL (18 Apr 2020 12:58)  POCT Blood Glucose.: 174 mg/dL (18 Apr 2020 10:02)  POCT Blood Glucose.: 373 mg/dL (18 Apr 2020 04:00)  POCT Blood Glucose.: 343 mg/dL (17 Apr 2020 23:34)      < from: Xray Chest 1 View-PORTABLE IMMEDIATE (04.17.20 @ 23:02) >  Impression:      Stable bilateral airspace opacities.     < end of copied text >

## 2020-04-18 NOTE — CONSULT NOTE ADULT - ASSESSMENT
IMP:  - covid 19 pneumonia with acute resp failure and ARDS  - cytokine storm  - TELLO and elevated LFTs secondary to the above  - DM poorly controlled PTA - note HbA1c  - HTN  - suspect poor po intake since at least 2 days prior to admission (~ 8 days ago) - this increases risk    Suggest:  - start enteral feeds today with Replete 250 ml q6h  - this provides 1000 kcal, 64 gm protein (20% of k), LOW omega 6:3 ratio, total 40 mEq K/24h, 800 mg phos/24h, isosmolar solution, all of which are in accordance with latest [facility] recs for covid19 crit SIRS pts.  - current propofol dose provides 264 k/d of fat, but keep in mind that it is omega 6 fa  - est caloric needs by PSE 2126 kcal/d, but est protein needs in this clinical situation likely > 130 gm/d.   - will re-evaluate situation and ability/ formula needed to gradually increase nutrient intake to meet full needs, over the next 24-48h.  - consider including thiamine, zinc, vitamin C to treatment regimen.  - glycemic control

## 2020-04-19 NOTE — CHART NOTE - NSCHARTNOTEFT_GEN_A_CORE
Patient's creatinine increased to 5.3 from 3.4, oliguric with approx 5-10 cc/hr UOP, fluid+1L, will give IV Lasix 40mg x 1. Attempted to reach nephrology without success, pending consult. Patient's creatinine increased to 5.3 from 3.4, oliguric with approx 5-10 cc/hr UOP, fluid+1L, gave Lasix challenge 80 mg IV. Attempted to reach nephrology without success, pending consult, possible dialysis later today?

## 2020-04-19 NOTE — PROGRESS NOTE ADULT - ASSESSMENT
· Assessment		  55 year old male patient with hypertension, diabetes presenting for dyspnea.     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 53 > 3.53 with possible  bacterial superinfection    RECOMMENDATIONS;   S/p Anakinra 4/14-17  D/c Solumedrol   BCx  nares ORSA  serum fungitell assay  caspofungin 50 mg iv q24h ( started 4/18)  Meropenem 750 mg iv q24h  Monitor  CRP, Ferritin, Ddimers q48h   s/p PLAQUENIL

## 2020-04-19 NOTE — CONSULT NOTE ADULT - SUBJECTIVE AND OBJECTIVE BOX
NEPHROLOGY CONSULTATION NOTE    55 year old male patient with hypertension, diabetes presenting on  for dyspnea, diagnosed with SARS- COV-2 pneumonia, is on day 5 of admission, was treated with plaquenil + azithromycin + anakinra + steroids + prone position, and was improving on NRB, up until the morning of  when patient desaturated to 84% on 15L NRB and was transferred to a negative pressure room and started on high flow O2. ABG obtained, PaO2: 101, respiratory alkalosis. Patien was evaluated by ICU  , recommend to continue high flow and ICU monitoring. Significant increase in D-Dimer: 940---> 3483, thus possibilty of PE, will be started on therapeutic Lovenox.    subsequently intubated     Now Cr rising   PAST MEDICAL & SURGICAL HISTORY:  Diabetes  Hypertension  No significant past surgical history    Allergies:  No Known Allergies    Home Medications Reviewed  Hospital Medications:   MEDICATIONS  (STANDING):  chlorhexidine 0.12% Liquid 15 milliLiter(s) Oral Mucosa two times a day  chlorhexidine 4% Liquid 1 Application(s) Topical <User Schedule>  dextrose 5%. 1000 milliLiter(s) (50 mL/Hr) IV Continuous <Continuous>  dextrose 50% Injectable 12.5 Gram(s) IV Push once  dextrose 50% Injectable 25 Gram(s) IV Push once  dextrose 50% Injectable 25 Gram(s) IV Push once  heparin   Injectable 5000 Unit(s) SubCutaneous every 8 hours  insulin glargine Injectable (LANTUS) 14 Unit(s) SubCutaneous at bedtime  insulin lispro (HumaLOG) corrective regimen sliding scale   SubCutaneous three times a day before meals  insulin lispro Injectable (HumaLOG) 4 Unit(s) SubCutaneous three times a day before meals  lactulose Syrup 20 Gram(s) Oral three times a day  meropenem  IVPB 1000 milliGRAM(s) IV Intermittent every 12 hours  methylPREDNISolone sodium succinate Injectable 60 milliGRAM(s) IV Push every 12 hours  midazolam Infusion 0.02 mG/kG/Hr (1.76 mL/Hr) IV Continuous <Continuous>  morphine  Infusion. 5 mG/Hr (5 mL/Hr) IV Continuous <Continuous>  norepinephrine Infusion 0.05 MICROgram(s)/kG/Min (4.13 mL/Hr) IV Continuous <Continuous>  pantoprazole   Suspension 40 milliGRAM(s) Oral daily  propofol Infusion 10 MICROgram(s)/kG/Min (5.28 mL/Hr) IV Continuous <Continuous>  senna 2 Tablet(s) Oral at bedtime      SOCIAL HISTORY:  Denies ETOH,Smoking,   FAMILY HISTORY:        REVIEW OF SYSTEMS:  unable obtain    VITALS:  T(F): 98.1 (20 @ 12:00), Max: 103 (20 @ 20:00)  HR: 84 (20 @ 14:00)  BP: --  RR: --  SpO2: 93% (20 @ 14:00)     @ 07:  -   @ 07:00  --------------------------------------------------------  IN: 3996.7 mL / OUT: 325 mL / NET: 3671.7 mL     @ 07:  -   16:00  --------------------------------------------------------  IN: 524.3 mL / OUT: 105 mL / NET: 419.3 mL          20 @ 07:01  -  20 @ 07:00  --------------------------------------------------------  IN: 0 mL / OUT: 325 mL / NET: -325 mL    20 @ 07:01  -  20 @ 16:00  --------------------------------------------------------  IN: 0 mL / OUT: 105 mL / NET: -105 mL      I&O's Detail    2020 07:01  -  2020 07:00  --------------------------------------------------------  IN:    cisatracurium Infusion: 4 mL    Enteral Tube Flush: 240 mL    IV PiggyBack: 100 mL    lactated ringers.: 75 mL    midazolam Infusion: 68 mL    Miscellaneous Tube Feedin mL    morphine  Infusion.: 2 mL    Nepro with Carb Steady: 720 mL    norepinephrine Infusion: 410 mL    norepinephrine Infusion: 97.8 mL    propofol Infusion: 300.5 mL    sodium chloride 0.9%: 1725 mL    vasopressin Infusion: 14.4 mL  Total IN: 3996.7 mL    OUT:    Indwelling Catheter - Urethral: 325 mL  Total OUT: 325 mL    Total NET: 3671.7 mL      2020 07:01  -  2020 16:00  --------------------------------------------------------  IN:    Enteral Tube Flush: 60 mL    midazolam Infusion: 28 mL    Miscellaneous Tube Feedin mL    norepinephrine Infusion: 78 mL    propofol Infusion: 118.3 mL  Total IN: 524.3 mL    OUT:    Indwelling Catheter - Urethral: 105 mL  Total OUT: 105 mL    Total NET: 419.3 mL            PHYSICAL EXAM:  Constitutional: NAD  HEENT: anicteric sclera, oropharynx clear, MMM  Neck: No JVD  Respiratory: CTAB, no wheezes, rales or rhonchi  Cardiovascular: S1, S2, RRR  Gastrointestinal: BS+, soft, NT/ND  Extremities: No cyanosis or clubbing. No peripheral edema  Neurological: A/O x 3, no focal deficits  Psychiatric: Normal mood, normal affect  : No CVA tenderness. No mendoza.   Skin: No rashes  Vascular Access:    LABS:      139  |  100  |  80<HH>  ----------------------------<  232<H>  4.9   |  19  |  6.5<HH>    Ca    8.1<L>      2020 13:52  Mg     2.7         TPro  5.8<L>  /  Alb  2.6<L>  /  TBili  0.6  /  DBili      /  AST  75<H>  /  ALT  97<H>  /  AlkPhos  80      Creatinine Trend: 6.5 <--, 5.3 <--, 3.4 <--, 2.5 <--, 0.7 <--, 0.7 <--, 0.8 <--, 0.8 <--, 1.1 <--                        12.5   14.78 )-----------( 218      ( 2020 23:40 )             39.4     Urine Studies:    Sodium, Random Urine: 44.0 mmoL/L ( @ 11:10)  Osmolality, Random Urine: 384 mos/kg ( @ 11:10)  Protein/Creatinine Ratio Calculation: 0.9 Ratio ( @ 11:10)  Creatinine, Random Urine: 135 mg/dL ( @ 11:10)            RADIOLOGY & ADDITIONAL STUDIES:

## 2020-04-19 NOTE — CONSULT NOTE ADULT - ATTENDING COMMENTS
General Thoracic Surgery Attestation    I have seen and examined the patient.  Where appropriate I have updated, edited, or corrected the resident's or PA's note with regard to findings, values, and plan.    patient with subQ and mediastinal emphysema seen on plain film imaging.  moderate subQ air on exam.  I am unimpressed regarding any potential pneumothorax on review of yesterday and today's imaging.    no chest tube  wean vent settings as possible  thoracic to follow for possible blowhole
patient seen and examined, agree with above, covid pneumonia, high oxygen requirement, steroids, lovenox therapeutic for now, MICU

## 2020-04-19 NOTE — CONSULT NOTE ADULT - ASSESSMENT
Assessment: Patient is a COVID+ 55 year old male patient with hypertension, diabetes presenting for dyspnea. Surgery consulted for new finding of pneumomediastinum and left apical pneumothorax.     Plan:  - Continue daily CXRs  - No chest tube recommended at this time  - If pneumomediastinum or apical pneumothorax enlarges, please recall

## 2020-04-19 NOTE — PROGRESS NOTE ADULT - SUBJECTIVE AND OBJECTIVE BOX
RHIANNON MAHER  55y, Male    All available historical data reviewed    OVERNIGHT EVENTS:    fevers  Fio2 60%  2 pressors  ROS:  unable to obtain history secondary to patient's mental status and/or sedation     VITALS:  T(F): 98.1, Max: 103 (04-18-20 @ 20:00)  HR: 84  BP: --  RR: --Vital Signs Last 24 Hrs  T(C): 36.7 (19 Apr 2020 12:00), Max: 39.4 (18 Apr 2020 20:00)  T(F): 98.1 (19 Apr 2020 12:00), Max: 103 (18 Apr 2020 20:00)  HR: 84 (19 Apr 2020 13:00) (80 - 152)  BP: --  BP(mean): --  RR: --  SpO2: 93% (19 Apr 2020 13:00) (90% - 100%)    TESTS & MEASUREMENTS:                        12.5   14.78 )-----------( 218      ( 18 Apr 2020 23:40 )             39.4     04-18    136  |  101  |  60<H>  ----------------------------<  287<H>  5.6<H>   |  19  |  5.3<HH>    Ca    7.8<L>      18 Apr 2020 23:40  Mg     2.7     04-18    TPro  5.8<L>  /  Alb  2.7<L>  /  TBili  0.6  /  DBili  x   /  AST  95<H>  /  ALT  117<H>  /  AlkPhos  80  04-18    LIVER FUNCTIONS - ( 18 Apr 2020 23:40 )  Alb: 2.7 g/dL / Pro: 5.8 g/dL / ALK PHOS: 80 U/L / ALT: 117 U/L / AST: 95 U/L / GGT: x                   RADIOLOGY & ADDITIONAL TESTS:  Personal review of radiological diagnostics performed  Echo and EKG results noted when applicable.     MEDICATIONS:  acetaminophen   Tablet .. 650 milliGRAM(s) Oral every 6 hours PRN  chlorhexidine 0.12% Liquid 15 milliLiter(s) Oral Mucosa two times a day  chlorhexidine 4% Liquid 1 Application(s) Topical <User Schedule>  dextrose 40% Gel 15 Gram(s) Oral once PRN  dextrose 5%. 1000 milliLiter(s) IV Continuous <Continuous>  dextrose 50% Injectable 12.5 Gram(s) IV Push once  dextrose 50% Injectable 25 Gram(s) IV Push once  dextrose 50% Injectable 25 Gram(s) IV Push once  glucagon  Injectable 1 milliGRAM(s) IntraMuscular once PRN  glucagon  Injectable 1 milliGRAM(s) IntraMuscular once PRN  heparin   Injectable 5000 Unit(s) SubCutaneous every 8 hours  insulin glargine Injectable (LANTUS) 14 Unit(s) SubCutaneous at bedtime  insulin lispro (HumaLOG) corrective regimen sliding scale   SubCutaneous three times a day before meals  insulin lispro Injectable (HumaLOG) 4 Unit(s) SubCutaneous three times a day before meals  lactulose Syrup 20 Gram(s) Oral three times a day  meropenem  IVPB 1000 milliGRAM(s) IV Intermittent every 12 hours  methylPREDNISolone sodium succinate Injectable 60 milliGRAM(s) IV Push every 12 hours  midazolam Infusion 0.02 mG/kG/Hr IV Continuous <Continuous>  morphine  Infusion. 5 mG/Hr IV Continuous <Continuous>  norepinephrine Infusion 0.05 MICROgram(s)/kG/Min IV Continuous <Continuous>  ondansetron Injectable 4 milliGRAM(s) IV Push every 8 hours PRN  pantoprazole   Suspension 40 milliGRAM(s) Oral daily  propofol Infusion 10 MICROgram(s)/kG/Min IV Continuous <Continuous>  senna 2 Tablet(s) Oral at bedtime      ANTIBIOTICS:  meropenem  IVPB 1000 milliGRAM(s) IV Intermittent every 12 hours

## 2020-04-19 NOTE — PROGRESS NOTE ADULT - ASSESSMENT
Assessment:    Acute hypoxemic respiratory failure secondary to SARS-COV-2 infection requiring invasive mechanical ventilation new sub q emphysema  ARDS  Possible superimposed bacterial infection  TELLO/ hyperkalemia worsening/ metabolic acidosis      PLAN:    CNS: VERSED, PROPOFOL,    HEENT:  Oral care    PULMONARY:  HOB @ 45 degrees, keep sats 92-96%  Start solumedrol 60q12(monitor FS),  / increase RR 28, DEC fio2 60%, dec PEEP 12, CT sx eval    CARDIOVASCULAR: dc IVF    GI: GI prophylaxis Protonix, bowel regimen                                         Feeding: feeding    RENAL:  F/u  lytes.  Correct as needed. accurate I/O, FENA, lokemal, renal eval, repeat CMP    INFECTIOUS DISEASE: IV abx  violetta, Continue HCQ(monitor Qtc), ID f/u for immunomodulators    HEMATOLOGICAL:  Change lovenox to hep sq ( decrease clearance)    ENDOCRINE:  Follow up FS.  Insulin protocol if needed.    MUSCULOSKELETAL: Bed rest for now    CODE STATUS: FULL CODE    DISPOSITION: Patient require continued monitoring in MICU  poor prognosis

## 2020-04-19 NOTE — CHART NOTE - NSCHARTNOTEFT_GEN_A_CORE
Attempted to contact family @[phone], left a message on machine, no answer x 2.   Will attempt again tomorrow.

## 2020-04-19 NOTE — CONSULT NOTE ADULT - SUBJECTIVE AND OBJECTIVE BOX
HPI:  55 year old male patient with hypertension, diabetes presenting for dyspnea.   The patient states that he has experienced non productive cough as well as low grade fever (between 90 and 100) for the past week associated with loss of appetite. These symptoms stopped last Friday. Since yesterday he has been experiencing dyspnea mostly on exertion that has been worsening which prompted him to call EMS today. His son works at a rehab center, has been experiencing the same symptoms however has been denied COVID-19 testing   In the ED, SpO2 was 78 % on room air and the patient was placed on 6 L NC with improvement of his SpO2 to 90 - 92 % and subjective improvement endorsed by the patient as well. Otherwise, tachycardic to 104, hemodynamically stable and afebrile. Chest  x ray was done and showed bilateral infiltrates (official report pending), SARS-CoV-2 PCR sent and patient was admitted for suspected COVID-19 (13 Apr 2020 15:47)    Surgery was consulted by CCU for new finding of pneumomediastinum and left apical pneumothorax seen on CXR.  Patient is currently intubated with FiO2 of 60 and PEEP of 12, on multiple pressors.     PAST MEDICAL & SURGICAL HISTORY:  Diabetes  Hypertension  No significant past surgical history      MEDICATIONS  (STANDING):  chlorhexidine 0.12% Liquid 15 milliLiter(s) Oral Mucosa two times a day  chlorhexidine 4% Liquid 1 Application(s) Topical <User Schedule>  dextrose 5%. 1000 milliLiter(s) (50 mL/Hr) IV Continuous <Continuous>  dextrose 50% Injectable 12.5 Gram(s) IV Push once  dextrose 50% Injectable 25 Gram(s) IV Push once  dextrose 50% Injectable 25 Gram(s) IV Push once  heparin   Injectable 5000 Unit(s) SubCutaneous every 8 hours  insulin glargine Injectable (LANTUS) 14 Unit(s) SubCutaneous at bedtime  insulin lispro (HumaLOG) corrective regimen sliding scale   SubCutaneous three times a day before meals  insulin lispro Injectable (HumaLOG) 4 Unit(s) SubCutaneous three times a day before meals  lactulose Syrup 20 Gram(s) Oral three times a day  meropenem  IVPB 1000 milliGRAM(s) IV Intermittent every 12 hours  methylPREDNISolone sodium succinate Injectable 60 milliGRAM(s) IV Push every 12 hours  midazolam Infusion 0.02 mG/kG/Hr (1.76 mL/Hr) IV Continuous <Continuous>  morphine  Infusion. 5 mG/Hr (5 mL/Hr) IV Continuous <Continuous>  norepinephrine Infusion 0.05 MICROgram(s)/kG/Min (4.13 mL/Hr) IV Continuous <Continuous>  pantoprazole   Suspension 40 milliGRAM(s) Oral daily  propofol Infusion 10 MICROgram(s)/kG/Min (5.28 mL/Hr) IV Continuous <Continuous>  senna 2 Tablet(s) Oral at bedtime    MEDICATIONS  (PRN):  acetaminophen   Tablet .. 650 milliGRAM(s) Oral every 6 hours PRN Temp greater or equal to 38C (100.4F)  dextrose 40% Gel 15 Gram(s) Oral once PRN Blood Glucose LESS THAN 70 milliGRAM(s)/deciliter  glucagon  Injectable 1 milliGRAM(s) IntraMuscular once PRN Glucose LESS THAN 70 milligrams/deciliter  glucagon  Injectable 1 milliGRAM(s) IntraMuscular once PRN Glucose LESS THAN 70 milligrams/deciliter  ondansetron Injectable 4 milliGRAM(s) IV Push every 8 hours PRN Nausea and/or Vomiting      Allergies: No Known Allergies      REVIEW OF SYSTEMS    [x] A ten-point review of systems was otherwise negative except as noted.  [ ] Due to altered mental status/intubation, subjective information were not able to be obtained from the patient. History was obtained, to the extent possible, from review of the chart and collateral sources of information.      Vital Signs Last 24 Hrs  T(C): 36.7 (19 Apr 2020 12:00), Max: 39.4 (18 Apr 2020 20:00)  T(F): 98.1 (19 Apr 2020 12:00), Max: 103 (18 Apr 2020 20:00)  HR: 84 (19 Apr 2020 14:00) (80 - 152)  SpO2: 93% (19 Apr 2020 14:00) (90% - 99%)    PHYSICAL EXAM:  GENERAL: intubated  CHEST/LUNG: Clear to auscultation bilaterally  HEART: Regular rate and rhythm  ABDOMEN: Soft, Nondistended;   EXTREMITIES:  No clubbing, cyanosis, or edema      LABS:    POCT Blood Glucose.: 195 mg/dL (19 Apr 2020 11:32)  POCT Blood Glucose.: 275 mg/dL (19 Apr 2020 05:39)  POCT Blood Glucose.: 204 mg/dL (18 Apr 2020 21:46)                          12.5   14.78 )-----------( 218      ( 18 Apr 2020 23:40 )             39.4       Auto Neutrophil %: 82.9 % (04-18-20 @ 23:40)  Auto Immature Granulocyte %: 6.0 % (04-18-20 @ 23:40)    04-19    139  |  100  |  80<HH>  ----------------------------<  232<H>  4.9   |  19  |  6.5<HH>      Calcium, Total Serum: 8.1 mg/dL (04-19-20 @ 13:52)      LFTs:             5.8  | 0.6  | 75       ------------------[80      ( 19 Apr 2020 13:52 )  2.6  | x    | 97          Lipase:x      Amylase:x         Blood Gas Arterial, Lactate: 1.9 mmoL/L (04-19-20 @ 14:48)  Blood Gas Arterial, Lactate: 2.0 mmoL/L (04-19-20 @ 03:21)  Blood Gas Arterial, Lactate: 2.1 mmoL/L (04-18-20 @ 14:12)  Blood Gas Arterial, Lactate: 2.0 mmoL/L (04-18-20 @ 03:00)  Blood Gas Arterial, Lactate: 2.0 mmoL/L (04-17-20 @ 22:50)  Blood Gas Arterial, Lactate: 1.9 mmoL/L (04-17-20 @ 19:35)  Blood Gas Arterial, Lactate: 2.2 mmoL/L (04-17-20 @ 17:12)  Blood Gas Arterial, Lactate: 2.3 mmoL/L (04-17-20 @ 15:04)  Blood Gas Arterial, Lactate: 2.3 mmoL/L (04-17-20 @ 10:11)    ABG - ( 19 Apr 2020 14:48 )  pH: 7.29  /  pCO2: 42    /  pO2: 82    / HCO3: 20    / Base Excess: -6.3  /  SaO2: 95              ABG - ( 19 Apr 2020 03:21 )  pH: 7.29  /  pCO2: 40    /  pO2: 84    / HCO3: 19    / Base Excess: -6.8  /  SaO2: 95              ABG - ( 18 Apr 2020 14:12 )  pH: 7.34  /  pCO2: 40    /  pO2: 215   / HCO3: 22    / Base Excess: -4.0  /  SaO2: 99          Serum Pro-Brain Natriuretic Peptide: 398 pg/mL (04-17-20 @ 11:21)      RADIOLOGY & ADDITIONAL STUDIES:  < from: Xray Chest 1 View- PORTABLE-Routine (04.18.20 @ 09:07) >  Impression:  Diminishing bilateral parenchymal opacities. No pneumothorax  < end of copied text >      < from: Xray Chest 1 View- PORTABLE-Routine (04.19.20 @ 06:51) >  Impression:    New pneumomediastinum, left apical pneumothorax and subcutaneous emphysema.  Bilateral opacities without significant change.  Lines and support devices as described above. There is an endotracheal tube terminating approximately 7.5 cm above the santi.  < end of copied text >

## 2020-04-19 NOTE — CONSULT NOTE ADULT - ASSESSMENT
55 year old male patient with hypertension, diabetes presenting for dyspnea.     Acute hypoxemic respiratory failure secondary to SARS-COV-2 infection requiring invasive mechanical ventilation new sub q emphysema  ARDS  Possible superimposed bacterial infection  TELLO/ hyperkalemia worsening/ metabolic acidosis    Cr rapidly rising. No indication for HD , will hold off for now though may be indicated in day or so  Trend Cr monitor K and UOP  ensure no obstruction , mendoza flushing   Keep AMP > 65, on pressors   marcela rojas    discussed w/ CCU team

## 2020-04-19 NOTE — PROGRESS NOTE ADULT - SUBJECTIVE AND OBJECTIVE BOX
OVERNIGHT EVENTS: intuabted 3, levophed 0.05, vaso 0.02. NS 75. versed, porpofol, worsening renal function    Vital Signs Last 24 Hrs  T(C): 37.7 (19 Apr 2020 04:00), Max: 39.4 (18 Apr 2020 20:00)  T(F): 99.8 (19 Apr 2020 04:00), Max: 103 (18 Apr 2020 20:00)  HR: 84 (19 Apr 2020 06:00) (82 - 152)  SpO2: 92% (19 Apr 2020 06:00) (92% - 100%)    PHYSICAL EXAMINATION:    GENERAL: sedated    HEENT: Head is normocephalic and atraumatic. Extraocular muscles are intact. Mucous membranes are moist. subq enmphysema    NECK: Supple.    LUNGS: Clear to auscultation without wheezing, rales or rhonchi; respirations unlabored    HEART: Regular rate and rhythm without murmur.    ABDOMEN: Soft, nontender, and nondistended.      EXTREMITIES: Without any cyanosis, clubbing, rash, lesions or edema.    NEUROLOGIC: sedated    SKIN: No ulceration or induration present.      LABS:                        12.5   14.78 )-----------( 218      ( 18 Apr 2020 23:40 )             39.4     04-18    136  |  101  |  60<H>  ----------------------------<  287<H>  5.6<H>   |  19  |  5.3<HH>    Ca    7.8<L>      18 Apr 2020 23:40  Mg     2.7     04-18    TPro  5.8<L>  /  Alb  2.7<L>  /  TBili  0.6  /  DBili  x   /  AST  95<H>  /  ALT  117<H>  /  AlkPhos  80  04-18        ABG - ( 19 Apr 2020 03:21 )  pH, Arterial: 7.29  pH, Blood: x     /  pCO2: 40    /  pO2: 84    / HCO3: 19    / Base Excess: -6.8  /  SaO2: 95        30/370/60/14  plateau 29        CARDIAC MARKERS ( 17 Apr 2020 11:21 )  x     / <0.01 ng/mL / x     / x     / x          D-Dimer Assay, Quantitative: 3068 ng/mL DDU (04-18-20 @ 23:40)    Serum Pro-Brain Natriuretic Peptide: 398 pg/mL (04-17-20 @ 11:21)      Procalcitonin, Serum: 3.53 ng/mL (04-18-20 @ 04:30)        04-18-20 @ 07:01  -  04-19-20 @ 07:00  --------------------------------------------------------  IN: 3996.7 mL / OUT: 325 mL / NET: 3671.7 mL        MICROBIOLOGY:      MEDICATIONS  (STANDING):  chlorhexidine 0.12% Liquid 15 milliLiter(s) Oral Mucosa two times a day  chlorhexidine 4% Liquid 1 Application(s) Topical <User Schedule>  dextrose 5%. 1000 milliLiter(s) (50 mL/Hr) IV Continuous <Continuous>  dextrose 50% Injectable 12.5 Gram(s) IV Push once  dextrose 50% Injectable 25 Gram(s) IV Push once  dextrose 50% Injectable 25 Gram(s) IV Push once  enoxaparin Injectable 40 milliGRAM(s) SubCutaneous two times a day  insulin glargine Injectable (LANTUS) 14 Unit(s) SubCutaneous at bedtime  insulin lispro (HumaLOG) corrective regimen sliding scale   SubCutaneous three times a day before meals  insulin lispro Injectable (HumaLOG) 4 Unit(s) SubCutaneous three times a day before meals  lactulose Syrup 20 Gram(s) Oral three times a day  meropenem  IVPB 1000 milliGRAM(s) IV Intermittent every 12 hours  methylPREDNISolone sodium succinate Injectable 60 milliGRAM(s) IV Push every 12 hours  midazolam Infusion 0.02 mG/kG/Hr (1.76 mL/Hr) IV Continuous <Continuous>  morphine  Infusion. 5 mG/Hr (5 mL/Hr) IV Continuous <Continuous>  norepinephrine Infusion 0.05 MICROgram(s)/kG/Min (4.13 mL/Hr) IV Continuous <Continuous>  pantoprazole   Suspension 40 milliGRAM(s) Oral daily  propofol Infusion 10 MICROgram(s)/kG/Min (5.28 mL/Hr) IV Continuous <Continuous>  senna 2 Tablet(s) Oral at bedtime  sodium chloride 0.9%. 1000 milliLiter(s) (75 mL/Hr) IV Continuous <Continuous>  vasopressin Infusion 0.02 Unit(s)/Min (1.2 mL/Hr) IV Continuous <Continuous>    MEDICATIONS  (PRN):  acetaminophen   Tablet .. 650 milliGRAM(s) Oral every 6 hours PRN Temp greater or equal to 38C (100.4F)  dextrose 40% Gel 15 Gram(s) Oral once PRN Blood Glucose LESS THAN 70 milliGRAM(s)/deciliter  glucagon  Injectable 1 milliGRAM(s) IntraMuscular once PRN Glucose LESS THAN 70 milligrams/deciliter  glucagon  Injectable 1 milliGRAM(s) IntraMuscular once PRN Glucose LESS THAN 70 milligrams/deciliter  ondansetron Injectable 4 milliGRAM(s) IV Push every 8 hours PRN Nausea and/or Vomiting      RADIOLOGY & ADDITIONAL STUDIES:

## 2020-04-20 NOTE — PROGRESS NOTE ADULT - ASSESSMENT
ASSESSMENT/PLAN  - covid 19 pneumonia with acute resp failure and ARDS  - cytokine storm  - TELLO and elevated LFTs secondary to the above  - DM poorly controlled PTA - note HbA1c  - HTN  suggest  changing  tube feed to 375ml x 4feeds  check bmp/phos/mg and correct lytes

## 2020-04-20 NOTE — PROGRESS NOTE ADULT - SUBJECTIVE AND OBJECTIVE BOX
24 h events noted  intubated/ventilated        PAST HISTORY  --------------------------------------------------------------------------------  No significant changes to PMH, PSH, FHx, SHx, unless otherwise noted    ALLERGIES & MEDICATIONS  --------------------------------------------------------------------------------  Allergies    No Known Allergies    Intolerances      Standing Inpatient Medications  chlorhexidine 0.12% Liquid 15 milliLiter(s) Oral Mucosa two times a day  chlorhexidine 4% Liquid 1 Application(s) Topical <User Schedule>  dexMEDEtomidine Infusion 0.05 MICROgram(s)/kG/Hr IV Continuous <Continuous>  dextrose 5%. 1000 milliLiter(s) IV Continuous <Continuous>  dextrose 50% Injectable 12.5 Gram(s) IV Push once  dextrose 50% Injectable 25 Gram(s) IV Push once  dextrose 50% Injectable 25 Gram(s) IV Push once  heparin   Injectable 5000 Unit(s) SubCutaneous every 8 hours  insulin glargine Injectable (LANTUS) 14 Unit(s) SubCutaneous at bedtime  insulin lispro (HumaLOG) corrective regimen sliding scale   SubCutaneous three times a day before meals  insulin lispro Injectable (HumaLOG) 4 Unit(s) SubCutaneous three times a day before meals  insulin regular Infusion 3 Unit(s)/Hr IV Continuous <Continuous>  lactulose Syrup 20 Gram(s) Oral three times a day  meropenem  IVPB 1000 milliGRAM(s) IV Intermittent every 12 hours  methylPREDNISolone sodium succinate Injectable 60 milliGRAM(s) IV Push every 12 hours  morphine  Infusion. 5 mG/Hr IV Continuous <Continuous>  norepinephrine Infusion 0.05 MICROgram(s)/kG/Min IV Continuous <Continuous>  pantoprazole   Suspension 40 milliGRAM(s) Oral daily  propofol Infusion 10 MICROgram(s)/kG/Min IV Continuous <Continuous>  senna 2 Tablet(s) Oral at bedtime  sodium chloride 0.9% Bolus 500 milliLiter(s) IV Bolus once  sodium zirconium cyclosilicate 10 Gram(s) Oral once            REVIEW OF SYSTEMS  --------------------------------------------------------------------------------  Gen: No weight changes, fatigue, fevers/chills, weakness  Skin: No rashes  Head/Eyes/Ears/Mouth: No headache; Normal hearing; Normal vision w/o blurriness; No sinus pain/discomfort, sore throat  Respiratory: No dyspnea, cough, wheezing, hemoptysis      VITALS/PHYSICAL EXAM  --------------------------------------------------------------------------------  T(C): 37.2 (04-20-20 @ 04:00), Max: 37.2 (04-20-20 @ 04:00)  HR: 84 (04-20-20 @ 06:00) (80 - 86)  BP: --  RR: 14 (04-20-20 @ 04:00) (12 - 14)  SpO2: 96% (04-20-20 @ 06:00) (92% - 98%)  Wt(kg): --        04-19-20 @ 07:01  -  04-20-20 @ 07:00  --------------------------------------------------------  IN: 2126.3 mL / OUT: 444 mL / NET: 1682.3 mL      Physical Exam:  	Gen: intubated/ventilated     LABS/STUDIES  --------------------------------------------------------------------------------              12.1   11.21 >-----------<  254      [04-20-20 @ 01:48]              36.6     135  |  97  |  95  ----------------------------<  416      [04-20-20 @ 01:48]  5.9   |  19  |  7.4        Ca     8.4     [04-20-20 @ 01:48]      Mg     2.7     [04-18-20 @ 23:40]    TPro  5.8  /  Alb  2.5  /  TBili  0.4  /  DBili  x   /  AST  51  /  ALT  76  /  AlkPhos  77  [04-20-20 @ 01:48]        LDH 1312      [04-18-20 @ 23:40]  Serum Osmolality 299      [04-18-20 @ 15:40]    Creatinine Trend:  SCr 7.4 [04-20 @ 01:48]  SCr 6.5 [04-19 @ 13:52]  SCr 5.3 [04-18 @ 23:40]  SCr 3.4 [04-18 @ 11:15]  SCr 2.5 [04-18 @ 04:30]      Urine Creatinine 135      [04-18-20 @ 11:10]  Urine Protein 115      [04-18-20 @ 11:10]  Urine Sodium 44.0      [04-18-20 @ 11:10]  Urine Osmolality 384      [04-18-20 @ 11:10]    Ferritin 7678      [04-18-20 @ 04:30]  Lipid: chol --, , HDL --, LDL --      [04-14-20 @ 07:01] 24 h events noted  intubated/ventilated        PAST HISTORY  --------------------------------------------------------------------------------  No significant changes to PMH, PSH, FHx, SHx, unless otherwise noted    ALLERGIES & MEDICATIONS  --------------------------------------------------------------------------------  Allergies    No Known Allergies    Intolerances      Standing Inpatient Medications  chlorhexidine 0.12% Liquid 15 milliLiter(s) Oral Mucosa two times a day  chlorhexidine 4% Liquid 1 Application(s) Topical <User Schedule>  dexMEDEtomidine Infusion 0.05 MICROgram(s)/kG/Hr IV Continuous <Continuous>  dextrose 5%. 1000 milliLiter(s) IV Continuous <Continuous>  dextrose 50% Injectable 12.5 Gram(s) IV Push once  dextrose 50% Injectable 25 Gram(s) IV Push once  dextrose 50% Injectable 25 Gram(s) IV Push once  heparin   Injectable 5000 Unit(s) SubCutaneous every 8 hours  insulin glargine Injectable (LANTUS) 14 Unit(s) SubCutaneous at bedtime  insulin lispro (HumaLOG) corrective regimen sliding scale   SubCutaneous three times a day before meals  insulin lispro Injectable (HumaLOG) 4 Unit(s) SubCutaneous three times a day before meals  insulin regular Infusion 3 Unit(s)/Hr IV Continuous <Continuous>  lactulose Syrup 20 Gram(s) Oral three times a day  meropenem  IVPB 1000 milliGRAM(s) IV Intermittent every 12 hours  methylPREDNISolone sodium succinate Injectable 60 milliGRAM(s) IV Push every 12 hours  morphine  Infusion. 5 mG/Hr IV Continuous <Continuous>  norepinephrine Infusion 0.05 MICROgram(s)/kG/Min IV Continuous <Continuous>  pantoprazole   Suspension 40 milliGRAM(s) Oral daily  propofol Infusion 10 MICROgram(s)/kG/Min IV Continuous <Continuous>  senna 2 Tablet(s) Oral at bedtime  sodium chloride 0.9% Bolus 500 milliLiter(s) IV Bolus once  sodium zirconium cyclosilicate 10 Gram(s) Oral once            VITALS/PHYSICAL EXAM  --------------------------------------------------------------------------------  T(C): 37.2 (04-20-20 @ 04:00), Max: 37.2 (04-20-20 @ 04:00)  HR: 84 (04-20-20 @ 06:00) (80 - 86)  BP: --  RR: 14 (04-20-20 @ 04:00) (12 - 14)  SpO2: 96% (04-20-20 @ 06:00) (92% - 98%)  Wt(kg): --        04-19-20 @ 07:01  -  04-20-20 @ 07:00  --------------------------------------------------------  IN: 2126.3 mL / OUT: 444 mL / NET: 1682.3 mL      Physical Exam:  	Gen: intubated/ventilated     LABS/STUDIES  --------------------------------------------------------------------------------              12.1   11.21 >-----------<  254      [04-20-20 @ 01:48]              36.6     135  |  97  |  95  ----------------------------<  416      [04-20-20 @ 01:48]  5.9   |  19  |  7.4        Ca     8.4     [04-20-20 @ 01:48]      Mg     2.7     [04-18-20 @ 23:40]    TPro  5.8  /  Alb  2.5  /  TBili  0.4  /  DBili  x   /  AST  51  /  ALT  76  /  AlkPhos  77  [04-20-20 @ 01:48]        LDH 1312      [04-18-20 @ 23:40]  Serum Osmolality 299      [04-18-20 @ 15:40]    Creatinine Trend:  SCr 7.4 [04-20 @ 01:48]  SCr 6.5 [04-19 @ 13:52]  SCr 5.3 [04-18 @ 23:40]  SCr 3.4 [04-18 @ 11:15]  SCr 2.5 [04-18 @ 04:30]      Urine Creatinine 135      [04-18-20 @ 11:10]  Urine Protein 115      [04-18-20 @ 11:10]  Urine Sodium 44.0      [04-18-20 @ 11:10]  Urine Osmolality 384      [04-18-20 @ 11:10]    Ferritin 7678      [04-18-20 @ 04:30]  Lipid: chol --, , HDL --, LDL --      [04-14-20 @ 07:01]

## 2020-04-20 NOTE — PROGRESS NOTE ADULT - ASSESSMENT
Patient is a 55 year old male with PMH of HTN and T2DM diabetes who presented on 4/13 for dyspnea found to have SARS-COV-2 pneumonia. Was intubated 4/17 after desaturating to 84% on 15L NRB and upgraded to CCU    # Acute hypoxic respiratory failure 2/2 SARS-COV-2 lower respiratory tract infection with evidence of cytokine release syndrome -   # PTX and Pneumomediastinum - CXR April 20 - no PTX - Stable Pneumomediastinum - CTSurg Call PRN  - Intubated 4/17.   - Vent settings: RR 30, , FiO2 50%, PEEP 12, Plateau 30 . - Decrease PEEP to 10 and then to 8 if tolerating  - Pressure support: Levo .07  - Sedation:  Propofol 32. Versed .04 - Dc Versed and If needed start Precedex - prepare for possible wean.   - COVID + - Isolation - ID following. S/p Plaquenil and Anakinra courses. Continue IV Solumedrol 60 mg BID - 5 days April 16-21  - Concern for PE given significant elevation in d-dimer;   - Switched from Full AC to Lovenox 40 mg BID per intensivist 4/18- Switched to Heparin 5K subq q8 - TELLO  - Continue with IV Meropenem adjusted Kidney function  - AM Procal, Ddimer,Ferritin, CRP, - q72hrs     # TELLO, no history of CKD with noted decreased UOP and hyperkalemia  - Worsening   - Given 500 cc bolus April 20  - Decrease Meropenem to 500mg   - Noted new onset 4/18  - FeNa - .8   - Repeat CMP 12 PM. F/u results and follow Nephrology for possible HD.   - Hyperkalemia - Started on Insulin Drip and given Lokelma - Give additional Lokelma April 20     # History of HTN  - Will hold off Losartan 40 mg daily, given borderline BP  - Resume if becomes hypertensive     # Type 2 Diabetes - Uncontrolled   - HbA1c: 11.6  - Switched to Insulin Drip  - Monitor finger sticks    Diet: NPO with tube feeds. F/u nutrition recs  Electrolytes: Replete K<4, Mg<2. Given Potassium elevated - Given Lokelma - Follow up Nephrology  DVT ppx: Hap 5K subq q8 Decreased Clearance   GI ppx: Pantoprazole 40 mg suspension while intubated and on steroids  Activity: bedrest  Dispo: from home, continue MICU monitoring; Possible HD with Worsening Kidney Function; 1100 CMP Spoke with Daughter [phone] - updated on status will follow up regarding possible HD.

## 2020-04-20 NOTE — PROGRESS NOTE ADULT - SUBJECTIVE AND OBJECTIVE BOX
OVERNIGHT EVENTS: intubated 4, versed, propofol, levophed 0.072, s/p renal failure    Vital Signs Last 24 Hrs  T(C): 37.2 (20 Apr 2020 04:00), Max: 37.2 (20 Apr 2020 04:00)  T(F): 98.9 (20 Apr 2020 04:00), Max: 98.9 (20 Apr 2020 04:00)  HR: 84 (20 Apr 2020 06:00) (80 - 86)  RR: 14 (20 Apr 2020 04:00) (12 - 14)  SpO2: 96% (20 Apr 2020 06:00) (92% - 98%)    PHYSICAL EXAMINATION:    GENERAL: sedated    HEENT: Head is normocephalic and atraumatic. Extraocular muscles are intact. Mucous membranes are moist.    NECK: Supple. subqemphysema    LUNGS: bl crackles    HEART: Regular rate and rhythm without murmur.    ABDOMEN: Soft, nontender, and nondistended.      EXTREMITIES: Without any cyanosis, clubbing, rash, lesions or edema.    NEUROLOGIC: sedated    SKIN: No ulceration or induration present.      LABS:                        12.1   11.21 )-----------( 254      ( 20 Apr 2020 01:48 )             36.6     04-20    135  |  97<L>  |  95<HH>  ----------------------------<  416<H>  5.9<H>   |  19  |  7.4<HH>    Ca    8.4<L>      20 Apr 2020 01:48  Mg     2.7     04-18    TPro  5.8<L>  /  Alb  2.5<L>  /  TBili  0.4  /  DBili  x   /  AST  51<H>  /  ALT  76<H>  /  AlkPhos  77  04-20        ABG - ( 20 Apr 2020 03:08 )  pH, Arterial: 7.30  pH, Blood: x     /  pCO2: 41    /  pO2: 97    / HCO3: 20    / Base Excess: -6.0  /  SaO2: 97        30/370/50/12  plateau 30              Serum Pro-Brain Natriuretic Peptide: 398 pg/mL (04-17-20 @ 11:21)      Procalcitonin, Serum: 3.53 ng/mL (04-18-20 @ 04:30)        04-19-20 @ 07:01  -  04-20-20 @ 07:00  --------------------------------------------------------  IN: 2126.3 mL / OUT: 444 mL / NET: 1682.3 mL        MICROBIOLOGY:      MEDICATIONS  (STANDING):  chlorhexidine 0.12% Liquid 15 milliLiter(s) Oral Mucosa two times a day  chlorhexidine 4% Liquid 1 Application(s) Topical <User Schedule>  dextrose 5%. 1000 milliLiter(s) (50 mL/Hr) IV Continuous <Continuous>  dextrose 50% Injectable 12.5 Gram(s) IV Push once  dextrose 50% Injectable 25 Gram(s) IV Push once  dextrose 50% Injectable 25 Gram(s) IV Push once  heparin   Injectable 5000 Unit(s) SubCutaneous every 8 hours  insulin glargine Injectable (LANTUS) 14 Unit(s) SubCutaneous at bedtime  insulin lispro (HumaLOG) corrective regimen sliding scale   SubCutaneous three times a day before meals  insulin lispro Injectable (HumaLOG) 4 Unit(s) SubCutaneous three times a day before meals  lactulose Syrup 20 Gram(s) Oral three times a day  meropenem  IVPB 1000 milliGRAM(s) IV Intermittent every 12 hours  methylPREDNISolone sodium succinate Injectable 60 milliGRAM(s) IV Push every 12 hours  midazolam Infusion 0.02 mG/kG/Hr (1.76 mL/Hr) IV Continuous <Continuous>  morphine  Infusion. 5 mG/Hr (5 mL/Hr) IV Continuous <Continuous>  norepinephrine Infusion 0.05 MICROgram(s)/kG/Min (4.13 mL/Hr) IV Continuous <Continuous>  pantoprazole   Suspension 40 milliGRAM(s) Oral daily  propofol Infusion 10 MICROgram(s)/kG/Min (5.28 mL/Hr) IV Continuous <Continuous>  senna 2 Tablet(s) Oral at bedtime    MEDICATIONS  (PRN):  acetaminophen   Tablet .. 650 milliGRAM(s) Oral every 6 hours PRN Temp greater or equal to 38C (100.4F)  dextrose 40% Gel 15 Gram(s) Oral once PRN Blood Glucose LESS THAN 70 milliGRAM(s)/deciliter  glucagon  Injectable 1 milliGRAM(s) IntraMuscular once PRN Glucose LESS THAN 70 milligrams/deciliter  glucagon  Injectable 1 milliGRAM(s) IntraMuscular once PRN Glucose LESS THAN 70 milligrams/deciliter  ondansetron Injectable 4 milliGRAM(s) IV Push every 8 hours PRN Nausea and/or Vomiting      RADIOLOGY & ADDITIONAL STUDIES:

## 2020-04-20 NOTE — PROGRESS NOTE ADULT - SUBJECTIVE AND OBJECTIVE BOX
Admit Date: 04-13-20  Length of Stay: 7d    55yMale    Reason for admission to ICU:  Patient is in acute hypoxic respiratory distress requiring intubation and sedation. Imaging c/w ARDS. Admitted to MICU for further observation and management.    INTERVAL HPI/OVERNIGHT EVENTS:  INtubated and sedated - Worsening TELLO - Possible HD today - Follow Nephrology    MEDICATIONS  (STANDING):  chlorhexidine 0.12% Liquid 15 milliLiter(s) Oral Mucosa two times a day  chlorhexidine 4% Liquid 1 Application(s) Topical <User Schedule>  dexMEDEtomidine Infusion 0.05 MICROgram(s)/kG/Hr (1.1 mL/Hr) IV Continuous <Continuous>  dextrose 5%. 1000 milliLiter(s) (50 mL/Hr) IV Continuous <Continuous>  dextrose 50% Injectable 12.5 Gram(s) IV Push once  dextrose 50% Injectable 25 Gram(s) IV Push once  dextrose 50% Injectable 25 Gram(s) IV Push once  heparin   Injectable 5000 Unit(s) SubCutaneous every 8 hours  insulin regular Infusion 3 Unit(s)/Hr (3 mL/Hr) IV Continuous <Continuous>  lactulose Syrup 20 Gram(s) Oral three times a day  meropenem  IVPB 500 milliGRAM(s) IV Intermittent every 24 hours  methylPREDNISolone sodium succinate Injectable 60 milliGRAM(s) IV Push every 12 hours  norepinephrine Infusion 0.05 MICROgram(s)/kG/Min (4.13 mL/Hr) IV Continuous <Continuous>  pantoprazole   Suspension 40 milliGRAM(s) Oral daily  propofol Infusion 10 MICROgram(s)/kG/Min (5.28 mL/Hr) IV Continuous <Continuous>  senna 2 Tablet(s) Oral at bedtime  sodium chloride 0.9% Bolus 500 milliLiter(s) IV Bolus once    MEDICATIONS  (PRN):  acetaminophen   Tablet .. 650 milliGRAM(s) Oral every 6 hours PRN Temp greater or equal to 38C (100.4F)  dextrose 40% Gel 15 Gram(s) Oral once PRN Blood Glucose LESS THAN 70 milliGRAM(s)/deciliter  glucagon  Injectable 1 milliGRAM(s) IntraMuscular once PRN Glucose LESS THAN 70 milligrams/deciliter  glucagon  Injectable 1 milliGRAM(s) IntraMuscular once PRN Glucose LESS THAN 70 milligrams/deciliter  ondansetron Injectable 4 milliGRAM(s) IV Push every 8 hours PRN Nausea and/or Vomiting      Vitals:  Vital Signs Last 24 Hrs  T(C): 37.2 (20 Apr 2020 04:00), Max: 37.2 (20 Apr 2020 04:00)  T(F): 98.9 (20 Apr 2020 04:00), Max: 98.9 (20 Apr 2020 04:00)  HR: 84 (20 Apr 2020 10:00) (80 - 87)  -  RR: 14 (20 Apr 2020 04:00) (12 - 14)  SpO2: 93% (20 Apr 2020 10:00) (92% - 98%)    Vi    I&O's Summary    19 Apr 2020 07:01  -  20 Apr 2020 07:00  --------------------------------------------------------  IN: 2126.3 mL / OUT: 444 mL / NET: 1682.3 mL        Physical Exam To be updated :  HEENT:     + NCAT  + EOMI  - Conjunctival edema   - Icterus   - Thrush   - ETT  - NGT/OGT  Neck:         + FROM    + JVD     - Nodes     - Masses    + Mid-line trachea   - Tracheostomy  Chest:         - Sternal click  - Sternal drainage  - Chest tubes  - SubQ emphysema  Lungs:          + CTA   - Rhonchi    - Rales    - Wheezing     - Decreased BS   - Dullness R L  Cardiac:       + S1 + S2    + RRR   - Irregular   - S3  - S4    - Murmurs   - Rub   - Hamman’s sign   Abdomen:    + BS     + Soft    + Non-tender     - Distended    - Organomegaly  - PEG  Extremities:   - Cyanosis U/L   - Clubbing  U/L  - LE/UE Edema   + Capillary refill    + Pulses   Neuro:        - Awake   -  Alert   - Confused   - Lethargic   + Sedated   + Paralyzed  - Generalized weakness  Skin:        - Rashes    - Erythema   + Normal incisions   + IV sites intact  - Sacral decubitus    Labs:  COVID:  COVID-19 PCR: Detected (04-13-20 @ 14:30)                          12.1   11.21 )-----------( 254      ( 20 Apr 2020 01:48 )             36.6     04-20    135  |  97<L>  |  95<HH>  ----------------------------<  416<H>  5.9<H>   |  19  |  7.4<HH>    Ca    8.4<L>      20 Apr 2020 01:48  Mg     2.7     04-18    TPro  5.8<L>  /  Alb  2.5<L>  /  TBili  0.4  /  DBili  x   /  AST  51<H>  /  ALT  76<H>  /  AlkPhos  77  04-20          COVID related labs:  18 Apr 2020 23:40  D-dimer:  3068<H>  Calcitonin:  3.53  CRP:  11.8  LDH:  1312<H>  Lactate,Blood:  x  CK: x  Troponin I:  x  Troponin T:  x  Troponin HS:  x  Ferritin, Serum: x  BNP:  x      Blood Gases:  (ARTERIAL):  04-20-20 @ 03:08  pH 7.30 / pCO2 41 / pO2 97 / HCO3 20  Total CO2 --  FiO2 --  Oxygen Saturation 97  Total Hemoglobin, Calculated --  Hematocrit, Calculated --  Oxygen Content --  Blood Gas Arterial - Calcium, Ionized --  Blood Gas Arterial - Chloride --  Blood Gas Arterial - Glucose --  Blood Gas Arterial - Potassium --  Blood Gas Arterial - Sodium --  Blood Gas Arterial - Creatinine --  Base Excess, Arterial -6.0    (VENOUS):      Radiology:  < from: Xray Chest 1 View- PORTABLE-Routine (04.20.20 @ 04:01) >  Impression: Unchanged bilateral airspace opacities, pneumomediastinum and soft tissue emphysema.  < end of copied text > Admit Date: 04-13-20  Length of Stay: 7d    55yMale    Reason for admission to ICU:  Patient is in acute hypoxic respiratory distress requiring intubation and sedation. Imaging c/w ARDS. Admitted to MICU for further observation and management.    INTERVAL HPI/OVERNIGHT EVENTS:  INtubated and sedated - Worsening TELLO - Possible HD today - Follow Nephrology    MEDICATIONS  (STANDING):  chlorhexidine 0.12% Liquid 15 milliLiter(s) Oral Mucosa two times a day  chlorhexidine 4% Liquid 1 Application(s) Topical <User Schedule>  dexMEDEtomidine Infusion 0.05 MICROgram(s)/kG/Hr (1.1 mL/Hr) IV Continuous <Continuous>  dextrose 5%. 1000 milliLiter(s) (50 mL/Hr) IV Continuous <Continuous>  dextrose 50% Injectable 12.5 Gram(s) IV Push once  dextrose 50% Injectable 25 Gram(s) IV Push once  dextrose 50% Injectable 25 Gram(s) IV Push once  heparin   Injectable 5000 Unit(s) SubCutaneous every 8 hours  insulin regular Infusion 3 Unit(s)/Hr (3 mL/Hr) IV Continuous <Continuous>  lactulose Syrup 20 Gram(s) Oral three times a day  meropenem  IVPB 500 milliGRAM(s) IV Intermittent every 24 hours  methylPREDNISolone sodium succinate Injectable 60 milliGRAM(s) IV Push every 12 hours  norepinephrine Infusion 0.05 MICROgram(s)/kG/Min (4.13 mL/Hr) IV Continuous <Continuous>  pantoprazole   Suspension 40 milliGRAM(s) Oral daily  propofol Infusion 10 MICROgram(s)/kG/Min (5.28 mL/Hr) IV Continuous <Continuous>  senna 2 Tablet(s) Oral at bedtime  sodium chloride 0.9% Bolus 500 milliLiter(s) IV Bolus once    MEDICATIONS  (PRN):  acetaminophen   Tablet .. 650 milliGRAM(s) Oral every 6 hours PRN Temp greater or equal to 38C (100.4F)  dextrose 40% Gel 15 Gram(s) Oral once PRN Blood Glucose LESS THAN 70 milliGRAM(s)/deciliter  glucagon  Injectable 1 milliGRAM(s) IntraMuscular once PRN Glucose LESS THAN 70 milligrams/deciliter  glucagon  Injectable 1 milliGRAM(s) IntraMuscular once PRN Glucose LESS THAN 70 milligrams/deciliter  ondansetron Injectable 4 milliGRAM(s) IV Push every 8 hours PRN Nausea and/or Vomiting      Vitals:  Vital Signs Last 24 Hrs  T(C): 37.2 (20 Apr 2020 04:00), Max: 37.2 (20 Apr 2020 04:00)  T(F): 98.9 (20 Apr 2020 04:00), Max: 98.9 (20 Apr 2020 04:00)  HR: 84 (20 Apr 2020 10:00) (80 - 87)  -  RR: 14 (20 Apr 2020 04:00) (12 - 14)  SpO2: 93% (20 Apr 2020 10:00) (92% - 98%)    Vi    I&O's Summary    19 Apr 2020 07:01  -  20 Apr 2020 07:00  --------------------------------------------------------  IN: 2126.3 mL / OUT: 444 mL / NET: 1682.3 mL        Physical Exam  Labs:  COVID:  COVID-19 PCR: Detected (04-13-20 @ 14:30)                          12.1   11.21 )-----------( 254      ( 20 Apr 2020 01:48 )             36.6     04-20    135  |  97<L>  |  95<HH>  ----------------------------<  416<H>  5.9<H>   |  19  |  7.4<HH>    Ca    8.4<L>      20 Apr 2020 01:48  Mg     2.7     04-18    TPro  5.8<L>  /  Alb  2.5<L>  /  TBili  0.4  /  DBili  x   /  AST  51<H>  /  ALT  76<H>  /  AlkPhos  77  04-20          COVID related labs:  18 Apr 2020 23:40  D-dimer:  3068<H>  Calcitonin:  3.53  CRP:  11.8  LDH:  1312<H>  Lactate,Blood:  x  CK: x  Troponin I:  x  Troponin T:  x  Troponin HS:  x  Ferritin, Serum: x  BNP:  x      Blood Gases:  (ARTERIAL):  04-20-20 @ 03:08  pH 7.30 / pCO2 41 / pO2 97 / HCO3 20  Total CO2 --  FiO2 --  Oxygen Saturation 97  Total Hemoglobin, Calculated --  Hematocrit, Calculated --  Oxygen Content --  Blood Gas Arterial - Calcium, Ionized --  Blood Gas Arterial - Chloride --  Blood Gas Arterial - Glucose --  Blood Gas Arterial - Potassium --  Blood Gas Arterial - Sodium --  Blood Gas Arterial - Creatinine --  Base Excess, Arterial -6.0    (VENOUS):      Radiology:  < from: Xray Chest 1 View- PORTABLE-Routine (04.20.20 @ 04:01) >  Impression: Unchanged bilateral airspace opacities, pneumomediastinum and soft tissue emphysema.  < end of copied text >

## 2020-04-20 NOTE — PROGRESS NOTE ADULT - ASSESSMENT
Acute hypoxemic respiratory failure secondary to SARS-COV-2 infection / TELLO/ hyperkalemia:   # creatinine trending up  # UO borderline  # k noted , on lokelma, increase to q 12   #decrease meropenem 500 q 24   #feed : nepro  # iv fluids 1/2 ns with 75 meq of bicarbonate at 75 cc/h  # check ph level  # repeat labs at 12:00   # will need hd if no improvement  # will follow

## 2020-04-20 NOTE — PROGRESS NOTE ADULT - SUBJECTIVE AND OBJECTIVE BOX
Patient is a 55y old  Male who presents with a chief complaint of suspected COVID-19 (2020 11:06)  pt remain vented/sedated  on pressor  on enteral tube feed  medical/ renal  f/u noted    ICU Vital Signs Last 24 Hrs  T(C): 37.2 (2020 04:00), Max: 37.2 (2020 04:00)  T(F): 98.9 (2020 04:00), Max: 98.9 (2020 04:00)  HR: 86 (2020 12:00) (80 - 87)  ABP: 134/68 (2020 12:00) (98/54 - 140/70)  ABP(mean): 92 (:00) (40 - 96)  RR: 14 (2020 04:00) (12 - 14)  SpO2: 93% (2020 12:00) (93% - 98%)    Drug Dosing Weight  Height (cm): 170.2 (2020 03:38)  Weight (kg): 88 (2020 03:38)  BMI (kg/m2): 30.4 (2020 03:38)  BSA (m2): 2 (2020 03:38)    I&O's Detail    2020 07:01  -  2020 07:00  --------------------------------------------------------  IN:    Enteral Tube Flush: 320 mL    IV PiggyBack: 100 mL    midazolam Infusion: 96 mL    Miscellaneous Tube Feedin mL    norepinephrine Infusion: 224 mL    propofol Infusion: 406.3 mL  Total IN: 2126.3 mL    OUT:    Indwelling Catheter - Urethral: 474 mL  Total OUT: 474 mL    Total NET: 1652.3 mL      2020 07:01  -  2020 13:50  --------------------------------------------------------  IN:    dexmedetomidine Infusion: 30 mL    insulin regular Infusion: 20 mL    IV PiggyBack: 50 mL    Miscellaneous Tube Feedin mL    norepinephrine Infusion: 24 mL    propofol Infusion: 132 mL    Sodium Chloride 0.9% IV Bolus: 500 mL  Total IN: 1006 mL    OUT:    Indwelling Catheter - Urethral: 120 mL  Total OUT: 120 mL    Total NET: 886 mL     PHYSICAL EXAM:  Constitutional:remains vented/sedated  on enteral tube feed  MEDICATIONS  (STANDING):  chlorhexidine 0.12% Liquid 15 milliLiter(s) Oral Mucosa two times a day  chlorhexidine 4% Liquid 1 Application(s) Topical <User Schedule>  dexMEDEtomidine Infusion 0.05 MICROgram(s)/kG/Hr (1.1 mL/Hr) IV Continuous <Continuous>  dextrose 5%. 1000 milliLiter(s) (50 mL/Hr) IV Continuous <Continuous>  dextrose 50% Injectable 12.5 Gram(s) IV Push once  dextrose 50% Injectable 25 Gram(s) IV Push once  dextrose 50% Injectable 25 Gram(s) IV Push once  heparin   Injectable 5000 Unit(s) SubCutaneous every 8 hours  insulin regular Infusion 3 Unit(s)/Hr (3 mL/Hr) IV Continuous <Continuous>  lactulose Syrup 20 Gram(s) Oral three times a day  meropenem  IVPB 500 milliGRAM(s) IV Intermittent every 24 hours  methylPREDNISolone sodium succinate Injectable 60 milliGRAM(s) IV Push every 12 hours  norepinephrine Infusion 0.05 MICROgram(s)/kG/Min (4.13 mL/Hr) IV Continuous <Continuous>  pantoprazole   Suspension 40 milliGRAM(s) Oral daily  propofol Infusion 10 MICROgram(s)/kG/Min (5.28 mL/Hr) IV Continuous <Continuous>  senna 2 Tablet(s) Oral at bedtime      Diet, NPO with Tube Feed:   Tube Feeding Modality: Orogastric  Replete  Total Volume for 24 Hours (mL): 1000  Bolus  Total Volume of Bolus (mL):  250  Tube Feed Frequency: Every 6 hours   Tube Feed Start Time: 14:30  Bolus Feed Rate (mL per Hour): 250   Bolus Feed Duration (in Hours): 1 (20 @ 14:15)      LABS      137  |  99  |  107<HH>  ----------------------------<  351<H>  5.7<H>   |  17  |  8.0<HH>    Ca    8.2<L>      2020 12:20  Mg     2.7         TPro  5.7<L>  /  Alb  2.4<L>  /  TBili  0.4  /  DBili  x   /  AST  38  /  ALT  63<H>  /  AlkPhos  78                            12.1   11.21 )-----------( 254      ( 2020 01:48 )             36.6     CAPILLARY BLOOD GLUCOSE  POCT Blood Glucose.: 323 mg/dL (2020 04:45)  POCT Blood Glucose.: 406 mg/dL (2020 01:38)   RADIOLOGY STUDIES  < from: Xray Chest 1 View- PORTABLE-Routine (20 @ 04:01) >  Impression: Unchanged bilateral airspace opacities, pneumomediastinum and soft tissue emphysema.

## 2020-04-20 NOTE — PROGRESS NOTE ADULT - ASSESSMENT
Assessment:    Acute hypoxemic respiratory failure secondary to SARS-COV-2 infection requiring invasive mechanical ventilation new sub q emphysema/ ? l apical ptx  ARDS  Possible superimposed bacterial infection  TELLO/ hyperkalemia worsening/ metabolic acidosis      PLAN:    CNS: PROPOFOL, PRECEDEX IF NEEDED DC VERSED    HEENT:  Oral care    PULMONARY:  HOB @ 45 degrees, keep sats 92-96%  Start solumedrol 60q12(monitor FS),  / increase RR 30, DEC fio2 50%, dec PEEP 10, CT F/UP    CARDIOVASCULAR: dc IVF    GI: GI prophylaxis Protonix, bowel regimen                                         Feeding: feeding    RENAL:  F/u  lytes.  Correct as needed. accurate I/O, FENA 0.8, lokemal, renal F/UP, repeat CMP    INFECTIOUS DISEASE: IV abx  violetta, ID f/u for immunomodulators    HEMATOLOGICAL:  hep sq ( decrease clearance)    ENDOCRINE:  Follow up FS.  Insulin protocol if needed.    MUSCULOSKELETAL: Bed rest for now    CODE STATUS: FULL CODE    DISPOSITION: Patient require continued monitoring in MICU  poor prognosis

## 2020-04-21 NOTE — PROGRESS NOTE ADULT - ASSESSMENT
Patient is a 55 year old male with PMH of HTN and T2DM diabetes who presented on 4/13 for dyspnea found to have SARS-COV-2 pneumonia. Was intubated 4/17 after desaturating to 84% on 15L NRB and upgraded to CCU    # Acute hypoxic respiratory failure 2/2 SARS-COV-2 lower respiratory tract infection with evidence of cytokine release syndrome -   # PTX and Pneumomediastinum - CXR April 20 - no PTX - Stable Pneumomediastinum - CTSurg Call PRN  - Intubated 4/17.   - Vent settings: RR 30, , FiO2 50%, PEEP 12, Plateau 30 . - Decrease PEEP to 10 and then to 8 if tolerating  - Pressure support: Levo .07  - Sedation:  Propofol 32. Versed .04 - Dc Versed and If needed start Precedex - prepare for possible wean.   - COVID + - Isolation - ID following. S/p Plaquenil and Anakinra courses. Continue IV Solumedrol 60 mg BID - 5 days April 16-21  - Concern for PE given significant elevation in d-dimer;   - Switched from Full AC to Lovenox 40 mg BID per intensivist 4/18- Switched to Heparin 5K subq q8 - TELLO  - Continue with IV Meropenem adjusted Kidney function  - AM Procal, Ddimer,Ferritin, CRP, - q72hrs     # TELLO, no history of CKD with noted decreased UOP and hyperkalemia  - Worsening   - Given 500 cc bolus April 20  - Decrease Meropenem to 500mg   - Noted new onset 4/18  - FeNa - .8   - Repeat CMP 12 PM. F/u results and follow Nephrology for possible HD.   - Hyperkalemia - Started on Insulin Drip and given Lokelma - Give additional Lokelma April 20     # History of HTN  - Will hold off Losartan 40 mg daily, given borderline BP  - Resume if becomes hypertensive     # Type 2 Diabetes - Uncontrolled   - HbA1c: 11.6  - Switched to Insulin Drip  - Monitor finger sticks    Diet: NPO with tube feeds. F/u nutrition recs  Electrolytes: Replete K<4, Mg<2. Given Potassium elevated - Given Lokelma - Follow up Nephrology  DVT ppx: Hap 5K subq q8 Decreased Clearance   GI ppx: Pantoprazole 40 mg suspension while intubated and on steroids  Activity: bedrest  Dispo: from home, continue MICU monitoring; Possible HD with Worsening Kidney Function; 1100 [facility] Spoke with Daughter [phone] - updated on status will follow up regarding possible HD. Patient is a 55 year old male with PMH of HTN and T2DM diabetes who presented on 4/13 for dyspnea found to have SARS-COV-2 pneumonia. Was intubated 4/17 after desaturating to 84% on 15L NRB and upgraded to CCU    # Acute hypoxic respiratory failure 2/2 SARS-COV-2 lower respiratory tract infection with evidence of cytokine release syndrome -   # PTX and Pneumomediastinum - CXR April 20 - no PTX - Improving Pneumomediastinum - CTSurg Call PRN  - Intubated 4/17.   - Vent settings: RR 30, , FiO2 50%, PEEP 10, Plateau 27 - No changes today.  - Pressure support: Levo .07  - Sedation:  Propofol and Precedex Versed .04 -Off Versed  - COVID + - Isolation - ID following. S/p Plaquenil and Anakinra courses. Continue IV Solumedrol 60 mg BID - 5 days April 16-21  - Concern for PE given significant elevation in d-dimer;   - Switched from Full AC to Lovenox 40 mg BID per intensivist 4/18- Increased to Heparin 7500K subq q8   - Continue with IV Meropenem adjusted Kidney function  - Blood Culture, Sputum, Fungitell - Pending  - D-Dimer 1950 (<3068<4178) April 20  - Procal 14.4 (<3.53<.53) April 18 Repeat pending  - CRP - 21 (<11<5) April 18 repeat pending  - LDH - 1223 (<1312<1384) April 20  - Ferritin  - 5116 (<7678<4770) April 18   - Fibrinogen - 663 (<700) April 18     # TELLO, no history of CKD with noted decreased UOP and hyperkalemia  - Worsening  - Decrease Meropenem to 500mg qD  - Switch 1/2 NS 75meq Bicarb to D5 150 meq Bicarb @75  - Noted new onset 4/18  - FeNa - .8   - Repeat CMP 12 PM. F/u results and follow Nephrology for possible HD.   - Hyperkalemia - Started on Insulin Drip and given Lokelma - Increase to Lokelma 10mg q8    # History of HTN  - Will hold off Losartan 40 mg daily, given borderline BP  - Resume if becomes hypertensive     # Type 2 Diabetes - Uncontrolled   - HbA1c: 11.6  - Switched to Insulin Drip  - Monitor finger sticks    Diet: NPO with tube feeds. F/u nutrition recs  Electrolytes: Replete K<4, Mg<2. Given Potassium elevated - Given Lokelma - Follow up Nephrology  DVT ppx: Hap 5K subq q8 Decreased Clearance   GI ppx: Pantoprazole 40 mg suspension while intubated and on steroids  Activity: bedrest  Dispo: from home, continue MICU monitoring; Possible HD with Worsening Kidney Function; [address] [facility] Spoke with Daughter [phone] - updated on status will follow up regarding possible HD.

## 2020-04-21 NOTE — PROGRESS NOTE ADULT - ASSESSMENT
IMP:  - covid 19 pneumonia with acute resp failure and ARDS  - cytokine storm  - TELLO and elevated LFTs secondary to the above  - DM poorly controlled PTA - note HbA1c  - HTN  - suspect poor po intake since at least 2 days prior to admission (~ 8 days ago) - this increases risk    Suggest:  - start enteral feeds today with Replete 250 ml q6h  - this provides 1000 kcal, 64 gm protein (20% of k), LOW omega 6:3 ratio, total 40 mEq K/24h, 800 mg phos/24h, isosmolar solution, all of which are in accordance with latest [facility] recs for covid19 crit SIRS pts.  - current propofol dose provides 264 k/d of fat, but keep in mind that it is omega 6 fa  - est caloric needs by PSE 2126 kcal/d, but est protein needs in this clinical situation likely > 130 gm/d.   - will re-evaluate situation and ability/ formula needed to gradually increase nutrient intake to meet full needs, over the next 24-48h.  - consider including thiamine, zinc, vitamin C to treatment regimen.  - glycemic control IMP:  - covid 19 pneumonia with acute resp failure and ARDS  - cytokine storm  - TELLO and elevated LFTs secondary to the above  - DM poorly controlled PTA - note HbA1c  - HTN  - suspect poor po intake since at least 2 days prior to admission (~ 8 days ago) - this increases risk    Suggest:  - pt receiving 1/2 NS with 150 mEq bicarb/l at 75 ml/h = hypertonic Na  - current propofol dose provides 532  k/d of fat, but keep in mind that it is omega 6 fa  - est caloric needs by PSE 2126 kcal/d, but est protein needs in this clinical situation likely > 130 gm/d.   - should check 25oh vitamin D level  - consider including thiamine, zinc, vitamin C to treatment regimen.  - change feeds to Vital High Protein, (to account for the propofol) - today give 240 ml q6h = 83 gm protein and 948 k/d (plus propofol), 740 mg total phos and 39 mEq total K per day.  LOW omega 6:3 ratio, low mOsm, high % protein solution, all of which are in accordance with latest [facility] recs for covid19 crit SIRS pts.  -pt should be getting 360 ml of the Vital HP q6h - will f/u in am.  - Nepro is NOT appropriate.  - glycemic control

## 2020-04-21 NOTE — PROGRESS NOTE ADULT - ASSESSMENT
Acute hypoxemic respiratory failure secondary to SARS-COV-2 infection / TELLO/ hyperkalemia:   # creatinine trending up  # UO improving   # k noted , on lokelma, increase to q 8  # on meropenem 500 q 24   #feed : nepro  #  continue iv fluids 1/2 ns with 75 meq of bicarbonate at 75 cc/h  # ph level noted , renagel 2/2/2  # start sodium bicarbonate 650 q 8   # will need hd if no improvement, no acute indication today   # will follow

## 2020-04-21 NOTE — PROGRESS NOTE ADULT - SUBJECTIVE AND OBJECTIVE BOX
RHIANNON MAHER  55y Male   [id number]    Hospital Day: 3  Post Operative Day:  Procedure:  Patient is a 55y old  Male who presents with a chief complaint of suspected COVID-19, found to have left apical pneumo  and pneumomediastinum  (2020 13:50)    PAST MEDICAL & SURGICAL HISTORY:  Diabetes  Hypertension  No significant past surgical history      Events of the Last 24h:none  Vital Signs Last 24 Hrs  T(C): 36.6 (2020 20:00), Max: 37.8 (2020 16:00)  T(F): 97.8 (2020 20:00), Max: 100.1 (2020 16:00)  HR: 78 (2020 22:00) (78 - 87)  BP: --  BP(mean): --  RR: 17 (:00) (14 - 17)  SpO2: 92% (:00) (87% - 98%)    Mode: AC/ CMV (Assist Control/ Continuous Mandatory Ventilation), RR (machine): 30, TV (machine): 370, FiO2: 50, PEEP: 10, ITime: 1, MAP: 20, PIP: 31    Diet, NPO with Tube Feed:   Tube Feeding Modality: Orogastric  Replete  Total Volume for 24 Hours (mL): 1000  Bolus  Total Volume of Bolus (mL):  250  Tube Feed Frequency: Every 6 hours   Tube Feed Start Time: 14:30  Bolus Feed Rate (mL per Hour): 250   Bolus Feed Duration (in Hours): 1 (20 @ 14:15)      I&O's Summary    2020 07:  -  2020 07:00  --------------------------------------------------------  IN: 2126.3 mL / OUT: 474 mL / NET: 1652.3 mL    2020 07:  -  2020 00:18  --------------------------------------------------------  IN: 2392 mL / OUT: 455 mL / NET: 1937 mL     I&O's Detail    2020 07:01  -  2020 07:00  --------------------------------------------------------  IN:    Enteral Tube Flush: 320 mL    IV PiggyBack: 100 mL    midazolam Infusion: 96 mL    Miscellaneous Tube Feedin mL    norepinephrine Infusion: 224 mL    propofol Infusion: 406.3 mL  Total IN: 2126.3 mL    OUT:    Indwelling Catheter - Urethral: 474 mL  Total OUT: 474 mL    Total NET: 1652.3 mL      2020 07:  -  2020 00:18  --------------------------------------------------------  IN:    dexmedetomidine Infusion: 80 mL    Enteral Tube Flush: 30 mL    insulin regular Infusion: 92 mL    IV PiggyBack: 50 mL    Miscellaneous Tube Feedin mL    norepinephrine Infusion: 38 mL    propofol Infusion: 352 mL    sodium chloride 0.45%: 750 mL    Sodium Chloride 0.9% IV Bolus: 500 mL  Total IN: 2392 mL    OUT:    Indwelling Catheter - Urethral: 455 mL  Total OUT: 455 mL    Total NET: 1937 mL          MEDICATIONS  (STANDING):  chlorhexidine 0.12% Liquid 15 milliLiter(s) Oral Mucosa two times a day  chlorhexidine 4% Liquid 1 Application(s) Topical <User Schedule>  dexMEDEtomidine Infusion 0.05 MICROgram(s)/kG/Hr (1.1 mL/Hr) IV Continuous <Continuous>  dextrose 5%. 1000 milliLiter(s) (50 mL/Hr) IV Continuous <Continuous>  dextrose 50% Injectable 12.5 Gram(s) IV Push once  dextrose 50% Injectable 25 Gram(s) IV Push once  dextrose 50% Injectable 25 Gram(s) IV Push once  heparin   Injectable 5000 Unit(s) SubCutaneous every 8 hours  insulin regular Infusion 3 Unit(s)/Hr (3 mL/Hr) IV Continuous <Continuous>  lactulose Syrup 20 Gram(s) Oral three times a day  meropenem  IVPB 500 milliGRAM(s) IV Intermittent every 24 hours  methylPREDNISolone sodium succinate Injectable 60 milliGRAM(s) IV Push every 12 hours  norepinephrine Infusion 0.05 MICROgram(s)/kG/Min (4.13 mL/Hr) IV Continuous <Continuous>  pantoprazole   Suspension 40 milliGRAM(s) Oral daily  propofol Infusion 10 MICROgram(s)/kG/Min (5.28 mL/Hr) IV Continuous <Continuous>  senna 2 Tablet(s) Oral at bedtime  sodium chloride 0.45% 1000 milliLiter(s) (75 mL/Hr) IV Continuous <Continuous>  sodium zirconium cyclosilicate 10 Gram(s) Oral two times a day    MEDICATIONS  (PRN):  acetaminophen   Tablet .. 650 milliGRAM(s) Oral every 6 hours PRN Temp greater or equal to 38C (100.4F)  dextrose 40% Gel 15 Gram(s) Oral once PRN Blood Glucose LESS THAN 70 milliGRAM(s)/deciliter  glucagon  Injectable 1 milliGRAM(s) IntraMuscular once PRN Glucose LESS THAN 70 milligrams/deciliter  glucagon  Injectable 1 milliGRAM(s) IntraMuscular once PRN Glucose LESS THAN 70 milligrams/deciliter  ondansetron Injectable 4 milliGRAM(s) IV Push every 8 hours PRN Nausea and/or Vomiting      PHYSICAL EXAM:    GENERAL: NAD    HEENT: NCAT    CHEST/LUNGS: CTAB    HEART: RRR,  No murmurs, rubs, or gallops    ABDOMEN: SNTND +BS    EXTREMITIES:  FROM, No clubbing, cyanosis, or edema, palpable pulse    NEURO: No focal neurological deficits    SKIN: No rashes or lesions    INCISION/WOUNDS:                          12.1   11.21 )-----------( 254      ( 2020 01:48 )             36.6        CBC Full  -  ( 2020 01:48 )  WBC Count : 11.21 K/uL  RBC Count : 4.16 M/uL  Hemoglobin : 12.1 g/dL  Hematocrit : 36.6 %  Platelet Count - Automated : 254 K/uL  Mean Cell Volume : 88.0 fL  Mean Cell Hemoglobin : 29.1 pg  Mean Cell Hemoglobin Concentration : 33.1 g/dL  Auto Neutrophil # : 9.48 K/uL  Auto Lymphocyte # : 0.69 K/uL  Auto Monocyte # : 0.36 K/uL  Auto Eosinophil # : 0.00 K/uL  Auto Basophil # : 0.04 K/uL  Auto Neutrophil % : 84.5 %  Auto Lymphocyte % : 6.2 %  Auto Monocyte % : 3.2 %  Auto Eosinophil % : 0.0 %  Auto Basophil % : 0.4 %               x     |  x     |  x                  Ca: x      BMP:   ----------------------------< x      Mg: x     (20 @ 17:01)             x      |  x     | x                  Ph: 8.6      LFT:     TPro: 5.7 / Alb: 2.4 / TBili: 0.4 / DBili: x / AST: 38 / ALT: 63 / AlkPhos: 78   (20 @ 12:20)    LIVER FUNCTIONS - ( 2020 12:20 )  Alb: 2.4 g/dL / Pro: 5.7 g/dL / ALK PHOS: 78 U/L / ALT: 63 U/L / AST: 38 U/L / GGT: x                   < from: Xray Chest 1 View- PORTABLE-Routine (20 @ 04:01) >    Findings:    Support devices: An endotracheal tube is seen advanced with tip 4.5 cm above the santi. An enteric tube is seen projecting under the diaphragm. Left subclavian central venous catheter with tip in the SVC.    Cardiac/mediastinum/hilum: Unchanged pneumomediastinum.    Lung parenchyma/Pleura: Bilateral airspace opacities, unchanged. No pneumothorax.    Skeleton/soft tissues: Stable soft tissue emphysema.    Impression: Unchanged bilateral airspace opacities, pneumomediastinum and soft tissue emphysema.          < end of copied text >

## 2020-04-21 NOTE — PROGRESS NOTE ADULT - SUBJECTIVE AND OBJECTIVE BOX
OVERNIGHT EVENTS: intubated 5, propofol, precedex, levophed 0.02, bicarb drip 1 1/2 in 1/2 NS, 75 CC/H, insulin drip    Vital Signs Last 24 Hrs  T(C): 37.1 (21 Apr 2020 06:00), Max: 37.8 (20 Apr 2020 16:00)  T(F): 98.8 (21 Apr 2020 06:00), Max: 100.1 (20 Apr 2020 16:00)  HR: 72 (21 Apr 2020 06:00) (72 - 87)  RR: 17 (20 Apr 2020 20:00) (17 - 17)  SpO2: 90% (21 Apr 2020 06:00) (87% - 94%)    PHYSICAL EXAMINATION:    GENERAL: sedated    HEENT: Head is normocephalic and atraumatic. subq enphysema    NECK: Supple.    LUNGS: dec bs boh bases    HEART: Regular rate and rhythm without murmur.    ABDOMEN: Soft, nontender, and nondistended.      EXTREMITIES: Without any cyanosis, clubbing, rash, lesions or edema.    NEUROLOGIC: sedated    SKIN: No ulceration or induration present.      LABS:                        11.7   13.15 )-----------( 298      ( 21 Apr 2020 04:30 )             36.6     04-20    137  |  99  |  107<HH>  ----------------------------<  351<H>  5.7<H>   |  17  |  8.0<HH>    Ca    8.2<L>      20 Apr 2020 12:20  Phos  8.6     04-20    TPro  5.7<L>  /  Alb  2.4<L>  /  TBili  0.4  /  DBili  x   /  AST  38  /  ALT  63<H>  /  AlkPhos  78  04-20        ABG - ( 21 Apr 2020 05:26 )  pH, Arterial: 7.32  pH, Blood: x     /  pCO2: 39    /  pO2: 64    / HCO3: 20    / Base Excess: -5.6  /  SaO2: 91        30/370/50/10  plateau 27          D-Dimer Assay, Quantitative: 1950 ng/mL DDU (04-20-20 @ 23:30)        Procalcitonin, Serum: 14.40 ng/mL (04-18-20 @ 23:40)        04-20-20 @ 07:01  -  04-21-20 @ 07:00  --------------------------------------------------------  IN: 3137 mL / OUT: 755 mL / NET: 2382 mL        MICROBIOLOGY:      MEDICATIONS  (STANDING):  chlorhexidine 0.12% Liquid 15 milliLiter(s) Oral Mucosa two times a day  chlorhexidine 0.12% Liquid 15 milliLiter(s) Oral Mucosa every 12 hours  chlorhexidine 4% Liquid 1 Application(s) Topical <User Schedule>  dexMEDEtomidine Infusion 0.05 MICROgram(s)/kG/Hr (1.1 mL/Hr) IV Continuous <Continuous>  dextrose 5%. 1000 milliLiter(s) (50 mL/Hr) IV Continuous <Continuous>  dextrose 50% Injectable 12.5 Gram(s) IV Push once  dextrose 50% Injectable 25 Gram(s) IV Push once  dextrose 50% Injectable 25 Gram(s) IV Push once  heparin   Injectable 5000 Unit(s) SubCutaneous every 8 hours  insulin regular Infusion 3 Unit(s)/Hr (3 mL/Hr) IV Continuous <Continuous>  lactulose Syrup 20 Gram(s) Oral three times a day  meropenem  IVPB 500 milliGRAM(s) IV Intermittent every 24 hours  methylPREDNISolone sodium succinate Injectable 60 milliGRAM(s) IV Push every 12 hours  norepinephrine Infusion 0.05 MICROgram(s)/kG/Min (4.13 mL/Hr) IV Continuous <Continuous>  pantoprazole   Suspension 40 milliGRAM(s) Oral daily  propofol Infusion 10 MICROgram(s)/kG/Min (5.28 mL/Hr) IV Continuous <Continuous>  senna 2 Tablet(s) Oral at bedtime  sodium chloride 0.45% 1000 milliLiter(s) (75 mL/Hr) IV Continuous <Continuous>  sodium zirconium cyclosilicate 10 Gram(s) Oral two times a day    MEDICATIONS  (PRN):  acetaminophen   Tablet .. 650 milliGRAM(s) Oral every 6 hours PRN Temp greater or equal to 38C (100.4F)  dextrose 40% Gel 15 Gram(s) Oral once PRN Blood Glucose LESS THAN 70 milliGRAM(s)/deciliter  glucagon  Injectable 1 milliGRAM(s) IntraMuscular once PRN Glucose LESS THAN 70 milligrams/deciliter  glucagon  Injectable 1 milliGRAM(s) IntraMuscular once PRN Glucose LESS THAN 70 milligrams/deciliter  ondansetron Injectable 4 milliGRAM(s) IV Push every 8 hours PRN Nausea and/or Vomiting      RADIOLOGY & ADDITIONAL STUDIES:

## 2020-04-21 NOTE — PROGRESS NOTE ADULT - SUBJECTIVE AND OBJECTIVE BOX
RHIANNON MAHER  55y, Male    All available historical data reviewed    OVERNIGHT EVENTS:  low grade fevers  fio2 60%    ROS:  unable to obtain history secondary to patient's mental status and/or sedation     VITALS:  T(F): 98.7, Max: 100.1 (04-20-20 @ 16:00)  HR: 68  BP: --  RR: 17Vital Signs Last 24 Hrs  T(C): 37.1 (21 Apr 2020 12:00), Max: 37.8 (20 Apr 2020 16:00)  T(F): 98.7 (21 Apr 2020 12:00), Max: 100.1 (20 Apr 2020 16:00)  HR: 68 (21 Apr 2020 12:00) (68 - 86)  BP: --  BP(mean): --  RR: 17 (20 Apr 2020 20:00) (17 - 17)  SpO2: 95% (21 Apr 2020 12:00) (87% - 95%)    TESTS & MEASUREMENTS:                        11.7   13.15 )-----------( 298      ( 21 Apr 2020 04:30 )             36.6     04-21    142  |  101  |  122<HH>  ----------------------------<  73  5.5<H>   |  17  |  8.9<HH>    Ca    8.1<L>      21 Apr 2020 04:30  Phos  8.9     04-21  Mg     3.6     04-21    TPro  5.8<L>  /  Alb  2.5<L>  /  TBili  0.4  /  DBili  x   /  AST  39  /  ALT  47<H>  /  AlkPhos  76  04-21    LIVER FUNCTIONS - ( 21 Apr 2020 04:30 )  Alb: 2.5 g/dL / Pro: 5.8 g/dL / ALK PHOS: 76 U/L / ALT: 47 U/L / AST: 39 U/L / GGT: x                   RADIOLOGY & ADDITIONAL TESTS:  Personal review of radiological diagnostics performed  Echo and EKG results noted when applicable.     MEDICATIONS:  acetaminophen   Tablet .. 650 milliGRAM(s) Oral every 6 hours PRN  chlorhexidine 0.12% Liquid 15 milliLiter(s) Oral Mucosa two times a day  chlorhexidine 0.12% Liquid 15 milliLiter(s) Oral Mucosa every 12 hours  chlorhexidine 4% Liquid 1 Application(s) Topical <User Schedule>  dexMEDEtomidine Infusion 0.05 MICROgram(s)/kG/Hr IV Continuous <Continuous>  dextrose 40% Gel 15 Gram(s) Oral once PRN  dextrose 5% 1000 milliLiter(s) IV Continuous <Continuous>  dextrose 5%. 1000 milliLiter(s) IV Continuous <Continuous>  dextrose 50% Injectable 12.5 Gram(s) IV Push once  dextrose 50% Injectable 25 Gram(s) IV Push once  dextrose 50% Injectable 25 Gram(s) IV Push once  glucagon  Injectable 1 milliGRAM(s) IntraMuscular once PRN  glucagon  Injectable 1 milliGRAM(s) IntraMuscular once PRN  heparin   Injectable 7500 Unit(s) SubCutaneous every 8 hours  insulin regular Infusion 3 Unit(s)/Hr IV Continuous <Continuous>  lactulose Syrup 20 Gram(s) Oral three times a day  meropenem  IVPB 500 milliGRAM(s) IV Intermittent every 24 hours  methylPREDNISolone sodium succinate Injectable 60 milliGRAM(s) IV Push every 12 hours  norepinephrine Infusion 0.05 MICROgram(s)/kG/Min IV Continuous <Continuous>  ondansetron Injectable 4 milliGRAM(s) IV Push every 8 hours PRN  pantoprazole   Suspension 40 milliGRAM(s) Oral daily  propofol Infusion 10 MICROgram(s)/kG/Min IV Continuous <Continuous>  senna 2 Tablet(s) Oral at bedtime  sodium zirconium cyclosilicate 10 Gram(s) Oral two times a day      ANTIBIOTICS:  meropenem  IVPB 500 milliGRAM(s) IV Intermittent every 24 hours

## 2020-04-21 NOTE — PROGRESS NOTE ADULT - ASSESSMENT
55 year old male patient with hypertension, diabetes presenting for dyspnea.     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 53 > 3.53 > 14.4 with possible  bacterial superinfection    RECOMMENDATIONS;   S/p Anakinra 4/14-17  BCx  nares ORSA  serum fungitell assay  caspofungin 50 mg iv q24h ( started 4/18)  Meropenem 750 mg iv q24h  Monitor  CRP, Ferritin, Ddimers q48h   s/p PLAQUENIL

## 2020-04-21 NOTE — PROGRESS NOTE ADULT - SUBJECTIVE AND OBJECTIVE BOX
55 year old male patient with hypertension, diabetes presenting on  for dyspnea.   pt found to be covid 19 +, was doing better, inflam markers improving,   pt transferred to CCU for worsening respiratory status and was intubated .   Renal function worsening rapidly over past few days - adm BUN 16/ 0.7.    Vital Signs Last 24 Hrs  T(C): 37.1 (2020 06:00), Max: 37.8 (2020 16:00)  T(F): 98.8 (2020 06:00), Max: 100.1 (2020 16:00)  HR: 68 (2020 10:00) (68 - 86)  RR: 17 (2020 20:00) (17 - 17)  SpO2: 93% (2020 10:00) (87% - 94%)  pt remains intubated, sedated  abd soft, ND per resident and attending notes    I&O's Detail  2020 07:01  -  2020 07:00  --------------------------------------------------------  IN:    dexmedetomidine Infusion: 115 mL    Enteral Tube Flush: 30 mL    insulin regular Infusion: 130 mL    IV PiggyBack: 50 mL    Miscellaneous Tube Feedin mL    norepinephrine Infusion: 52 mL    propofol Infusion: 485 mL    sodium chloride 0.45%: 1275 mL    Sodium Chloride 0.9% IV Bolus: 500 mL  Total IN: 3137 mL    OUT:    Indwelling Catheter - Urethral: 755 mL  Total OUT: 755 mL  Total NET: 2382 mL    MEDICATIONS  (STANDING):  chlorhexidine 0.12% Liquid 15 milliLiter(s) Oral Mucosa two times a day  chlorhexidine 0.12% Liquid 15 milliLiter(s) Oral Mucosa every 12 hours  chlorhexidine 4% Liquid 1 Application(s) Topical <User Schedule>  dexMEDEtomidine Infusion 0.05 MICROgram(s)/kG/Hr (1.1 mL/Hr) IV Continuous <Continuous>  heparin   Injectable 7500 Unit(s) SubCutaneous every 8 hours  insulin regular Infusion 3 Unit(s)/Hr (3 mL/Hr) IV Continuous <Continuous>  lactulose Syrup 20 Gram(s) Oral three times a day  meropenem  IVPB 500 milliGRAM(s) IV Intermittent every 24 hours  methylPREDNISolone sodium succinate Injectable 60 milliGRAM(s) IV Push every 12 hours  norepinephrine Infusion 0.05 MICROgram(s)/kG/Min (4.13 mL/Hr) IV Continuous <Continuous>  pantoprazole   Suspension 40 milliGRAM(s) Oral daily  propofol Infusion 10 MICROgram(s)/kG/Min (5.28 mL/Hr) IV Continuous <Continuous>  senna 2 Tablet(s) Oral at bedtime  sodium zirconium cyclosilicate 10 Gram(s) Oral two times a day                          11.7   13.15 )-----------( 298      ( 2020 04:30 )             36.6   04-    142  |  101  |  122<HH>  ----------------------------<  73  5.5<H>   |  17  |  8.9<HH>    Ca    8.1<L>      2020 04:30  Phos  8.9       Mg     3.6         TPro  5.8<L>  /  Alb  2.5<L>  /  TBili  0.4  /  DBili  x   /  AST  39  /  ALT  47<H>  /  AlkPhos  76

## 2020-04-21 NOTE — PROGRESS NOTE ADULT - SUBJECTIVE AND OBJECTIVE BOX
24 h events noted  intubated/ventilated         PAST HISTORY  --------------------------------------------------------------------------------  No significant changes to PMH, PSH, FHx, SHx, unless otherwise noted    ALLERGIES & MEDICATIONS  --------------------------------------------------------------------------------  Allergies    No Known Allergies    Intolerances      Standing Inpatient Medications  chlorhexidine 0.12% Liquid 15 milliLiter(s) Oral Mucosa two times a day  chlorhexidine 0.12% Liquid 15 milliLiter(s) Oral Mucosa every 12 hours  chlorhexidine 4% Liquid 1 Application(s) Topical <User Schedule>  dexMEDEtomidine Infusion 0.05 MICROgram(s)/kG/Hr IV Continuous <Continuous>  dextrose 5%. 1000 milliLiter(s) IV Continuous <Continuous>  dextrose 50% Injectable 12.5 Gram(s) IV Push once  dextrose 50% Injectable 25 Gram(s) IV Push once  dextrose 50% Injectable 25 Gram(s) IV Push once  heparin   Injectable 5000 Unit(s) SubCutaneous every 8 hours  insulin regular Infusion 3 Unit(s)/Hr IV Continuous <Continuous>  lactulose Syrup 20 Gram(s) Oral three times a day  meropenem  IVPB 500 milliGRAM(s) IV Intermittent every 24 hours  methylPREDNISolone sodium succinate Injectable 60 milliGRAM(s) IV Push every 12 hours  norepinephrine Infusion 0.05 MICROgram(s)/kG/Min IV Continuous <Continuous>  pantoprazole   Suspension 40 milliGRAM(s) Oral daily  propofol Infusion 10 MICROgram(s)/kG/Min IV Continuous <Continuous>  senna 2 Tablet(s) Oral at bedtime  sodium chloride 0.45% 1000 milliLiter(s) IV Continuous <Continuous>  sodium zirconium cyclosilicate 10 Gram(s) Oral two times a day    PRN Inpatient Medications  acetaminophen   Tablet .. 650 milliGRAM(s) Oral every 6 hours PRN  dextrose 40% Gel 15 Gram(s) Oral once PRN  glucagon  Injectable 1 milliGRAM(s) IntraMuscular once PRN  glucagon  Injectable 1 milliGRAM(s) IntraMuscular once PRN  ondansetron Injectable 4 milliGRAM(s) IV Push every 8 hours PRN          VITALS/PHYSICAL EXAM  --------------------------------------------------------------------------------  T(C): 37.1 (04-21-20 @ 06:00), Max: 37.8 (04-20-20 @ 16:00)  HR: 72 (04-21-20 @ 06:00) (72 - 86)  BP: --  RR: 17 (04-20-20 @ 20:00) (17 - 17)  SpO2: 90% (04-21-20 @ 06:00) (87% - 94%)  Wt(kg): --        04-20-20 @ 07:01  -  04-21-20 @ 07:00  --------------------------------------------------------  IN: 3137 mL / OUT: 755 mL / NET: 2382 mL      Physical Exam:  	Gen:intubated/ventilated     LABS/STUDIES  --------------------------------------------------------------------------------              11.7   13.15 >-----------<  298      [04-21-20 @ 04:30]              36.6     142  |  101  |  122  ----------------------------<  73      [04-21-20 @ 04:30]  5.5   |  17  |  8.9        Ca     8.1     [04-21-20 @ 04:30]      Mg     3.6     [04-21-20 @ 04:30]      Phos  8.9     [04-21-20 @ 04:30]    TPro  5.8  /  Alb  2.5  /  TBili  0.4  /  DBili  x   /  AST  39  /  ALT  47  /  AlkPhos  76  [04-21-20 @ 04:30]        LDH 1223      [04-20-20 @ 23:30]    Creatinine Trend:  SCr 8.9 [04-21 @ 04:30]  SCr 8.0 [04-20 @ 12:20]  SCr 7.4 [04-20 @ 01:48]  SCr 6.5 [04-19 @ 13:52]  SCr 5.3 [04-18 @ 23:40]      Urine Creatinine 135      [04-18-20 @ 11:10]  Urine Protein 115      [04-18-20 @ 11:10]  Urine Sodium 44.0      [04-18-20 @ 11:10]  Urine Osmolality 384      [04-18-20 @ 11:10]    Ferritin 5116      [04-18-20 @ 23:40]  Lipid: chol --, , HDL --, LDL --      [04-14-20 @ 07:01]

## 2020-04-21 NOTE — PROGRESS NOTE ADULT - ASSESSMENT
Assessment:    Acute hypoxemic respiratory failure secondary to SARS-COV-2 infection requiring invasive mechanical ventilation new sub q emphysema/ ? l apical ptx  ARDS  Possible superimposed bacterial infection increase procal  TELLO/ hyperkalemia worsening/ metabolic acidosis      PLAN:    CNS: PROPOFOL, PRECEDEX I    HEENT:  Oral care    PULMONARY:  HOB @ 45 degrees, keep sats 92-96%  Start solumedrol 60q12(monitor FS),  / increase RR 30, DEC fio2 50%, dec PEEP 10, CTsx F/UP    CARDIOVASCULAR: bicarb drip    GI: GI prophylaxis Protonix, bowel regimen                                         Feeding: feeding    RENAL:  F/u  lytes.  Correct as needed. accurate I/O, FENA 0.8, lokemal, renal F/UP, repeat CMP    INFECTIOUS DISEASE: IV abx  violetta, ID f/u for immunomodulators, vanco x1    HEMATOLOGICAL:  hep sq ( decrease clearance)    ENDOCRINE:  Follow up FS.  Insulin protocol if needed.    MUSCULOSKELETAL: Bed rest for now    CODE STATUS: FULL CODE    DISPOSITION: Patient require continued monitoring in MICU  poor prognosis

## 2020-04-21 NOTE — PROGRESS NOTE ADULT - SUBJECTIVE AND OBJECTIVE BOX
To be updated Admit Date: 04-13-20  Length of Stay: 8d    55yMale    Reason for admission to ICU:  Patient is in acute hypoxic respiratory distress requiring intubation and sedation. Imaging c/w ARDS. Admitted to MICU for further observation and management.    INTERVAL HPI/OVERNIGHT EVENTS:  INtubated and sedated - SLightly Worsening TELLO - Stable - Follow Nephrology      MEDICATIONS  (STANDING):  chlorhexidine 0.12% Liquid 15 milliLiter(s) Oral Mucosa two times a day  chlorhexidine 0.12% Liquid 15 milliLiter(s) Oral Mucosa every 12 hours  chlorhexidine 4% Liquid 1 Application(s) Topical <User Schedule>  dexMEDEtomidine Infusion 0.05 MICROgram(s)/kG/Hr (1.1 mL/Hr) IV Continuous <Continuous>  dextrose 5% 1000 milliLiter(s) (75 mL/Hr) IV Continuous <Continuous>  dextrose 5%. 1000 milliLiter(s) (50 mL/Hr) IV Continuous <Continuous>  dextrose 50% Injectable 12.5 Gram(s) IV Push once  dextrose 50% Injectable 25 Gram(s) IV Push once  dextrose 50% Injectable 25 Gram(s) IV Push once  heparin   Injectable 7500 Unit(s) SubCutaneous every 8 hours  insulin regular Infusion 3 Unit(s)/Hr (3 mL/Hr) IV Continuous <Continuous>  lactulose Syrup 20 Gram(s) Oral three times a day  meropenem  IVPB 500 milliGRAM(s) IV Intermittent every 24 hours  methylPREDNISolone sodium succinate Injectable 60 milliGRAM(s) IV Push every 12 hours  norepinephrine Infusion 0.05 MICROgram(s)/kG/Min (4.13 mL/Hr) IV Continuous <Continuous>  pantoprazole   Suspension 40 milliGRAM(s) Oral daily  propofol Infusion 10 MICROgram(s)/kG/Min (5.28 mL/Hr) IV Continuous <Continuous>  senna 2 Tablet(s) Oral at bedtime  sodium zirconium cyclosilicate 10 Gram(s) Oral two times a day    MEDICATIONS  (PRN):  acetaminophen   Tablet .. 650 milliGRAM(s) Oral every 6 hours PRN Temp greater or equal to 38C (100.4F)  dextrose 40% Gel 15 Gram(s) Oral once PRN Blood Glucose LESS THAN 70 milliGRAM(s)/deciliter  glucagon  Injectable 1 milliGRAM(s) IntraMuscular once PRN Glucose LESS THAN 70 milligrams/deciliter  glucagon  Injectable 1 milliGRAM(s) IntraMuscular once PRN Glucose LESS THAN 70 milligrams/deciliter  ondansetron Injectable 4 milliGRAM(s) IV Push every 8 hours PRN Nausea and/or Vomiting      Vitals:  Vital Signs Last 24 Hrs  T(C): 37.1 (21 Apr 2020 12:00), Max: 37.8 (20 Apr 2020 16:00)  T(F): 98.7 (21 Apr 2020 12:00), Max: 100.1 (20 Apr 2020 16:00)  HR: 68 (21 Apr 2020 12:00) (68 - 86)    RR: 17 (20 Apr 2020 20:00) (17 - 17)  SpO2: 95% (21 Apr 2020 12:00) (87% - 95%)    Vitals (Invasive):  ABP: 162/82 (04-21-20 @ 12:00) (92/50 - 162/82)    I&O's Summary    20 Apr 2020 07:01  -  21 Apr 2020 07:00  --------------------------------------------------------  IN: 3137 mL / OUT: 755 mL / NET: 2382 mL    21 Apr 2020 07:01  -  21 Apr 2020 13:24  --------------------------------------------------------  IN: 432 mL / OUT: 75 mL / NET: 357 mL        Physical Exam:  HEENT:     + NCAT  + EOMI  - Conjunctival edema   - Icterus   - Thrush   - ETT  - NGT/OGT  Neck:         + FROM    + JVD     - Nodes     - Masses    + Mid-line trachea   - Tracheostomy  Chest:         - Sternal click  - Sternal drainage  - Chest tubes  - SubQ emphysema  Lungs:          + CTA   - Rhonchi    - Rales    - Wheezing     - Decreased BS   - Dullness R L  Cardiac:       + S1 + S2    + RRR   - Irregular   - S3  - S4    - Murmurs   - Rub   - Hamman’s sign   Abdomen:    + BS     + Soft    + Non-tender     - Distended    - Organomegaly  - PEG  Extremities:   - Cyanosis U/L   - Clubbing  U/L  - LE/UE Edema   + Capillary refill    + Pulses   Neuro:        - Awake   -  Alert   - Confused   - Lethargic   + Sedated   + Paralyzed  - Generalized weakness  Skin:        - Rashes    - Erythema   + Normal incisions   + IV sites intact  - Sacral decubitus    Labs:  COVID:  COVID-19 PCR: Detected (04-13-20 @ 14:30)                          11.7   13.15 )-----------( 298      ( 21 Apr 2020 04:30 )             36.6     04-21    142  |  101  |  122<HH>  ----------------------------<  73  5.5<H>   |  17  |  8.9<HH>    Ca    8.1<L>      21 Apr 2020 04:30  Phos  8.9     04-21  Mg     3.6     04-21    TPro  5.8<L>  /  Alb  2.5<L>  /  TBili  0.4  /  DBili  x   /  AST  39  /  ALT  47<H>  /  AlkPhos  76  04-21          COVID related labs:  20 Apr 2020 23:30  D-dimer:  1950<H>  Calcitonin:  x  CRP:  x  LDH:  1223<H>  Lactate,Blood:  x  CK:  x  Troponin I:  x  Troponin T:  x  Troponin HS:  x  Ferritin, Serum: x  BNP:  x      Blood Gases:  (ARTERIAL):  04-21-20 @ 05:26  pH 7.32 / pCO2 39 / pO2 64 / HCO3 20  Total CO2 --  FiO2 --  Oxygen Saturation 91    Base Excess, Arterial -5.6      Radiology:  < from: Xray Chest 1 View- PORTABLE-Routine (04.21.20 @ 03:29) >  Impression:    Stable bilateral airspace opacities.  Improving pneumomediastinum.  < end of copied text > Admit Date: 04-13-20  Length of Stay: 8d    55yMale    Reason for admission to ICU:  Patient is in acute hypoxic respiratory distress requiring intubation and sedation. Imaging c/w ARDS. Admitted to MICU for further observation and management.    INTERVAL HPI/OVERNIGHT EVENTS:  INtubated and sedated - SLightly Worsening TELLO - Stable - Follow Nephrology      MEDICATIONS  (STANDING):  chlorhexidine 0.12% Liquid 15 milliLiter(s) Oral Mucosa two times a day  chlorhexidine 0.12% Liquid 15 milliLiter(s) Oral Mucosa every 12 hours  chlorhexidine 4% Liquid 1 Application(s) Topical <User Schedule>  dexMEDEtomidine Infusion 0.05 MICROgram(s)/kG/Hr (1.1 mL/Hr) IV Continuous <Continuous>  dextrose 5% 1000 milliLiter(s) (75 mL/Hr) IV Continuous <Continuous>  dextrose 5%. 1000 milliLiter(s) (50 mL/Hr) IV Continuous <Continuous>  dextrose 50% Injectable 12.5 Gram(s) IV Push once  dextrose 50% Injectable 25 Gram(s) IV Push once  dextrose 50% Injectable 25 Gram(s) IV Push once  heparin   Injectable 7500 Unit(s) SubCutaneous every 8 hours  insulin regular Infusion 3 Unit(s)/Hr (3 mL/Hr) IV Continuous <Continuous>  lactulose Syrup 20 Gram(s) Oral three times a day  meropenem  IVPB 500 milliGRAM(s) IV Intermittent every 24 hours  methylPREDNISolone sodium succinate Injectable 60 milliGRAM(s) IV Push every 12 hours  norepinephrine Infusion 0.05 MICROgram(s)/kG/Min (4.13 mL/Hr) IV Continuous <Continuous>  pantoprazole   Suspension 40 milliGRAM(s) Oral daily  propofol Infusion 10 MICROgram(s)/kG/Min (5.28 mL/Hr) IV Continuous <Continuous>  senna 2 Tablet(s) Oral at bedtime  sodium zirconium cyclosilicate 10 Gram(s) Oral two times a day    MEDICATIONS  (PRN):  acetaminophen   Tablet .. 650 milliGRAM(s) Oral every 6 hours PRN Temp greater or equal to 38C (100.4F)  dextrose 40% Gel 15 Gram(s) Oral once PRN Blood Glucose LESS THAN 70 milliGRAM(s)/deciliter  glucagon  Injectable 1 milliGRAM(s) IntraMuscular once PRN Glucose LESS THAN 70 milligrams/deciliter  glucagon  Injectable 1 milliGRAM(s) IntraMuscular once PRN Glucose LESS THAN 70 milligrams/deciliter  ondansetron Injectable 4 milliGRAM(s) IV Push every 8 hours PRN Nausea and/or Vomiting      Vitals:  Vital Signs Last 24 Hrs  T(C): 37.1 (21 Apr 2020 12:00), Max: 37.8 (20 Apr 2020 16:00)  T(F): 98.7 (21 Apr 2020 12:00), Max: 100.1 (20 Apr 2020 16:00)  HR: 68 (21 Apr 2020 12:00) (68 - 86)    RR: 17 (20 Apr 2020 20:00) (17 - 17)  SpO2: 95% (21 Apr 2020 12:00) (87% - 95%)    Vitals (Invasive):  ABP: 162/82 (04-21-20 @ 12:00) (92/50 - 162/82)    I&O's Summary    20 Apr 2020 07:01  -  21 Apr 2020 07:00  --------------------------------------------------------  IN: 3137 mL / OUT: 755 mL / NET: 2382 mL    21 Apr 2020 07:01  -  21 Apr 2020 13:24  --------------------------------------------------------  IN: 432 mL / OUT: 75 mL / NET: 357 mL        Physical Exam:\  GEN; NAD  HEENT:    Pupils appear to constrict with light, Slight conjunctival Edema, no icterus ET,OGT in place    Chest:         SubQ emphysema present over chest wall and lateral chests and run down B/l Arms.   Lungs:          CTABL, Symmetrical Inspiration  Cardiac:       Faint heart sounds with + S1 + S2    + RRR   No murmurs appreciated  Abdomen:    Obese habitus + BS     + Soft    + Non-tender    Extremities:  Edema Present Diffuse - no cyanosis  Neuro:        Sedated, no response to pain, No corneal reflex/     Labs:  COVID:  COVID-19 PCR: Detected (04-13-20 @ 14:30)                          11.7   13.15 )-----------( 298      ( 21 Apr 2020 04:30 )             36.6     04-21    142  |  101  |  122<HH>  ----------------------------<  73  5.5<H>   |  17  |  8.9<HH>    Ca    8.1<L>      21 Apr 2020 04:30  Phos  8.9     04-21  Mg     3.6     04-21    TPro  5.8<L>  /  Alb  2.5<L>  /  TBili  0.4  /  DBili  x   /  AST  39  /  ALT  47<H>  /  AlkPhos  76  04-21          COVID related labs:  20 Apr 2020 23:30  D-dimer:  1950<H>  Calcitonin:  x  CRP:  x  LDH:  1223<H>  Lactate,Blood:  x  CK:  x  Troponin I:  x  Troponin T:  x  Troponin HS:  x  Ferritin, Serum: x  BNP:  x      Blood Gases:  (ARTERIAL):  04-21-20 @ 05:26  pH 7.32 / pCO2 39 / pO2 64 / HCO3 20  Total CO2 --  FiO2 --  Oxygen Saturation 91    Base Excess, Arterial -5.6      Radiology:  < from: Xray Chest 1 View- PORTABLE-Routine (04.21.20 @ 03:29) >  Impression:    Stable bilateral airspace opacities.  Improving pneumomediastinum.  < end of copied text >

## 2020-04-22 NOTE — PROGRESS NOTE ADULT - SUBJECTIVE AND OBJECTIVE BOX
To be updated Admit Date: 04-13-20  Length of Stay: 9d    55yMale      Reason for admission to ICU:  Patient is in acute hypoxic respiratory distress requiring intubation and sedation. Imaging c/w ARDS. Admitted to MICU for further observation and management.    INTERVAL HPI/OVERNIGHT EVENTS:  INtubated and sedated - SLightly Worsening TELLO - Stable - Follow Nephrology  Worsening Subq Emphysema - Blow hole placed today by CTS  NO RRT    MEDICATIONS  (STANDING):  caspofungin IVPB      chlorhexidine 0.12% Liquid 15 milliLiter(s) Oral Mucosa every 12 hours  chlorhexidine 0.12% Liquid 15 milliLiter(s) Oral Mucosa two times a day  chlorhexidine 4% Liquid 1 Application(s) Topical <User Schedule>  dexMEDEtomidine Infusion 0.05 MICROgram(s)/kG/Hr (1.1 mL/Hr) IV Continuous <Continuous>  dextrose 5%. 1000 milliLiter(s) (50 mL/Hr) IV Continuous <Continuous>  dextrose 50% Injectable 12.5 Gram(s) IV Push once  dextrose 50% Injectable 25 Gram(s) IV Push once  dextrose 50% Injectable 25 Gram(s) IV Push once  heparin   Injectable 7500 Unit(s) SubCutaneous every 8 hours  insulin regular Infusion 3 Unit(s)/Hr (3 mL/Hr) IV Continuous <Continuous>  lactulose Syrup 20 Gram(s) Oral three times a day  meropenem  IVPB 500 milliGRAM(s) IV Intermittent every 24 hours  methylPREDNISolone sodium succinate Injectable 60 milliGRAM(s) IV Push every 12 hours  norepinephrine Infusion 0.05 MICROgram(s)/kG/Min (4.13 mL/Hr) IV Continuous <Continuous>  pantoprazole   Suspension 40 milliGRAM(s) Oral daily  propofol Infusion 10 MICROgram(s)/kG/Min (5.28 mL/Hr) IV Continuous <Continuous>  senna 2 Tablet(s) Oral at bedtime  sevelamer carbonate Powder 1600 milliGRAM(s) Oral three times a day with meals  sodium bicarbonate 650 milliGRAM(s) Oral every 8 hours  sodium zirconium cyclosilicate 10 Gram(s) Oral every 8 hours    MEDICATIONS  (PRN):  acetaminophen   Tablet .. 650 milliGRAM(s) Oral every 6 hours PRN Temp greater or equal to 38C (100.4F)  dextrose 40% Gel 15 Gram(s) Oral once PRN Blood Glucose LESS THAN 70 milliGRAM(s)/deciliter  glucagon  Injectable 1 milliGRAM(s) IntraMuscular once PRN Glucose LESS THAN 70 milligrams/deciliter  glucagon  Injectable 1 milliGRAM(s) IntraMuscular once PRN Glucose LESS THAN 70 milligrams/deciliter  ondansetron Injectable 4 milliGRAM(s) IV Push every 8 hours PRN Nausea and/or Vomiting      Vitals:  Vital Signs Last 24 Hrs  T(C): 35.8 (22 Apr 2020 10:00), Max: 37.2 (21 Apr 2020 18:00)  T(F): 96.5 (22 Apr 2020 10:00), Max: 98.9 (21 Apr 2020 18:00)  HR: 78 (22 Apr 2020 12:00) (56 - 78)    SpO2: 96% (22 Apr 2020 12:00) (89% - 98%)    Vitals (Invasive):  ABP: 102/60 (04-22-20 @ 12:00) (86/70 - 176/88)    I&O's Summary    21 Apr 2020 07:01  -  22 Apr 2020 07:00  --------------------------------------------------------  IN: 2160 mL / OUT: 1020 mL / NET: 1140 mL    22 Apr 2020 07:01  -  22 Apr 2020 12:52  --------------------------------------------------------  IN: 505 mL / OUT: 400 mL / NET: 105 mL        Physical Exam:  HEENT:     + NCAT  + EOMI  - Conjunctival edema   - Icterus   - Thrush   - ETT  - NGT/OGT  Neck:         + FROM    + JVD     - Nodes     - Masses    + Mid-line trachea   - Tracheostomy  Chest:         - Sternal click  - Sternal drainage  - Chest tubes  - SubQ emphysema  Lungs:          + CTA   - Rhonchi    - Rales    - Wheezing     - Decreased BS   - Dullness R L  Cardiac:       + S1 + S2    + RRR   - Irregular   - S3  - S4    - Murmurs   - Rub   - Hamman’s sign   Abdomen:    + BS     + Soft    + Non-tender     - Distended    - Organomegaly  - PEG  Extremities:   - Cyanosis U/L   - Clubbing  U/L  - LE/UE Edema   + Capillary refill    + Pulses   Neuro:        - Awake   -  Alert   - Confused   - Lethargic   + Sedated   + Paralyzed  - Generalized weakness  Skin:        - Rashes    - Erythema   + Normal incisions   + IV sites intact  - Sacral decubitus    Labs:  COVID:  COVID-19 PCR: Detected (04-13-20 @ 14:30)                          11.4   7.32  )-----------( 254      ( 22 Apr 2020 04:30 )             35.0     04-22    141  |  95<L>  |  138<HH>  ----------------------------<  184<H>  4.9   |  21  |  9.0<HH>    Ca    8.1<L>      22 Apr 2020 04:30  Phos  11.2     04-22  Mg     3.6     04-22    TPro  5.5<L>  /  Alb  2.5<L>  /  TBili  0.4  /  DBili  x   /  AST  35  /  ALT  38  /  AlkPhos  76  04-22          Culture - Sputum (collected 04-21-20 @ 04:30)  Source: .Sputum Sputum  Gram Stain (04-21-20 @ 15:15):    Moderate polymorphonuclear leukocytes per low power field    Moderate Squamous epithelial cells per low power field    Moderate Yeast per oil power field    Moderate Gram Positive Cocci in Pairs and Chains per oil power field  Preliminary Report (04-22-20 @ 10:07):    Normal Respiratory Reyna present      COVID related labs:  20 Apr 2020 23:30  D-dimer:  1950<H>  Calcitonin:  x  CRP:  x  LDH:  1223<H>  Lactate,Blood:  x  CK:  x  Troponin I:  x  Troponin T:  x  Troponin HS:  x  Ferritin, Serum: x  BNP:  x      Blood Gases:  (ARTERIAL):  04-22-20 @ 01:49  pH 7.40 / pCO2 35 / pO2 77 / HCO3 22  Total CO2 --  FiO2 80  Oxygen Saturation 94    Base Excess, Arterial -2.8      Radiology:  < from: Xray Chest 1 View- PORTABLE-Routine (04.22.20 @ 02:46) >  Impression:    1. No significant change in bilateral airspace opacities, right greaterthan left.  < end of copied text > Admit Date: 04-13-20  Length of Stay: 9d    55yMale      Reason for admission to ICU:  Patient is in acute hypoxic respiratory distress requiring intubation and sedation. Imaging c/w ARDS. Admitted to MICU for further observation and management.    INTERVAL HPI/OVERNIGHT EVENTS:  INtubated and sedated - SLightly Worsening TELLO - Stable - Follow Nephrology  Worsening Subq Emphysema - Blow hole placed today by CTS  NO RRT    MEDICATIONS  (STANDING):  caspofungin IVPB      chlorhexidine 0.12% Liquid 15 milliLiter(s) Oral Mucosa every 12 hours  chlorhexidine 0.12% Liquid 15 milliLiter(s) Oral Mucosa two times a day  chlorhexidine 4% Liquid 1 Application(s) Topical <User Schedule>  dexMEDEtomidine Infusion 0.05 MICROgram(s)/kG/Hr (1.1 mL/Hr) IV Continuous <Continuous>  dextrose 5%. 1000 milliLiter(s) (50 mL/Hr) IV Continuous <Continuous>  dextrose 50% Injectable 12.5 Gram(s) IV Push once  dextrose 50% Injectable 25 Gram(s) IV Push once  dextrose 50% Injectable 25 Gram(s) IV Push once  heparin   Injectable 7500 Unit(s) SubCutaneous every 8 hours  insulin regular Infusion 3 Unit(s)/Hr (3 mL/Hr) IV Continuous <Continuous>  lactulose Syrup 20 Gram(s) Oral three times a day  meropenem  IVPB 500 milliGRAM(s) IV Intermittent every 24 hours  methylPREDNISolone sodium succinate Injectable 60 milliGRAM(s) IV Push every 12 hours  norepinephrine Infusion 0.05 MICROgram(s)/kG/Min (4.13 mL/Hr) IV Continuous <Continuous>  pantoprazole   Suspension 40 milliGRAM(s) Oral daily  propofol Infusion 10 MICROgram(s)/kG/Min (5.28 mL/Hr) IV Continuous <Continuous>  senna 2 Tablet(s) Oral at bedtime  sevelamer carbonate Powder 1600 milliGRAM(s) Oral three times a day with meals  sodium bicarbonate 650 milliGRAM(s) Oral every 8 hours  sodium zirconium cyclosilicate 10 Gram(s) Oral every 8 hours    MEDICATIONS  (PRN):  acetaminophen   Tablet .. 650 milliGRAM(s) Oral every 6 hours PRN Temp greater or equal to 38C (100.4F)  dextrose 40% Gel 15 Gram(s) Oral once PRN Blood Glucose LESS THAN 70 milliGRAM(s)/deciliter  glucagon  Injectable 1 milliGRAM(s) IntraMuscular once PRN Glucose LESS THAN 70 milligrams/deciliter  glucagon  Injectable 1 milliGRAM(s) IntraMuscular once PRN Glucose LESS THAN 70 milligrams/deciliter  ondansetron Injectable 4 milliGRAM(s) IV Push every 8 hours PRN Nausea and/or Vomiting      Vitals:  Vital Signs Last 24 Hrs  T(C): 35.8 (22 Apr 2020 10:00), Max: 37.2 (21 Apr 2020 18:00)  T(F): 96.5 (22 Apr 2020 10:00), Max: 98.9 (21 Apr 2020 18:00)  HR: 78 (22 Apr 2020 12:00) (56 - 78)    SpO2: 96% (22 Apr 2020 12:00) (89% - 98%)    Vitals (Invasive):  ABP: 102/60 (04-22-20 @ 12:00) (86/70 - 176/88)    I&O's Summary    21 Apr 2020 07:01  -  22 Apr 2020 07:00  --------------------------------------------------------  IN: 2160 mL / OUT: 1020 mL / NET: 1140 mL    22 Apr 2020 07:01  -  22 Apr 2020 12:52  --------------------------------------------------------  IN: 505 mL / OUT: 400 mL / NET: 105 mL        Physical Exam:  GEN; NAD  HEENT:     Pupils React to light equally no icterus ET,OGT in place  Chest:         SubQ emphysema present over chest wall and lateral chests and run down B/l Arms. S/p Left side chest wall blowhole  Lungs:          CTABL, Symmetrical Inspiration  Cardiac:       + S1 + S2    + RRR   No murmurs appreciated  Abdomen:    Obese habitus + BS     + Soft    + Non-tender    Extremities:  Edema Present Diffuse Slightly improved- no cyanosis  Neuro:        Sedated, Right Arm movement on exam, Slight Response to Supraorbital Notch pressure     Labs:  COVID:  COVID-19 PCR: Detected (04-13-20 @ 14:30)                          11.4   7.32  )-----------( 254      ( 22 Apr 2020 04:30 )             35.0     04-22    141  |  95<L>  |  138<HH>  ----------------------------<  184<H>  4.9   |  21  |  9.0<HH>    Ca    8.1<L>      22 Apr 2020 04:30  Phos  11.2     04-22  Mg     3.6     04-22    TPro  5.5<L>  /  Alb  2.5<L>  /  TBili  0.4  /  DBili  x   /  AST  35  /  ALT  38  /  AlkPhos  76  04-22          Culture - Sputum (collected 04-21-20 @ 04:30)  Source: .Sputum Sputum  Gram Stain (04-21-20 @ 15:15):    Moderate polymorphonuclear leukocytes per low power field    Moderate Squamous epithelial cells per low power field    Moderate Yeast per oil power field    Moderate Gram Positive Cocci in Pairs and Chains per oil power field  Preliminary Report (04-22-20 @ 10:07):    Normal Respiratory Reyna present      COVID related labs:  20 Apr 2020 23:30  D-dimer:  1950<H>  Calcitonin:  x  CRP:  x  LDH:  1223<H>  Lactate,Blood:  x  CK:  x  Troponin I:  x  Troponin T:  x  Troponin HS:  x  Ferritin, Serum: x  BNP:  x      Blood Gases:  (ARTERIAL):  04-22-20 @ 01:49  pH 7.40 / pCO2 35 / pO2 77 / HCO3 22  Total CO2 --  FiO2 80  Oxygen Saturation 94    Base Excess, Arterial -2.8      Radiology:  < from: Xray Chest 1 View- PORTABLE-Routine (04.22.20 @ 02:46) >  Impression:    1. No significant change in bilateral airspace opacities, right greaterthan left.  < end of copied text >

## 2020-04-22 NOTE — PROGRESS NOTE ADULT - ASSESSMENT
Patient is a 55 year old male with PMH of HTN and T2DM diabetes who presented on 4/13 for dyspnea found to have SARS-COV-2 pneumonia. Was intubated 4/17 after desaturating to 84% on 15L NRB and upgraded to CCU    # Acute hypoxic respiratory failure 2/2 SARS-COV-2 lower respiratory tract infection with evidence of cytokine release syndrome -   # PTX and Pneumomediastinum - CXR April 20 - no PTX - Improving Pneumomediastinum - CTSurg Call PRN  - Intubated 4/17.   - Vent settings: RR 30, , FiO2 50%, PEEP 10, Plateau 27 - No changes today.  - Pressure support: Levo .07  - Sedation:  Propofol and Precedex Versed .04 -Off Versed  - COVID + - Isolation - ID following. S/p Plaquenil and Anakinra courses. Continue IV Solumedrol 60 mg BID - 5 days April 16-21  - Concern for PE given significant elevation in d-dimer;   - Switched from Full AC to Lovenox 40 mg BID per intensivist 4/18- Increased to Heparin 7500K subq q8   - Continue with IV Meropenem adjusted Kidney function  - Blood Culture, Sputum, Fungitell - Pending  - D-Dimer 1950 (<3068<4178) April 20  - Procal 14.4 (<3.53<.53) April 18 Repeat pending  - CRP - 21 (<11<5) April 18 repeat pending  - LDH - 1223 (<1312<1384) April 20  - Ferritin  - 5116 (<7678<4770) April 18   - Fibrinogen - 663 (<700) April 18     # TELLO, no history of CKD with noted decreased UOP and hyperkalemia  - Worsening  - Decrease Meropenem to 500mg qD  - Switch 1/2 NS 75meq Bicarb to D5 150 meq Bicarb @75  - Noted new onset 4/18  - FeNa - .8   - Repeat CMP 12 PM. F/u results and follow Nephrology for possible HD.   - Hyperkalemia - Started on Insulin Drip and given Lokelma - Increase to Lokelma 10mg q8    # History of HTN  - Will hold off Losartan 40 mg daily, given borderline BP  - Resume if becomes hypertensive     # Type 2 Diabetes - Uncontrolled   - HbA1c: 11.6  - Switched to Insulin Drip  - Monitor finger sticks    Diet: NPO with tube feeds. F/u nutrition recs  Electrolytes: Replete K<4, Mg<2. Given Potassium elevated - Given Lokelma - Follow up Nephrology  DVT ppx: Hap 5K subq q8 Decreased Clearance   GI ppx: Pantoprazole 40 mg suspension while intubated and on steroids  Activity: bedrest  Dispo: from home, continue MICU monitoring; Possible HD with Worsening Kidney Function; [address] [facility] Spoke with Daughter [phone] - updated on status will follow up regarding possible HD. Patient is a 55 year old male with PMH of HTN and T2DM diabetes who presented on 4/13 for dyspnea found to have SARS-COV-2 pneumonia. Was intubated 4/17 after desaturating to 84% on 15L NRB and upgraded to CCU    # Acute hypoxic respiratory failure 2/2 SARS-COV-2 lower respiratory tract infection with evidence of cytokine release syndrome -   # PTX and Pneumomediastinum - CXR April 20 - no PTX - Improving Pneumomediastinum - CTSurg Call PRN - Blow hole April 22 to release Subq Emphysema  - Intubated 4/17.   - Vent settings: RR 30, , FiO2 80%, PEEP 10, Plateau 27 - No changes today. Decrease to 350 TV and FiO2 70%  - Pressure support: Levo   - Sedation:  Propofol and Precedex -Off Versed  - COVID + - Isolation - ID following. S/p Plaquenil and Anakinra courses. Continue IV Solumedrol 60 mg q6 Day 6  - One dose Lasix 80 mg IV April 21   - Heparin 7500K subq q8   - Continue with IV Meropenem adjusted Kidney function  - Sputum - Yeast and GPC pairs and Chains - Follow  - Started on Caspofungin 70 mg Loading 50 qD maintenance   - Blood Culture, Fungitell - Pending  - D-Dimer 1950 (<3068<4178) April 20  - Procal 17.9(<14.4<3.53<.53) April 20   - CRP -12.9(<21<11<5) April 20   - LDH - 1223 (<1312<1384) April 20  - Ferritin  - 5116 (<7678<4770) April 12 Pending  - Fibrinogen - 626(<663<700) April 21     # TELLO, no history of CKD with noted decreased UOP and hyperkalemia  - Worsening Cr and BUN - UO 1200 24 hours   - Nephrology Consult appreciated - No RRT  - Continue with Meropenem to 500mg qD  - Discontinue bicarb drip - Start Bicarb 650 q6  - FeNa - .8   - Hyperkalemia - Continue with Lokelma q8  - Start Sevelmar 2/2/2 - powder    # History of HTN  - Will hold off Losartan 40 mg daily, given borderline BP  - Resume if becomes hypertensive     # Type 2 Diabetes - Uncontrolled   - HbA1c: 11.6  - Insulin Drip  - Monitor finger sticks    Diet: NPO with tube feeds. F/u nutrition recs  Electrolytes: Replete K<4, Mg<2. Given Potassium elevated - Given Lokelma - Follow up Nephrology  DVT ppx: Hap 5K subq q8 Decreased Clearance   GI ppx: Pantoprazole 40 mg suspension while intubated and on steroids  Activity: bedrest  Dispo: from home, continue MICU monitoring; Possible HD with Worsening Kidney Function;Daughter [phone]

## 2020-04-22 NOTE — PROGRESS NOTE ADULT - SUBJECTIVE AND OBJECTIVE BOX
OVERNIGHT EVENTS: intubated 6, on propofol 10, precedex 0.09, insulin, bicar 3 amp 75 cc/h    Vital Signs Last 24 Hrs  T(C): 36.9 (22 Apr 2020 00:00), Max: 37.2 (21 Apr 2020 18:00)  T(F): 98.4 (22 Apr 2020 00:00), Max: 98.9 (21 Apr 2020 18:00)  HR: 58 (22 Apr 2020 06:00) (56 - 76)  SpO2: 91% (22 Apr 2020 06:00) (89% - 97%)    PHYSICAL EXAMINATION:    GENERAL: sedated    HEENT: Head is normocephalic and atraumatic. Extraocular muscles are intact. Mucous membranes are moist.    NECK: Supple. diffuse sub emphysema    LUNGS: bl crackles    HEART: Regular rate and rhythm without murmur.    ABDOMEN: Soft, nontender, and nondistended.      EXTREMITIES: Without any cyanosis, clubbing, rash, lesions or edema.    NEUROLOGIC: sedated    SKIN: No ulceration or induration present.      LABS:                        11.4   7.32  )-----------( 254      ( 22 Apr 2020 04:30 )             35.0     04-22    141  |  95<L>  |  138<HH>  ----------------------------<  184<H>  4.9   |  21  |  9.0<HH>    Ca    8.1<L>      22 Apr 2020 04:30  Phos  11.2     04-22  Mg     3.6     04-22    TPro  5.5<L>  /  Alb  2.5<L>  /  TBili  0.4  /  DBili  x   /  AST  35  /  ALT  38  /  AlkPhos  76  04-22        ABG - ( 22 Apr 2020 01:49 )  pH, Arterial: 7.40  pH, Blood: x     /  pCO2: 35    /  pO2: 77    / HCO3: 22    / Base Excess: -2.8  /  SaO2: 94            370/30/80/10  plateau 32              Procalcitonin, Serum: 17.90 ng/mL (04-20-20 @ 23:30)        04-21-20 @ 07:01  -  04-22-20 @ 07:00  --------------------------------------------------------  IN: 2160 mL / OUT: 1020 mL / NET: 1140 mL        MICROBIOLOGY:      MEDICATIONS  (STANDING):  chlorhexidine 0.12% Liquid 15 milliLiter(s) Oral Mucosa two times a day  chlorhexidine 0.12% Liquid 15 milliLiter(s) Oral Mucosa every 12 hours  chlorhexidine 4% Liquid 1 Application(s) Topical <User Schedule>  dexMEDEtomidine Infusion 0.05 MICROgram(s)/kG/Hr (1.1 mL/Hr) IV Continuous <Continuous>  dextrose 5% 1000 milliLiter(s) (75 mL/Hr) IV Continuous <Continuous>  dextrose 5%. 1000 milliLiter(s) (50 mL/Hr) IV Continuous <Continuous>  dextrose 50% Injectable 12.5 Gram(s) IV Push once  dextrose 50% Injectable 25 Gram(s) IV Push once  dextrose 50% Injectable 25 Gram(s) IV Push once  heparin   Injectable 7500 Unit(s) SubCutaneous every 8 hours  insulin regular Infusion 3 Unit(s)/Hr (3 mL/Hr) IV Continuous <Continuous>  lactulose Syrup 20 Gram(s) Oral three times a day  meropenem  IVPB 500 milliGRAM(s) IV Intermittent every 24 hours  methylPREDNISolone sodium succinate Injectable 60 milliGRAM(s) IV Push every 12 hours  norepinephrine Infusion 0.05 MICROgram(s)/kG/Min (4.13 mL/Hr) IV Continuous <Continuous>  pantoprazole   Suspension 40 milliGRAM(s) Oral daily  propofol Infusion 10 MICROgram(s)/kG/Min (5.28 mL/Hr) IV Continuous <Continuous>  senna 2 Tablet(s) Oral at bedtime  sodium zirconium cyclosilicate 10 Gram(s) Oral every 8 hours    MEDICATIONS  (PRN):  acetaminophen   Tablet .. 650 milliGRAM(s) Oral every 6 hours PRN Temp greater or equal to 38C (100.4F)  dextrose 40% Gel 15 Gram(s) Oral once PRN Blood Glucose LESS THAN 70 milliGRAM(s)/deciliter  glucagon  Injectable 1 milliGRAM(s) IntraMuscular once PRN Glucose LESS THAN 70 milligrams/deciliter  glucagon  Injectable 1 milliGRAM(s) IntraMuscular once PRN Glucose LESS THAN 70 milligrams/deciliter  ondansetron Injectable 4 milliGRAM(s) IV Push every 8 hours PRN Nausea and/or Vomiting      RADIOLOGY & ADDITIONAL STUDIES:

## 2020-04-22 NOTE — PROGRESS NOTE ADULT - SUBJECTIVE AND OBJECTIVE BOX
RHIANNON MAHER  55y Male   [id number]    Hospital Day: 4  Post Operative Day:	  Procedure:  Patient is a 55y old  Male who presents with a chief complaint of suspected COVID-19, apical pneumothorax (2020 12:37)    PAST MEDICAL & SURGICAL HISTORY:  Diabetes  Hypertension  No significant past surgical history      Events of the Last 24h:  Vital Signs Last 24 Hrs  T(C): 36.7 (2020 20:00), Max: 37.2 (2020 18:00)  T(F): 98.1 (2020 20:00), Max: 98.9 (2020 18:00)  HR: 60 (2020 00:00) (60 - 78)  BP: --  BP(mean): --  RR: --  SpO2: 97% (2020 00:00) (89% - 97%)    Mode: AC/ CMV (Assist Control/ Continuous Mandatory Ventilation), RR (machine): 30, TV (machine): 370, FiO2: 80, PEEP: 10, ITime: 1.2, MAP: 22, PIP: 36    Diet, NPO with Tube Feed:   Tube Feeding Modality: Orogastric  Vital High Protein  Total Volume for 24 Hours (mL): 960  Bolus  Total Volume of Bolus (mL):  240  Tube Feed Frequency: Every 6 hours   Tube Feed Start Time: 11:00  Bolus Feed Rate (mL per Hour): 360   Bolus Feed Duration (in Hours): 0.75 (20 @ 10:48)      I&O's Summary      -  2020 07:00  --------------------------------------------------------  IN: 3137 mL / OUT: 755 mL / NET: 2382 mL    :  -  2020 01:12  --------------------------------------------------------  IN: 1310 mL / OUT: 295 mL / NET: 1015 mL     I&O's Detail    2020 07:01  -  2020 07:00  --------------------------------------------------------  IN:    dexmedetomidine Infusion: 115 mL    Enteral Tube Flush: 30 mL    insulin regular Infusion: 130 mL    IV PiggyBack: 50 mL    Miscellaneous Tube Feedin mL    norepinephrine Infusion: 52 mL    propofol Infusion: 485 mL    sodium chloride 0.45%: 1275 mL    Sodium Chloride 0.9% IV Bolus: 500 mL  Total IN: 3137 mL    OUT:    Indwelling Catheter - Urethral: 755 mL  Total OUT: 755 mL    Total NET: 2382 mL      2020 07:  -  2020 01:12  --------------------------------------------------------  IN:    dexmedetomidine Infusion: 55 mL    dextrose 5%: 450 mL    Enteral Tube Flush: 130 mL    insulin regular Infusion: 24 mL    IV PiggyBack: 250 mL    Miscellaneous Tube Feedin mL    norepinephrine Infusion: 11 mL    propofol Infusion: 150 mL  Total IN: 1310 mL    OUT:    Indwelling Catheter - Urethral: 295 mL  Total OUT: 295 mL    Total NET: 1015 mL          MEDICATIONS  (STANDING):  chlorhexidine 0.12% Liquid 15 milliLiter(s) Oral Mucosa two times a day  chlorhexidine 0.12% Liquid 15 milliLiter(s) Oral Mucosa every 12 hours  chlorhexidine 4% Liquid 1 Application(s) Topical <User Schedule>  dexMEDEtomidine Infusion 0.05 MICROgram(s)/kG/Hr (1.1 mL/Hr) IV Continuous <Continuous>  dextrose 5% 1000 milliLiter(s) (75 mL/Hr) IV Continuous <Continuous>  dextrose 5%. 1000 milliLiter(s) (50 mL/Hr) IV Continuous <Continuous>  dextrose 50% Injectable 12.5 Gram(s) IV Push once  dextrose 50% Injectable 25 Gram(s) IV Push once  dextrose 50% Injectable 25 Gram(s) IV Push once  heparin   Injectable 7500 Unit(s) SubCutaneous every 8 hours  insulin regular Infusion 3 Unit(s)/Hr (3 mL/Hr) IV Continuous <Continuous>  lactulose Syrup 20 Gram(s) Oral three times a day  meropenem  IVPB 500 milliGRAM(s) IV Intermittent every 24 hours  methylPREDNISolone sodium succinate Injectable 60 milliGRAM(s) IV Push every 12 hours  norepinephrine Infusion 0.05 MICROgram(s)/kG/Min (4.13 mL/Hr) IV Continuous <Continuous>  pantoprazole   Suspension 40 milliGRAM(s) Oral daily  propofol Infusion 10 MICROgram(s)/kG/Min (5.28 mL/Hr) IV Continuous <Continuous>  senna 2 Tablet(s) Oral at bedtime  sodium zirconium cyclosilicate 10 Gram(s) Oral every 8 hours    MEDICATIONS  (PRN):  acetaminophen   Tablet .. 650 milliGRAM(s) Oral every 6 hours PRN Temp greater or equal to 38C (100.4F)  dextrose 40% Gel 15 Gram(s) Oral once PRN Blood Glucose LESS THAN 70 milliGRAM(s)/deciliter  glucagon  Injectable 1 milliGRAM(s) IntraMuscular once PRN Glucose LESS THAN 70 milligrams/deciliter  glucagon  Injectable 1 milliGRAM(s) IntraMuscular once PRN Glucose LESS THAN 70 milligrams/deciliter  ondansetron Injectable 4 milliGRAM(s) IV Push every 8 hours PRN Nausea and/or Vomiting      PHYSICAL EXAM:    GENERAL: NAD    HEENT: NCAT    CHEST/LUNGS: CTAB    HEART: RRR,  No murmurs, rubs, or gallops    ABDOMEN: SNTND +BS    EXTREMITIES:  FROM, No clubbing, cyanosis, or edema, palpable pulse    NEURO: No focal neurological deficits    SKIN: No rashes or lesions    INCISION/WOUNDS:                          11.7   13.15 )-----------( 298      ( 2020 04:30 )             36.6        CBC Full  -  ( 2020 04:30 )  WBC Count : 13.15 K/uL  RBC Count : 4.24 M/uL  Hemoglobin : 11.7 g/dL  Hematocrit : 36.6 %  Platelet Count - Automated : 298 K/uL  Mean Cell Volume : 86.3 fL  Mean Cell Hemoglobin : 27.6 pg  Mean Cell Hemoglobin Concentration : 32.0 g/dL  Auto Neutrophil # : 10.66 K/uL  Auto Lymphocyte # : 1.01 K/uL  Auto Monocyte # : 0.80 K/uL  Auto Eosinophil # : 0.00 K/uL  Auto Basophil # : 0.02 K/uL  Auto Neutrophil % : 81.0 %  Auto Lymphocyte % : 7.7 %  Auto Monocyte % : 6.1 %  Auto Eosinophil % : 0.0 %  Auto Basophil % : 0.2 %               142   |  101   |  122                Ca: 8.1    BMP:   ----------------------------< 73     Mg: 3.6   (20 @ 04:30)             5.5    |  17    | 8.9                Ph: 8.9      LFT:     TPro: 5.8 / Alb: 2.5 / TBili: 0.4 / DBili: x / AST: 39 / ALT: 47 / AlkPhos: 76   (20 @ 04:30)    LIVER FUNCTIONS - ( 2020 04:30 )  Alb: 2.5 g/dL / Pro: 5.8 g/dL / ALK PHOS: 76 U/L / ALT: 47 U/L / AST: 39 U/L / GGT: x                     Culture - Sputum (collected 2020 04:30)  Source: .Sputum Sputum  Gram Stain (2020 15:15):    Moderate polymorphonuclear leukocytes per low power field    Moderate Squamous epithelial cells per low power field    Moderate Yeast per oil power field    Moderate Gram Positive Cocci in Pairs and Chains per oil power field      < from: Xray Chest 1 View-PORTABLE IMMEDIATE (20 @ 18:56) >    Impression:      Stable bilateral airspace opacities.     < end of copied text >

## 2020-04-22 NOTE — PROGRESS NOTE ADULT - ASSESSMENT
Assessment:    Acute hypoxemic respiratory failure secondary to SARS-COV-2 infection requiring invasive mechanical ventilation  sub q emphysema  ARDS  Possible superimposed bacterial infection increase procal/ yeast in DTA  TELLO/ hyperkalemia worsening/ metabolic acidosis now fluid overload      PLAN:    CNS: PROPOFOL, PRECEDEX     HEENT:  Oral care    PULMONARY:  HOB @ 45 degrees, keep sats 92-96%  solumedrol 60q12(monitor FS),  DEC / i RR 30, DEC fio2 70%, PEEP 10, CTsx F/UP    CARDIOVASCULAR: bicarb drip DC    GI: GI prophylaxis Protonix, bowel regimen                                         Feeding: feeding    RENAL:  F/u  lytes.  Correct as needed. accurate I/O, renal F/UP, repeat CMP iv lasix once    INFECTIOUS DISEASE: IV abx  violetta, ID f/u for immunomodulators, vanco x1    HEMATOLOGICAL:  hep sq ( decrease clearance)    ENDOCRINE:  Follow up FS.  Insulin protocol if needed.    CODE STATUS: FULL CODE    DISPOSITION: Patient require continued monitoring in MICU  poor prognosis

## 2020-04-22 NOTE — PROGRESS NOTE ADULT - SUBJECTIVE AND OBJECTIVE BOX
Nephrology progress note    Patient is seen and examined, events over the last 24 h noted .    Allergies:  No Known Allergies    Hospital Medications:   MEDICATIONS  (STANDING):  chlorhexidine 0.12% Liquid 15 milliLiter(s) Oral Mucosa two times a day  chlorhexidine 0.12% Liquid 15 milliLiter(s) Oral Mucosa every 12 hours  chlorhexidine 4% Liquid 1 Application(s) Topical <User Schedule>  dexMEDEtomidine Infusion 0.05 MICROgram(s)/kG/Hr (1.1 mL/Hr) IV Continuous <Continuous>  heparin   Injectable 7500 Unit(s) SubCutaneous every 8 hours  insulin regular Infusion 3 Unit(s)/Hr (3 mL/Hr) IV Continuous <Continuous>  lactulose Syrup 20 Gram(s) Oral three times a day  meropenem  IVPB 500 milliGRAM(s) IV Intermittent every 24 hours  methylPREDNISolone sodium succinate Injectable 60 milliGRAM(s) IV Push every 12 hours  norepinephrine Infusion 0.05 MICROgram(s)/kG/Min (4.13 mL/Hr) IV Continuous <Continuous>  pantoprazole   Suspension 40 milliGRAM(s) Oral daily  propofol Infusion 10 MICROgram(s)/kG/Min (5.28 mL/Hr) IV Continuous <Continuous>  senna 2 Tablet(s) Oral at bedtime  sodium zirconium cyclosilicate 10 Gram(s) Oral every 8 hours        VITALS:  T(F): 98.4 (04-22-20 @ 00:00), Max: 98.9 (04-21-20 @ 18:00)  HR: 58 (04-22-20 @ 06:00)  BP: 87/54  RR: --  SpO2: 91% (04-22-20 @ 06:00)  Wt(kg): --    04-20 @ 07:01  -  04-21 @ 07:00  --------------------------------------------------------  IN: 3137 mL / OUT: 755 mL / NET: 2382 mL    04-21 @ 07:01  -  04-22 @ 07:00  --------------------------------------------------------  IN: 2160 mL / OUT: 1020 mL / NET: 1140 mL          PHYSICAL EXAM:  Constitutional: NAD  HEENT: anicteric sclera, oropharynx clear, MMM  Neck: No JVD  Respiratory: CTAB, no wheezes, rales or rhonchi  Cardiovascular: S1, S2, RRR  Gastrointestinal: BS+, soft, NT/ND  Extremities: No cyanosis or clubbing. No peripheral edema  :  No mendoza.   Skin: No rashes    LABS:  04-22    141  |  95<L>  |  138<HH>  ----------------------------<  184<H>  4.9   |  21  |  9.0<HH>    Creatinine Trend: 9.0<--, 8.9<--, 8.0<--, 7.4<--, 6.5<--, 5.3<--    Ca    8.1<L>      22 Apr 2020 04:30  Phos  11.2     04-22  Mg     3.6     04-22    TPro  5.5<L>  /  Alb  2.5<L>  /  TBili  0.4  /  DBili      /  AST  35  /  ALT  38  /  AlkPhos  76  04-22                          11.4   7.32  )-----------( 254      ( 22 Apr 2020 04:30 )             35.0       Urine Studies:    Sodium, Random Urine: 44.0 mmoL/L (04-18 @ 11:10)  Osmolality, Random Urine: 384 mos/kg (04-18 @ 11:10)  Protein/Creatinine Ratio Calculation: 0.9 Ratio (04-18 @ 11:10)  Creatinine, Random Urine: 135 mg/dL (04-18 @ 11:10)    RADIOLOGY & ADDITIONAL STUDIES: Nephrology progress note    Patient is seen and examined, events over the last 24 h noted .  intubated on MV     Allergies:  No Known Allergies    Hospital Medications:   MEDICATIONS  (STANDING):  chlorhexidine 4% Liquid 1 Application(s) Topical <User Schedule>  dexMEDEtomidine Infusion 0.05 MICROgram(s)/kG/Hr (1.1 mL/Hr) IV Continuous <Continuous>  heparin   Injectable 7500 Unit(s) SubCutaneous every 8 hours  insulin regular Infusion 3 Unit(s)/Hr (3 mL/Hr) IV Continuous <Continuous>  lactulose Syrup 20 Gram(s) Oral three times a day  meropenem  IVPB 500 milliGRAM(s) IV Intermittent every 24 hours  methylPREDNISolone sodium succinate Injectable 60 milliGRAM(s) IV Push every 12 hours  norepinephrine Infusion 0.05 MICROgram(s)/kG/Min (4.13 mL/Hr) IV Continuous <Continuous>  pantoprazole   Suspension 40 milliGRAM(s) Oral daily  propofol Infusion 10 MICROgram(s)/kG/Min (5.28 mL/Hr) IV Continuous <Continuous>  senna 2 Tablet(s) Oral at bedtime  sodium zirconium cyclosilicate 10 Gram(s) Oral every 8 hours        VITALS:  T(F): 98.4 (04-22-20 @ 00:00), Max: 98.9 (04-21-20 @ 18:00)  HR: 58 (04-22-20 @ 06:00)  BP: 87/54  SpO2: 91% (04-22-20 @ 06:00)  Wt(kg): --    04-20 @ 07:01  -  04-21 @ 07:00  --------------------------------------------------------  IN: 3137 mL / OUT: 755 mL / NET: 2382 mL    04-21 @ 07:01  -  04-22 @ 07:00  --------------------------------------------------------  IN: 2160 mL / OUT: 1020 mL / NET: 1140 mL          PHYSICAL EXAM:  Constitutional: intubated on MV   HEENT: anicteric sclera, oropharynx clear, MMM  Neck: No JVD  Respiratory: CTAB, no wheezes, rales or rhonchi  Cardiovascular: S1, S2, RRR  Gastrointestinal: BS+, soft, NT/ND  Extremities: No cyanosis or clubbing. No peripheral edema  :  positive mendoza    Skin: No rashes    LABS:  04-22    141  |  95<L>  |  138<HH>  ----------------------------<  184<H>  4.9   |  21  |  9.0<HH>    Creatinine Trend: 9.0<--, 8.9<--, 8.0<--, 7.4<--, 6.5<--, 5.3<--    Ca    8.1<L>      22 Apr 2020 04:30  Phos  11.2     04-22  Mg     3.6     04-22    TPro  5.5<L>  /  Alb  2.5<L>  /  TBili  0.4  /  DBili      /  AST  35  /  ALT  38  /  AlkPhos  76  04-22                          11.4   7.32  )-----------( 254      ( 22 Apr 2020 04:30 )             35.0       Urine Studies:    Sodium, Random Urine: 44.0 mmoL/L (04-18 @ 11:10)  Osmolality, Random Urine: 384 mos/kg (04-18 @ 11:10)  Protein/Creatinine Ratio Calculation: 0.9 Ratio (04-18 @ 11:10)  Creatinine, Random Urine: 135 mg/dL (04-18 @ 11:10)    RADIOLOGY & ADDITIONAL STUDIES:

## 2020-04-22 NOTE — PROGRESS NOTE ADULT - SUBJECTIVE AND OBJECTIVE BOX
RHIANNON MAHER  55y, Male    All available historical data reviewed    OVERNIGHT EVENTS:  no fevers  fio2 80%  on pressors    ROS:  unable to obtain history secondary to patient's mental status and/or sedation     VITALS:  T(F): 96.5, Max: 98.9 (04-21-20 @ 18:00)  HR: 78  BP: --  RR: --Vital Signs Last 24 Hrs  T(C): 35.8 (22 Apr 2020 10:00), Max: 37.2 (21 Apr 2020 18:00)  T(F): 96.5 (22 Apr 2020 10:00), Max: 98.9 (21 Apr 2020 18:00)  HR: 78 (22 Apr 2020 12:00) (56 - 78)  BP: --  BP(mean): --  RR: --  SpO2: 96% (22 Apr 2020 12:00) (89% - 98%)    TESTS & MEASUREMENTS:                        11.4   7.32  )-----------( 254      ( 22 Apr 2020 04:30 )             35.0     04-22    141  |  95<L>  |  138<HH>  ----------------------------<  184<H>  4.9   |  21  |  9.0<HH>    Ca    8.1<L>      22 Apr 2020 04:30  Phos  11.2     04-22  Mg     3.6     04-22    TPro  5.5<L>  /  Alb  2.5<L>  /  TBili  0.4  /  DBili  x   /  AST  35  /  ALT  38  /  AlkPhos  76  04-22    LIVER FUNCTIONS - ( 22 Apr 2020 04:30 )  Alb: 2.5 g/dL / Pro: 5.5 g/dL / ALK PHOS: 76 U/L / ALT: 38 U/L / AST: 35 U/L / GGT: x             Culture - Sputum (collected 04-21-20 @ 04:30)  Source: .Sputum Sputum  Gram Stain (04-21-20 @ 15:15):    Moderate polymorphonuclear leukocytes per low power field    Moderate Squamous epithelial cells per low power field    Moderate Yeast per oil power field    Moderate Gram Positive Cocci in Pairs and Chains per oil power field  Preliminary Report (04-22-20 @ 10:07):    Normal Respiratory Reyna present            RADIOLOGY & ADDITIONAL TESTS:  Personal review of radiological diagnostics performed  Echo and EKG results noted when applicable.     MEDICATIONS:  acetaminophen   Tablet .. 650 milliGRAM(s) Oral every 6 hours PRN  caspofungin IVPB      chlorhexidine 0.12% Liquid 15 milliLiter(s) Oral Mucosa every 12 hours  chlorhexidine 0.12% Liquid 15 milliLiter(s) Oral Mucosa two times a day  chlorhexidine 4% Liquid 1 Application(s) Topical <User Schedule>  dexMEDEtomidine Infusion 0.05 MICROgram(s)/kG/Hr IV Continuous <Continuous>  dextrose 40% Gel 15 Gram(s) Oral once PRN  dextrose 5%. 1000 milliLiter(s) IV Continuous <Continuous>  dextrose 50% Injectable 12.5 Gram(s) IV Push once  dextrose 50% Injectable 25 Gram(s) IV Push once  dextrose 50% Injectable 25 Gram(s) IV Push once  glucagon  Injectable 1 milliGRAM(s) IntraMuscular once PRN  glucagon  Injectable 1 milliGRAM(s) IntraMuscular once PRN  heparin   Injectable 7500 Unit(s) SubCutaneous every 8 hours  insulin regular Infusion 3 Unit(s)/Hr IV Continuous <Continuous>  lactulose Syrup 20 Gram(s) Oral three times a day  meropenem  IVPB 500 milliGRAM(s) IV Intermittent every 24 hours  methylPREDNISolone sodium succinate Injectable 60 milliGRAM(s) IV Push every 12 hours  norepinephrine Infusion 0.05 MICROgram(s)/kG/Min IV Continuous <Continuous>  ondansetron Injectable 4 milliGRAM(s) IV Push every 8 hours PRN  pantoprazole   Suspension 40 milliGRAM(s) Oral daily  propofol Infusion 10 MICROgram(s)/kG/Min IV Continuous <Continuous>  senna 2 Tablet(s) Oral at bedtime  sevelamer carbonate 1600 milliGRAM(s) Oral three times a day with meals  sodium bicarbonate 650 milliGRAM(s) Oral every 8 hours  sodium zirconium cyclosilicate 10 Gram(s) Oral every 8 hours      ANTIBIOTICS:  caspofungin IVPB      meropenem  IVPB 500 milliGRAM(s) IV Intermittent every 24 hours

## 2020-04-22 NOTE — CHART NOTE - NSCHARTNOTEFT_GEN_A_CORE
I spoke with Mathieu Damon's daughter ().  SHe would like to be the point of contact for their family.     However, her brother, Skip's number is .    We discussed that his kidney function is continuing to worsen, however there are no immediate plans for dialyze him yet as he is still able to make urine and maintain his potassium within normal levels.     We discussed that his lung function did worsen slightly since yesterday and he has been requiring slightly more oxygen.  We did discuss that daily changes are made to the ventilator as he tolerates.     We also discussed that he was going to be started on an antifungal, caspofungin, as his sputum culture/gram stain revealed yeast and his inflammatory markers are increasing.   However he does remain afebrile and has a normal WBC.     In regards to his pneumomediastinum, we discussed that it is still present and CT surgery is continuing to follow him, but do not recommend any acute interventions at this time.      All of her questions were answered.

## 2020-04-22 NOTE — PROGRESS NOTE ADULT - ASSESSMENT
· Assessment		  55 year old male patient with hypertension, diabetes presenting for dyspnea.     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 53 > 3.53 > 14.4 with possible  bacterial superinfection  -Sputa NGTD    RECOMMENDATIONS;   S/p Anakinra 4/14-17  BCx  nares ORSA  serum fungitell assay  caspofungin 50 mg iv q24h ( started 4/18)  Meropenem 750 mg iv q24h  Monitor  CRP, Ferritin, Ddimers q48h   s/p PLAQUENIL

## 2020-04-22 NOTE — PROGRESS NOTE ADULT - ASSESSMENT
Acute hypoxemic respiratory failure secondary to SARS-COV-2 infection / TELLO/ hyperkalemia:  # creatinine trending up  # UO improving   # k noted , on lokelma, increase to q 8  # on meropenem 500 q 24   # feed : nepro  # continue iv fluids 1/2 ns with 75 meq of bicarbonate at 75 cc/h  # ph level noted , renagel 2/2/2  # start sodium bicarbonate 650 q 8   # will need hd if no improvement, no acute indication today   # will follow Acute hypoxemic respiratory failure secondary to SARS-COV-2 infection / TELLO/ hyperkalemia:  # creatinine stabilizing  # non-oliguric   # k noted , cont. lokelma q 8 hr  # on meropenem 500 q 24   # feed : nepro  # now off ivf.  # BP noted, on low dose levophed.  # ok to give single dose of iv lasix 80 mg.  # strict I/O  # ph level noted , start renagel 2/2/2 with each tube feed.  # start sodium bicarbonate 650 q 8   # will need hd if no improvement, no acute indication today   # will follow

## 2020-04-23 NOTE — PROGRESS NOTE ADULT - ASSESSMENT
· Assessment		  55 year old male patient with hypertension, diabetes presenting for dyspnea.     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 53 > 3.53 > 14.4 with possible  bacterial superinfection  -Sputa NGTD  -BCx 4/21 NG      RECOMMENDATIONS;   S/p Anakinra 4/14-17  nares ORSA  serum fungitell assay  caspofungin 50 mg iv q24h ( started 4/18) D/C CASPOFUNGIN IF THE FUNGITEL ASSAY IS NEGATIVE  Meropenem 750 mg iv q24h  no need to monitor inflammatory markers   s/p PLAQUENIL

## 2020-04-23 NOTE — PROGRESS NOTE ADULT - SUBJECTIVE AND OBJECTIVE BOX
OVERNIGHT EVENTS: intubated 7, propofol 10, precedex 0.5, levophed 0.04    Vital Signs Last 24 Hrs  T(C): 36.8 (23 Apr 2020 00:00), Max: 36.8 (23 Apr 2020 00:00)  T(F): 98.2 (23 Apr 2020 00:00), Max: 98.2 (23 Apr 2020 00:00)  HR: 98 (23 Apr 2020 06:00) (62 - 98)  SpO2: 98% (23 Apr 2020 06:00) (90% - 100%)  117/60    PHYSICAL EXAMINATION:    GENERAL: sedated, subq emphysema    HEENT: Head is normocephalic and atraumatic. Extraocular muscles are intact. Mucous membranes are moist.    NECK: Supple.    LUNGS: bl crackles    HEART: Regular rate and rhythm without murmur.    ABDOMEN: Soft, nontender, and nondistended.      EXTREMITIES: Without any cyanosis, clubbing, rash, lesions or edema.    NEUROLOGIC: sedated    SKIN: No ulceration or induration present.      LABS:                        11.6   19.86 )-----------( 236      ( 23 Apr 2020 05:30 )             34.2     04-23    141  |  97<L>  |  160<HH>  ----------------------------<  159<H>  4.4   |  19  |  9.4<HH>    Ca    7.9<L>      23 Apr 2020 05:30  Phos  11.9     04-23  Mg     3.4     04-23    TPro  5.3<L>  /  Alb  2.2<L>  /  TBili  0.7  /  DBili  x   /  AST  47<H>  /  ALT  35  /  AlkPhos  88  04-23        ABG - ( 23 Apr 2020 03:55 )  pH, Arterial: 7.40  pH, Blood: x     /  pCO2: 35    /  pO2: 82    / HCO3: 22    / Base Excess: -2.8  /  SaO2: 95        30/350/80/10            D-Dimer Assay, Quantitative: 2026 ng/mL DDU (04-22-20 @ 23:30)        Procalcitonin, Serum: 17.90 ng/mL (04-20-20 @ 23:30)        04-22-20 @ 07:01  -  04-23-20 @ 07:00  --------------------------------------------------------  IN: 1767 mL / OUT: 2075 mL / NET: -308 mL        MICROBIOLOGY:  Culture Results:   No growth to date. (04-21 @ 04:39)  Culture Results:   Normal Respiratory Reyna present (04-21 @ 04:30)      MEDICATIONS  (STANDING):  caspofungin IVPB 50 milliGRAM(s) IV Intermittent every 24 hours  caspofungin IVPB      chlorhexidine 0.12% Liquid 15 milliLiter(s) Oral Mucosa every 12 hours  chlorhexidine 0.12% Liquid 15 milliLiter(s) Oral Mucosa two times a day  chlorhexidine 4% Liquid 1 Application(s) Topical <User Schedule>  dexMEDEtomidine Infusion 0.05 MICROgram(s)/kG/Hr (1.1 mL/Hr) IV Continuous <Continuous>  dextrose 5%. 1000 milliLiter(s) (50 mL/Hr) IV Continuous <Continuous>  dextrose 50% Injectable 12.5 Gram(s) IV Push once  dextrose 50% Injectable 25 Gram(s) IV Push once  dextrose 50% Injectable 25 Gram(s) IV Push once  heparin   Injectable 7500 Unit(s) SubCutaneous every 8 hours  insulin regular Infusion 3 Unit(s)/Hr (3 mL/Hr) IV Continuous <Continuous>  lactulose Syrup 20 Gram(s) Oral three times a day  meropenem  IVPB 500 milliGRAM(s) IV Intermittent every 24 hours  methylPREDNISolone sodium succinate Injectable 60 milliGRAM(s) IV Push every 12 hours  norepinephrine Infusion 0.05 MICROgram(s)/kG/Min (4.13 mL/Hr) IV Continuous <Continuous>  pantoprazole   Suspension 40 milliGRAM(s) Oral daily  propofol Infusion 10 MICROgram(s)/kG/Min (5.28 mL/Hr) IV Continuous <Continuous>  senna 2 Tablet(s) Oral at bedtime  sevelamer carbonate Powder 1600 milliGRAM(s) Oral three times a day with meals  sodium bicarbonate 650 milliGRAM(s) Oral every 8 hours  sodium zirconium cyclosilicate 10 Gram(s) Oral every 8 hours    MEDICATIONS  (PRN):  acetaminophen   Tablet .. 650 milliGRAM(s) Oral every 6 hours PRN Temp greater or equal to 38C (100.4F)  dextrose 40% Gel 15 Gram(s) Oral once PRN Blood Glucose LESS THAN 70 milliGRAM(s)/deciliter  glucagon  Injectable 1 milliGRAM(s) IntraMuscular once PRN Glucose LESS THAN 70 milligrams/deciliter  glucagon  Injectable 1 milliGRAM(s) IntraMuscular once PRN Glucose LESS THAN 70 milligrams/deciliter  ondansetron Injectable 4 milliGRAM(s) IV Push every 8 hours PRN Nausea and/or Vomiting      RADIOLOGY & ADDITIONAL STUDIES:

## 2020-04-23 NOTE — PROGRESS NOTE ADULT - ASSESSMENT
Patient is a 55 year old male with PMH of HTN and T2DM diabetes who presented on 4/13 for dyspnea found to have SARS-COV-2 pneumonia. Was intubated 4/17 after desaturating to 84% on 15L NRB and upgraded to CCU    # Acute hypoxic respiratory failure 2/2 SARS-COV-2 lower respiratory tract infection with evidence of cytokine release syndrome -   # PTX and Pneumomediastinum - CXR April 20 - no PTX - Improving Pneumomediastinum - CTSurg Call PRN - Blow hole April 22 to release Subq Emphysema  - Intubated 4/17.   - Vent settings: RR 30, , FiO2 80%, PEEP 10, Plateau 27 - No changes today. Decrease to 350 TV and FiO2 70%  - Pressure support: Levo   - Sedation:  Propofol and Precedex -Off Versed  - COVID + - Isolation - ID following. S/p Plaquenil and Anakinra courses. Continue IV Solumedrol 60 mg q6 Day 6  - One dose Lasix 80 mg IV April 21   - Heparin 7500K subq q8   - Continue with IV Meropenem adjusted Kidney function  - Sputum - Yeast and GPC pairs and Chains - Follow  - Started on Caspofungin 70 mg Loading 50 qD maintenance   - Blood Culture, Fungitell - Pending  - D-Dimer 1950 (<3068<4178) April 20  - Procal 17.9(<14.4<3.53<.53) April 20   - CRP -12.9(<21<11<5) April 20   - LDH - 1223 (<1312<1384) April 20  - Ferritin  - 5116 (<7678<4770) April 12 Pending  - Fibrinogen - 626(<663<700) April 21     # TELLO, no history of CKD with noted decreased UOP and hyperkalemia  - Worsening Cr and BUN - UO 1200 24 hours   - Nephrology Consult appreciated - No RRT  - Continue with Meropenem to 500mg qD  - Discontinue bicarb drip - Start Bicarb 650 q6  - FeNa - .8   - Hyperkalemia - Continue with Lokelma q8  - Start Sevelmar 2/2/2 - powder    # History of HTN  - Will hold off Losartan 40 mg daily, given borderline BP  - Resume if becomes hypertensive     # Type 2 Diabetes - Uncontrolled   - HbA1c: 11.6  - Insulin Drip  - Monitor finger sticks    Diet: NPO with tube feeds. F/u nutrition recs  Electrolytes: Replete K<4, Mg<2. Given Potassium elevated - Given Lokelma - Follow up Nephrology  DVT ppx: Hap 5K subq q8 Decreased Clearance   GI ppx: Pantoprazole 40 mg suspension while intubated and on steroids  Activity: bedrest  Dispo: from home, continue MICU monitoring; Possible HD with Worsening Kidney Function;Daughter [phone] Patient is a 55 year old male with PMH of HTN and T2DM diabetes who presented on 4/13 for dyspnea found to have SARS-COV-2 pneumonia. Was intubated 4/17 after desaturating to 84% on 15L NRB and upgraded to CCU    # Acute hypoxic respiratory failure 2/2 SARS-COV-2 lower respiratory tract infection with evidence of cytokine release syndrome -   # PTX and Pneumomediastinum - CXR April 20 - no PTX - Improving Pneumomediastinum - CTSurg Call PRN - Blow hole April 22 to release Subq Emphysema  - Intubated 4/17.   - Vent settings:. RR (machine): 30 TV (machine): 350 FiO2: 80 PEEP: 10  Decrease to FiO2 70%  - Pressure support: Levo   - Sedation:  Propofol and Precedex -Off Versed - TG in AM  - COVID + - Isolation - ID following. S/p Plaquenil and Anakinra. Decrease to IV Solumedrol 40 mg q6 Started April 14  - One dose Lasix 80 mg IV April 21 -  Can give additional Lasix as needed for Decreased Urine Output.   - Heparin 7500K subq q8   - Continue with IV Meropenem adjusted Kidney function  - Sputum - Yeast and GPC pairs and Chains - Culture Negative  - Started on Caspofungin 70 mg Loading 50 qD maintenance   - Blood Culture - NGTD  - Fungitell - Pending  - D-Dimer 2026( <1950 (<3068<4178) April 22  - Procal 17.9(<14.4<3.53<.53) April 20 - Pending  - CRP -12.9(<21<11<5) April 20 - Pending  - LDH - 963(<1223<1312<1384) April 22  - Ferritin  - 2335(<5116<7678<4770) April 22  - Fibrinogen - 604 (<626<663<700) April 22     # TELLO, no history of CKD with noted decreased UOP and hyperkalemia  - Worsening Cr and BUN - UO 2075 24 hours   - Nephrology Consult appreciated - Possible RRT Today/ Tomorrow  - Continue with Meropenem to 500mg qD  - Discontinue bicarb drip - Start Bicarb 650 q6  - FeNa - .8   - Hyperkalemia - Decrease to Lokelma q12  - Increase Sevelmar 3/3/3 - powder  - Start Phoslo 2/2/2    # History of HTN  - Will hold off Losartan 40 mg daily, given borderline BP  - Resume if becomes hypertensive     # Type 2 Diabetes - Uncontrolled   - HbA1c: 11.6  - Insulin Drip  - Monitor finger sticks    Diet: NPO with tube feeds. F/u nutrition recs  Electrolytes: Replete K<4, Mg<2. Given Potassium elevated - Given Lokelma - Follow up Nephrology  DVT ppx: Hep 5K subq q8 Decreased Clearance   GI ppx: Pantoprazole 40 mg suspension while intubated and on steroids  Activity: bedrest  Dispo: from home, continue MICU monitoring; Possible HD with Worsening Kidney Function;Daughter [phone] Patient is a 55 year old male with PMH of HTN and T2DM diabetes who presented on 4/13 for dyspnea found to have SARS-COV-2 pneumonia. Was intubated 4/17 after desaturating to 84% on 15L NRB and upgraded to CCU    # Acute hypoxic respiratory failure 2/2 SARS-COV-2 lower respiratory tract infection with evidence of cytokine release syndrome -   # PTX and Pneumomediastinum - CXR April 20 - no PTX - Improving Pneumomediastinum - CTSurg Call PRN - Blow hole April 22 to release Subq Emphysema  - Intubated 4/17.   - Vent settings:. RR (machine): 30 TV (machine): 350 FiO2: 80 PEEP: 10  Decrease to FiO2 70%  - Pressure support: Levo   - Sedation:  Propofol and Precedex -Off Versed - TG in AM  - COVID + - Isolation - ID following. S/p Plaquenil and Anakinra. Decrease to IV Solumedrol 40 mg q6 Started April 14  - One dose Lasix 80 mg IV April 21 -  Can give additional Lasix as needed for Decreased Urine Output.   - Heparin 7500K subq q8   - Continue with IV Meropenem adjusted Kidney function  - Sputum - Yeast and GPC pairs and Chains - Culture Negative  - Started on Caspofungin 70 mg Loading 50 qD maintenance   - Blood Culture - NGTD  - Fungitell - Pending  - D-Dimer 2026( <1950 (<3068<4178) April 22  - Procal 17.9(<14.4<3.53<.53) April 20 - Pending  - CRP -12.9(<21<11<5) April 20 - Pending  - LDH - 963(<1223<1312<1384) April 22  - Ferritin  - 2335(<5116<7678<4770) April 22  - Fibrinogen - 604 (<626<663<700) April 22     # TELLO, no history of CKD with noted decreased UOP and hyperkalemia  - Worsening Cr and BUN - UO 2075 24 hours   - Nephrology Consult appreciated - Possible RRT Today/ Tomorrow  - Increase to 750 mg Meropenem qD  - Continue with Bicarb 650 q6  - FeNa - .8   - Hyperkalemia - Decrease to Lokelma q12  - Increase Sevelmar 3/3/3 - powder  - Start Phoslo 2/2/2    # History of HTN  - Will hold off Losartan 40 mg daily, given borderline BP  - Resume if becomes hypertensive     # Type 2 Diabetes - Uncontrolled   - HbA1c: 11.6  - Insulin Drip  - Monitor finger sticks    Diet: NPO with tube feeds. F/u nutrition recs  Electrolytes: Replete K<4, Mg<2. Given Potassium elevated - Given Lokelma - Follow up Nephrology  DVT ppx: Hep 5K subq q8 Decreased Clearance   GI ppx: Pantoprazole 40 mg suspension while intubated and on steroids  Activity: bedrest  Dispo: from home, continue MICU monitoring; Possible HD with Worsening Kidney Function;Daughter [phone]

## 2020-04-23 NOTE — PROGRESS NOTE ADULT - SUBJECTIVE AND OBJECTIVE BOX
RHIANNON MAHER  55y Male   [id number]    Hospital Day: 5  Post Operative Day:s/p blowhole   Procedure:  Patient is a 55y old  Male who presents with a chief complaint of suspected COVID-19 (2020 12:47)    PAST MEDICAL & SURGICAL HISTORY:  Diabetes  Hypertension  No significant past surgical history      Events of the Last 24h:  Vital Signs Last 24 Hrs  T(C): 36.4 (2020 20:00), Max: 36.4 (2020 20:00)  T(F): 97.6 (2020 20:00), Max: 97.6 (2020 20:00)  HR: 70 (2020 22:00) (56 - 92)  BP: --  BP(mean): --  RR: --  SpO2: 96% (:) (90% - 98%)    Mode: AC/ CMV (Assist Control/ Continuous Mandatory Ventilation), RR (machine): 30, TV (machine): 350, FiO2: 80, PEEP: 10, ITime: 1, MAP: 18, PIP: 31    Diet, NPO with Tube Feed:   Tube Feeding Modality: Orogastric  Nepro with Carb Steady  Total Volume for 24 Hours (mL): 960  Bolus  Total Volume of Bolus (mL):  240  Tube Feed Frequency: Every 6 hours   Tube Feed Start Time: 10:30  Bolus Feed Rate (mL per Hour): 240   Bolus Feed Duration (in Hours): 1 (20 @ 10:19)      I&O's Summary    2020 07:  -  2020 07:00  --------------------------------------------------------  IN: 2160 mL / OUT: 1020 mL / NET: 1140 mL    2020 07:  -  2020 01:08  --------------------------------------------------------  IN: 1034 mL / OUT: 1475 mL / NET: -441 mL     I&O's Detail    :01  -  2020 07:00  --------------------------------------------------------  IN:    dexmedetomidine Infusion: 85 mL    dextrose 5%: 900 mL    Enteral Tube Flush: 180 mL    insulin regular Infusion: 64 mL    IV PiggyBack: 250 mL    Miscellaneous Tube Feedin mL    norepinephrine Infusion: 11 mL    propofol Infusion: 190 mL  Total IN: 2160 mL    OUT:    Indwelling Catheter - Urethral: 1020 mL  Total OUT: 1020 mL    Total NET: 1140 mL      2020 07:01  -  2020 01:08  --------------------------------------------------------  IN:    dexmedetomidine Infusion: 192 mL    dextrose 5%: 150 mL    Enteral Tube Flush: 100 mL    insulin regular Infusion: 16 mL    Nepro with Carb Steady: 480 mL    norepinephrine Infusion: 16 mL    propofol Infusion: 80 mL  Total IN: 1034 mL    OUT:    Indwelling Catheter - Urethral: 1475 mL  Total OUT: 1475 mL    Total NET: -441 mL          MEDICATIONS  (STANDING):  caspofungin IVPB 50 milliGRAM(s) IV Intermittent every 24 hours  caspofungin IVPB      chlorhexidine 0.12% Liquid 15 milliLiter(s) Oral Mucosa every 12 hours  chlorhexidine 0.12% Liquid 15 milliLiter(s) Oral Mucosa two times a day  chlorhexidine 4% Liquid 1 Application(s) Topical <User Schedule>  dexMEDEtomidine Infusion 0.05 MICROgram(s)/kG/Hr (1.1 mL/Hr) IV Continuous <Continuous>  dextrose 5%. 1000 milliLiter(s) (50 mL/Hr) IV Continuous <Continuous>  dextrose 50% Injectable 12.5 Gram(s) IV Push once  dextrose 50% Injectable 25 Gram(s) IV Push once  dextrose 50% Injectable 25 Gram(s) IV Push once  heparin   Injectable 7500 Unit(s) SubCutaneous every 8 hours  insulin regular Infusion 3 Unit(s)/Hr (3 mL/Hr) IV Continuous <Continuous>  lactulose Syrup 20 Gram(s) Oral three times a day  meropenem  IVPB 500 milliGRAM(s) IV Intermittent every 24 hours  methylPREDNISolone sodium succinate Injectable 60 milliGRAM(s) IV Push every 12 hours  norepinephrine Infusion 0.05 MICROgram(s)/kG/Min (4.13 mL/Hr) IV Continuous <Continuous>  pantoprazole   Suspension 40 milliGRAM(s) Oral daily  propofol Infusion 10 MICROgram(s)/kG/Min (5.28 mL/Hr) IV Continuous <Continuous>  senna 2 Tablet(s) Oral at bedtime  sevelamer carbonate Powder 1600 milliGRAM(s) Oral three times a day with meals  sodium bicarbonate 650 milliGRAM(s) Oral every 8 hours  sodium zirconium cyclosilicate 10 Gram(s) Oral every 8 hours    MEDICATIONS  (PRN):  acetaminophen   Tablet .. 650 milliGRAM(s) Oral every 6 hours PRN Temp greater or equal to 38C (100.4F)  dextrose 40% Gel 15 Gram(s) Oral once PRN Blood Glucose LESS THAN 70 milliGRAM(s)/deciliter  glucagon  Injectable 1 milliGRAM(s) IntraMuscular once PRN Glucose LESS THAN 70 milligrams/deciliter  glucagon  Injectable 1 milliGRAM(s) IntraMuscular once PRN Glucose LESS THAN 70 milligrams/deciliter  ondansetron Injectable 4 milliGRAM(s) IV Push every 8 hours PRN Nausea and/or Vomiting      PHYSICAL EXAM:    GENERAL: NAD    HEENT: NCAT    CHEST/LUNGS: CTAB    HEART: RRR,  No murmurs, rubs, or gallops    ABDOMEN: SNTND +BS    EXTREMITIES:  FROM, No clubbing, cyanosis, or edema, palpable pulse    NEURO: No focal neurological deficits    SKIN: No rashes or lesions    INCISION/WOUNDS:                          11.4   7.32  )-----------( 254      ( 2020 04:30 )             35.0        CBC Full  -  ( 2020 04:30 )  WBC Count : 7.32 K/uL  RBC Count : 4.11 M/uL  Hemoglobin : 11.4 g/dL  Hematocrit : 35.0 %  Platelet Count - Automated : 254 K/uL  Mean Cell Volume : 85.2 fL  Mean Cell Hemoglobin : 27.7 pg  Mean Cell Hemoglobin Concentration : 32.6 g/dL  Auto Neutrophil # : 6.92 K/uL  Auto Lymphocyte # : 0.34 K/uL  Auto Monocyte # : 0.07 K/uL  Auto Eosinophil # : 0.00 K/uL  Auto Basophil # : 0.00 K/uL  Auto Neutrophil % : 94.5 %  Auto Lymphocyte % : 4.6 %  Auto Monocyte % : 0.9 %  Auto Eosinophil % : 0.0 %  Auto Basophil % : 0.0 %               141   |  95    |  138                Ca: 8.1    BMP:   ----------------------------< 184    Mg: 3.6   (20 @ 04:30)             4.9    |  21    | 9.0                Ph: 11.2     LFT:     TPro: 5.5 / Alb: 2.5 / TBili: 0.4 / DBili: x / AST: 35 / ALT: 38 / AlkPhos: 76   (20 @ 04:30)    LIVER FUNCTIONS - ( 2020 04:30 )  Alb: 2.5 g/dL / Pro: 5.5 g/dL / ALK PHOS: 76 U/L / ALT: 38 U/L / AST: 35 U/L / GGT: x                     Culture - Blood (collected 2020 04:39)  Source: .Blood None  Preliminary Report (2020 14:01):    No growth to date.    Culture - Sputum (collected 2020 04:30)  Source: .Sputum Sputum  Gram Stain (2020 15:15):    Moderate polymorphonuclear leukocytes per low power field    Moderate Squamous epithelial cells per low power field    Moderate Yeast per oil power field    Moderate Gram Positive Cocci in Pairs and Chains per oil power field  Preliminary Report (2020 10:07):    Normal Respiratory Reyna present      < from: Xray Chest 1 View- PORTABLE-Routine (20 @ 02:46) >    Impression:      1. No significant change in bilateral airspace opacities, right greaterthan left.      < end of copied text >

## 2020-04-23 NOTE — PROGRESS NOTE ADULT - ASSESSMENT
Assessment:    Acute hypoxemic respiratory failure secondary to SARS-COV-2 infection requiring invasive mechanical ventilation  sub q emphysema s/p blow hole  ARDS  Possible superimposed bacterial infection increase procal/ yeast in DTA  TELLO/ hyperkalemia worsening/ metabolic acidosis non oliguric      PLAN:    CNS: PROPOFOL, PRECEDEX , TG    HEENT:  Oral care    PULMONARY:  HOB @ 45 degrees, keep sats 92-96%  solumedrol 40q12(monitor FS),  / i RR 30, DEC fio2 70%, PEEP 10, CTsx F/UP, PUSH ett 24    CARDIOVASCULAR: bicarb po q 6h    GI: GI prophylaxis Protonix, bowel regimen                                         Feeding: feeding    RENAL:  F/u  lytes.  Correct as needed. accurate I/O, renal F/UP, repeat CMP     INFECTIOUS DISEASE: IV abx  violetta, ID f/u for immunomodulators, vanco x1, caspo, f//up cx    HEMATOLOGICAL:  hep sq ( decrease clearance)    ENDOCRINE:  Follow up FS.  Insulin protocol if needed.    CODE STATUS: FULL CODE    DISPOSITION: Patient require continued monitoring in MICU  poor prognosis

## 2020-04-23 NOTE — PROGRESS NOTE ADULT - ASSESSMENT
Acute hypoxemic respiratory failure secondary to SARS-COV-2 infection / TELLO/ hyperkalemia:  # creatinine trending up   # non-oliguric   # k noted , decrease lokelma to q 12   # on meropenem 500 q 24 / caspofungin   # feed : nepro  # ph noted, start renagel 3/3/3 and phoslo 2/2/2  # continue sodium bicarbonate 650 q 8  # will need RRT today/tomorrow  # will discuss with CCU team   # will follow

## 2020-04-23 NOTE — PROGRESS NOTE ADULT - SUBJECTIVE AND OBJECTIVE BOX
24 h events noted  intubated/ventilated         PAST HISTORY  --------------------------------------------------------------------------------  No significant changes to PMH, PSH, FHx, SHx, unless otherwise noted    ALLERGIES & MEDICATIONS  --------------------------------------------------------------------------------  Allergies    No Known Allergies    Intolerances      Standing Inpatient Medications  caspofungin IVPB 50 milliGRAM(s) IV Intermittent every 24 hours  caspofungin IVPB      chlorhexidine 0.12% Liquid 15 milliLiter(s) Oral Mucosa every 12 hours  chlorhexidine 0.12% Liquid 15 milliLiter(s) Oral Mucosa two times a day  chlorhexidine 4% Liquid 1 Application(s) Topical <User Schedule>  dexMEDEtomidine Infusion 0.05 MICROgram(s)/kG/Hr IV Continuous <Continuous>  dextrose 5%. 1000 milliLiter(s) IV Continuous <Continuous>  dextrose 50% Injectable 12.5 Gram(s) IV Push once  dextrose 50% Injectable 25 Gram(s) IV Push once  dextrose 50% Injectable 25 Gram(s) IV Push once  heparin   Injectable 7500 Unit(s) SubCutaneous every 8 hours  insulin regular Infusion 3 Unit(s)/Hr IV Continuous <Continuous>  lactulose Syrup 20 Gram(s) Oral three times a day  meropenem  IVPB 500 milliGRAM(s) IV Intermittent every 24 hours  methylPREDNISolone sodium succinate Injectable 40 milliGRAM(s) IV Push two times a day  norepinephrine Infusion 0.05 MICROgram(s)/kG/Min IV Continuous <Continuous>  pantoprazole   Suspension 40 milliGRAM(s) Oral daily  propofol Infusion 10 MICROgram(s)/kG/Min IV Continuous <Continuous>  senna 2 Tablet(s) Oral at bedtime  sevelamer carbonate Powder 1600 milliGRAM(s) Oral three times a day with meals  sodium bicarbonate 650 milliGRAM(s) Oral every 6 hours  sodium zirconium cyclosilicate 10 Gram(s) Oral every 8 hours    PRN Inpatient Medications  acetaminophen   Tablet .. 650 milliGRAM(s) Oral every 6 hours PRN  dextrose 40% Gel 15 Gram(s) Oral once PRN  glucagon  Injectable 1 milliGRAM(s) IntraMuscular once PRN  glucagon  Injectable 1 milliGRAM(s) IntraMuscular once PRN  ondansetron Injectable 4 milliGRAM(s) IV Push every 8 hours PRN        VITALS/PHYSICAL EXAM  --------------------------------------------------------------------------------  T(C): 36.8 (04-23-20 @ 00:00), Max: 36.8 (04-23-20 @ 00:00)  HR: 98 (04-23-20 @ 06:00) (64 - 98)  BP: --  RR: --  SpO2: 98% (04-23-20 @ 06:00) (90% - 100%)  Wt(kg): --        04-22-20 @ 07:01  -  04-23-20 @ 07:00  --------------------------------------------------------  IN: 1767 mL / OUT: 2075 mL / NET: -308 mL      Physical Exam:  	Gen: intubated/ventilated     LABS/STUDIES  --------------------------------------------------------------------------------              11.6   19.86 >-----------<  236      [04-23-20 @ 05:30]              34.2     141  |  97  |  160  ----------------------------<  159      [04-23-20 @ 05:30]  4.4   |  19  |  9.4        Ca     7.9     [04-23-20 @ 05:30]      Mg     3.4     [04-23-20 @ 05:30]      Phos  11.9     [04-23-20 @ 05:30]    TPro  5.3  /  Alb  2.2  /  TBili  0.7  /  DBili  x   /  AST  47  /  ALT  35  /  AlkPhos  88  [04-23-20 @ 05:30]              [04-22-20 @ 23:30]    Creatinine Trend:  SCr 9.4 [04-23 @ 05:30]  SCr 9.0 [04-22 @ 04:30]  SCr 8.9 [04-21 @ 04:30]  SCr 8.0 [04-20 @ 12:20]  SCr 7.4 [04-20 @ 01:48]      Urine Creatinine 135      [04-18-20 @ 11:10]  Urine Protein 115      [04-18-20 @ 11:10]  Urine Sodium 44.0      [04-18-20 @ 11:10]  Urine Osmolality 384      [04-18-20 @ 11:10]    Ferritin 2335      [04-22-20 @ 04:30]  Lipid: chol --, , HDL --, LDL --      [04-14-20 @ 07:01]

## 2020-04-23 NOTE — PROGRESS NOTE ADULT - ASSESSMENT
Daily chest xray  No intervention at this time  Call CT surgery team as needed  Will continue to follow

## 2020-04-23 NOTE — PROGRESS NOTE ADULT - SUBJECTIVE AND OBJECTIVE BOX
To be updated Admit Date: 04-13-20  Length of Stay: 10d    55yMale      Reason for admission to ICU:  Patient is in acute hypoxic respiratory distress requiring intubation and sedation. Imaging c/w ARDS. Admitted to MICU for further observation and management.    INTERVAL HPI/OVERNIGHT EVENTS:  Intubated and sedated - Worsening TELLO - Urine output still ok - Stable - Follow Nephrology  Worsening Subq Emphysema - Blow hole placed tby CTS April 22  Possible RRT Today tomorrow    MEDICATIONS  (STANDING):  caspofungin IVPB 50 milliGRAM(s) IV Intermittent every 24 hours  caspofungin IVPB      chlorhexidine 0.12% Liquid 15 milliLiter(s) Oral Mucosa every 12 hours  chlorhexidine 0.12% Liquid 15 milliLiter(s) Oral Mucosa two times a day  chlorhexidine 4% Liquid 1 Application(s) Topical <User Schedule>  dexMEDEtomidine Infusion 0.05 MICROgram(s)/kG/Hr (1.1 mL/Hr) IV Continuous <Continuous>  dextrose 5%. 1000 milliLiter(s) (50 mL/Hr) IV Continuous <Continuous>  dextrose 50% Injectable 12.5 Gram(s) IV Push once  dextrose 50% Injectable 25 Gram(s) IV Push once  dextrose 50% Injectable 25 Gram(s) IV Push once  heparin   Injectable 7500 Unit(s) SubCutaneous every 8 hours  insulin regular Infusion 3 Unit(s)/Hr (3 mL/Hr) IV Continuous <Continuous>  lactulose Syrup 20 Gram(s) Oral three times a day  meropenem  IVPB 500 milliGRAM(s) IV Intermittent every 24 hours  methylPREDNISolone sodium succinate Injectable 40 milliGRAM(s) IV Push two times a day  norepinephrine Infusion 0.05 MICROgram(s)/kG/Min (4.13 mL/Hr) IV Continuous <Continuous>  pantoprazole   Suspension 40 milliGRAM(s) Oral daily  propofol Infusion 10 MICROgram(s)/kG/Min (5.28 mL/Hr) IV Continuous <Continuous>  senna 2 Tablet(s) Oral at bedtime  sevelamer carbonate Powder 1600 milliGRAM(s) Oral three times a day with meals  sodium bicarbonate 650 milliGRAM(s) Oral every 6 hours  sodium zirconium cyclosilicate 10 Gram(s) Oral every 8 hours  vancomycin  IVPB 1250 milliGRAM(s) IV Intermittent once    MEDICATIONS  (PRN):  acetaminophen   Tablet .. 650 milliGRAM(s) Oral every 6 hours PRN Temp greater or equal to 38C (100.4F)  dextrose 40% Gel 15 Gram(s) Oral once PRN Blood Glucose LESS THAN 70 milliGRAM(s)/deciliter  glucagon  Injectable 1 milliGRAM(s) IntraMuscular once PRN Glucose LESS THAN 70 milligrams/deciliter  glucagon  Injectable 1 milliGRAM(s) IntraMuscular once PRN Glucose LESS THAN 70 milligrams/deciliter  ondansetron Injectable 4 milliGRAM(s) IV Push every 8 hours PRN Nausea and/or Vomiting      Vitals:  Vital Signs Last 24 Hrs  T(C): 37.9 (23 Apr 2020 12:00), Max: 37.9 (23 Apr 2020 12:00)  T(F): 100.2 (23 Apr 2020 12:00), Max: 100.2 (23 Apr 2020 12:00)  HR: 100 (23 Apr 2020 12:00) (70 - 106)    SpO2: 92% (23 Apr 2020 12:00) (91% - 100%)    Vitals (Invasive):  ABP: 124/58 (04-23-20 @ 12:00) (90/52 - 132/70)      I&O's Summary    22 Apr 2020 07:01  -  23 Apr 2020 07:00  --------------------------------------------------------  IN: 1767 mL / OUT: 2075 mL / NET: -308 mL    23 Apr 2020 07:01  -  23 Apr 2020 13:09  --------------------------------------------------------  IN: 592 mL / OUT: 440 mL / NET: 152 mL        Physical Exam:  Physical Exam:  GEN; NAD  HEENT:     Pupils React to light equally no icterus ET,OGT in place  Chest:         SubQ emphysema present over chest wall and lateral chests and run down B/l Arms. S/p Left side chest wall blowhole (Stable)  Lungs:          CTABL, Decreased Air MOvement R>L Symmetrical Inspiration, Slight wheeze  Cardiac:       + S1 + S2    + RRR   No murmurs appreciated  Abdomen:    Obese habitus + BS     + Soft    + Non-tender    Extremities:  Edema Present Diffuse Slightly improved- no cyanosis  Neuro:        Sedated, , Slight Response to Supraorbital Notch pressure       Labs:  COVID:  COVID-19 PCR: Detected (04-13-20 @ 14:30)                          11.6   19.86 )-----------( 236      ( 23 Apr 2020 05:30 )             34.2     04-23    141  |  97<L>  |  160<HH>  ----------------------------<  159<H>  4.4   |  19  |  9.4<HH>    Ca    7.9<L>      23 Apr 2020 05:30  Phos  11.9     04-23  Mg     3.4     04-23    TPro  5.3<L>  /  Alb  2.2<L>  /  TBili  0.7  /  DBili  x   /  AST  47<H>  /  ALT  35  /  AlkPhos  88  04-23          Culture - Blood (collected 04-21-20 @ 04:39)  Source: .Blood None  Preliminary Report (04-22-20 @ 14:01):    No growth to date.      COVID related labs:  22 Apr 2020 23:30  D-dimer:  2026<H>  Calcitonin:  x  CRP:  x  LDH:  963<H>  Lactate,Blood:  x  CK:  x  Troponin I:  x  Troponin T:  x  Troponin HS:  x  Ferritin, Serum: x  BNP:  x      Blood Gases:  (ARTERIAL):  04-23-20 @ 03:55  pH 7.40 / pCO2 35 / pO2 82 / HCO3 22    Oxygen Saturation 95    Base Excess, Arterial -2.8    (VENOUS):      Radiology:  Impression:    No evidence of pneumothorax. Stable extensive subcutaneous emphysema. No pneumothoraces.

## 2020-04-23 NOTE — PROGRESS NOTE ADULT - SUBJECTIVE AND OBJECTIVE BOX
RHIANNON MAHER  55y, Male    All available historical data reviewed    OVERNIGHT EVENTS:  no fevers  on pressors  fio2 80%    ROS:  unable to obtain history secondary to patient's mental status and/or sedation     VITALS:  T(F): 99.8, Max: 99.8 (04-23-20 @ 08:00)  HR: 96  BP: --  RR: --Vital Signs Last 24 Hrs  T(C): 37.7 (23 Apr 2020 08:00), Max: 37.7 (23 Apr 2020 08:00)  T(F): 99.8 (23 Apr 2020 08:00), Max: 99.8 (23 Apr 2020 08:00)  HR: 96 (23 Apr 2020 08:00) (70 - 98)  BP: --  BP(mean): --  RR: --  SpO2: 95% (23 Apr 2020 08:00) (93% - 100%)    TESTS & MEASUREMENTS:                        11.6   19.86 )-----------( 236      ( 23 Apr 2020 05:30 )             34.2     04-23    141  |  97<L>  |  160<HH>  ----------------------------<  159<H>  4.4   |  19  |  9.4<HH>    Ca    7.9<L>      23 Apr 2020 05:30  Phos  11.9     04-23  Mg     3.4     04-23    TPro  5.3<L>  /  Alb  2.2<L>  /  TBili  0.7  /  DBili  x   /  AST  47<H>  /  ALT  35  /  AlkPhos  88  04-23    LIVER FUNCTIONS - ( 23 Apr 2020 05:30 )  Alb: 2.2 g/dL / Pro: 5.3 g/dL / ALK PHOS: 88 U/L / ALT: 35 U/L / AST: 47 U/L / GGT: x             Culture - Blood (collected 04-21-20 @ 04:39)  Source: .Blood None  Preliminary Report (04-22-20 @ 14:01):    No growth to date.    Culture - Sputum (collected 04-21-20 @ 04:30)  Source: .Sputum Sputum  Gram Stain (04-21-20 @ 15:15):    Moderate polymorphonuclear leukocytes per low power field    Moderate Squamous epithelial cells per low power field    Moderate Yeast per oil power field    Moderate Gram Positive Cocci in Pairs and Chains per oil power field  Final Report (04-23-20 @ 08:57):    Normal Respiratory Reyna present            RADIOLOGY & ADDITIONAL TESTS:  Personal review of radiological diagnostics performed  Echo and EKG results noted when applicable.     MEDICATIONS:  acetaminophen   Tablet .. 650 milliGRAM(s) Oral every 6 hours PRN  caspofungin IVPB 50 milliGRAM(s) IV Intermittent every 24 hours  caspofungin IVPB      chlorhexidine 0.12% Liquid 15 milliLiter(s) Oral Mucosa every 12 hours  chlorhexidine 0.12% Liquid 15 milliLiter(s) Oral Mucosa two times a day  chlorhexidine 4% Liquid 1 Application(s) Topical <User Schedule>  dexMEDEtomidine Infusion 0.05 MICROgram(s)/kG/Hr IV Continuous <Continuous>  dextrose 40% Gel 15 Gram(s) Oral once PRN  dextrose 5%. 1000 milliLiter(s) IV Continuous <Continuous>  dextrose 50% Injectable 12.5 Gram(s) IV Push once  dextrose 50% Injectable 25 Gram(s) IV Push once  dextrose 50% Injectable 25 Gram(s) IV Push once  glucagon  Injectable 1 milliGRAM(s) IntraMuscular once PRN  glucagon  Injectable 1 milliGRAM(s) IntraMuscular once PRN  heparin   Injectable 7500 Unit(s) SubCutaneous every 8 hours  insulin regular Infusion 3 Unit(s)/Hr IV Continuous <Continuous>  lactulose Syrup 20 Gram(s) Oral three times a day  meropenem  IVPB 500 milliGRAM(s) IV Intermittent every 24 hours  methylPREDNISolone sodium succinate Injectable 40 milliGRAM(s) IV Push two times a day  norepinephrine Infusion 0.05 MICROgram(s)/kG/Min IV Continuous <Continuous>  ondansetron Injectable 4 milliGRAM(s) IV Push every 8 hours PRN  pantoprazole   Suspension 40 milliGRAM(s) Oral daily  propofol Infusion 10 MICROgram(s)/kG/Min IV Continuous <Continuous>  senna 2 Tablet(s) Oral at bedtime  sevelamer carbonate Powder 1600 milliGRAM(s) Oral three times a day with meals  sodium bicarbonate 650 milliGRAM(s) Oral every 6 hours  sodium zirconium cyclosilicate 10 Gram(s) Oral every 8 hours  vancomycin  IVPB 1250 milliGRAM(s) IV Intermittent once      ANTIBIOTICS:  caspofungin IVPB 50 milliGRAM(s) IV Intermittent every 24 hours  caspofungin IVPB      meropenem  IVPB 500 milliGRAM(s) IV Intermittent every 24 hours  vancomycin  IVPB 1250 milliGRAM(s) IV Intermittent once

## 2020-04-23 NOTE — CHART NOTE - NSCHARTNOTEFT_GEN_A_CORE
Family contacted to update COVID status. Family member contacted: Marisol (daughter, [phone]). I explained that her father remains in the ICU in critical condition. He is intubated on a ventilator and requiring a high level of support. Family asked about ventilator settings, particularly FiO2 - still at 80%. I informed her that he is still on pressors as well. I informed her that his renal function remains poor and he may receive dialysis in the next day or so. She asked for indications fo RRT, I stated that there are various reasons for dialysis including fluid status and electrolytes. K is normal at 4.4. I informed her that his WBC increased from 7 to 19 and that there is concern for bacterial or fungal infection. Awaiting cultures. Remains afebrile however. She asked about bowel movements. She asked about pneumomediastinum. I told her CT surgery is still following him and has no acute recommendations at this time. All of her questions were answered.

## 2020-04-24 NOTE — PROGRESS NOTE ADULT - ASSESSMENT
Acute hypoxemic respiratory failure secondary to SARS-COV-2 infection / TELLO/ hyperkalemia:  # creatinine trending up   # non-oliguric   # k noted , continue  lokelma q 12   # on meropenem 500 q 24 / caspofungin   # feed : nepro  # ph noted, on  renagel 3/3/3 and phoslo 2/2/2  # continue sodium bicarbonate 650 q 6  # will need RRT today/tomorrow  # will follow

## 2020-04-24 NOTE — PROGRESS NOTE ADULT - SUBJECTIVE AND OBJECTIVE BOX
Patient is a 55y old  Male who presents with a chief complaint of suspected COVID-19 (24 Apr 2020 15:12)  pt remains vented/sedated  on enteral tube feed  renal f/u    ICU Vital Signs Last 24 Hrs  T(C): 37.6 (24 Apr 2020 12:00), Max: 39 (23 Apr 2020 20:00)  T(F): 99.6 (24 Apr 2020 12:00), Max: 102.2 (23 Apr 2020 20:00)  HR: 82 (24 Apr 2020 13:00) (82 - 114)  ABP: 134/74 (24 Apr 2020 13:00) (84/124 - 142/78)  ABP(mean): 96 (24 Apr 2020 13:00) (64 - 102)  RR: 30 (24 Apr 2020 13:00) (27 - 32)  SpO2: 98% (24 Apr 2020 13:00) (97% - 100%)      Drug Dosing Weight  Height (cm): 170.2 (18 Apr 2020 03:38)  Weight (kg): 88 (18 Apr 2020 03:38)  BMI (kg/m2): 30.4 (18 Apr 2020 03:38)  BSA (m2): 2 (18 Apr 2020 03:38)    I&O's Detail    23 Apr 2020 07:01  -  24 Apr 2020 07:00  --------------------------------------------------------  IN:    dexmedetomidine Infusion: 395.4 mL    Enteral Tube Flush: 310 mL    insulin regular Infusion: 90 mL    IV PiggyBack: 600 mL    Nepro with Carb Steady: 960 mL    norepinephrine Infusion: 29.2 mL    propofol Infusion: 464.5 mL  Total IN: 2849.1 mL    OUT:    Indwelling Catheter - Urethral: 1455 mL  Total OUT: 1455 mL    Total NET: 1394.1 mL      24 Apr 2020 07:01  -  24 Apr 2020 15:49  --------------------------------------------------------  IN:    dexmedetomidine Infusion: 118.8 mL    Enteral Tube Flush: 60 mL    insulin regular Infusion: 40 mL    IV PiggyBack: 250 mL    Nepro with Carb Steady: 240 mL    norepinephrine Infusion: 7 mL    propofol Infusion: 113.3 mL  Total IN: 829.1 mL    OUT:    Indwelling Catheter - Urethral: 775 mL  Total OUT: 775 mL    Total NET: 54.1 mL     PHYSICAL EXAM:  Constitutional: remains vented/sedated  on enteral tube feed  MEDICATIONS  (STANDING):  calcium acetate 1334 milliGRAM(s) Oral three times a day with meals  chlorhexidine 0.12% Liquid 15 milliLiter(s) Oral Mucosa every 12 hours  chlorhexidine 4% Liquid 1 Application(s) Topical <User Schedule>  dexMEDEtomidine Infusion 0.05 MICROgram(s)/kG/Hr (1.1 mL/Hr) IV Continuous <Continuous>  dextrose 5%. 1000 milliLiter(s) (50 mL/Hr) IV Continuous <Continuous>  dextrose 50% Injectable 12.5 Gram(s) IV Push once  dextrose 50% Injectable 25 Gram(s) IV Push once  dextrose 50% Injectable 25 Gram(s) IV Push once  heparin   Injectable 7500 Unit(s) SubCutaneous every 8 hours  insulin regular Infusion 3 Unit(s)/Hr (3 mL/Hr) IV Continuous <Continuous>  lactulose Syrup 20 Gram(s) Oral three times a day  meropenem  IVPB 750 milliGRAM(s) IV Intermittent every 24 hours  methylPREDNISolone sodium succinate Injectable 40 milliGRAM(s) IV Push daily  norepinephrine Infusion 0.05 MICROgram(s)/kG/Min (4.13 mL/Hr) IV Continuous <Continuous>  pantoprazole   Suspension 40 milliGRAM(s) Oral daily  propofol Infusion 10 MICROgram(s)/kG/Min (5.28 mL/Hr) IV Continuous <Continuous>  senna 2 Tablet(s) Oral at bedtime  sevelamer carbonate Powder 2400 milliGRAM(s) Oral three times a day with meals  sodium bicarbonate 650 milliGRAM(s) Oral every 6 hours  sodium zirconium cyclosilicate 10 Gram(s) Oral two times a day      Diet, NPO with Tube Feed:   Tube Feeding Modality: Orogastric  Vital High Protein  Total Volume for 24 Hours (mL): 1440  Bolus  Total Volume of Bolus (mL):  360  Tube Feed Frequency: Every 6 hours   Tube Feed Start Time: 15:00  Bolus Feed Rate (mL per Hour): 360   Bolus Feed Duration (in Hours): 1 (04-24-20 @ 14:47)      LABS  04-24    143  |  96<L>  |  170<HH>  ----------------------------<  177<H>  4.7   |  19  |  10.4<HH>    Ca    7.9<L>      24 Apr 2020 03:50  Phos  11.6     04-24  Mg     3.6     04-24    TPro  5.4<L>  /  Alb  2.3<L>  /  TBili  0.4  /  DBili  x   /  AST  40  /  ALT  30  /  AlkPhos  100  04-24                          10.6   20.29 )-----------( 256      ( 24 Apr 2020 03:50 )             32.7     CAPILLARY BLOOD GLUCOSE  POCT Blood Glucose.: 162 mg/dL (24 Apr 2020 13:16)  POCT Blood Glucose.: 130 mg/dL (24 Apr 2020 11:21)  POCT Blood Glucose.: 154 mg/dL (24 Apr 2020 08:56)  POCT Blood Glucose.: 181 mg/dL (24 Apr 2020 05:50)  POCT Blood Glucose.: 167 mg/dL (24 Apr 2020 04:03)   RADIOLOGY STUDIES  < from: Xray Chest 1 View- PORTABLE-Routine (04.24.20 @ 04:50) >  Impression:    Diffuse interstitial opacifications. Likely pneumomediastinum. Subcutaneous emphysema.

## 2020-04-24 NOTE — PROGRESS NOTE ADULT - SUBJECTIVE AND OBJECTIVE BOX
RHIANNON MAHER  55y, Male    All available historical data reviewed    OVERNIGHT EVENTS:  fevers  fio2 70%  2 pressors  non responsive  ROS:  unable to obtain history secondary to patient's mental status and/or sedation     VITALS:  T(F): 99.6, Max: 102.2 (04-23-20 @ 20:00)  HR: 82  BP: --  RR: 30Vital Signs Last 24 Hrs  T(C): 37.6 (24 Apr 2020 12:00), Max: 39 (23 Apr 2020 20:00)  T(F): 99.6 (24 Apr 2020 12:00), Max: 102.2 (23 Apr 2020 20:00)  HR: 82 (24 Apr 2020 13:00) (82 - 114)  BP: --  BP(mean): --  RR: 30 (24 Apr 2020 13:00) (27 - 32)  SpO2: 98% (24 Apr 2020 13:00) (97% - 100%)    TESTS & MEASUREMENTS:                        10.6   20.29 )-----------( 256      ( 24 Apr 2020 03:50 )             32.7     04-24    143  |  96<L>  |  170<HH>  ----------------------------<  177<H>  4.7   |  19  |  10.4<HH>    Ca    7.9<L>      24 Apr 2020 03:50  Phos  11.6     04-24  Mg     3.6     04-24    TPro  5.4<L>  /  Alb  2.3<L>  /  TBili  0.4  /  DBili  x   /  AST  40  /  ALT  30  /  AlkPhos  100  04-24    LIVER FUNCTIONS - ( 24 Apr 2020 03:50 )  Alb: 2.3 g/dL / Pro: 5.4 g/dL / ALK PHOS: 100 U/L / ALT: 30 U/L / AST: 40 U/L / GGT: x             Culture - Blood (collected 04-21-20 @ 04:39)  Source: .Blood None  Preliminary Report (04-22-20 @ 14:01):    No growth to date.    Culture - Sputum (collected 04-21-20 @ 04:30)  Source: .Sputum Sputum  Gram Stain (04-21-20 @ 15:15):    Moderate polymorphonuclear leukocytes per low power field    Moderate Squamous epithelial cells per low power field    Moderate Yeast per oil power field    Moderate Gram Positive Cocci in Pairs and Chains per oil power field  Final Report (04-23-20 @ 08:57):    Normal Respiratory Reyna present            RADIOLOGY & ADDITIONAL TESTS:  Personal review of radiological diagnostics performed  Echo and EKG results noted when applicable.     MEDICATIONS:  acetaminophen   Tablet .. 650 milliGRAM(s) Oral every 6 hours PRN  calcium acetate 1334 milliGRAM(s) Oral three times a day with meals  chlorhexidine 0.12% Liquid 15 milliLiter(s) Oral Mucosa every 12 hours  chlorhexidine 4% Liquid 1 Application(s) Topical <User Schedule>  dexMEDEtomidine Infusion 0.05 MICROgram(s)/kG/Hr IV Continuous <Continuous>  dextrose 40% Gel 15 Gram(s) Oral once PRN  dextrose 5%. 1000 milliLiter(s) IV Continuous <Continuous>  dextrose 50% Injectable 12.5 Gram(s) IV Push once  dextrose 50% Injectable 25 Gram(s) IV Push once  dextrose 50% Injectable 25 Gram(s) IV Push once  glucagon  Injectable 1 milliGRAM(s) IntraMuscular once PRN  glucagon  Injectable 1 milliGRAM(s) IntraMuscular once PRN  heparin   Injectable 7500 Unit(s) SubCutaneous every 8 hours  insulin regular Infusion 3 Unit(s)/Hr IV Continuous <Continuous>  lactulose Syrup 20 Gram(s) Oral three times a day  meropenem  IVPB 750 milliGRAM(s) IV Intermittent every 24 hours  methylPREDNISolone sodium succinate Injectable 40 milliGRAM(s) IV Push daily  norepinephrine Infusion 0.05 MICROgram(s)/kG/Min IV Continuous <Continuous>  ondansetron Injectable 4 milliGRAM(s) IV Push every 8 hours PRN  pantoprazole   Suspension 40 milliGRAM(s) Oral daily  propofol Infusion 10 MICROgram(s)/kG/Min IV Continuous <Continuous>  senna 2 Tablet(s) Oral at bedtime  sevelamer carbonate Powder 2400 milliGRAM(s) Oral three times a day with meals  sodium bicarbonate 650 milliGRAM(s) Oral every 6 hours  sodium zirconium cyclosilicate 10 Gram(s) Oral two times a day      ANTIBIOTICS:  meropenem  IVPB 750 milliGRAM(s) IV Intermittent every 24 hours

## 2020-04-24 NOTE — PROGRESS NOTE ADULT - ASSESSMENT
55 year old male patient with hypertension, diabetes presenting for dyspnea.     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 53 > 3.53 > 14.4 with possible  bacterial superinfection  -Sputa NGTD  -BCx 4/21 NG  -fungitell 61      RECOMMENDATIONS;   S/p Anakinra 4/14-17  broderick ARNETT  d/c caspofungin 50 mg iv q24h   Meropenem 750 mg iv q24h  no need to monitor inflammatory markers   s/p PLAQUENIL

## 2020-04-24 NOTE — PROGRESS NOTE ADULT - SUBJECTIVE AND OBJECTIVE BOX
OVERNIGHT EVENTS: intubated 8, propofol 35, precedex, levophed 0.04, insulin, spiking T    Vital Signs Last 24 Hrs  T(C): 38 (24 Apr 2020 04:00), Max: 39 (23 Apr 2020 20:00)  T(F): 100.4 (24 Apr 2020 04:00), Max: 102.2 (23 Apr 2020 20:00)  HR: 102 (24 Apr 2020 06:00) (84 - 114)  RR: 27 (24 Apr 2020 06:00) (27 - 31)  SpO2: 100% (24 Apr 2020 06:00) (91% - 100%)    PHYSICAL EXAMINATION:    GENERAL: sedated    HEENT: subq emphysema    NECK: Supple.    LUNGS: bl crackles    HEART: Regular rate and rhythm without murmur.    ABDOMEN: Soft, nontender, and nondistended.      EXTREMITIES: Without any cyanosis, clubbing, rash, lesions or edema.    NEUROLOGIC: sedated    SKIN: No ulceration or induration present.      LABS:                        10.6   20.29 )-----------( 256      ( 24 Apr 2020 03:50 )             32.7     04-24    143  |  96<L>  |  170<HH>  ----------------------------<  177<H>  4.7   |  19  |  10.4<HH>    Ca    7.9<L>      24 Apr 2020 03:50  Phos  11.6     04-24  Mg     3.6     04-24    TPro  5.4<L>  /  Alb  2.3<L>  /  TBili  0.4  /  DBili  x   /  AST  40  /  ALT  30  /  AlkPhos  100  04-24        ABG - ( 24 Apr 2020 02:14 )  pH, Arterial: 7.35  pH, Blood: x     /  pCO2: 40    /  pO2: 76    / HCO3: 22    / Base Excess: -3.2  /  SaO2: 94        350/30/70/10  plateau 28                Procalcitonin, Serum: 6.62 ng/mL (04-22-20 @ 23:30)        04-23-20 @ 07:01  -  04-24-20 @ 07:00  --------------------------------------------------------  IN: 2849.1 mL / OUT: 1455 mL / NET: 1394.1 mL        MICROBIOLOGY:  Culture Results:   No growth to date. (04-21 @ 04:39)  Culture Results:   Normal Respiratory Reyna present (04-21 @ 04:30)      MEDICATIONS  (STANDING):  calcium acetate 1334 milliGRAM(s) Oral three times a day with meals  caspofungin IVPB 50 milliGRAM(s) IV Intermittent every 24 hours  caspofungin IVPB      chlorhexidine 0.12% Liquid 15 milliLiter(s) Oral Mucosa every 12 hours  chlorhexidine 0.12% Liquid 15 milliLiter(s) Oral Mucosa two times a day  chlorhexidine 4% Liquid 1 Application(s) Topical <User Schedule>  dexMEDEtomidine Infusion 0.05 MICROgram(s)/kG/Hr (1.1 mL/Hr) IV Continuous <Continuous>  dextrose 5%. 1000 milliLiter(s) (50 mL/Hr) IV Continuous <Continuous>  dextrose 50% Injectable 12.5 Gram(s) IV Push once  dextrose 50% Injectable 25 Gram(s) IV Push once  dextrose 50% Injectable 25 Gram(s) IV Push once  heparin   Injectable 7500 Unit(s) SubCutaneous every 8 hours  insulin regular Infusion 3 Unit(s)/Hr (3 mL/Hr) IV Continuous <Continuous>  lactulose Syrup 20 Gram(s) Oral three times a day  meropenem  IVPB 750 milliGRAM(s) IV Intermittent every 24 hours  methylPREDNISolone sodium succinate Injectable 40 milliGRAM(s) IV Push two times a day  norepinephrine Infusion 0.05 MICROgram(s)/kG/Min (4.13 mL/Hr) IV Continuous <Continuous>  pantoprazole   Suspension 40 milliGRAM(s) Oral daily  propofol Infusion 10 MICROgram(s)/kG/Min (5.28 mL/Hr) IV Continuous <Continuous>  senna 2 Tablet(s) Oral at bedtime  sevelamer carbonate Powder 2400 milliGRAM(s) Oral three times a day with meals  sodium bicarbonate 650 milliGRAM(s) Oral every 6 hours  sodium zirconium cyclosilicate 10 Gram(s) Oral two times a day    MEDICATIONS  (PRN):  acetaminophen   Tablet .. 650 milliGRAM(s) Oral every 6 hours PRN Temp greater or equal to 38C (100.4F)  dextrose 40% Gel 15 Gram(s) Oral once PRN Blood Glucose LESS THAN 70 milliGRAM(s)/deciliter  glucagon  Injectable 1 milliGRAM(s) IntraMuscular once PRN Glucose LESS THAN 70 milligrams/deciliter  glucagon  Injectable 1 milliGRAM(s) IntraMuscular once PRN Glucose LESS THAN 70 milligrams/deciliter  ondansetron Injectable 4 milliGRAM(s) IV Push every 8 hours PRN Nausea and/or Vomiting      RADIOLOGY & ADDITIONAL STUDIES:

## 2020-04-24 NOTE — PROGRESS NOTE ADULT - SUBJECTIVE AND OBJECTIVE BOX
To be updated Admit Date: 04-13-20  Length of Stay: 11d    55yMale    Reason for admission to ICU:  Patient is in acute hypoxic respiratory distress requiring intubation and sedation. Imaging c/w ARDS. Admitted to MICU for further observation and management.    INTERVAL HPI/OVERNIGHT EVENTS:  Intubated and sedated - Worsening TELLO - Urine output still ok - Stable - Follow Nephrology  Worsening Subq Emphysema - Blow hole placed tby CTS April 22  Possible RRT Today tomorrow      MEDICATIONS  (STANDING):  calcium acetate 1334 milliGRAM(s) Oral three times a day with meals  caspofungin IVPB 50 milliGRAM(s) IV Intermittent every 24 hours  caspofungin IVPB      chlorhexidine 0.12% Liquid 15 milliLiter(s) Oral Mucosa every 12 hours  chlorhexidine 4% Liquid 1 Application(s) Topical <User Schedule>  dexMEDEtomidine Infusion 0.05 MICROgram(s)/kG/Hr (1.1 mL/Hr) IV Continuous <Continuous>  dextrose 5%. 1000 milliLiter(s) (50 mL/Hr) IV Continuous <Continuous>  dextrose 50% Injectable 12.5 Gram(s) IV Push once  dextrose 50% Injectable 25 Gram(s) IV Push once  dextrose 50% Injectable 25 Gram(s) IV Push once  heparin   Injectable 7500 Unit(s) SubCutaneous every 8 hours  insulin regular Infusion 3 Unit(s)/Hr (3 mL/Hr) IV Continuous <Continuous>  lactulose Syrup 20 Gram(s) Oral three times a day  meropenem  IVPB 750 milliGRAM(s) IV Intermittent every 24 hours  methylPREDNISolone sodium succinate Injectable 40 milliGRAM(s) IV Push daily  norepinephrine Infusion 0.05 MICROgram(s)/kG/Min (4.13 mL/Hr) IV Continuous <Continuous>  pantoprazole   Suspension 40 milliGRAM(s) Oral daily  propofol Infusion 10 MICROgram(s)/kG/Min (5.28 mL/Hr) IV Continuous <Continuous>  senna 2 Tablet(s) Oral at bedtime  sevelamer carbonate Powder 2400 milliGRAM(s) Oral three times a day with meals  sodium bicarbonate 650 milliGRAM(s) Oral every 6 hours  sodium zirconium cyclosilicate 10 Gram(s) Oral two times a day    MEDICATIONS  (PRN):  acetaminophen   Tablet .. 650 milliGRAM(s) Oral every 6 hours PRN Temp greater or equal to 38C (100.4F)  dextrose 40% Gel 15 Gram(s) Oral once PRN Blood Glucose LESS THAN 70 milliGRAM(s)/deciliter  glucagon  Injectable 1 milliGRAM(s) IntraMuscular once PRN Glucose LESS THAN 70 milligrams/deciliter  glucagon  Injectable 1 milliGRAM(s) IntraMuscular once PRN Glucose LESS THAN 70 milligrams/deciliter  ondansetron Injectable 4 milliGRAM(s) IV Push every 8 hours PRN Nausea and/or Vomiting      Vitals:  Vital Signs Last 24 Hrs  T(C): 38 (24 Apr 2020 04:00), Max: 39 (23 Apr 2020 20:00)  T(F): 100.4 (24 Apr 2020 04:00), Max: 102.2 (23 Apr 2020 20:00)  HR: 88 (24 Apr 2020 07:00) (84 - 114)  BP: --  BP(mean): --  RR: 32 (24 Apr 2020 07:00) (27 - 32)  SpO2: 99% (24 Apr 2020 07:00) (91% - 100%)    Vitals (Invasive):  ABP: 120/62 (04-24-20 @ 07:00) (84/124 - 132/64)  CVP(mm Hg): --  CVP(cm H2O): --  CO: --  CI: --  PA: --  PA(mean): --  PCWP: --  LA: --  RA: --  SVR: --  SVRI: --  PVR: --  PVRI: --    I&O's Summary    23 Apr 2020 07:01  -  24 Apr 2020 07:00  --------------------------------------------------------  IN: 2849.1 mL / OUT: 1455 mL / NET: 1394.1 mL        Physical Exam:  GEN; NAD  HEENT:     Pupils React to light equally no icterus ET,OGT in place< worsening swelling over face and eye - nonerythematous or warm  Chest:         SubQ emphysema present over chest wall and lateral chests and run down B/l Arms. S/p Left side chest wall blowhole (Stable)  Lungs:          CTABL, Decreased Air MOvement R>L Symmetrical Inspiration, Slight wheeze stable  Cardiac:       + S1 + S2    + RRR   No murmurs appreciated  Abdomen:    Obese habitus + BS     + Soft    + Non-tender    Extremities:  Edema Present Diffuse Slightly improved- no cyanosis  Neuro:        Sedated, , Slight Response to Supraorbital Notch pressure     Labs:  COVID:  COVID-19 PCR: Detected (04-13-20 @ 14:30)                          10.6   20.29 )-----------( 256      ( 24 Apr 2020 03:50 )             32.7     04-24    143  |  96<L>  |  170<HH>  ----------------------------<  177<H>  4.7   |  19  |  10.4<HH>    Ca    7.9<L>      24 Apr 2020 03:50  Phos  11.6     04-24  Mg     3.6     04-24    TPro  5.4<L>  /  Alb  2.3<L>  /  TBili  0.4  /  DBili  x   /  AST  40  /  ALT  30  /  AlkPhos  100  04-24          Culture - Blood (collected 04-21-20 @ 04:39)  Source: .Blood None  Preliminary Report (04-22-20 @ 14:01):    No growth to date.      COVID related labs:  22 Apr 2020 23:30  D-dimer:  2026<H>  Calcitonin:  x  CRP:  x  LDH:  963<H>  Lactate,Blood:  x  CK:  x  Troponin I:  x  Troponin T:  x  Troponin HS:  x  Ferritin, Serum: 1787<H>  BNP:  x      Blood Gases:  (ARTERIAL):  04-24-20 @ 02:14  pH 7.35 / pCO2 40 / pO2 76 / HCO3 22  Total CO2 --  FiO2 --  Oxygen Saturation 94  Total Hemoglobin, Calculated --  Hematocrit, Calculated --  Oxygen Content --  Blood Gas Arterial - Calcium, Ionized --  Blood Gas Arterial - Chloride --  Blood Gas Arterial - Glucose --  Blood Gas Arterial - Potassium --  Blood Gas Arterial - Sodium --  Blood Gas Arterial - Creatinine --  Base Excess, Arterial -3.2    (VENOUS):      Radiology:  < from: Xray Chest 1 View- PORTABLE-Routine (04.24.20 @ 04:50) >  Impression:    Diffuse interstitial opacifications. Likely pneumomediastinum. Subcutaneous emphysema.  Support devices as described.  < end of copied text >

## 2020-04-24 NOTE — PROGRESS NOTE ADULT - ASSESSMENT
ASSESSMENT/PLAN  - covid 19 pneumonia with acute resp failure and ARDS  - cytokine storm  - TELLO and elevated LFTs secondary to the above  - DM poorly controlled PTA - note HbA1c  - HTN  - Will change   tube feed to VITAL HP 360x 4feeds  that is 58 meq of K+  check bmp/phos/mg and correct lytes ASSESSMENT/PLAN  - covid 19 pneumonia with acute resp failure and ARDS  - cytokine storm  - TELLO and elevated LFTs secondary to the above  - DM poorly controlled PTA - note HbA1c  - HTN    - Nepro not appropriate here, yadira not in combination with propofol  - Will change   tube feed to VITAL HP 360x 4feeds/d to [id number] gm protein, 1422 k/d plus the propofol, total 58 meq of K+ and 1110 mg phos per day

## 2020-04-24 NOTE — CHART NOTE - NSCHARTNOTEFT_GEN_A_CORE
Family member contacted to update. Spoke with Marisol (daughter, [phone]) and wife. I explained that Mr. Alford remains in the ICU in critical condition. He is intubated on a ventilator and requiring a high level of support, we are making incremental changes. Family asked about ventilator settings, particularly FiO2 - now at 60% from 80%. I informed them that he is still on pressors as well for BP. I informed them that his renal function remains poor and he may receive dialysis in the next day or so, but that will depend on nephrology recommendations. She asked about K, which I reported is normal at 4.7. I informed her that his WBC remains elevated at 20, there is concern for infection, for which he is receiving antifungals and antibiotics. Reported fever to 102F in the past 24h. All of her questions were answered. Daughter and wife expressed understanding. I told them they would receiving additional phone calls for acute events, but otherwise I would call back tomorrow for another update.

## 2020-04-24 NOTE — PROGRESS NOTE ADULT - ASSESSMENT
Assessment:    Acute hypoxemic respiratory failure secondary to SARS-COV-2 infection requiring invasive mechanical ventilation  sub q emphysema s/p blow hole  ARDS  Possible superimposed bacterial infection increase procal/ yeast in DTA  TELLO/ hyperkalemia worsening/ metabolic acidosis non oliguric      PLAN:    CNS: PROPOFOL, PRECEDEX , TG checked    HEENT:  Oral care    PULMONARY:  HOB @ 45 degrees, keep sats 92-96%  solumedrol 40q 24(monitor FS),  / i RR 30, DEC fio2 60%, PEEP 10, CTsx F/UP,  ett 24    CARDIOVASCULAR: bicarb po q 6h    GI: GI prophylaxis Protonix, bowel regimen                                         Feeding: feeding    RENAL:  F/u  lytes.  Correct as needed. accurate I/O, renal F/UP, repeat CMP , renal fup    INFECTIOUS DISEASE: IV abx  violetta, ID f/u for immunomodulators, caspo, f//up cx    HEMATOLOGICAL:  hep sq ( decrease clearance)    ENDOCRINE:  Follow up FS.  Insulin protocol if needed.    CODE STATUS: FULL CODE    DISPOSITION: Patient require continued monitoring in MICU  poor prognosis

## 2020-04-24 NOTE — PROGRESS NOTE ADULT - SUBJECTIVE AND OBJECTIVE BOX
RHIANNON MAHER  55y Male   [id number]    Hospital Day: 5  Post Operative Day:   Procedure: s/p blowhole   Patient is a 55y old  Male who presents with a chief complaint of suspected COVID-19 (2020 12:47)    PAST MEDICAL & SURGICAL HISTORY:  Diabetes  Hypertension  No significant past surgical history      Events of the Last 24h: FiO2 70%, PEEP 10.     Vital Signs Last 24 Hrs  T(C): 36.4 (2020 20:00), Max: 36.4 (2020 20:00)  T(F): 97.6 (2020 20:00), Max: 97.6 (2020 20:00)  HR: 70 (:00) (56 - 92)  BP: --  BP(mean): --  RR: --  SpO2: 96% (2020 22:00) (90% - 98%)    Mode: AC/ CMV (Assist Control/ Continuous Mandatory Ventilation), RR (machine): 30, TV (machine): 350, FiO2: 80, PEEP: 10, ITime: 1, MAP: 18, PIP: 31    Diet, NPO with Tube Feed:   Tube Feeding Modality: Orogastric  Nepro with Carb Steady  Total Volume for 24 Hours (mL): 960  Bolus  Total Volume of Bolus (mL):  240  Tube Feed Frequency: Every 6 hours   Tube Feed Start Time: 10:30  Bolus Feed Rate (mL per Hour): 240   Bolus Feed Duration (in Hours): 1 (20 @ 10:19)      I&O's Summary    2020 07:  -  2020 07:00  --------------------------------------------------------  IN: 2160 mL / OUT: 1020 mL / NET: 1140 mL    :  -  2020 01:08  --------------------------------------------------------  IN: 1034 mL / OUT: 1475 mL / NET: -441 mL     I&O's Detail    2020 07:01  -  2020 07:00  --------------------------------------------------------  IN:    dexmedetomidine Infusion: 85 mL    dextrose 5%: 900 mL    Enteral Tube Flush: 180 mL    insulin regular Infusion: 64 mL    IV PiggyBack: 250 mL    Miscellaneous Tube Feedin mL    norepinephrine Infusion: 11 mL    propofol Infusion: 190 mL  Total IN: 2160 mL    OUT:    Indwelling Catheter - Urethral: 1020 mL  Total OUT: 1020 mL    Total NET: 1140 mL      2020 07:01  -  2020 01:08  --------------------------------------------------------  IN:    dexmedetomidine Infusion: 192 mL    dextrose 5%: 150 mL    Enteral Tube Flush: 100 mL    insulin regular Infusion: 16 mL    Nepro with Carb Steady: 480 mL    norepinephrine Infusion: 16 mL    propofol Infusion: 80 mL  Total IN: 1034 mL    OUT:    Indwelling Catheter - Urethral: 1475 mL  Total OUT: 1475 mL    Total NET: -441 mL          MEDICATIONS  (STANDING):  caspofungin IVPB 50 milliGRAM(s) IV Intermittent every 24 hours  caspofungin IVPB      chlorhexidine 0.12% Liquid 15 milliLiter(s) Oral Mucosa every 12 hours  chlorhexidine 0.12% Liquid 15 milliLiter(s) Oral Mucosa two times a day  chlorhexidine 4% Liquid 1 Application(s) Topical <User Schedule>  dexMEDEtomidine Infusion 0.05 MICROgram(s)/kG/Hr (1.1 mL/Hr) IV Continuous <Continuous>  dextrose 5%. 1000 milliLiter(s) (50 mL/Hr) IV Continuous <Continuous>  dextrose 50% Injectable 12.5 Gram(s) IV Push once  dextrose 50% Injectable 25 Gram(s) IV Push once  dextrose 50% Injectable 25 Gram(s) IV Push once  heparin   Injectable 7500 Unit(s) SubCutaneous every 8 hours  insulin regular Infusion 3 Unit(s)/Hr (3 mL/Hr) IV Continuous <Continuous>  lactulose Syrup 20 Gram(s) Oral three times a day  meropenem  IVPB 500 milliGRAM(s) IV Intermittent every 24 hours  methylPREDNISolone sodium succinate Injectable 60 milliGRAM(s) IV Push every 12 hours  norepinephrine Infusion 0.05 MICROgram(s)/kG/Min (4.13 mL/Hr) IV Continuous <Continuous>  pantoprazole   Suspension 40 milliGRAM(s) Oral daily  propofol Infusion 10 MICROgram(s)/kG/Min (5.28 mL/Hr) IV Continuous <Continuous>  senna 2 Tablet(s) Oral at bedtime  sevelamer carbonate Powder 1600 milliGRAM(s) Oral three times a day with meals  sodium bicarbonate 650 milliGRAM(s) Oral every 8 hours  sodium zirconium cyclosilicate 10 Gram(s) Oral every 8 hours    MEDICATIONS  (PRN):  acetaminophen   Tablet .. 650 milliGRAM(s) Oral every 6 hours PRN Temp greater or equal to 38C (100.4F)  dextrose 40% Gel 15 Gram(s) Oral once PRN Blood Glucose LESS THAN 70 milliGRAM(s)/deciliter  glucagon  Injectable 1 milliGRAM(s) IntraMuscular once PRN Glucose LESS THAN 70 milligrams/deciliter  glucagon  Injectable 1 milliGRAM(s) IntraMuscular once PRN Glucose LESS THAN 70 milligrams/deciliter  ondansetron Injectable 4 milliGRAM(s) IV Push every 8 hours PRN Nausea and/or Vomiting      PHYSICAL EXAM:    GENERAL: NAD    HEENT: NCAT    CHEST/LUNGS: CTAB    HEART: RRR,  No murmurs, rubs, or gallops    ABDOMEN: SNTND +BS    EXTREMITIES:  FROM, No clubbing, cyanosis, or edema, palpable pulse    NEURO: No focal neurological deficits    SKIN: No rashes or lesions    INCISION/WOUNDS:                          11.4   7.32  )-----------( 254      ( 2020 04:30 )             35.0        CBC Full  -  ( 2020 04:30 )  WBC Count : 7.32 K/uL  RBC Count : 4.11 M/uL  Hemoglobin : 11.4 g/dL  Hematocrit : 35.0 %  Platelet Count - Automated : 254 K/uL  Mean Cell Volume : 85.2 fL  Mean Cell Hemoglobin : 27.7 pg  Mean Cell Hemoglobin Concentration : 32.6 g/dL  Auto Neutrophil # : 6.92 K/uL  Auto Lymphocyte # : 0.34 K/uL  Auto Monocyte # : 0.07 K/uL  Auto Eosinophil # : 0.00 K/uL  Auto Basophil # : 0.00 K/uL  Auto Neutrophil % : 94.5 %  Auto Lymphocyte % : 4.6 %  Auto Monocyte % : 0.9 %  Auto Eosinophil % : 0.0 %  Auto Basophil % : 0.0 %               141   |  95    |  138                Ca: 8.1    BMP:   ----------------------------< 184    Mg: 3.6   (20 @ 04:30)             4.9    |  21    | 9.0                Ph: 11.2     LFT:     TPro: 5.5 / Alb: 2.5 / TBili: 0.4 / DBili: x / AST: 35 / ALT: 38 / AlkPhos: 76   (20 @ 04:30)    LIVER FUNCTIONS - ( 2020 04:30 )  Alb: 2.5 g/dL / Pro: 5.5 g/dL / ALK PHOS: 76 U/L / ALT: 38 U/L / AST: 35 U/L / GGT: x             Culture - Blood (collected 2020 04:39)  Source: .Blood None  Preliminary Report (2020 14:01):    No growth to date.    Culture - Sputum (collected 2020 04:30)  Source: .Sputum Sputum  Gram Stain (2020 15:15):    Moderate polymorphonuclear leukocytes per low power field    Moderate Squamous epithelial cells per low power field    Moderate Yeast per oil power field    Moderate Gram Positive Cocci in Pairs and Chains per oil power field  Preliminary Report (2020 10:07):    Normal Respiratory Reyna present      < from: Xray Chest 1 View-PORTABLE IMMEDIATE (20 @ 11:59) >  Impression:    No evidence of pneumothorax. Stable extensive subcutaneous emphysema. No pneumothoraces.  < end of copied text >    < from: Xray Chest 1 View- PORTABLE-Routine (20 @ 02:46) >  Impression:    1. No significant change in bilateral airspace opacities, right greaterthan left.  < end of copied text >

## 2020-04-24 NOTE — PROGRESS NOTE ADULT - ASSESSMENT
Patient is a 55 year old male with PMH of HTN and T2DM diabetes who presented on 4/13 for dyspnea found to have SARS-COV-2 pneumonia. Was intubated 4/17 after desaturating to 84% on 15L NRB and upgraded to CCU    # Acute hypoxic respiratory failure 2/2 SARS-COV-2 lower respiratory tract infection with evidence of cytokine release syndrome -   # PTX and Pneumomediastinum - CXR April 20 - no PTX - Improving Pneumomediastinum - CTSurg Call PRN - Blow hole April 22 to release Subq Emphysema  - Intubated 4/17.   - Vent settings:. RR (machine): 30 TV (machine): 350 FiO2: 80 PEEP: 10  Decrease to FiO2 70%  - Pressure support: Levo   - Sedation:  Propofol and Precedex -Off Versed - TG in AM  - COVID + - Isolation - ID following. S/p Plaquenil and Anakinra. Decrease to IV Solumedrol 40 mg q6 Started April 14  - One dose Lasix 80 mg IV April 21 -  Can give additional Lasix as needed for Decreased Urine Output.   - Heparin 7500K subq q8   - Continue with IV Meropenem adjusted Kidney function  - Sputum - Yeast and GPC pairs and Chains - Culture Negative  - Started on Caspofungin 70 mg Loading 50 qD maintenance   - Blood Culture - NGTD  - Fungitell - Pending  - D-Dimer 2026( <1950 (<3068<4178) April 22  - Procal 17.9(<14.4<3.53<.53) April 20 - Pending  - CRP -12.9(<21<11<5) April 20 - Pending  - LDH - 963(<1223<1312<1384) April 22  - Ferritin  - 2335(<5116<7678<4770) April 22  - Fibrinogen - 604 (<626<663<700) April 22     # TELLO, no history of CKD with noted decreased UOP and hyperkalemia  - Worsening Cr and BUN - UO 2075 24 hours   - Nephrology Consult appreciated - Possible RRT Today/ Tomorrow  - Increase to 750 mg Meropenem qD  - Continue with Bicarb 650 q6  - FeNa - .8   - Hyperkalemia - Decrease to Lokelma q12  - Increase Sevelmar 3/3/3 - powder  - Start Phoslo 2/2/2    # History of HTN  - Will hold off Losartan 40 mg daily, given borderline BP  - Resume if becomes hypertensive     # Type 2 Diabetes - Uncontrolled   - HbA1c: 11.6  - Insulin Drip  - Monitor finger sticks    Diet: NPO with tube feeds. F/u nutrition recs  Electrolytes: Replete K<4, Mg<2. Given Potassium elevated - Given Lokelma - Follow up Nephrology  DVT ppx: Hep 5K subq q8 Decreased Clearance   GI ppx: Pantoprazole 40 mg suspension while intubated and on steroids  Activity: bedrest  Dispo: from home, continue MICU monitoring; Possible HD with Worsening Kidney Function;Daughter [phone] Patient is a 55 year old male with PMH of HTN and T2DM diabetes who presented on 4/13 for dyspnea found to have SARS-COV-2 pneumonia. Was intubated 4/17 after desaturating to 84% on 15L NRB and upgraded to CCU    # Acute hypoxic respiratory failure 2/2 SARS-COV-2 lower respiratory tract infection with evidence of cytokine release syndrome -   # PTX and Pneumomediastinum - CXR April 20 - no PTX - Improving Pneumomediastinum - CTSurg Call PRN - Blow hole April 22 to release Subq Emphysema  - Intubated 4/17.   - Vent settings:. RR (machine): RR (machine): 30 TV (machine): 350 FiO2: 70PEEP: 10  - Pressure support: Levo   - Sedation:  Propofol and Precedex -Off Versed - TG - 336  - COVID + - Isolation - ID following. S/p Plaquenil and Anakinra. Decrease to q24 IV Solumedrol 40 mg q6 Started April 14 - for 3 more days  - One dose Lasix 80 mg IV April 21 -  Can give additional Lasix as needed for Decreased Urine Output.   - Heparin 7500K subq q8   - Continue with IV Meropenem adjusted Kidney function - Start April 17 - Finish 10 day course  - Sputum - Yeast and GPC pairs and Chains - Culture Negative  - Started on Caspofungin 70 mg Loading 50 qD maintenance   - Blood Culture - NGTD  - Fungitell - Pending  - D-Dimer 2026( <1950 (<3068<4178) April 22  - Procal 6.62(<17.9(<14.4<3.53<.53) April 22 - Pending  - CRP -11.42(<12.9(<21<11<5) April 22 -   - LDH - 963(<1223<1312<1384) April 22  - Ferritin  - 1787<(2335(<5116<7678<4770) April 23  - Fibrinogen - 604 (<626<663<700) April 22     # TELLO, no history of CKD with noted decreased UOP and hyperkalemia  - Worsening Cr and BUN - UO 1455 24 hours   - Nephrology Consult appreciated - Possible RRT Today/ Tomorrow - will need access  - Increase to 750 mg Meropenem qD  - Continue with Bicarb 650 q6  - FeNa - .8   - Hyperkalemia - Decrease to Lokelma q12  - Continue with Sevelmar 3/3/3 - powder, Phoslo 2/2/2    # History of HTN  - Will hold off Losartan 40 mg daily, given borderline BP  - Resume if becomes hypertensive     # Type 2 Diabetes - Uncontrolled   - HbA1c: 11.6  - Insulin Drip  - Monitor finger sticks    Diet: NPO with tube feeds. F/u nutrition recs  Electrolytes: Replete K<4, Mg<2. Given Potassium elevated - Given Lokelma - Follow up Nephrology  DVT ppx: Hep 5K subq q8 Decreased Clearance   GI ppx: Pantoprazole 40 mg suspension while intubated and on steroids  Activity: bedrest  Dispo: from home, continue MICU monitoring; Possible HD with Worsening Kidney Function;Daughter [phone]

## 2020-04-24 NOTE — PROGRESS NOTE ADULT - ASSESSMENT
Assessment:  Patient is a COVID+ 55 year old male patient with hypertension, diabetes presenting for dyspnea. Surgery consulted for finding of pneumomediastinum and left apical pneumothorax. Patient is s/p blowhole.    Plan:  - Continue to monitor subQ emphysema  - Daily dressing changes to blowhole  - Continue daily CXR  - Will continue to follow

## 2020-04-25 NOTE — PROGRESS NOTE ADULT - ASSESSMENT
Assessment:    Acute hypoxemic respiratory failure secondary to SARS-COV-2 infection requiring invasive mechanical ventilation  sub q emphysema s/p blow hole  ARDS  Possible superimposed bacterial infection increase procal/ yeast in DTA  TELLO/ hyperkalemia worsening/ metabolic acidosis non oliguric      PLAN:    CNS: PROPOFOL, PRECEDEX ,    HEENT:  Oral care    PULMONARY:  HOB @ 45 degrees, keep sats 92-96%  solumedrol 40q 24(monitor FS),  / i RR 30, DEC fio2 50%, PEEP 8, CTsx F/UP,    CARDIOVASCULAR: bicarb po q 6h    GI: GI prophylaxis Protonix, bowel regimen                                         Feeding: feeding    RENAL:  F/u  lytes.  Correct as needed. accurate I/O, renal F/UP, repeat CMP , renal fup    INFECTIOUS DISEASE: IV abx  violetta,  vanco x1, pan cx    HEMATOLOGICAL:  hep sq ( decrease clearance)    ENDOCRINE:  Follow up FS.  Insulin protocol if needed.    CODE STATUS: FULL CODE    DISPOSITION: Patient require continued monitoring in MICU  poor prognosis

## 2020-04-25 NOTE — CHART NOTE - NSCHARTNOTEFT_GEN_A_CORE
Family member contacted to update. Spoke with Marisol (daughter, [phone]). I explained that her father remains in the ICU in critical condition. He is intubated on a ventilator and requiring a high level of support, but we are making incremental improvements. Daughter asked about ventilator settings, particularly FiO2 - now at 50% from 60%. I informed them that he is no longer on pressors for BP. I informed them that his renal function remains poor but improving overall and making urine. I told her that nephrologist is deferring dialysis for now as long as his renal function continues to improve. She asked about K, which I reported is normal at 4.4. I informed her that his WBC remains elevated at 19, that he is still receiving antibiotics and that cultures were to be resent. She inquired about blood thinners and I informed her that he is receiving heparin TID. All of her questions were answered. Daughter expressed understanding. I told her they would receive additional phone calls for acute events, but otherwise I would call back tomorrow for another update.

## 2020-04-25 NOTE — PROGRESS NOTE ADULT - SUBJECTIVE AND OBJECTIVE BOX
RHIANNON MAHER  55y, Male  Allergy: No Known Allergies      CHIEF COMPLAINT: suspected COVID-19 (25 Apr 2020 07:54)      INTERVAL EVENTS/HPI  - No acute events overnight  - T(F): , Max: 100 (04-24-20 @ 20:00)  - Tolerating medication  - WBC Count: 19.49 K/uL (04-25-20 @ 03:44)      ROS  unable to obtain history secondary to patient's mental status and/or sedation     FH non-contributory   Social Hx non-contributory    VITALS:  T(F): 99.9, Max: 100 (04-24-20 @ 20:00)  HR: 102  BP: --  RR: 31Vital Signs Last 24 Hrs  T(C): 37.7 (25 Apr 2020 08:00), Max: 37.8 (24 Apr 2020 20:00)  T(F): 99.9 (25 Apr 2020 08:00), Max: 100 (24 Apr 2020 20:00)  HR: 102 (25 Apr 2020 08:00) (82 - 114)  BP: --  BP(mean): --  RR: 31 (25 Apr 2020 08:00) (22 - 33)  SpO2: 100% (25 Apr 2020 08:00) (95% - 100%)    PHYSICAL EXAM:  see below       TESTS & MEASUREMENTS:                        10.1   19.49 )-----------( 243      ( 25 Apr 2020 03:44 )             30.8     04-25    146  |  102  |  160<HH>  ----------------------------<  173<H>  4.4   |  22  |  9.7<HH>    Ca    8.1<L>      25 Apr 2020 03:44  Phos  9.2     04-25  Mg     3.6     04-25    TPro  5.4<L>  /  Alb  2.1<L>  /  TBili  0.3  /  DBili  x   /  AST  32  /  ALT  22  /  AlkPhos  100  04-25    eGFR if Non African American: 5 mL/min/1.73M2 (04-25-20 @ 03:44)  eGFR if African American: 6 mL/min/1.73M2 (04-25-20 @ 03:44)    LIVER FUNCTIONS - ( 25 Apr 2020 03:44 )  Alb: 2.1 g/dL / Pro: 5.4 g/dL / ALK PHOS: 100 U/L / ALT: 22 U/L / AST: 32 U/L / GGT: x               Culture - Blood (collected 04-21-20 @ 04:39)  Source: .Blood None  Preliminary Report (04-22-20 @ 14:01):    No growth to date.    Culture - Sputum (collected 04-21-20 @ 04:30)  Source: .Sputum Sputum  Gram Stain (04-21-20 @ 15:15):    Moderate polymorphonuclear leukocytes per low power field    Moderate Squamous epithelial cells per low power field    Moderate Yeast per oil power field    Moderate Gram Positive Cocci in Pairs and Chains per oil power field  Final Report (04-23-20 @ 08:57):    Normal Respiratory Reyna present            INFECTIOUS DISEASES TESTING  Fungitell: 60 pg/mL (04-21-20 @ 13:43)      RADIOLOGY & ADDITIONAL TESTS:  I have personally reviewed the last Chest xray  CXR  Xray Chest 1 View- PORTABLE-Urgent:   EXAM:  XR CHEST PORTABLE URGENT 1V            PROCEDURE DATE:  04/23/2020            INTERPRETATION:  Clinical History / Reason for exam: Endotracheal tube adjustment.    Comparison : Chest radiograph 4/23/2020.    Technique/Positioning: AP view the chest..    Findings:    Support devices: The tracheal tube with tip above the santi. NG tube with tip in the stomach. Left-sided central line, stable.    Cardiac/mediastinum/hilum: Stable.    Lung parenchyma/Pleura: Stable bilateral airspace opacities.    Skeleton/soft tissues: Extensive subcutaneous emphysema, stable.    Impression:      Stable bilateral airspace opacities. No pneumothorax. Stable subcutaneous emphysema.                  EJFFREY VARGAS M.D., ATTENDING RADIOLOGIST  This document has been electronically signed. Apr 23 2020  9:04AM             (04-23-20 @ 08:53)      CT      CARDIOLOGY TESTING  12 Lead ECG:   Systolic  mmHg    Diastolic BP 92 mmHg    Ventricular Rate 79 BPM    Atrial Rate 79 BPM    P-R Interval 146 ms    QRS Duration 67 ms    Q-T Interval 388 ms    QTC Calculation(Bezet) 445 ms    P Axis 72 degrees    R Axis 88 degrees    T Axis 97 degrees    Diagnosis Line Normal sinus rhythm  Low voltage QRS  Nonspecific T wave abnormality  Abnormal ECG    Confirmed by MISAEL BLOOM MD [id number]) on 4/20/2020 11:56:59 AM (04-19-20 @ 05:41)  12 Lead ECG:   Ventricular Rate 94 BPM    Atrial Rate 94 BPM    P-R Interval 144 ms    QRS Duration 74 ms    Q-T Interval 336 ms    QTC Calculation(Bezet) 420 ms    P Axis 45 degrees    R Axis 76 degrees    T Axis 56 degrees    Diagnosis Line Normal sinus rhythm  Normal ECG    Confirmed by MISAEL BLOOM MD [id number]) on 4/13/2020 4:55:37 PM (04-13-20 @ 16:08)      MEDICATIONS  calcium acetate 2001  chlorhexidine 0.12% Liquid 15  chlorhexidine 4% Liquid 1  dexMEDEtomidine Infusion 0.2  dextrose 5%. 1000  dextrose 50% Injectable 12.5  dextrose 50% Injectable 25  dextrose 50% Injectable 25  heparin   Injectable 7500  insulin regular Infusion 3  lactulose Syrup 20  meropenem  IVPB 750  methylPREDNISolone sodium succinate Injectable 40  norepinephrine Infusion 0.05  pantoprazole   Suspension 40  propofol Infusion 10  senna 2  sevelamer carbonate Powder 2400  sodium bicarbonate 650  sodium zirconium cyclosilicate 10  vancomycin  IVPB 1250      ANTIBIOTICS:  meropenem  IVPB 750 milliGRAM(s) IV Intermittent every 24 hours  vancomycin  IVPB 1250 milliGRAM(s) IV Intermittent once      All available historical data has been reviewed

## 2020-04-25 NOTE — PROGRESS NOTE ADULT - ASSESSMENT
55 year old male patient with hypertension, diabetes presenting for dyspnea.     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 53 > 3.53 > 14.4 with possible  bacterial superinfection  -Sputa NGTD  -BCx 4/21 NG  -fungitell 61  S/P hydroxychloroquine   S/p Anakinra 4/14-17  Off Caspo      RECOMMENDATIONS;   Nasal MRSA PCR: If negative D/C Vanco   Continue Meropenem 750 mg iv q24h  no need to monitor inflammatory markers

## 2020-04-25 NOTE — PROGRESS NOTE ADULT - ASSESSMENT
Acute hypoxemic respiratory failure secondary to SARS-COV-2 infection / TELLO/ hyperkalemia:  # creatinine trending up , check repeat today   # non-oliguric   # k noted , continue  lokelma q 12   # on meropenem 500 q 24 / caspofungin   # feed : nepro  # ID notes appreciated on violetta   # ph noted, on  renagel 3/3/3 , increase phoslo 3/3/3  # continue sodium bicarbonate 650 q 6  # might need RRT soon   # will follow

## 2020-04-25 NOTE — PROGRESS NOTE ADULT - SUBJECTIVE AND OBJECTIVE BOX
24 h events noted  intubated/ventilated         PAST HISTORY  --------------------------------------------------------------------------------  No significant changes to PMH, PSH, FHx, SHx, unless otherwise noted    ALLERGIES & MEDICATIONS  --------------------------------------------------------------------------------  Allergies    No Known Allergies    Intolerances      Standing Inpatient Medications  calcium acetate 1334 milliGRAM(s) Oral three times a day with meals  chlorhexidine 0.12% Liquid 15 milliLiter(s) Oral Mucosa every 12 hours  chlorhexidine 4% Liquid 1 Application(s) Topical <User Schedule>  dexMEDEtomidine Infusion 0.2 MICROgram(s)/kG/Hr IV Continuous <Continuous>  dextrose 5%. 1000 milliLiter(s) IV Continuous <Continuous>  dextrose 50% Injectable 12.5 Gram(s) IV Push once  dextrose 50% Injectable 25 Gram(s) IV Push once  dextrose 50% Injectable 25 Gram(s) IV Push once  heparin   Injectable 7500 Unit(s) SubCutaneous every 8 hours  insulin regular Infusion 3 Unit(s)/Hr IV Continuous <Continuous>  lactulose Syrup 20 Gram(s) Oral three times a day  meropenem  IVPB 750 milliGRAM(s) IV Intermittent every 24 hours  methylPREDNISolone sodium succinate Injectable 40 milliGRAM(s) IV Push daily  norepinephrine Infusion 0.05 MICROgram(s)/kG/Min IV Continuous <Continuous>  pantoprazole   Suspension 40 milliGRAM(s) Oral daily  propofol Infusion 10 MICROgram(s)/kG/Min IV Continuous <Continuous>  senna 2 Tablet(s) Oral at bedtime  sevelamer carbonate Powder 2400 milliGRAM(s) Oral three times a day with meals  sodium bicarbonate 650 milliGRAM(s) Oral every 6 hours  sodium zirconium cyclosilicate 10 Gram(s) Oral two times a day    PRN Inpatient Medications  acetaminophen   Tablet .. 650 milliGRAM(s) Oral every 6 hours PRN  dextrose 40% Gel 15 Gram(s) Oral once PRN  glucagon  Injectable 1 milliGRAM(s) IntraMuscular once PRN  glucagon  Injectable 1 milliGRAM(s) IntraMuscular once PRN  ondansetron Injectable 4 milliGRAM(s) IV Push every 8 hours PRN        VITALS/PHYSICAL EXAM  --------------------------------------------------------------------------------  T(C): 37.5 (04-25-20 @ 04:00), Max: 37.8 (04-24-20 @ 20:00)  HR: 106 (04-25-20 @ 04:00) (82 - 114)  BP: --  RR: 25 (04-25-20 @ 04:00) (22 - 33)  SpO2: 98% (04-25-20 @ 04:00) (95% - 100%)  Wt(kg): --        04-23-20 @ 07:01  -  04-24-20 @ 07:00  --------------------------------------------------------  IN: 2849.1 mL / OUT: 1455 mL / NET: 1394.1 mL    04-24-20 @ 07:01  -  04-25-20 @ 05:03  --------------------------------------------------------  IN: 2563.5 mL / OUT: 1915 mL / NET: 648.5 mL      Physical Exam:  	Gen: intubated/ventilated     LABS/STUDIES  --------------------------------------------------------------------------------              10.1   19.49 >-----------<  243      [04-25-20 @ 03:44]              30.8     146  |  102  |  x   ----------------------------<  173      [04-25-20 @ 03:44]  4.4   |  22  |  x         Ca     8.1     [04-25-20 @ 03:44]      Mg     3.6     [04-24-20 @ 03:50]      Phos  9.2     [04-25-20 @ 03:44]    TPro  5.4  /  Alb  2.1  /  TBili  0.3  /  DBili  x   /  AST  32  /  ALT  22  /  AlkPhos  100  [04-25-20 @ 03:44]    PT/INR: PT 11.50, INR 1.00       [04-24-20 @ 17:14]  PTT: 33.0       [04-24-20 @ 17:14]      Creatinine Trend:  SCr 10.4 [04-24 @ 03:50]  SCr 9.4 [04-23 @ 05:30]  SCr 9.0 [04-22 @ 04:30]  SCr 8.9 [04-21 @ 04:30]  SCr 8.0 [04-20 @ 12:20]      Urine Creatinine 135      [04-18-20 @ 11:10]  Urine Protein 115      [04-18-20 @ 11:10]  Urine Sodium 44.0      [04-18-20 @ 11:10]  Urine Osmolality 384      [04-18-20 @ 11:10]    Ferritin 1787      [04-22-20 @ 23:30]  Lipid: chol --, , HDL --, LDL --      [04-24-20 @ 03:50]

## 2020-04-25 NOTE — PROGRESS NOTE ADULT - SUBJECTIVE AND OBJECTIVE BOX
OVERNIGHT EVENTS: intubated 9, insulin, precedex, propofol    Vital Signs Last 24 Hrs  T(C): 37.5 (25 Apr 2020 04:00), Max: 37.8 (24 Apr 2020 20:00)  T(F): 99.5 (25 Apr 2020 04:00), Max: 100 (24 Apr 2020 20:00)  HR: 108 (25 Apr 2020 06:00) (82 - 114)-  RR: 25 (25 Apr 2020 06:00) (22 - 33)  SpO2: 98% (25 Apr 2020 06:00) (95% - 100%)    PHYSICAL EXAMINATION:    GENERAL: sedated    HEENT: Head is normocephalic and atraumatic. Extraocular muscles are intact. Mucous membranes are moist.    NECK: Supple. diffuse subemphysema    LUNGS: bl crackles    HEART: Regular rate and rhythm without murmur.    ABDOMEN: Soft, nontender, and nondistended.      EXTREMITIES: Without any cyanosis, clubbing, rash, lesions or edema.    NEUROLOGIC: sedated    SKIN: No ulceration or induration present.      LABS:                        10.1   19.49 )-----------( 243      ( 25 Apr 2020 03:44 )             30.8     04-25    146  |  102  |  160<HH>  ----------------------------<  173<H>  4.4   |  22  |  9.7<HH>    Ca    8.1<L>      25 Apr 2020 03:44  Phos  9.2     04-25  Mg     3.6     04-25    TPro  5.4<L>  /  Alb  2.1<L>  /  TBili  0.3  /  DBili  x   /  AST  32  /  ALT  22  /  AlkPhos  100  04-25    PT/INR - ( 24 Apr 2020 17:14 )   PT: 11.50 sec;   INR: 1.00 ratio         PTT - ( 24 Apr 2020 17:14 )  PTT:33.0 sec    ABG - ( 25 Apr 2020 02:50 )  pH, Arterial: 7.40  pH, Blood: x     /  pCO2: 39    /  pO2: 59    / HCO3: 24    / Base Excess: -0.6  /  SaO2: 89          350/30/50/8  plateau 24                Procalcitonin, Serum: 36.40 ng/mL (04-24-20 @ 03:50)  Procalcitonin, Serum: 6.62 ng/mL (04-22-20 @ 23:30)        04-24-20 @ 07:01  -  04-25-20 @ 07:00  --------------------------------------------------------  IN: 2628.1 mL / OUT: 2090 mL / NET: 538.1 mL        MICROBIOLOGY:      MEDICATIONS  (STANDING):  calcium acetate 2001 milliGRAM(s) Oral three times a day with meals  chlorhexidine 0.12% Liquid 15 milliLiter(s) Oral Mucosa every 12 hours  chlorhexidine 4% Liquid 1 Application(s) Topical <User Schedule>  dexMEDEtomidine Infusion 0.2 MICROgram(s)/kG/Hr (4.4 mL/Hr) IV Continuous <Continuous>  dextrose 5%. 1000 milliLiter(s) (50 mL/Hr) IV Continuous <Continuous>  dextrose 50% Injectable 12.5 Gram(s) IV Push once  dextrose 50% Injectable 25 Gram(s) IV Push once  dextrose 50% Injectable 25 Gram(s) IV Push once  heparin   Injectable 7500 Unit(s) SubCutaneous every 8 hours  insulin regular Infusion 3 Unit(s)/Hr (3 mL/Hr) IV Continuous <Continuous>  lactulose Syrup 20 Gram(s) Oral three times a day  meropenem  IVPB 750 milliGRAM(s) IV Intermittent every 24 hours  methylPREDNISolone sodium succinate Injectable 40 milliGRAM(s) IV Push daily  norepinephrine Infusion 0.05 MICROgram(s)/kG/Min (4.13 mL/Hr) IV Continuous <Continuous>  pantoprazole   Suspension 40 milliGRAM(s) Oral daily  propofol Infusion 10 MICROgram(s)/kG/Min (5.28 mL/Hr) IV Continuous <Continuous>  senna 2 Tablet(s) Oral at bedtime  sevelamer carbonate Powder 2400 milliGRAM(s) Oral three times a day with meals  sodium bicarbonate 650 milliGRAM(s) Oral every 6 hours  sodium zirconium cyclosilicate 10 Gram(s) Oral two times a day    MEDICATIONS  (PRN):  acetaminophen   Tablet .. 650 milliGRAM(s) Oral every 6 hours PRN Temp greater or equal to 38C (100.4F)  dextrose 40% Gel 15 Gram(s) Oral once PRN Blood Glucose LESS THAN 70 milliGRAM(s)/deciliter  glucagon  Injectable 1 milliGRAM(s) IntraMuscular once PRN Glucose LESS THAN 70 milligrams/deciliter  glucagon  Injectable 1 milliGRAM(s) IntraMuscular once PRN Glucose LESS THAN 70 milligrams/deciliter  ondansetron Injectable 4 milliGRAM(s) IV Push every 8 hours PRN Nausea and/or Vomiting      RADIOLOGY & ADDITIONAL STUDIES:

## 2020-04-26 NOTE — PROGRESS NOTE ADULT - SUBJECTIVE AND OBJECTIVE BOX
Nephrology progress note    THIS IS AN INCOMPLETE NOTE . FULL NOTE TO FOLLOW SHORTLY    Patient is seen and examined, events over the last 24 h noted .    Allergies:  No Known Allergies    Hospital Medications:   MEDICATIONS  (STANDING):  calcium acetate 2001 milliGRAM(s) Oral three times a day with meals  chlorhexidine 0.12% Liquid 15 milliLiter(s) Oral Mucosa every 12 hours  chlorhexidine 4% Liquid 1 Application(s) Topical <User Schedule>  dexMEDEtomidine Infusion 0.2 MICROgram(s)/kG/Hr (4.4 mL/Hr) IV Continuous <Continuous>  dextrose 5%. 1000 milliLiter(s) (50 mL/Hr) IV Continuous <Continuous>  dextrose 50% Injectable 12.5 Gram(s) IV Push once  dextrose 50% Injectable 25 Gram(s) IV Push once  dextrose 50% Injectable 25 Gram(s) IV Push once  heparin   Injectable 7500 Unit(s) SubCutaneous every 8 hours  insulin regular Infusion 3 Unit(s)/Hr (3 mL/Hr) IV Continuous <Continuous>  lactulose Syrup 20 Gram(s) Oral three times a day  meropenem  IVPB 750 milliGRAM(s) IV Intermittent every 24 hours  methylPREDNISolone sodium succinate Injectable 40 milliGRAM(s) IV Push daily  norepinephrine Infusion 0.05 MICROgram(s)/kG/Min (4.13 mL/Hr) IV Continuous <Continuous>  pantoprazole   Suspension 40 milliGRAM(s) Oral daily  propofol Infusion 10 MICROgram(s)/kG/Min (5.28 mL/Hr) IV Continuous <Continuous>  senna 2 Tablet(s) Oral at bedtime  sevelamer carbonate Powder 2400 milliGRAM(s) Oral three times a day with meals  sodium bicarbonate 650 milliGRAM(s) Oral every 6 hours  sodium zirconium cyclosilicate 10 Gram(s) Oral two times a day        VITALS:  T(F): 98 (04-26-20 @ 00:00), Max: 99.9 (04-25-20 @ 08:00)  HR: 62 (04-26-20 @ 02:00)  BP: 115/70 (04-25-20 @ 20:00)  RR: 30 (04-26-20 @ 02:00)  SpO2: 97% (04-26-20 @ 02:00)  Wt(kg): --    04-23 @ 07:01  -  04-24 @ 07:00  --------------------------------------------------------  IN: 2849.1 mL / OUT: 1455 mL / NET: 1394.1 mL    04-24 @ 07:01 - 04-25 @ 07:00  --------------------------------------------------------  IN: 2628.1 mL / OUT: 2090 mL / NET: 538.1 mL    04-25 @ 07:01 - 04-26 @ 06:30  --------------------------------------------------------  IN: 2006.4 mL / OUT: 1995 mL / NET: 11.4 mL          PHYSICAL EXAM:  Constitutional: NAD  HEENT: anicteric sclera, oropharynx clear, MMM  Neck: No JVD  Respiratory: CTAB, no wheezes, rales or rhonchi  Cardiovascular: S1, S2, RRR  Gastrointestinal: BS+, soft, NT/ND  Extremities: No cyanosis or clubbing. No peripheral edema  :  No mendoza.   Skin: No rashes    LABS:  04-26    148<H>  |  101  |  x   ----------------------------<  340<H>  4.5   |  24  |  x     Ca    8.6      26 Apr 2020 04:30  Phos  8.3     04-26  Mg     3.6     04-25    TPro  5.9<L>  /  Alb  2.4<L>  /  TBili  0.5  /  DBili      /  AST  31  /  ALT  20  /  AlkPhos  92  04-26                          11.2   13.14 )-----------( 215      ( 26 Apr 2020 04:30 )             34.6       Urine Studies:      RADIOLOGY & ADDITIONAL STUDIES: Nephrology progress note  Patient is seen and examined, events over the last 24 h noted .  still intubated on MV     Allergies:  No Known Allergies    Hospital Medications:   MEDICATIONS  (STANDING):  calcium acetate 2001 milliGRAM(s) Oral three times a day with meals  dexMEDEtomidine Infusion 0.2 MICROgram(s)/kG/Hr (4.4 mL/Hr) IV Continuous <Continuous>  dextrose 5%. 1000 milliLiter(s) (50 mL/Hr) IV Continuous <Continuous>  heparin   Injectable 7500 Unit(s) SubCutaneous every 8 hours  insulin regular Infusion 3 Unit(s)/Hr (3 mL/Hr) IV Continuous <Continuous>  lactulose Syrup 20 Gram(s) Oral three times a day  meropenem  IVPB 750 milliGRAM(s) IV Intermittent every 24 hours  methylPREDNISolone sodium succinate Injectable 40 milliGRAM(s) IV Push daily  norepinephrine Infusion 0.05 MICROgram(s)/kG/Min (4.13 mL/Hr) IV Continuous <Continuous>  pantoprazole   Suspension 40 milliGRAM(s) Oral daily  propofol Infusion 10 MICROgram(s)/kG/Min (5.28 mL/Hr) IV Continuous <Continuous>  senna 2 Tablet(s) Oral at bedtime  sevelamer carbonate Powder 2400 milliGRAM(s) Oral three times a day with meals  sodium bicarbonate 650 milliGRAM(s) Oral every 6 hours  sodium zirconium cyclosilicate 10 Gram(s) Oral two times a day        VITALS:  T(F): 98 (04-26-20 @ 00:00), Max: 99.9 (04-25-20 @ 08:00)  HR: 62 (04-26-20 @ 02:00)  BP: 115/70 (04-25-20 @ 20:00)  RR: 30 (04-26-20 @ 02:00)  SpO2: 97% (04-26-20 @ 02:00)      04-23 @ 07:01  -  04-24 @ 07:00  --------------------------------------------------------  IN: 2849.1 mL / OUT: 1455 mL / NET: 1394.1 mL    04-24 @ 07:01  -  04-25 @ 07:00  --------------------------------------------------------  IN: 2628.1 mL / OUT: 2090 mL / NET: 538.1 mL    04-25 @ 07:01  -  04-26 @ 06:30  --------------------------------------------------------  IN: 2006.4 mL / OUT: 1995 mL / NET: 11.4 mL          PHYSICAL EXAM:  Constitutional: intubated on MV   HEENT: anicteric sclera, oropharynx clear, MMM  Neck: No JVD  Respiratory: CTAB, no wheezes, rales or rhonchi  Cardiovascular: S1, S2, RRR  Gastrointestinal: BS+, soft, NT/ND  Extremities: No cyanosis or clubbing. No peripheral edema  Skin: No rashes    LABS:  04-26    148<H>  |  101  |  176<HH>  ----------------------------<  340<H>  4.5   |  24  |  7.3<HH>    Ca    8.6      26 Apr 2020 04:30  Phos  8.3     04-26  Mg     3.4     04-26    TPro  5.9<L>  /  Alb  2.4<L>  /  TBili  0.5  /  DBili  x   /  AST  31  /  ALT  20  /  AlkPhos  92  04-26                            11.2   13.14 )-----------( 215      ( 26 Apr 2020 04:30 )             34.6     Creatinine Trend: 7.3<--, 9.7<--, 10.4<--, 9.4<--, 9.0<--, 8.9<--    Urine Studies:      RADIOLOGY & ADDITIONAL STUDIES:

## 2020-04-26 NOTE — PROGRESS NOTE ADULT - SUBJECTIVE AND OBJECTIVE BOX
To be updated Admit Date: 04-13-20  Length of Stay: 13d    55yMale  Reason for admission to ICU:  Patient is in acute hypoxic respiratory distress requiring intubation and sedation. Imaging c/w ARDS. Admitted to MICU for further observation and management.    INTERVAL HPI/OVERNIGHT EVENTS:  Intubated and sedated - Slightly Improving TELLO - Urine output improving - Stable - Follow Nephrology  Stable Subq Emphysema - Blow hole placed by CTS April 22  Urine Output Improving - No RRT today - Nephrology following.  Had episode of High Peak pressure and Decreased Saturation - Given Paralytic and INcreased FiO2 and improved.  Will lower FiO2 as appropriate.       MEDICATIONS  (STANDING):  calcium acetate 2001 milliGRAM(s) Oral three times a day with meals  chlorhexidine 0.12% Liquid 15 milliLiter(s) Oral Mucosa every 12 hours  chlorhexidine 4% Liquid 1 Application(s) Topical <User Schedule>  dexMEDEtomidine Infusion 0.2 MICROgram(s)/kG/Hr (4.4 mL/Hr) IV Continuous <Continuous>  dextrose 5%. 1000 milliLiter(s) (50 mL/Hr) IV Continuous <Continuous>  dextrose 50% Injectable 12.5 Gram(s) IV Push once  dextrose 50% Injectable 25 Gram(s) IV Push once  dextrose 50% Injectable 25 Gram(s) IV Push once  fentaNYL   Infusion. 0.5 MICROgram(s)/kG/Hr (4.4 mL/Hr) IV Continuous <Continuous>  heparin   Injectable 7500 Unit(s) SubCutaneous every 8 hours  insulin regular Infusion 3 Unit(s)/Hr (3 mL/Hr) IV Continuous <Continuous>  lactulose Syrup 20 Gram(s) Oral three times a day  meropenem  IVPB 750 milliGRAM(s) IV Intermittent every 24 hours  methylPREDNISolone sodium succinate Injectable 40 milliGRAM(s) IV Push daily  norepinephrine Infusion 0.05 MICROgram(s)/kG/Min (4.13 mL/Hr) IV Continuous <Continuous>  pantoprazole   Suspension 40 milliGRAM(s) Oral daily  propofol Infusion 10 MICROgram(s)/kG/Min (5.28 mL/Hr) IV Continuous <Continuous>  senna 2 Tablet(s) Oral at bedtime  sevelamer carbonate Powder 2400 milliGRAM(s) Oral three times a day with meals  sodium bicarbonate 650 milliGRAM(s) Oral every 6 hours  sodium zirconium cyclosilicate 10 Gram(s) Oral two times a day    MEDICATIONS  (PRN):  acetaminophen   Tablet .. 650 milliGRAM(s) Oral every 6 hours PRN Temp greater or equal to 38C (100.4F)  dextrose 40% Gel 15 Gram(s) Oral once PRN Blood Glucose LESS THAN 70 milliGRAM(s)/deciliter  glucagon  Injectable 1 milliGRAM(s) IntraMuscular once PRN Glucose LESS THAN 70 milligrams/deciliter  glucagon  Injectable 1 milliGRAM(s) IntraMuscular once PRN Glucose LESS THAN 70 milligrams/deciliter  ondansetron Injectable 4 milliGRAM(s) IV Push every 8 hours PRN Nausea and/or Vomiting      Vitals:  Vital Signs Last 24 Hrs  T(C): 37.1 (26 Apr 2020 12:00), Max: 37.2 (25 Apr 2020 20:00)  T(F): 98.8 (26 Apr 2020 12:00), Max: 99 (25 Apr 2020 20:00)  HR: 56 (26 Apr 2020 12:00) (56 - 88)  BP: 115/70 (25 Apr 2020 20:00) (115/70 - 115/70)  BP(mean): 84 (25 Apr 2020 20:00) (84 - 84)  RR: 30 (26 Apr 2020 12:00) (27 - 30)  SpO2: 100% (26 Apr 2020 12:00) (92% - 100%)    Vitals (Invasive):  ABP: 124/68 (04-26-20 @ 12:00) (118/62 - 182/88)    I&O's Summary    25 Apr 2020 07:01 - 26 Apr 2020 07:00  --------------------------------------------------------  IN: 2657.4 mL / OUT: 2930 mL / NET: -272.6 mL    26 Apr 2020 07:01  -  26 Apr 2020 14:54  --------------------------------------------------------  IN: 905.8 mL / OUT: 0 mL / NET: 905.8 mL        Physical Exam:  GEN; NAD  HEENT:     Pupils Constricted React to light equally no icterus ET,OGT in place. Sweling improving.  Chest:         SubQ emphysema present over chest wall and lateral chests and run down B/l Arms. S/p Left side chest wall blowhole (Stable)  Lungs:          CTABL, Decreased Air MOvement R>L Symmetrical Inspiration, NO wheeze appreciated.  Cardiac:       + S1 + S2    + RRR   No murmurs appreciated  Abdomen:    Obese habitus + BS     + Soft    + Non-tender    Extremities:  Edema Present Diffuse Slightly improved- no cyanosis  Neuro:        Sedated,     Labs:  COVID:  COVID-19 PCR: Detected (04-13-20 @ 14:30)                          11.2   13.14 )-----------( 215      ( 26 Apr 2020 04:30 )             34.6     04-26    148<H>  |  101  |  176<HH>  ----------------------------<  340<H>  4.5   |  24  |  7.3<HH>    Ca    8.6      26 Apr 2020 04:30  Phos  8.3     04-26  Mg     3.4     04-26    TPro  5.9<L>  /  Alb  2.4<L>  /  TBili  0.5  /  DBili  x   /  AST  31  /  ALT  20  /  AlkPhos  92  04-26    PT/INR - ( 24 Apr 2020 17:14 )   PT: 11.50 sec;   INR: 1.00 ratio         PTT - ( 24 Apr 2020 17:14 )  PTT:33.0 sec      Culture - Blood (collected 04-21-20 @ 04:39)  Source: .Blood None  Final Report (04-26-20 @ 14:00):    No Growth Final      COVID related labs:      Blood Gases:  (ARTERIAL):  04-26-20 @ 02:13  pH 7.40 / pCO2 42 / pO2 63 / HCO3 26  Total CO2 --  FiO2 60  Oxygen Saturation 90    Base Excess, Arterial 1.0    (VENOUS):      Radiology:  < from: Xray Chest 1 View- PORTABLE-Routine (04.26.20 @ 05:48) >  Impression:    Diffuse bilateral parenchymal opacities, unchanged. Pneumomediastinum, unchanged.  Tubes and lines positioned appropriately.  < end of copied text >

## 2020-04-26 NOTE — PROGRESS NOTE ADULT - SUBJECTIVE AND OBJECTIVE BOX
OVERNIGHT EVENTS: intubated 10, precedex 1.5, propofol 56, insulin    Vital Signs Last 24 Hrs  T(C): 37 (26 Apr 2020 04:00), Max: 37.7 (25 Apr 2020 08:00)  T(F): 98.6 (26 Apr 2020 04:00), Max: 99.9 (25 Apr 2020 08:00)  HR: 82 (26 Apr 2020 06:00) (58 - 102)  BP: 115/70 (25 Apr 2020 20:00) (115/70 - 115/70)  BP(mean): 84 (25 Apr 2020 20:00) (84 - 84)  RR: 27 (26 Apr 2020 06:00) (27 - 31)  SpO2: 93% (26 Apr 2020 06:00) (92% - 100%)    PHYSICAL EXAMINATION:    GENERAL: sedated    HEENT: subq emphysema    NECK: Supple.    LUNGS: bl crackls    HEART: SEM3/6    ABDOMEN: Soft, nontender, and nondistended.      EXTREMITIES: Without any cyanosis, clubbing, rash, lesions or edema.    NEUROLOGIC: SEDATED    SKIN: No ulceration or induration present.      LABS:                        11.2   13.14 )-----------( 215      ( 26 Apr 2020 04:30 )             34.6     04-26    148<H>  |  101  |  176<HH>  ----------------------------<  340<H>  4.5   |  24  |  7.3<HH>    Ca    8.6      26 Apr 2020 04:30  Phos  8.3     04-26  Mg     3.4     04-26    TPro  5.9<L>  /  Alb  2.4<L>  /  TBili  0.5  /  DBili  x   /  AST  31  /  ALT  20  /  AlkPhos  92  04-26    PT/INR - ( 24 Apr 2020 17:14 )   PT: 11.50 sec;   INR: 1.00 ratio         PTT - ( 24 Apr 2020 17:14 )  PTT:33.0 sec    ABG - ( 26 Apr 2020 02:13 )  pH, Arterial: 7.40  pH, Blood: x     /  pCO2: 42    /  pO2: 63    / HCO3: 26    / Base Excess: 1.0   /  SaO2: 90        350/30/50/8  PLATEAU 35                  Procalcitonin, Serum: 36.40 ng/mL (04-24-20 @ 03:50)        04-25-20 @ 07:01  -  04-26-20 @ 07:00  --------------------------------------------------------  IN: 2657.4 mL / OUT: 2930 mL / NET: -272.6 mL        MICROBIOLOGY:      MEDICATIONS  (STANDING):  calcium acetate 2001 milliGRAM(s) Oral three times a day with meals  chlorhexidine 0.12% Liquid 15 milliLiter(s) Oral Mucosa every 12 hours  chlorhexidine 4% Liquid 1 Application(s) Topical <User Schedule>  dexMEDEtomidine Infusion 0.2 MICROgram(s)/kG/Hr (4.4 mL/Hr) IV Continuous <Continuous>  dextrose 5%. 1000 milliLiter(s) (50 mL/Hr) IV Continuous <Continuous>  dextrose 50% Injectable 12.5 Gram(s) IV Push once  dextrose 50% Injectable 25 Gram(s) IV Push once  dextrose 50% Injectable 25 Gram(s) IV Push once  heparin   Injectable 7500 Unit(s) SubCutaneous every 8 hours  insulin regular Infusion 3 Unit(s)/Hr (3 mL/Hr) IV Continuous <Continuous>  lactulose Syrup 20 Gram(s) Oral three times a day  meropenem  IVPB 750 milliGRAM(s) IV Intermittent every 24 hours  methylPREDNISolone sodium succinate Injectable 40 milliGRAM(s) IV Push daily  norepinephrine Infusion 0.05 MICROgram(s)/kG/Min (4.13 mL/Hr) IV Continuous <Continuous>  pantoprazole   Suspension 40 milliGRAM(s) Oral daily  propofol Infusion 10 MICROgram(s)/kG/Min (5.28 mL/Hr) IV Continuous <Continuous>  senna 2 Tablet(s) Oral at bedtime  sevelamer carbonate Powder 2400 milliGRAM(s) Oral three times a day with meals  sodium bicarbonate 650 milliGRAM(s) Oral every 6 hours  sodium zirconium cyclosilicate 10 Gram(s) Oral two times a day  veCURonium  Injectable 10 milliGRAM(s) IV Push once    MEDICATIONS  (PRN):  acetaminophen   Tablet .. 650 milliGRAM(s) Oral every 6 hours PRN Temp greater or equal to 38C (100.4F)  dextrose 40% Gel 15 Gram(s) Oral once PRN Blood Glucose LESS THAN 70 milliGRAM(s)/deciliter  glucagon  Injectable 1 milliGRAM(s) IntraMuscular once PRN Glucose LESS THAN 70 milligrams/deciliter  glucagon  Injectable 1 milliGRAM(s) IntraMuscular once PRN Glucose LESS THAN 70 milligrams/deciliter  ondansetron Injectable 4 milliGRAM(s) IV Push every 8 hours PRN Nausea and/or Vomiting      RADIOLOGY & ADDITIONAL STUDIES:

## 2020-04-26 NOTE — CHART NOTE - NSCHARTNOTEFT_GEN_A_CORE
Family member contacted to update. Spoke with Marisol (daughter, [phone]). I explained that her father remains in the CCU in critical condition. He is intubated on a ventilator and requiring a high level of support, but we are making incremental improvements. Daughter asked about ventilator settings, particularly FiO2 - still at 50%. I informed them that he is still OFF pressors for BP. I informed them that his renal function remains poor but improving overall and making urine. I told her that nephrologist is deferring dialysis for now as long as his renal function continues to improve. I informed her that his WBC remains elevated but improving - 13 from 20. He is still receiving antibiotics and cultures were negative so far. All of her questions were answered. Daughter expressed understanding. I told her they would receive additional phone calls for acute events, but otherwise I would call back tomorrow for another update.

## 2020-04-26 NOTE — PROGRESS NOTE ADULT - ASSESSMENT
Patient is a 55 year old male with PMH of HTN and T2DM diabetes who presented on 4/13 for dyspnea found to have SARS-COV-2 pneumonia. Was intubated 4/17 after desaturating to 84% on 15L NRB and upgraded to CCU    # Acute hypoxic respiratory failure 2/2 SARS-COV-2 lower respiratory tract infection with evidence of cytokine release syndrome -   # PTX and Pneumomediastinum - CXR April 20 - no PTX - Improving Pneumomediastinum - CTSurg Call PRN - Blow hole April 22 to release Subq Emphysema  - Intubated 4/17.   - Vent settings:. RR (machine): RR (machine): 30 TV (machine): 350 FiO2: 70PEEP: 10  - Pressure support: Levo   - Sedation:  Propofol and Precedex -Off Versed - TG - 336  - COVID + - Isolation - ID following. S/p Plaquenil and Anakinra. Decrease to q24 IV Solumedrol 40 mg q6 Started April 14 - for 3 more days  - One dose Lasix 80 mg IV April 21 -  Can give additional Lasix as needed for Decreased Urine Output.   - Heparin 7500K subq q8   - Continue with IV Meropenem adjusted Kidney function - Start April 17 - Finish 10 day course  - Sputum - Yeast and GPC pairs and Chains - Culture Negative  - Started on Caspofungin 70 mg Loading 50 qD maintenance   - Blood Culture - NGTD  - Fungitell - Pending  - D-Dimer 2026( <1950 (<3068<4178) April 22  - Procal 6.62(<17.9(<14.4<3.53<.53) April 22 - Pending  - CRP -11.42(<12.9(<21<11<5) April 22 -   - LDH - 963(<1223<1312<1384) April 22  - Ferritin  - 1787<(2335(<5116<7678<4770) April 23  - Fibrinogen - 604 (<626<663<700) April 22     # TELLO, no history of CKD with noted decreased UOP and hyperkalemia  - Worsening Cr and BUN - UO 1455 24 hours   - Nephrology Consult appreciated - Possible RRT Today/ Tomorrow - will need access  - Increase to 750 mg Meropenem qD  - Continue with Bicarb 650 q6  - FeNa - .8   - Hyperkalemia - Decrease to Lokelma q12  - Continue with Sevelmar 3/3/3 - powder, Phoslo 2/2/2    # History of HTN  - Will hold off Losartan 40 mg daily, given borderline BP  - Resume if becomes hypertensive     # Type 2 Diabetes - Uncontrolled   - HbA1c: 11.6  - Insulin Drip  - Monitor finger sticks    Diet: NPO with tube feeds. F/u nutrition recs  Electrolytes: Replete K<4, Mg<2. Given Potassium elevated - Given Lokelma - Follow up Nephrology  DVT ppx: Hep 5K subq q8 Decreased Clearance   GI ppx: Pantoprazole 40 mg suspension while intubated and on steroids  Activity: bedrest  Dispo: from home, continue MICU monitoring; Possible HD with Worsening Kidney Function;Daughter [phone] Patient is a 55 year old male with PMH of HTN and T2DM diabetes who presented on 4/13 for dyspnea found to have SARS-COV-2 pneumonia. Was intubated 4/17 after desaturating to 84% on 15L NRB and upgraded to CCU    # Acute hypoxic respiratory failure 2/2 SARS-COV-2 lower respiratory tract infection with evidence of cytokine release syndrome -   # PTX and Pneumomediastinum - CXR April 20 - no PTX - Improving Pneumomediastinum - CTSurg Call PRN - Blow hole April 22 to release Subq Emphysema  - Intubated 4/17.   - Vent settings:.  RR (machine): 30 TV (machine): 350 FiO2: 80 PEEP: 8 - Will decrease FiO2 as approriate - Had episode of High Peak pressure and Decreased Saturation - Given Paralytic and improved.   - Pressure support: Levo   - Sedation:  Propofol and Precedex -Off Versed - TG - 384  - COVID + - Isolation - ID following. S/p Plaquenil and Anakinra. Continue with  q24 IV Solumedrol 40 mg Started April 14 - April 27th   - One dose Lasix 80 mg IV April 21 -  Can give additional Lasix as needed for Decreased Urine Output.  -  - Heparin 7500K subq q8   - Continue with IV Meropenem adjusted Kidney function - Start April 17 - Finish 10 day course  - Sputum - Yeast and GPC pairs and Chains - Culture Negative  - Blood Culture - NGTD  - Fungitell - 60 (<80 negative) - Discontinue caspofungin  - D-Dimer 2026( <1950 (<3068<4178) April 22  - Procal 36.4 (6.62<17.9<14.4<3.53<.53) April 24  - CRP -30.75(<11.42<12.9(<21<11<5) April 24   - LDH - 963(<1223<1312<1384) April 22  - Ferritin  - 1787<(2335(<5116<7678<4770) April 23  - Fibrinogen - 604 (<626<663<700) April 22     # TELLO, no history of CKD with noted decreased UOP and hyperkalemia  - IMproving Cr and BUN - UO >2 L 24 hours   - Nephrology Consult appreciated - No RRT currently  - Continue with Meropenem qD  - Continue with Bicarb 650 q6  - FeNa - .8   - Hyperkalemia - resolved - Discontinue Lokelma  - Continue with Sevelmar 3/3/3 - powder,  Continue with Phoslo 3/3/3    # History of HTN  - Will hold off Losartan 40 mg daily, given borderline BP  - Resume if becomes hypertensive     # Type 2 Diabetes - Uncontrolled   - HbA1c: 11.6  - Insulin Drip  - Monitor finger sticks    Diet: NPO with tube feeds. F/u nutrition recs  Electrolytes: Replete K<4, Mg<2. Given Potassium elevated - Given Lokelma - Follow up Nephrology  DVT ppx: Hep 5K subq q8 Decreased Clearance   GI ppx: Pantoprazole 40 mg suspension while intubated and on steroids  Activity: bedrest  Dispo: from home, continue MICU monitoring; Possible HD with Worsening Kidney Function;Daughter [phone]

## 2020-04-26 NOTE — PROGRESS NOTE ADULT - ASSESSMENT
Acute hypoxemic respiratory failure secondary to SARS-COV-2 infection / TELLO/ hyperkalemia:  # creatinine trending up , check repeat today   # non-oliguric   # k noted , continue  lokelma q 12   # on meropenem 500 q 24 / caspofungin   # feed : nepro  # ID notes appreciated on violetta   # ph noted, on  renagel 3/3/3 , increase phoslo 3/3/3  # continue sodium bicarbonate 650 q 6  # might need RRT soon   # will follow Acute hypoxemic respiratory failure secondary to SARS-COV-2 infection / TELLO/ hyperkalemia:  # creatinine trending down non oliguric now   # k noted , DC nakita   # on meropenem 500 q 24 / caspofungin   # feed : nepro  # ID notes appreciated on violetta   # ph noted, on  renagel 3/3/3 , increase phoslo 3/3/3  # continue sodium bicarbonate 650 q 6  # no need for RRT for now   # will follow

## 2020-04-27 NOTE — PROGRESS NOTE ADULT - SUBJECTIVE AND OBJECTIVE BOX
RHIANNON MAHER  55y, Male    All available historical data reviewed    OVERNIGHT EVENTS:  no fevers  fio2 100%  on pressors      ROS:  unable to obtain history secondary to patient's mental status and/or sedation     VITALS:  T(F): 97.5, Max: 99 (04-27-20 @ 04:00)  HR: 76  BP: 94/56  RR: 30Vital Signs Last 24 Hrs  T(C): 36.4 (27 Apr 2020 12:00), Max: 37.2 (26 Apr 2020 16:00)  T(F): 97.5 (27 Apr 2020 12:00), Max: 99 (27 Apr 2020 04:00)  HR: 76 (27 Apr 2020 12:00) (58 - 94)  BP: 94/56 (27 Apr 2020 06:00) (86/55 - 186/91)  BP(mean): 66 (27 Apr 2020 06:00) (61 - 128)  RR: 30 (27 Apr 2020 12:00) (29 - 30)  SpO2: 94% (27 Apr 2020 12:00) (83% - 100%)    TESTS & MEASUREMENTS:                        10.7   20.41 )-----------( 302      ( 27 Apr 2020 04:40 )             33.6     04-27    147<H>  |  101  |  169<HH>  ----------------------------<  176<H>  4.3   |  25  |  6.1<HH>    Ca    8.6      27 Apr 2020 04:40  Phos  7.6     04-27  Mg     3.0     04-27    TPro  5.5<L>  /  Alb  2.3<L>  /  TBili  0.3  /  DBili  x   /  AST  32  /  ALT  20  /  AlkPhos  83  04-27    LIVER FUNCTIONS - ( 27 Apr 2020 04:40 )  Alb: 2.3 g/dL / Pro: 5.5 g/dL / ALK PHOS: 83 U/L / ALT: 20 U/L / AST: 32 U/L / GGT: x             Culture - Blood (collected 04-25-20 @ 11:15)  Source: .Blood None  Preliminary Report (04-26-20 @ 19:02):    No growth to date.    Culture - Blood (collected 04-21-20 @ 04:39)  Source: .Blood None  Final Report (04-26-20 @ 14:00):    No Growth Final    Culture - Sputum (collected 04-21-20 @ 04:30)  Source: .Sputum Sputum  Gram Stain (04-21-20 @ 15:15):    Moderate polymorphonuclear leukocytes per low power field    Moderate Squamous epithelial cells per low power field    Moderate Yeast per oil power field    Moderate Gram Positive Cocci in Pairs and Chains per oil power field  Final Report (04-23-20 @ 08:57):    Normal Respiratory Reyna present            RADIOLOGY & ADDITIONAL TESTS:  Personal review of radiological diagnostics performed  Echo and EKG results noted when applicable.     MEDICATIONS:  acetaminophen   Tablet .. 650 milliGRAM(s) Oral every 6 hours PRN  aspirin  chewable 81 milliGRAM(s) Oral daily  calcium acetate 2001 milliGRAM(s) Oral three times a day with meals  chlorhexidine 0.12% Liquid 15 milliLiter(s) Oral Mucosa every 12 hours  chlorhexidine 4% Liquid 1 Application(s) Topical <User Schedule>  dextrose 40% Gel 15 Gram(s) Oral once PRN  dextrose 5%. 1000 milliLiter(s) IV Continuous <Continuous>  dextrose 50% Injectable 12.5 Gram(s) IV Push once  dextrose 50% Injectable 25 Gram(s) IV Push once  dextrose 50% Injectable 25 Gram(s) IV Push once  fentaNYL   Infusion. 0.5 MICROgram(s)/kG/Hr IV Continuous <Continuous>  glucagon  Injectable 1 milliGRAM(s) IntraMuscular once PRN  glucagon  Injectable 1 milliGRAM(s) IntraMuscular once PRN  heparin   Injectable 7500 Unit(s) SubCutaneous every 8 hours  insulin regular Infusion 3 Unit(s)/Hr IV Continuous <Continuous>  lactulose Syrup 20 Gram(s) Oral four times a day  meropenem  IVPB 750 milliGRAM(s) IV Intermittent every 24 hours  norepinephrine Infusion 0.05 MICROgram(s)/kG/Min IV Continuous <Continuous>  ondansetron Injectable 4 milliGRAM(s) IV Push every 8 hours PRN  pantoprazole   Suspension 40 milliGRAM(s) Oral daily  propofol Infusion 10 MICROgram(s)/kG/Min IV Continuous <Continuous>  senna 2 Tablet(s) Oral at bedtime  sevelamer carbonate Powder 2400 milliGRAM(s) Oral three times a day with meals      ANTIBIOTICS:  meropenem  IVPB 750 milliGRAM(s) IV Intermittent every 24 hours

## 2020-04-27 NOTE — PROGRESS NOTE ADULT - SUBJECTIVE AND OBJECTIVE BOX
24 h events noted  intubated/ventilated         PAST HISTORY  --------------------------------------------------------------------------------  No significant changes to PMH, PSH, FHx, SHx, unless otherwise noted    ALLERGIES & MEDICATIONS  --------------------------------------------------------------------------------  Allergies    No Known Allergies    Intolerances      Standing Inpatient Medications  calcium acetate 2001 milliGRAM(s) Oral three times a day with meals  chlorhexidine 0.12% Liquid 15 milliLiter(s) Oral Mucosa every 12 hours  chlorhexidine 4% Liquid 1 Application(s) Topical <User Schedule>  dexMEDEtomidine Infusion 0.2 MICROgram(s)/kG/Hr IV Continuous <Continuous>  dextrose 5%. 1000 milliLiter(s) IV Continuous <Continuous>  dextrose 5%. 1000 milliLiter(s) IV Continuous <Continuous>  dextrose 50% Injectable 12.5 Gram(s) IV Push once  dextrose 50% Injectable 25 Gram(s) IV Push once  dextrose 50% Injectable 25 Gram(s) IV Push once  fentaNYL   Infusion. 0.5 MICROgram(s)/kG/Hr IV Continuous <Continuous>  heparin   Injectable 7500 Unit(s) SubCutaneous every 8 hours  insulin regular Infusion 3 Unit(s)/Hr IV Continuous <Continuous>  lactulose Syrup 20 Gram(s) Oral three times a day  meropenem  IVPB 750 milliGRAM(s) IV Intermittent every 24 hours  norepinephrine Infusion 0.05 MICROgram(s)/kG/Min IV Continuous <Continuous>  pantoprazole   Suspension 40 milliGRAM(s) Oral daily  propofol Infusion 10 MICROgram(s)/kG/Min IV Continuous <Continuous>  senna 2 Tablet(s) Oral at bedtime  sevelamer carbonate Powder 2400 milliGRAM(s) Oral three times a day with meals  sodium bicarbonate 650 milliGRAM(s) Oral every 6 hours    PRN Inpatient Medications  acetaminophen   Tablet .. 650 milliGRAM(s) Oral every 6 hours PRN  dextrose 40% Gel 15 Gram(s) Oral once PRN  glucagon  Injectable 1 milliGRAM(s) IntraMuscular once PRN  glucagon  Injectable 1 milliGRAM(s) IntraMuscular once PRN  ondansetron Injectable 4 milliGRAM(s) IV Push every 8 hours PRN        VITALS/PHYSICAL EXAM  --------------------------------------------------------------------------------  T(C): 37.2 (04-27-20 @ 04:00), Max: 37.2 (04-26-20 @ 08:00)  HR: 82 (04-27-20 @ 06:00) (56 - 92)  BP: 94/56 (04-27-20 @ 06:00) (86/55 - 186/91)  RR: 30 (04-27-20 @ 06:00) (29 - 30)  SpO2: 97% (04-27-20 @ 06:00) (83% - 100%)  Wt(kg): --        04-26-20 @ 07:01  -  04-27-20 @ 07:00  --------------------------------------------------------  IN: 4220 mL / OUT: 4765 mL / NET: -545 mL      Physical Exam:  	Gen: intubated/ventilated     LABS/STUDIES  --------------------------------------------------------------------------------              10.7   20.41 >-----------<  302      [04-27-20 @ 04:40]              33.6     147  |  101  |  169  ----------------------------<  176      [04-27-20 @ 04:40]  4.3   |  25  |  6.1        Ca     8.6     [04-27-20 @ 04:40]      Mg     3.0     [04-27-20 @ 04:40]      Phos  7.6     [04-27-20 @ 04:40]    TPro  5.5  /  Alb  2.3  /  TBili  0.3  /  DBili  x   /  AST  32  /  ALT  20  /  AlkPhos  83  [04-27-20 @ 04:40]          Creatinine Trend:  SCr 6.1 [04-27 @ 04:40]  SCr 6.7 [04-26 @ 17:04]  SCr 7.3 [04-26 @ 04:30]  SCr 9.7 [04-25 @ 03:44]  SCr 10.4 [04-24 @ 03:50]        Ferritin 1787      [04-22-20 @ 23:30]  Lipid: chol --, , HDL --, LDL --      [04-26-20 @ 04:30]

## 2020-04-27 NOTE — CHART NOTE - NSCHARTNOTEFT_GEN_A_CORE
Family member contacted to update. Spoke with Marisol (daughter, [phone]). I explained that her father remains in the CCU in critical condition. He is intubated on a ventilator.  His FiO2 had been decreased yesterday, but overnight, he was fighting with the vent so the oxygen requirements increased.  This morning he is sedated, so the team will make attempts at lowering the oxygen.     He was off pressors for a short period of time yesterday, however they were restarted last night, but the requirement is decreasing. I informed them that his renal function remains poor but improving overall and making urine. I told her that nephrologist is deferring dialysis for now as long as his renal function continues to improve. I informed her that his WBC remains elevated but, increased to 20 from 13. He is still receiving antibiotics and cultures were negative so far. All of her questions were answered. Daughter expressed understanding. I told her they would receive additional phone calls for acute events, but otherwise I would call back tomorrow for another update.

## 2020-04-27 NOTE — PROGRESS NOTE ADULT - ASSESSMENT
Assessment:    Acute hypoxemic respiratory failure secondary to SARS-COV-2 infection requiring invasive mechanical ventilation  sub q emphysema s/p blow hole  ARDS  Possible superimposed bacterial infection increase procal  TELLO/ non oliguric imrpoving  hypernatremia    PLAN:    CNS: PROPOFOL, FENTANYL    HEENT:  Oral care    PULMONARY:  HOB @ 45 degrees, keep sats 92-96%  solumedrol 40q 24 dc,  / i RR 30, DEC fio2 80%, PEEP 8, CTsx F/UP    CARDIOVASCULAR: dc bicarb drip    GI: GI prophylaxis Protonix, bowel regimen, increase lactulose                                         Feeding: feeding, free water    RENAL:  F/u  lytes.  Correct as needed. accurate I/O, renal F/UP, repeat CMP    INFECTIOUS DISEASE: IV abx  violetta, ID f/u     HEMATOLOGICAL:  hep sq ( decrease clearance)    ENDOCRINE:  Follow up FS.  Insulin protocol if needed.    CODE STATUS: FULL CODE    DISPOSITION: Patient require continued monitoring in MICU  poor prognosis

## 2020-04-27 NOTE — PROGRESS NOTE ADULT - ASSESSMENT
55 year old male patient with hypertension, diabetes presenting for dyspnea.     IMPRESSION;   COVID19 with septic shock ( fevers, 2 pressors )  with lactic acidemia with  resp failure, mechanical ventilation with ARDS with FiO2 100%, secondary to Cytokine Release Syndrome leading to cytokine storm   -pneumomediastinum with significant subcutaneous emphysema  -inflammatory markers significantly elevated with little response to anakinra  -end organ damage with renal failure and significant hepatitis ( both very poor prognostic markers )  -elevated Ddimer and Ferritin are also poor prognostic indicators and differentiate survivors from non survivors  -procalcitonin 0. 53 > 3.53 > 14.4 with possible  bacterial superinfection  -Sputa NGTD  -BCx 4/21,25 NG  -nares ORSA NG  -fungitell 61      RECOMMENDATIONS;   S/p Anakinra 4/14-17  -meropenem 750 mg iv q24h

## 2020-04-27 NOTE — PROGRESS NOTE ADULT - SUBJECTIVE AND OBJECTIVE BOX
OVERNIGHT EVENTS: intubated 11, fentanyl 3 mcg, propofol, 37, levophed 0.012, insulin, sp norcuran    Vital Signs Last 24 Hrs  T(C): 37.2 (27 Apr 2020 04:00), Max: 37.2 (26 Apr 2020 08:00)  T(F): 99 (27 Apr 2020 04:00), Max: 99 (27 Apr 2020 04:00)  HR: 82 (27 Apr 2020 06:00) (56 - 92)  BP: 94/56 (27 Apr 2020 06:00) (86/55 - 186/91)  BP(mean): 66 (27 Apr 2020 06:00) (61 - 128)  RR: 30 (27 Apr 2020 06:00) (29 - 30)  SpO2: 97% (27 Apr 2020 06:00) (83% - 100%)    PHYSICAL EXAMINATION:    GENERAL: sedated    HEENT: Head is normocephalic and atraumatic. Extraocular muscles are intact. Mucous membranes are moist.    NECK: Supple. sub q emphysema    LUNGS: bl crackles    HEART: Regular rate and rhythm without murmur.    ABDOMEN: Soft, nontender, and nondistended.      EXTREMITIES: Without any cyanosis, clubbing, rash, lesions or edema.    NEUROLOGIC sedated    SKIN: No ulceration or induration present.      LABS:                        10.7   20.41 )-----------( 302      ( 27 Apr 2020 04:40 )             33.6     04-27    147<H>  |  101  |  169<HH>  ----------------------------<  176<H>  4.3   |  25  |  6.1<HH>    Ca    8.6      27 Apr 2020 04:40  Phos  7.6     04-27  Mg     3.0     04-27    TPro  5.5<L>  /  Alb  2.3<L>  /  TBili  0.3  /  DBili  x   /  AST  32  /  ALT  20  /  AlkPhos  83  04-27        ABG - ( 27 Apr 2020 01:55 )  pH, Arterial: 7.43  pH, Blood: x     /  pCO2: 42    /  pO2: 91    / HCO3: 27    / Base Excess: 2.7   /  SaO2: 97        350/30/100/8  plateau 29                        04-26-20 @ 07:01  -  04-27-20 @ 07:00  --------------------------------------------------------  IN: 4220 mL / OUT: 4765 mL / NET: -545 mL        MICROBIOLOGY:  Culture Results:   No growth to date. (04-25 @ 11:15)      MEDICATIONS  (STANDING):  calcium acetate 2001 milliGRAM(s) Oral three times a day with meals  chlorhexidine 0.12% Liquid 15 milliLiter(s) Oral Mucosa every 12 hours  chlorhexidine 4% Liquid 1 Application(s) Topical <User Schedule>  dexMEDEtomidine Infusion 0.2 MICROgram(s)/kG/Hr (4.4 mL/Hr) IV Continuous <Continuous>  dextrose 5%. 1000 milliLiter(s) (75 mL/Hr) IV Continuous <Continuous>  dextrose 5%. 1000 milliLiter(s) (50 mL/Hr) IV Continuous <Continuous>  dextrose 50% Injectable 12.5 Gram(s) IV Push once  dextrose 50% Injectable 25 Gram(s) IV Push once  dextrose 50% Injectable 25 Gram(s) IV Push once  fentaNYL   Infusion. 0.5 MICROgram(s)/kG/Hr (4.4 mL/Hr) IV Continuous <Continuous>  heparin   Injectable 7500 Unit(s) SubCutaneous every 8 hours  insulin regular Infusion 3 Unit(s)/Hr (3 mL/Hr) IV Continuous <Continuous>  lactulose Syrup 20 Gram(s) Oral three times a day  meropenem  IVPB 750 milliGRAM(s) IV Intermittent every 24 hours  norepinephrine Infusion 0.05 MICROgram(s)/kG/Min (4.13 mL/Hr) IV Continuous <Continuous>  pantoprazole   Suspension 40 milliGRAM(s) Oral daily  propofol Infusion 10 MICROgram(s)/kG/Min (5.28 mL/Hr) IV Continuous <Continuous>  senna 2 Tablet(s) Oral at bedtime  sevelamer carbonate Powder 2400 milliGRAM(s) Oral three times a day with meals  sodium bicarbonate 650 milliGRAM(s) Oral every 6 hours    MEDICATIONS  (PRN):  acetaminophen   Tablet .. 650 milliGRAM(s) Oral every 6 hours PRN Temp greater or equal to 38C (100.4F)  dextrose 40% Gel 15 Gram(s) Oral once PRN Blood Glucose LESS THAN 70 milliGRAM(s)/deciliter  glucagon  Injectable 1 milliGRAM(s) IntraMuscular once PRN Glucose LESS THAN 70 milligrams/deciliter  glucagon  Injectable 1 milliGRAM(s) IntraMuscular once PRN Glucose LESS THAN 70 milligrams/deciliter  ondansetron Injectable 4 milliGRAM(s) IV Push every 8 hours PRN Nausea and/or Vomiting      RADIOLOGY & ADDITIONAL STUDIES:

## 2020-04-27 NOTE — PROGRESS NOTE ADULT - ASSESSMENT
Acute hypoxemic respiratory failure secondary to SARS-COV-2 infection / TELLO/ hyperkalemia:  # creatinine trending down  # non oliguric   # feed : nepro, d/c high vital protein   # ph noted, on  renagel 3/3/3 , increase phoslo 3/3/3  # decrease sodium bicarbonate 650 q  12  # start 1/2 ns at 75 cc/h   # no need for RRT for now   # will follow

## 2020-04-27 NOTE — PROGRESS NOTE ADULT - SUBJECTIVE AND OBJECTIVE BOX
To be updated Admit Date: 04-13-20  Length of Stay: 14d    55yMale    Reason for admission to ICU:  Patient is in acute hypoxic respiratory distress requiring intubation and sedation. Imaging c/w ARDS. Admitted to MICU for further observation and management.    INTERVAL HPI/OVERNIGHT EVENTS:  Intubated and sedated - Improving TELLO - Urine output improving - Stable - Follow Nephrology  Stable Subq Emphysema - Blow hole placed by CTS April 22  Urine Output Improving - No RRT today - Nephrology following.  Had episode of High Peak pressure April 26 and Decreased Saturation - Given Paralytic and INcreased FiO2 and improved. Lowered FiO2 to 60   Had decreased Saturation - Increased back to 100% - Will decrease as appropriate today       MEDICATIONS  (STANDING):  aspirin  chewable 81 milliGRAM(s) Oral daily  calcium acetate 2001 milliGRAM(s) Oral three times a day with meals  chlorhexidine 0.12% Liquid 15 milliLiter(s) Oral Mucosa every 12 hours  chlorhexidine 4% Liquid 1 Application(s) Topical <User Schedule>  dextrose 5%. 1000 milliLiter(s) (50 mL/Hr) IV Continuous <Continuous>  dextrose 50% Injectable 12.5 Gram(s) IV Push once  dextrose 50% Injectable 25 Gram(s) IV Push once  dextrose 50% Injectable 25 Gram(s) IV Push once  fentaNYL   Infusion. 0.5 MICROgram(s)/kG/Hr (4.4 mL/Hr) IV Continuous <Continuous>  heparin   Injectable 7500 Unit(s) SubCutaneous every 8 hours  insulin regular Infusion 3 Unit(s)/Hr (3 mL/Hr) IV Continuous <Continuous>  lactulose Syrup 20 Gram(s) Oral four times a day  meropenem  IVPB 750 milliGRAM(s) IV Intermittent every 24 hours  norepinephrine Infusion 0.05 MICROgram(s)/kG/Min (4.13 mL/Hr) IV Continuous <Continuous>  pantoprazole   Suspension 40 milliGRAM(s) Oral daily  propofol Infusion 10 MICROgram(s)/kG/Min (5.28 mL/Hr) IV Continuous <Continuous>  senna 2 Tablet(s) Oral at bedtime  sevelamer carbonate Powder 2400 milliGRAM(s) Oral three times a day with meals    MEDICATIONS  (PRN):  acetaminophen   Tablet .. 650 milliGRAM(s) Oral every 6 hours PRN Temp greater or equal to 38C (100.4F)  dextrose 40% Gel 15 Gram(s) Oral once PRN Blood Glucose LESS THAN 70 milliGRAM(s)/deciliter  glucagon  Injectable 1 milliGRAM(s) IntraMuscular once PRN Glucose LESS THAN 70 milligrams/deciliter  glucagon  Injectable 1 milliGRAM(s) IntraMuscular once PRN Glucose LESS THAN 70 milligrams/deciliter  ondansetron Injectable 4 milliGRAM(s) IV Push every 8 hours PRN Nausea and/or Vomiting      Vitals:  Vital Signs Last 24 Hrs  T(C): 37.1 (27 Apr 2020 08:00), Max: 37.2 (26 Apr 2020 16:00)  T(F): 98.8 (27 Apr 2020 08:00), Max: 99 (27 Apr 2020 04:00)  HR: 84 (27 Apr 2020 10:00) (56 - 94)  BP: 94/56 (27 Apr 2020 06:00) (86/55 - 186/91)  BP(mean): 66 (27 Apr 2020 06:00) (61 - 128)  RR: 30 (27 Apr 2020 10:00) (29 - 30)  SpO2: 95% (27 Apr 2020 10:00) (83% - 100%)    Vitals (Invasive):  ABP: 124/60 (04-27-20 @ 10:00) (94/50 - 188/88)      I&O's Summary    26 Apr 2020 07:01  -  27 Apr 2020 07:00  --------------------------------------------------------  IN: 4220 mL / OUT: 4765 mL / NET: -545 mL    27 Apr 2020 07:01  -  27 Apr 2020 11:50  --------------------------------------------------------  IN: 153.2 mL / OUT: 350 mL / NET: -196.8 mL        Physical Exam:  GEN;        NAD  HEENT:     Pupils Constricted React to light equally no icterus ET,OGT in place. Swelling improving.  Chest:         SubQ emphysema present over chest wall and lateral chests and run down B/l Arms. S/p Left side chest wall blowhole (Stable)  Lungs:          CTABL, Decreased Air MOvement R>L Symmetrical Inspiration, NO wheeze appreciated.  Cardiac:       + S1 + S2    + RRR   No murmurs appreciated  Abdomen:    Obese habitus + BS     + Soft    + Non-tender    Extremities:  Edema Present Diffuse Slightly improved- no cyanosis  Neuro:        Sedated,     Labs:  COVID:  COVID-19 PCR: Detected (04-13-20 @ 14:30)                          10.7   20.41 )-----------( 302      ( 27 Apr 2020 04:40 )             33.6     04-27    147<H>  |  101  |  169<HH>  ----------------------------<  176<H>  4.3   |  25  |  6.1<HH>    Ca    8.6      27 Apr 2020 04:40  Phos  7.6     04-27  Mg     3.0     04-27    TPro  5.5<L>  /  Alb  2.3<L>  /  TBili  0.3  /  DBili  x   /  AST  32  /  ALT  20  /  AlkPhos  83  04-27          Culture - Blood (collected 04-25-20 @ 11:15)  Source: .Blood None  Preliminary Report (04-26-20 @ 19:02):    No growth to date.      COVID related labs:      Blood Gases:  (ARTERIAL):  04-27-20 @ 01:55  pH 7.43 / pCO2 42 / pO2 91 / HCO3 27    Oxygen Saturation 97    Base Excess, Arterial 2.7    (VENOUS):      Radiology:  < from: Xray Chest 1 View- PORTABLE-Routine (04.27.20 @ 08:19) >  Impression:  Stable bilateral opacities. No significant pneumothorax is identified.  Stable pneumomediastinum.  Support devices, in satisfactory position.  < end of copied text > Admit Date: 04-13-20  Length of Stay: 14d    55yMale    Reason for admission to ICU:  Patient is in acute hypoxic respiratory distress requiring intubation and sedation. Imaging c/w ARDS. Admitted to MICU for further observation and management.    INTERVAL HPI/OVERNIGHT EVENTS:  Intubated and sedated - Improving TELLO - Urine output improving - Stable - Follow Nephrology  Stable Subq Emphysema - Blow hole placed by CTS April 22  Urine Output Improving - No RRT today - Nephrology following.  Had episode of High Peak pressure April 26 and Decreased Saturation - Given Paralytic and INcreased FiO2 and improved. Lowered FiO2 to 60   Had decreased Saturation - Increased back to 100% - Will decrease as appropriate today       MEDICATIONS  (STANDING):  aspirin  chewable 81 milliGRAM(s) Oral daily  calcium acetate 2001 milliGRAM(s) Oral three times a day with meals  chlorhexidine 0.12% Liquid 15 milliLiter(s) Oral Mucosa every 12 hours  chlorhexidine 4% Liquid 1 Application(s) Topical <User Schedule>  dextrose 5%. 1000 milliLiter(s) (50 mL/Hr) IV Continuous <Continuous>  dextrose 50% Injectable 12.5 Gram(s) IV Push once  dextrose 50% Injectable 25 Gram(s) IV Push once  dextrose 50% Injectable 25 Gram(s) IV Push once  fentaNYL   Infusion. 0.5 MICROgram(s)/kG/Hr (4.4 mL/Hr) IV Continuous <Continuous>  heparin   Injectable 7500 Unit(s) SubCutaneous every 8 hours  insulin regular Infusion 3 Unit(s)/Hr (3 mL/Hr) IV Continuous <Continuous>  lactulose Syrup 20 Gram(s) Oral four times a day  meropenem  IVPB 750 milliGRAM(s) IV Intermittent every 24 hours  norepinephrine Infusion 0.05 MICROgram(s)/kG/Min (4.13 mL/Hr) IV Continuous <Continuous>  pantoprazole   Suspension 40 milliGRAM(s) Oral daily  propofol Infusion 10 MICROgram(s)/kG/Min (5.28 mL/Hr) IV Continuous <Continuous>  senna 2 Tablet(s) Oral at bedtime  sevelamer carbonate Powder 2400 milliGRAM(s) Oral three times a day with meals    MEDICATIONS  (PRN):  acetaminophen   Tablet .. 650 milliGRAM(s) Oral every 6 hours PRN Temp greater or equal to 38C (100.4F)  dextrose 40% Gel 15 Gram(s) Oral once PRN Blood Glucose LESS THAN 70 milliGRAM(s)/deciliter  glucagon  Injectable 1 milliGRAM(s) IntraMuscular once PRN Glucose LESS THAN 70 milligrams/deciliter  glucagon  Injectable 1 milliGRAM(s) IntraMuscular once PRN Glucose LESS THAN 70 milligrams/deciliter  ondansetron Injectable 4 milliGRAM(s) IV Push every 8 hours PRN Nausea and/or Vomiting      Vitals:  Vital Signs Last 24 Hrs  T(C): 37.1 (27 Apr 2020 08:00), Max: 37.2 (26 Apr 2020 16:00)  T(F): 98.8 (27 Apr 2020 08:00), Max: 99 (27 Apr 2020 04:00)  HR: 84 (27 Apr 2020 10:00) (56 - 94)  BP: 94/56 (27 Apr 2020 06:00) (86/55 - 186/91)  BP(mean): 66 (27 Apr 2020 06:00) (61 - 128)  RR: 30 (27 Apr 2020 10:00) (29 - 30)  SpO2: 95% (27 Apr 2020 10:00) (83% - 100%)    Vitals (Invasive):  ABP: 124/60 (04-27-20 @ 10:00) (94/50 - 188/88)      I&O's Summary    26 Apr 2020 07:01  -  27 Apr 2020 07:00  --------------------------------------------------------  IN: 4220 mL / OUT: 4765 mL / NET: -545 mL    27 Apr 2020 07:01  -  27 Apr 2020 11:50  --------------------------------------------------------  IN: 153.2 mL / OUT: 350 mL / NET: -196.8 mL        Physical Exam:  GEN;        NAD  HEENT:     Pupils Constricted React to light equally no icterus ET,OGT in place. Swelling improving.  Chest:         Improving SubQ emphysema present over chest wall and lateral chests and run down B/l Arms. S/p Left side chest wall blowhole (Stable)  Lungs:          CTABL, Decreased Air MOvement R>L Symmetrical Inspiration, NO wheeze appreciated.  Cardiac:       + S1 + S2    + RRR   No murmurs appreciated  Abdomen:    Obese habitus + BS     + Soft    + Non-tender    Extremities:  Edema Present Diffuse Slightly improved- no cyanosis, Pulses present in all extremities.  Neuro:        Sedated,     Labs:  COVID:  COVID-19 PCR: Detected (04-13-20 @ 14:30)                          10.7   20.41 )-----------( 302      ( 27 Apr 2020 04:40 )             33.6     04-27    147<H>  |  101  |  169<HH>  ----------------------------<  176<H>  4.3   |  25  |  6.1<HH>    Ca    8.6      27 Apr 2020 04:40  Phos  7.6     04-27  Mg     3.0     04-27    TPro  5.5<L>  /  Alb  2.3<L>  /  TBili  0.3  /  DBili  x   /  AST  32  /  ALT  20  /  AlkPhos  83  04-27          Culture - Blood (collected 04-25-20 @ 11:15)  Source: .Blood None  Preliminary Report (04-26-20 @ 19:02):    No growth to date.      COVID related labs:      Blood Gases:  (ARTERIAL):  04-27-20 @ 01:55  pH 7.43 / pCO2 42 / pO2 91 / HCO3 27    Oxygen Saturation 97    Base Excess, Arterial 2.7    (VENOUS):      Radiology:  < from: Xray Chest 1 View- PORTABLE-Routine (04.27.20 @ 08:19) >  Impression:  Stable bilateral opacities. No significant pneumothorax is identified.  Stable pneumomediastinum.  Support devices, in satisfactory position.  < end of copied text >

## 2020-04-27 NOTE — PROGRESS NOTE ADULT - ASSESSMENT
Patient is a 55 year old male with PMH of HTN and T2DM diabetes who presented on 4/13 for dyspnea found to have SARS-COV-2 pneumonia. Was intubated 4/17 after desaturating to 84% on 15L NRB and upgraded to CCU    # Acute hypoxic respiratory failure 2/2 SARS-COV-2 lower respiratory tract infection with evidence of cytokine release syndrome -   # PTX and Pneumomediastinum - CXR April 20 - no PTX - Improving Pneumomediastinum - CTSurg Call PRN - Blow hole April 22 to release Subq Emphysema  - Intubated 4/17.   - Vent settings:.  RR (machine): 30 TV (machine): 350 FiO2: 80 PEEP: 8 - Will decrease FiO2 as approriate - Had episode of High Peak pressure and Decreased Saturation - Given Paralytic and improved.   - Pressure support: Levo   - Sedation:  Propofol and Precedex -Off Versed - TG - 384  - COVID + - Isolation - ID following. S/p Plaquenil and Anakinra. Continue with  q24 IV Solumedrol 40 mg Started April 14 - April 27th   - One dose Lasix 80 mg IV April 21 -  Can give additional Lasix as needed for Decreased Urine Output.  -  - Heparin 7500K subq q8   - Continue with IV Meropenem adjusted Kidney function - Start April 17 - Finish 10 day course  - Sputum - Yeast and GPC pairs and Chains - Culture Negative  - Blood Culture - NGTD  - Fungitell - 60 (<80 negative) - Discontinue caspofungin  - D-Dimer 2026( <1950 (<3068<4178) April 22  - Procal 36.4 (6.62<17.9<14.4<3.53<.53) April 24  - CRP -30.75(<11.42<12.9(<21<11<5) April 24   - LDH - 963(<1223<1312<1384) April 22  - Ferritin  - 1787<(2335(<5116<7678<4770) April 23  - Fibrinogen - 604 (<626<663<700) April 22     # TELLO, no history of CKD with noted decreased UOP and hyperkalemia  - IMproving Cr and BUN - UO >2 L 24 hours   - Nephrology Consult appreciated - No RRT currently  - Continue with Meropenem qD  - Continue with Bicarb 650 q6  - FeNa - .8   - Hyperkalemia - resolved - Discontinue Lokelma  - Continue with Sevelmar 3/3/3 - powder,  Continue with Phoslo 3/3/3    # History of HTN  - Will hold off Losartan 40 mg daily, given borderline BP  - Resume if becomes hypertensive     # Type 2 Diabetes - Uncontrolled   - HbA1c: 11.6  - Insulin Drip  - Monitor finger sticks    Diet: NPO with tube feeds. F/u nutrition recs  Electrolytes: Replete K<4, Mg<2. Given Potassium elevated - Given Lokelma - Follow up Nephrology  DVT ppx: Hep 5K subq q8 Decreased Clearance   GI ppx: Pantoprazole 40 mg suspension while intubated and on steroids  Activity: bedrest  Dispo: from home, continue MICU monitoring; Possible HD with Worsening Kidney Function;Daughter [phone] Patient is a 55 year old male with PMH of HTN and T2DM diabetes who presented on 4/13 for dyspnea found to have SARS-COV-2 pneumonia. Was intubated 4/17 after desaturating to 84% on 15L NRB and upgraded to CCU    # Acute hypoxic respiratory failure 2/2 SARS-COV-2 lower respiratory tract infection with evidence of cytokine release syndrome -   # PTX and Pneumomediastinum - CXR April 20 - no PTX - Improving Pneumomediastinum - CTSurg Call PRN - Blow hole April 22 to release Subq Emphysema  - Intubated 4/17.   - Vent settings:.  RR (machine): 30 TV (machine): 350 FiO2: 80 PEEP: 8 - Will decrease FiO2 as approriate - Had episode of High Peak pressure and Decreased Saturation - Given Paralytic and improved.   - Pressure support: Levo   - Sedation:  Propofol and Precedex and Fentanyl - Increase Lactulose to q6 -Off Versed - TG - 384  - COVID + - Isolation - ID following. S/p Plaquenil and Anakinra. Finished Solumedrol April 14 - April 27th   - One dose Lasix 80 mg IV April 21 -  Can give additional Lasix as needed for Decreased Urine Output.  -  - Heparin 7500K subq q8   - Start 81 ASA  - Continue IV Meropenem - Started April 17 - Finish course  - Sputum - Yeast and GPC pairs and Chains - Culture Negative  - Blood Culture - NGTD  - MRSA neg  - Fungitell - 60 (<80 negative) -   - D-Dimer 2026( <1950 (<3068<4178) April 22 - Follow Pt/Ptt  - Procal 36.4 (6.62<17.9<14.4<3.53<.53) April 24 Pending  - CRP -30.75(<11.42<12.9(<21<11<5) April 24   - LDH - 963(<1223<1312<1384) April 22  - Ferritin  - 1787<(2335(<5116<7678<4770) April 23  - Fibrinogen - 604 (<626<663<700) April 22     # TELLO, no history of CKD with noted decreased UOP and hyperkalemia  - Improving Cr and BUN - UO >4 L 24 hours   - Increase Free water to 400 q4  - Change Feeds to Nepro  - Stop Bicarb  - Nephrology Consult appreciated - No RRT currently  - Continue with Meropenem qD  - FeNa - .8   - Hyperkalemia - resolved - Discontinue Lokelma  - Continue with Sevelmar 3/3/3 - powder,  Continue with Phoslo 3/3/3    # History of HTN  - Will hold off Losartan 40 mg daily, given borderline BP  - Resume if becomes hypertensive     # Type 2 Diabetes - Uncontrolled   - HbA1c: 11.6  - Insulin Drip  - Monitor finger sticks    Diet: NPO with tube feeds. F/u nutrition recs  Electrolytes: Replete K<4, Mg<2. Given Potassium elevated - Given Lokelma - Follow up Nephrology  DVT ppx: Hep 5K subq q8 Decreased Clearance   GI ppx: Pantoprazole 40 mg suspension while intubated and on steroids  Activity: bedrest  Dispo: from home, continue MICU monitoring; Possible HD with Worsening Kidney Function;Daughter [phone] Patient is a 55 year old male with PMH of HTN and T2DM diabetes who presented on 4/13 for dyspnea found to have SARS-COV-2 pneumonia. Was intubated 4/17 after desaturating to 84% on 15L NRB and upgraded to CCU    # Acute hypoxic respiratory failure 2/2 SARS-COV-2 lower respiratory tract infection with evidence of cytokine release syndrome -   # PTX and Pneumomediastinum - CXR April 20 - no PTX - Improving Pneumomediastinum - CTSurg Call PRN - Blow hole April 22 to release Subq Emphysema  - Intubated 4/17.   - Vent settings:.  RR (machine): 30 TV (machine): 350 FiO2: 80 PEEP: 8 - Will decrease FiO2 as approriate - Had episode of High Peak pressure and Decreased Saturation - Given Paralytic and improved.   - Pressure support: Levo   - Sedation:  Propofol and Precedex and Fentanyl - Increase Lactulose to q6 -Off Versed - TG - 384  - COVID + - Isolation - ID following. S/p Plaquenil and Anakinra. Finished Solumedrol April 14 - April 27th   - One dose Lasix 80 mg IV April 21 -  Can give additional Lasix as needed for Decreased Urine Output.  -  - Heparin 7500K subq q8   - Start 81 ASA  - Continue IV Meropenem - Started April 17 - Finish course  - Sputum - Yeast and GPC pairs and Chains - Culture Negative  - Blood Culture - NGTD  - MRSA neg  - Fungitell - 60 (<80 negative) -   - D-Dimer 2026( <1950 (<3068<4178) April 22 - Follow Pt/Ptt  - Procal 36.4 (6.62<17.9<14.4<3.53<.53) April 24 Pending  - CRP -30.75(<11.42<12.9(<21<11<5) April 24   - LDH - 963(<1223<1312<1384) April 22  - Ferritin  - 1787<(2335(<5116<7678<4770) April 23  - Fibrinogen - 604 (<626<663<700) April 22     # TELLO, no history of CKD with noted decreased UOP and hyperkalemia  - Improving Cr and BUN - UO >4 L 24 hours   - Increase Free water to 400 q4  - Change Feeds to Nepro  - 650 Bicarb q12  - Nephrology Consult appreciated - No RRT currently  - Continue with Meropenem qD  - FeNa - .8   - Hyperkalemia - resolved - Discontinue Lokelma  - Continue with Sevelmar 3/3/3 - powder,  Continue with Phoslo 3/3/3    # History of HTN  - Will hold off Losartan 40 mg daily, given borderline BP  - Resume if becomes hypertensive     # Type 2 Diabetes - Uncontrolled   - HbA1c: 11.6  - Insulin Drip  - Monitor finger sticks    Diet: NPO with tube feeds. F/u nutrition recs  Electrolytes: Replete K<4, Mg<2. Given Potassium elevated - Given Lokelma - Follow up Nephrology  DVT ppx: Hep 5K subq q8 Decreased Clearance   GI ppx: Pantoprazole 40 mg suspension while intubated and on steroids  Activity: bedrest  Dispo: from home, continue MICU monitoring; Possible HD with Worsening Kidney Function;Daughter [phone]

## 2020-04-28 NOTE — PROGRESS NOTE ADULT - ASSESSMENT
Patient is a 55 year old male with PMH of HTN and T2DM diabetes who presented on 4/13 for dyspnea found to have SARS-COV-2 pneumonia. Was intubated 4/17 after desaturating to 84% on 15L NRB and upgraded to CCU    # Acute hypoxic respiratory failure 2/2 SARS-COV-2 lower respiratory tract infection with evidence of cytokine release syndrome -   # PTX and Pneumomediastinum - CXR April 20 - no PTX - Improving Pneumomediastinum - CTSurg Call PRN - Blow hole April 22 to release Subq Emphysema  - Intubated 4/17.   - Vent settings:.  RR (machine): 30 TV (machine): 350 FiO2: 70 PEEP: 8 Will decrease FiO2 as appropriate  - Pressure support: Levo - Wean as tolerated  - Sedation:  Propofol and Precedex and Fentanyl - Decrease Lactulose Increased Stools) -Off Versed - TG - 384  - COVID + - Isolation - ID following. S/p Plaquenil and Anakinra. Finished Solumedrol April 14 - April 27th   - One dose Lasix 80 mg IV April 21 -  Can give additional Lasix as needed for Decreased Urine Output.  -  - Switched to IV Heparin - rising D-Dimer  - Start 81 ASA  - Continue IV Meropenem - Started April 17 - Finish course - Increase to 500 q12 according to uptodate based on improving GFR.   - Sputum - Yeast and GPC pairs and Chains - Culture Negative  - Blood Culture - NGTD  - MRSA neg  - Fungitell - 60 (<80 negative) -   - D-Dimer 2976(<2026<1950 (<3068<4178) April 27   - Procal 9.61(<36.4<6.62<17.9<14.4<3.53<.53) April 27  - CRP -9.68(30.75<11.42<12.9(<21<11<5) April 27  - LDH - 963(<1223<1312<1384) April 22  - Ferritin  - 1787<(2335(<5116<7678<4770) April 23  - Fibrinogen - 604 (<626<663<700) April 22     # TELLO, no history of CKD with noted decreased UOP and hyperkalemia  - Improving Cr and BUN - UO 3.4 L 24 hours   - Stop Free water and IV Fluid  - Nepro Feeds  - 650 Bicarb q12  - Nephrology Consult appreciated - No RRT currently  - Continue with Meropenem qD 500 q12  - FeNa - .8   - Hyperkalemia - resolved - Discontinue Lokelma  - Continue with Sevelmar 3/3/3 - powder,  Continue with Phoslo 3/3/3    # History of HTN  - Will hold off Losartan 40 mg daily, given borderline BP  - Resume if becomes hypertensive     # Type 2 Diabetes - Uncontrolled   - HbA1c: 11.6  - Insulin Drip  - Monitor finger sticks    Diet: NPO with tube feeds. F/u nutrition recs  Electrolytes: Replete K<4, Mg<2. Given Potassium elevated - Given Lokelma - Follow up Nephrology  DVT ppx: IV Heparin    GI ppx: Pantoprazole 40 mg suspension while intubated and on steroids  Activity: bedrest  Dispo: from home, continue MICU monitoring; Possible HD with Worsening Kidney Function;Daughter [phone]

## 2020-04-28 NOTE — PROGRESS NOTE ADULT - ASSESSMENT
Acute hypoxemic respiratory failure secondary to SARS-COV-2 infection / TELLO/ hyperkalemia:  # creatinine improving   # non oliguric   # increase meropenem to 500 q 8  # feed : nepro, d/c high vital protein   # ph noted, on  renagel 3/3/3 , and phoslo  # continue  sodium bicarbonate 650 q  12  # no need for RRT for now   # will follow

## 2020-04-28 NOTE — PROGRESS NOTE ADULT - SUBJECTIVE AND OBJECTIVE BOX
24 h events noted  intubated/ventilated  on pressor         PAST HISTORY  --------------------------------------------------------------------------------  No significant changes to PMH, PSH, FHx, SHx, unless otherwise noted    ALLERGIES & MEDICATIONS  --------------------------------------------------------------------------------  Allergies    No Known Allergies    Intolerances      Standing Inpatient Medications  aspirin  chewable 81 milliGRAM(s) Oral daily  calcium acetate 2001 milliGRAM(s) Oral three times a day with meals  chlorhexidine 0.12% Liquid 15 milliLiter(s) Oral Mucosa every 12 hours  chlorhexidine 4% Liquid 1 Application(s) Topical <User Schedule>  dextrose 5%. 1000 milliLiter(s) IV Continuous <Continuous>  dextrose 50% Injectable 12.5 Gram(s) IV Push once  dextrose 50% Injectable 25 Gram(s) IV Push once  dextrose 50% Injectable 25 Gram(s) IV Push once  fentaNYL   Infusion. 0.5 MICROgram(s)/kG/Hr IV Continuous <Continuous>  heparin   Injectable 7500 Unit(s) SubCutaneous every 8 hours  insulin regular Infusion 3 Unit(s)/Hr IV Continuous <Continuous>  lactulose Syrup 20 Gram(s) Oral four times a day  meropenem  IVPB 750 milliGRAM(s) IV Intermittent every 24 hours  norepinephrine Infusion 0.05 MICROgram(s)/kG/Min IV Continuous <Continuous>  pantoprazole   Suspension 40 milliGRAM(s) Oral daily  propofol Infusion 10 MICROgram(s)/kG/Min IV Continuous <Continuous>  senna 2 Tablet(s) Oral at bedtime  sevelamer carbonate Powder 2400 milliGRAM(s) Oral three times a day with meals  sodium bicarbonate 650 milliGRAM(s) Oral every 12 hours    PRN Inpatient Medications  acetaminophen   Tablet .. 650 milliGRAM(s) Oral every 6 hours PRN  dextrose 40% Gel 15 Gram(s) Oral once PRN  glucagon  Injectable 1 milliGRAM(s) IntraMuscular once PRN  glucagon  Injectable 1 milliGRAM(s) IntraMuscular once PRN  ondansetron Injectable 4 milliGRAM(s) IV Push every 8 hours PRN        VITALS/PHYSICAL EXAM  --------------------------------------------------------------------------------  T(C): 36.1 (04-28-20 @ 04:00), Max: 37.1 (04-27-20 @ 08:00)  HR: 114 (04-28-20 @ 07:00) (76 - 138)  BP: --  RR: 30 (04-28-20 @ 07:00) (26 - 30)  SpO2: 100% (04-28-20 @ 07:00) (90% - 100%)  Wt(kg): --        04-27-20 @ 07:01  -  04-28-20 @ 07:00  --------------------------------------------------------  IN: 5967.8 mL / OUT: 3435 mL / NET: 2532.8 mL      Physical Exam:  	Gen: intubated/ventilated     LABS/STUDIES  --------------------------------------------------------------------------------              10.9   18.84 >-----------<  216      [04-28-20 @ 03:52]              33.3     139  |  97  |  151  ----------------------------<  186      [04-28-20 @ 03:52]  4.4   |  22  |  4.7        Ca     8.8     [04-28-20 @ 03:52]      Mg     2.6     [04-28-20 @ 03:52]      Phos  6.3     [04-28-20 @ 03:52]    TPro  5.8  /  Alb  2.5  /  TBili  0.4  /  DBili  x   /  AST  53  /  ALT  35  /  AlkPhos  97  [04-28-20 @ 03:52]    PT/INR: PT 11.10, INR 0.97       [04-27-20 @ 11:40]  PTT: 35.2       [04-27-20 @ 11:40]      Creatinine Trend:  SCr 4.7 [04-28 @ 03:52]  SCr 6.1 [04-27 @ 04:40]  SCr 6.7 [04-26 @ 17:04]  SCr 7.3 [04-26 @ 04:30]  SCr 9.7 [04-25 @ 03:44]        Ferritin 1787      [04-22-20 @ 23:30]  Lipid: chol --, , HDL --, LDL --      [04-27-20 @ 20:00]

## 2020-04-28 NOTE — CHART NOTE - NSCHARTNOTEFT_GEN_A_CORE
Family member contacted to update. Spoke with Marisol (daughter, [phone]). I explained that her father remains in the CCU in critical condition. He is intubated on a ventilator.  His FiO2 had been decreased yesterday, which he did tolerate.  THey will again be making incremental steps in decreasing the ventilator settings.      He was off pressors for a short period of time yesterday, however they were restarted last night, but the requirement is decreasing. I informed them that his renal function remains poor but improving overall and he made 3.5 L of urine in the past 24 hrs. I told her that nephrologist is deferring dialysis for now as long as his renal function continues to improve. I informed her that his WBC remains elevated but, decreased from 20 to 18. He is still receiving antibiotics and cultures were negative so far.     All of her questions were answered. Daughter expressed understanding. I told her they would receive additional phone calls for acute events, but otherwise I would call back tomorrow for another update.

## 2020-04-28 NOTE — PROGRESS NOTE ADULT - SUBJECTIVE AND OBJECTIVE BOX
OVERNIGHT EVENTS: intubated 12, levophed 0.05, insulin, NS 75 CC/H, Fentany, propofol, BMX1    Vital Signs Last 24 Hrs  T(C): 36.1 (28 Apr 2020 04:00), Max: 37.1 (27 Apr 2020 08:00)  T(F): 96.9 (28 Apr 2020 04:00), Max: 98.8 (27 Apr 2020 08:00)  HR: 114 (28 Apr 2020 07:00) (76 - 138)  RR: 30 (28 Apr 2020 07:00) (26 - 30)  SpO2: 100% (28 Apr 2020 07:00) (90% - 100%)    PHYSICAL EXAMINATION:    GENERAL: SEDATED    HEENT: Head is normocephalic and atraumatic. Extraocular muscles are intact. Mucous membranes are moist.    NECK: Supple. sub q emphysema    LUNGS: BL CRCKLES    HEART: Regular rate and rhythm without murmur.    ABDOMEN: Soft, nontender, and nondistended.      EXTREMITIES: Without any cyanosis, clubbing, rash, lesions or edema.    NEUROLOGIC: sedated    SKIN: No ulceration or induration present.      LABS:                        10.9   18.84 )-----------( 216      ( 28 Apr 2020 03:52 )             33.3     04-28    139  |  97<L>  |  151<HH>  ----------------------------<  186<H>  4.4   |  22  |  4.7<HH>    Ca    8.8      28 Apr 2020 03:52  Phos  6.3     04-28  Mg     2.6     04-28    TPro  5.8<L>  /  Alb  2.5<L>  /  TBili  0.4  /  DBili  x   /  AST  53<H>  /  ALT  35  /  AlkPhos  97  04-28    PT/INR - ( 27 Apr 2020 11:40 )   PT: 11.10 sec;   INR: 0.97 ratio         PTT - ( 27 Apr 2020 11:40 )  PTT:35.2 sec    ABG - ( 28 Apr 2020 02:34 )  pH, Arterial: 7.36  pH, Blood: x     /  pCO2: 43    /  pO2: 71    / HCO3: 24    / Base Excess: -1.7  /  SaO2: 93          30/350/80/8  plateau 27          D-Dimer Assay, Quantitative: 2976 ng/mL DDU (04-27-20 @ 20:00)        Procalcitonin, Serum: 9.61 ng/mL (04-26-20 @ 17:04)        04-27-20 @ 07:01  -  04-28-20 @ 07:00  --------------------------------------------------------  IN: 5967.8 mL / OUT: 3435 mL / NET: 2532.8 mL        MICROBIOLOGY:  Culture Results:   No growth to date. (04-25 @ 11:15)      MEDICATIONS  (STANDING):  aspirin  chewable 81 milliGRAM(s) Oral daily  calcium acetate 2001 milliGRAM(s) Oral three times a day with meals  chlorhexidine 0.12% Liquid 15 milliLiter(s) Oral Mucosa every 12 hours  chlorhexidine 4% Liquid 1 Application(s) Topical <User Schedule>  dextrose 5%. 1000 milliLiter(s) (50 mL/Hr) IV Continuous <Continuous>  dextrose 50% Injectable 12.5 Gram(s) IV Push once  dextrose 50% Injectable 25 Gram(s) IV Push once  dextrose 50% Injectable 25 Gram(s) IV Push once  fentaNYL   Infusion. 0.5 MICROgram(s)/kG/Hr (4.4 mL/Hr) IV Continuous <Continuous>  heparin   Injectable 7500 Unit(s) SubCutaneous every 8 hours  insulin regular Infusion 3 Unit(s)/Hr (3 mL/Hr) IV Continuous <Continuous>  lactulose Syrup 20 Gram(s) Oral four times a day  meropenem  IVPB 750 milliGRAM(s) IV Intermittent every 24 hours  norepinephrine Infusion 0.05 MICROgram(s)/kG/Min (4.13 mL/Hr) IV Continuous <Continuous>  pantoprazole   Suspension 40 milliGRAM(s) Oral daily  propofol Infusion 10 MICROgram(s)/kG/Min (5.28 mL/Hr) IV Continuous <Continuous>  senna 2 Tablet(s) Oral at bedtime  sevelamer carbonate Powder 2400 milliGRAM(s) Oral three times a day with meals  sodium bicarbonate 650 milliGRAM(s) Oral every 12 hours    MEDICATIONS  (PRN):  acetaminophen   Tablet .. 650 milliGRAM(s) Oral every 6 hours PRN Temp greater or equal to 38C (100.4F)  dextrose 40% Gel 15 Gram(s) Oral once PRN Blood Glucose LESS THAN 70 milliGRAM(s)/deciliter  glucagon  Injectable 1 milliGRAM(s) IntraMuscular once PRN Glucose LESS THAN 70 milligrams/deciliter  glucagon  Injectable 1 milliGRAM(s) IntraMuscular once PRN Glucose LESS THAN 70 milligrams/deciliter  ondansetron Injectable 4 milliGRAM(s) IV Push every 8 hours PRN Nausea and/or Vomiting      RADIOLOGY & ADDITIONAL STUDIES:

## 2020-04-28 NOTE — PROGRESS NOTE ADULT - SUBJECTIVE AND OBJECTIVE BOX
RHIANNON MAHER  55y, Male    All available historical data reviewed    OVERNIGHT EVENTS:  no fevers  on pressors  fio2 80%    ROS:  unable to obtain history secondary to patient's mental status and/or sedation     VITALS:  T(F): 96.9, Max: 97.6 (04-27-20 @ 20:00)  HR: 114  BP: --  RR: 30Vital Signs Last 24 Hrs  T(C): 36.1 (28 Apr 2020 04:00), Max: 36.4 (27 Apr 2020 12:00)  T(F): 96.9 (28 Apr 2020 04:00), Max: 97.6 (27 Apr 2020 20:00)  HR: 114 (28 Apr 2020 07:00) (76 - 138)  BP: --  BP(mean): --  RR: 30 (28 Apr 2020 07:00) (26 - 30)  SpO2: 100% (28 Apr 2020 07:00) (90% - 100%)    TESTS & MEASUREMENTS:                        10.9   18.84 )-----------( 216      ( 28 Apr 2020 03:52 )             33.3     04-28    139  |  97<L>  |  151<HH>  ----------------------------<  186<H>  4.4   |  22  |  4.7<HH>    Ca    8.8      28 Apr 2020 03:52  Phos  6.3     04-28  Mg     2.6     04-28    TPro  5.8<L>  /  Alb  2.5<L>  /  TBili  0.4  /  DBili  x   /  AST  53<H>  /  ALT  35  /  AlkPhos  97  04-28    LIVER FUNCTIONS - ( 28 Apr 2020 03:52 )  Alb: 2.5 g/dL / Pro: 5.8 g/dL / ALK PHOS: 97 U/L / ALT: 35 U/L / AST: 53 U/L / GGT: x             Culture - Blood (collected 04-25-20 @ 11:15)  Source: .Blood None  Preliminary Report (04-26-20 @ 19:02):    No growth to date.            RADIOLOGY & ADDITIONAL TESTS:  Personal review of radiological diagnostics performed  Echo and EKG results noted when applicable.     MEDICATIONS:  acetaminophen   Tablet .. 650 milliGRAM(s) Oral every 6 hours PRN  aspirin  chewable 81 milliGRAM(s) Oral daily  calcium acetate 2001 milliGRAM(s) Oral three times a day with meals  chlorhexidine 0.12% Liquid 15 milliLiter(s) Oral Mucosa every 12 hours  chlorhexidine 4% Liquid 1 Application(s) Topical <User Schedule>  dextrose 40% Gel 15 Gram(s) Oral once PRN  dextrose 5%. 1000 milliLiter(s) IV Continuous <Continuous>  dextrose 50% Injectable 12.5 Gram(s) IV Push once  dextrose 50% Injectable 25 Gram(s) IV Push once  dextrose 50% Injectable 25 Gram(s) IV Push once  fentaNYL   Infusion. 0.5 MICROgram(s)/kG/Hr IV Continuous <Continuous>  glucagon  Injectable 1 milliGRAM(s) IntraMuscular once PRN  glucagon  Injectable 1 milliGRAM(s) IntraMuscular once PRN  heparin   Injectable 7500 Unit(s) SubCutaneous every 8 hours  insulin regular Infusion 3 Unit(s)/Hr IV Continuous <Continuous>  lactulose Syrup 20 Gram(s) Oral four times a day  meropenem  IVPB 750 milliGRAM(s) IV Intermittent every 24 hours  norepinephrine Infusion 0.05 MICROgram(s)/kG/Min IV Continuous <Continuous>  ondansetron Injectable 4 milliGRAM(s) IV Push every 8 hours PRN  pantoprazole   Suspension 40 milliGRAM(s) Oral daily  propofol Infusion 10 MICROgram(s)/kG/Min IV Continuous <Continuous>  senna 2 Tablet(s) Oral at bedtime  sevelamer carbonate Powder 2400 milliGRAM(s) Oral three times a day with meals  sodium bicarbonate 650 milliGRAM(s) Oral every 12 hours      ANTIBIOTICS:  meropenem  IVPB 750 milliGRAM(s) IV Intermittent every 24 hours

## 2020-04-28 NOTE — PROGRESS NOTE ADULT - ASSESSMENT
· Assessment		  55 year old male patient with hypertension, diabetes presenting for dyspnea.     IMPRESSION;   COVID19 with septic shock ( fevers, 2 pressors )  with lactic acidemia with  resp failure, mechanical ventilation with ARDS with FiO2 100%, secondary to Cytokine Release Syndrome leading to cytokine storm   -CXR with less opacities  -pneumomediastinum with significant subcutaneous emphysema  -transaminitis secondary to COVID-19   -inflammatory markers significantly elevated with little response to anakinra  -end organ damage with renal failure and significant hepatitis ( both very poor prognostic markers )  -elevated Ddimer and Ferritin are also poor prognostic indicators and differentiate survivors from non survivors  -procalcitonin 0. 53 > 3.53 > 14.4 with possible  bacterial superinfection  -Sputa NGTD  -BCx 4/21,25 NG  -nares ORSA NG  -fungitell 61      RECOMMENDATIONS;   S/p Anakinra 4/14-17  -meropenem 750 mg iv q24h

## 2020-04-28 NOTE — PROGRESS NOTE ADULT - SUBJECTIVE AND OBJECTIVE BOX
Admit Date: 04-13-20  Length of Stay: 15d    55yMale    Reason for admission to ICU:  Patient is in acute hypoxic respiratory distress requiring intubation and sedation. Imaging c/w ARDS. Admitted to MICU for further observation and management.    INTERVAL HPI/OVERNIGHT EVENTS:  Intubated and sedated - Improving TELLO - Urine output improving - Stable - Follow Nephrology  Stable Subq Emphysema - Blow hole placed by CTS April 22  Urine Output Improving - No RRT today - Nephrology following.  Had episode of High Peak pressure April 26 and Decreased Saturation - Given Paralytic and INcreased FiO2 and improved. Lowered FiO2 to 60   Had decreased Saturation - Increased back to 100% - Was decreased Appropriately        MEDICATIONS  (STANDING):  aspirin  chewable 81 milliGRAM(s) Oral daily  calcium acetate 2001 milliGRAM(s) Oral three times a day with meals  chlorhexidine 0.12% Liquid 15 milliLiter(s) Oral Mucosa every 12 hours  chlorhexidine 4% Liquid 1 Application(s) Topical <User Schedule>  dextrose 5%. 1000 milliLiter(s) (50 mL/Hr) IV Continuous <Continuous>  dextrose 50% Injectable 12.5 Gram(s) IV Push once  dextrose 50% Injectable 25 Gram(s) IV Push once  dextrose 50% Injectable 25 Gram(s) IV Push once  fentaNYL   Infusion. 0.5 MICROgram(s)/kG/Hr (4.4 mL/Hr) IV Continuous <Continuous>  heparin  Infusion 1600 Unit(s)/Hr (16 mL/Hr) IV Continuous <Continuous>  insulin regular Infusion 3 Unit(s)/Hr (3 mL/Hr) IV Continuous <Continuous>  meropenem  IVPB 500 milliGRAM(s) IV Intermittent every 12 hours  norepinephrine Infusion 0.05 MICROgram(s)/kG/Min (4.13 mL/Hr) IV Continuous <Continuous>  pantoprazole   Suspension 40 milliGRAM(s) Oral daily  propofol Infusion 10 MICROgram(s)/kG/Min (5.28 mL/Hr) IV Continuous <Continuous>  senna 2 Tablet(s) Oral at bedtime  sevelamer carbonate Powder 2400 milliGRAM(s) Oral three times a day with meals  sodium bicarbonate 650 milliGRAM(s) Oral every 12 hours    MEDICATIONS  (PRN):  acetaminophen   Tablet .. 650 milliGRAM(s) Oral every 6 hours PRN Temp greater or equal to 38C (100.4F)  dextrose 40% Gel 15 Gram(s) Oral once PRN Blood Glucose LESS THAN 70 milliGRAM(s)/deciliter  glucagon  Injectable 1 milliGRAM(s) IntraMuscular once PRN Glucose LESS THAN 70 milligrams/deciliter  glucagon  Injectable 1 milliGRAM(s) IntraMuscular once PRN Glucose LESS THAN 70 milligrams/deciliter  lactulose Syrup 20 Gram(s) Oral every 12 hours PRN constipation  ondansetron Injectable 4 milliGRAM(s) IV Push every 8 hours PRN Nausea and/or Vomiting      Vitals:  Vital Signs Last 24 Hrs  T(C): 36.1 (28 Apr 2020 08:00), Max: 36.4 (27 Apr 2020 16:00)  T(F): 96.9 (28 Apr 2020 08:00), Max: 97.6 (27 Apr 2020 20:00)  HR: 113 (28 Apr 2020 08:00) (78 - 138)  BP: --  BP(mean): --  RR: 30 (28 Apr 2020 08:00) (26 - 30)  SpO2: 100% (28 Apr 2020 08:00) (90% - 100%)    Vitals (Invasive):  ABP: 104/52 (04-28-20 @ 08:00) (80/44 - 180/94)    I&O's Summary    27 Apr 2020 07:01  -  28 Apr 2020 07:00  --------------------------------------------------------  IN: 5967.8 mL / OUT: 3435 mL / NET: 2532.8 mL    28 Apr 2020 07:01  -  28 Apr 2020 12:36  --------------------------------------------------------  IN: 166.2 mL / OUT: 0 mL / NET: 166.2 mL        Physical Exam:  GEN;        NAD  HEENT:     Pupils React to light equally no icterus ET,OGT in place. Swelling improving.  Chest:         Improving SubQ emphysema present over chest wall and lateral chests and run down B/l Arms. S/p Left side chest wall blowhole (Stable).   Lungs:          CTABL, Decreased Air MOvement R>L (Stable) Symmetrical Inspiration, NO wheezes appreciated.  Cardiac:       + S1 + S2    + RRR   No murmurs appreciated  Abdomen:    Obese habitus + BS     + Soft    + Non-tender    Extremities:  Edema Present Diffuse Slightly improved- no cyanosis, Pulses present in all extremities.  Neuro:        Sedated, Corneal Reflex present, Grimacing to pain, Lifted R. Hand on command Once    Labs:  COVID:  COVID-19 PCR: Detected (04-13-20 @ 14:30)                          10.9   18.84 )-----------( 216      ( 28 Apr 2020 03:52 )             33.3     04-28    139  |  97<L>  |  151<HH>  ----------------------------<  186<H>  4.4   |  22  |  4.7<HH>    Ca    8.8      28 Apr 2020 03:52  Phos  6.3     04-28  Mg     2.6     04-28    TPro  5.8<L>  /  Alb  2.5<L>  /  TBili  0.4  /  DBili  x   /  AST  53<H>  /  ALT  35  /  AlkPhos  97  04-28    PT/INR - ( 27 Apr 2020 11:40 )   PT: 11.10 sec;   INR: 0.97 ratio         PTT - ( 27 Apr 2020 11:40 )  PTT:35.2 sec      Culture - Blood (collected 04-25-20 @ 11:15)  Source: .Blood None  Preliminary Report (04-26-20 @ 19:02):    No growth to date.      COVID related labs:  27 Apr 2020 20:00  D-dimer:  2976<H>  Calcitonin:  x  CRP:  x  LDH:  x  Lactate,Blood:  x  CK:  x  Troponin I:  x  Troponin T:  x  Troponin HS:  x  Ferritin, Serum: x  BNP:  x      Blood Gases:  (ARTERIAL):  04-28-20 @ 02:34  pH 7.36 / pCO2 43 / pO2 71 / HCO3 24  Total CO2 --  FiO2 21  Oxygen Saturation 93    Base Excess, Arterial -1.7    (VENOUS):      Radiology:  < from: Xray Chest 1 View- PORTABLE-Routine (04.28.20 @ 05:50) >  Impression:  Stable bilateral opacities. Stable pneumomediastinum  No significant pneumothorax is identified (lung fields obscured by simultaneous emphysema).  Support devices, in satisfactory position.  < end of copied text >

## 2020-04-28 NOTE — PROGRESS NOTE ADULT - ASSESSMENT
Assessment:    Acute hypoxemic respiratory failure secondary to SARS-COV-2 infection requiring invasive mechanical ventilation  sub q emphysema s/p blow hole  ARDS  Possible superimposed bacterial infection  procal improving  TELLO/ non oliguric imrpoving      PLAN:    CNS: PROPOFOL, FENTANYL    HEENT:  Oral care    PULMONARY:  HOB @ 45 degrees, keep sats 92-96%  solumedrol 40q 24 dc,  / i RR 30, DEC fio2 70%, PEEP 8, CTsx F/UP    CARDIOVASCULAR: dc IVF    GI: GI prophylaxis Protonix, bowel regimen,  lactulose                                         Feeding: feeding    RENAL:  F/u  lytes.  Correct as needed. accurate I/O, renal F/UP, repeat CMP    INFECTIOUS DISEASE: IV abx  violetta, ID f/u     HEMATOLOGICAL:  iv HEP ( increase D DIMER)    ENDOCRINE:  Follow up FS.  Insulin protocol if needed.    CODE STATUS: FULL CODE    DISPOSITION: Patient require continued monitoring in MICU  poor prognosis

## 2020-04-29 NOTE — PROGRESS NOTE ADULT - ASSESSMENT
Acute hypoxemic respiratory failure secondary to SARS-COV-2 infection / TELLO/ hyperkalemia:  # creatinine stable   # non oliguric   # increase meropenem to 500 q 8  # feed : nepro  # ph noted, on  renagel 3/3/3 , and phoslo  # continue  sodium bicarbonate 650 q  12  # no  acute indication for rrt   # will follow

## 2020-04-29 NOTE — PROGRESS NOTE ADULT - ASSESSMENT
Assessment:    Acute hypoxemic respiratory failure secondary to SARS-COV-2 infection requiring invasive mechanical ventilation  sub q emphysema s/p blow hole  ARDS  Possible superimposed bacterial infection  procal improving  TELLO/ non oliguric imrpoving      PLAN:    CNS: PROPOFOL, FENTANYL    HEENT:  Oral care    PULMONARY:  HOB @ 45 degrees, keep sats 92-96%   / i RR 30, DEC fio2 60%, PEEP 8, CTsx F/UP    CARDIOVASCULAR: dc IVF, lasix once    GI: GI prophylaxis Protonix, bowel regimen,  lactulose                                         Feeding: feeding    RENAL:  F/u  lytes.  Correct as needed. accurate I/O, renal F/UP, repeat CMP    INFECTIOUS DISEASE: IV abx  violetta, ID f/u , plasma convalescent    HEMATOLOGICAL:  iv HEP ( increase D DIMER)    ENDOCRINE:  Follow up FS.  Insulin protocol if needed.    CODE STATUS: FULL CODE    DISPOSITION: Patient require continued monitoring in MICU/ cahnge line  poor prognosis

## 2020-04-29 NOTE — PROGRESS NOTE ADULT - ASSESSMENT
Patient is a 55 year old male with PMH of HTN and T2DM diabetes who presented on 4/13 for dyspnea found to have SARS-COV-2 pneumonia. Was intubated 4/17 after desaturating to 84% on 15L NRB and upgraded to CCU    # Acute hypoxic respiratory failure 2/2 SARS-COV-2 lower respiratory tract infection with evidence of cytokine release syndrome -   # PTX and Pneumomediastinum - CXR April 20 - no PTX - Improving Pneumomediastinum - CTSurg Call PRN - Blow hole April 22 to release Subq Emphysema  - Intubated 4/17.   - Vent settings:.  RR (machine): 30 TV (machine): 350 FiO2: 50 PEEP: 8 Will decrease FiO2 as appropriate  - Pressure support: Levo - Wean as tolerated  - Sedation:  Propofol and Precedex and Fentanyl - Lactulose -Off Versed - TG - 311  - COVID + - Isolation - ID following. S/p Plaquenil and Anakinra. Finished Solumedrol April 14 - April 27th   - One dose Lasix 80 mg IV April 21 -  1 dose 80 mg IV LAsix  -  - Switched to IV Heparin - rising D-Dimer  - Continue with 81 ASA  - Continue IV Meropenem - Started April 17 - Finish course - Increase to 500 q12 according to uptodate based on improving GFR.   - Convalescent Plasma today April 29th   - Sputum - Yeast and GPC pairs and Chains - Culture Negative  - Blood Culture - NGTD  - MRSA neg  - Fungitell - 60 (<80 negative) -   - D-Dimer 2976(<2026<1950 (<3068<4178) April 27   - Procal 8.9(<9.61<36.4<6.62<17.9<14.4<3.53<.53) April 28  - CRP -9.68(30.75<11.42<12.9(<21<11<5) April 27  - LDH - 963(<1223<1312<1384) April 22  - Ferritin  - 1787<(2335(<5116<7678<4770) April 23  - Fibrinogen - 604 (<626<663<700) April 22     # TELLO, no history of CKD with noted decreased UOP and hyperkalemia  - Improving Cr and BUN - UO 1805 L 24 hours - Give IV lasix Today  - Stop Free water and IV Fluid  - Nepro Feeds  - 650 Bicarb q12  - Nephrology Consult appreciated - No RRT currently  - Continue with Meropenem 750 qD  - Follow up ID  - FeNa - .8   - Hyperkalemia - resolved - Discontinue Lokelma  - Continue with Sevelmar 3/3/3 - powder,  Continue with Phoslo 3/3/3    # History of HTN  - Will hold off Losartan 40 mg daily, given borderline BP  - Resume if becomes hypertensive     # Type 2 Diabetes - Uncontrolled   - HbA1c: 11.6  - Insulin Drip  - Monitor finger sticks    Diet: NPO with tube feeds. F/u nutrition recs  Electrolytes: Replete K<4, Mg<2.   DVT ppx: IV Heparin, ICD  GI ppx: Pantoprazole 40 mg suspension while intubated and on steroids  Activity: bedrest  Dispo: from home, continue MICU monitoring; Possible HD with Worsening Kidney Function;Daughter [phone]

## 2020-04-29 NOTE — PROGRESS NOTE ADULT - SUBJECTIVE AND OBJECTIVE BOX
Admit Date: 04-13-20  Length of Stay: 16d    55yMale    Reason for admission to ICU:  Patient is in acute hypoxic respiratory distress requiring intubation and sedation. Imaging c/w ARDS. Admitted to MICU for further observation and management.    INTERVAL HPI/OVERNIGHT EVENTS:  Intubated and sedated - Improving TELLO - Urine output improving - Stable - Follow Nephrology  Stable Subq Emphysema - Blow hole placed by CTS April 22  Urine Output Improving - No RRT today - Nephrology following.  Had episode of High Peak pressure April 26 and Decreased Saturation - Given Paralytic and INcreased FiO2 and improved. Lowered FiO2 to 60   Stable on FiO2 .50      MEDICATIONS  (STANDING):  aspirin  chewable 81 milliGRAM(s) Oral daily  calcium acetate 2001 milliGRAM(s) Oral three times a day with meals  chlorhexidine 0.12% Liquid 15 milliLiter(s) Oral Mucosa every 12 hours  chlorhexidine 4% Liquid 1 Application(s) Topical <User Schedule>  dextrose 5%. 1000 milliLiter(s) (50 mL/Hr) IV Continuous <Continuous>  dextrose 50% Injectable 12.5 Gram(s) IV Push once  dextrose 50% Injectable 25 Gram(s) IV Push once  dextrose 50% Injectable 25 Gram(s) IV Push once  fentaNYL   Infusion. 0.5 MICROgram(s)/kG/Hr (4.4 mL/Hr) IV Continuous <Continuous>  heparin  Infusion 1200 Unit(s)/Hr (12 mL/Hr) IV Continuous <Continuous>  insulin regular Infusion 3 Unit(s)/Hr (3 mL/Hr) IV Continuous <Continuous>  lactulose Syrup 20 Gram(s) Oral every 12 hours  meropenem  IVPB 500 milliGRAM(s) IV Intermittent every 12 hours  norepinephrine Infusion 0.05 MICROgram(s)/kG/Min (4.13 mL/Hr) IV Continuous <Continuous>  pantoprazole   Suspension 40 milliGRAM(s) Oral daily  propofol Infusion 10 MICROgram(s)/kG/Min (5.28 mL/Hr) IV Continuous <Continuous>  senna 2 Tablet(s) Oral at bedtime  sevelamer carbonate Powder 2400 milliGRAM(s) Oral three times a day with meals  sodium bicarbonate 650 milliGRAM(s) Oral every 12 hours    MEDICATIONS  (PRN):  acetaminophen   Tablet .. 650 milliGRAM(s) Oral every 6 hours PRN Temp greater or equal to 38C (100.4F)  dextrose 40% Gel 15 Gram(s) Oral once PRN Blood Glucose LESS THAN 70 milliGRAM(s)/deciliter  glucagon  Injectable 1 milliGRAM(s) IntraMuscular once PRN Glucose LESS THAN 70 milligrams/deciliter  glucagon  Injectable 1 milliGRAM(s) IntraMuscular once PRN Glucose LESS THAN 70 milligrams/deciliter  ondansetron Injectable 4 milliGRAM(s) IV Push every 8 hours PRN Nausea and/or Vomiting      Vitals:  Vital Signs Last 24 Hrs  T(C): 37.9 (29 Apr 2020 10:00), Max: 37.9 (29 Apr 2020 04:00)  T(F): 100.2 (29 Apr 2020 10:00), Max: 100.3 (29 Apr 2020 04:00)  HR: 118 (29 Apr 2020 10:00) (94 - 134)  BP: --  BP(mean): --  RR: 29 (29 Apr 2020 10:00) (28 - 30)  SpO2: 97% (29 Apr 2020 10:00) (94% - 100%)    Vitals (Invasive):  ABP: 116/60 (04-29-20 @ 10:00) (88/40 - 172/96)    I&O's Summary    28 Apr 2020 07:01  -  29 Apr 2020 07:00  --------------------------------------------------------  IN: 2636.5 mL / OUT: 1805 mL / NET: 831.5 mL    29 Apr 2020 07:01  -  29 Apr 2020 10:44  --------------------------------------------------------  IN: 92.2 mL / OUT: 350 mL / NET: -257.8 mL        Physical Exam:  GEN;        NAD  HEENT:     Pupils React to light equally no icterus ET,OGT in place. Swelling improving.  Chest:         Improving SubQ emphysema present over chest wall and lateral chests and run down B/l Arms. S/p Left side chest wall blowhole (Stable).   Lungs:          CTABL, Decreased Air MOvement R>L (Stable) Symmetrical Inspiration, NO wheezes appreciated.  Cardiac:       + S1 + S2    + RRR   No murmurs appreciated  Abdomen:    Obese habitus + BS     + Soft    + Non-tender    Extremities:  Edema Present Diffuse Slightly improved- no cyanosis, Pulses present in all extremities.  Neuro:        Sedated, Corneal Reflex present, Grimacing to pain      Labs:  COVID:  COVID-19 PCR: Detected (04-13-20 @ 14:30)                          9.5    27.56 )-----------( 198      ( 29 Apr 2020 04:15 )             29.6     04-29    134<L>  |  93<L>  |  150<HH>  ----------------------------<  240<H>  4.7   |  22  |  4.9<HH>    Ca    8.8      29 Apr 2020 04:15  Phos  6.5     04-29  Mg     2.7     04-29    TPro  5.6<L>  /  Alb  2.1<L>  /  TBili  0.5  /  DBili  x   /  AST  35  /  ALT  28  /  AlkPhos  116<H>  04-29    PT/INR - ( 27 Apr 2020 11:40 )   PT: 11.10 sec;   INR: 0.97 ratio         PTT - ( 29 Apr 2020 04:15 )  PTT:90.1 sec      Culture - Blood (collected 04-25-20 @ 11:15)  Source: .Blood None  Preliminary Report (04-26-20 @ 19:02):    No growth to date.      COVID related labs:  27 Apr 2020 20:00  D-dimer:  2976<H>  Calcitonin:  x  CRP:  x  LDH:  x  Lactate,Blood:  x  CK:  x  Troponin I:  x  Troponin T:  x  Troponin HS:  x  Ferritin, Serum: x  BNP:  x      Blood Gases:  (ARTERIAL):  04-29-20 @ 02:55  pH 7.41 / pCO2 39 / pO2 117 / HCO3 24    FiO2 70  Oxygen Saturation 99    Base Excess, Arterial -0.2    (VENOUS):      Radiology:  < from: Xray Chest 1 View- PORTABLE-Routine (04.29.20 @ 02:49) >  Low lung volume. Stable bilateral opacities. Stable pneumomediastinum. No definite pneumothorax.  Lines and support devices as described above.  < end of copied text >

## 2020-04-29 NOTE — PROGRESS NOTE ADULT - ASSESSMENT
· Assessment		  55 year old male patient with hypertension, diabetes presenting for dyspnea.     IMPRESSION;   COVID19 with septic shock ( fevers, 2 pressors )  with lactic acidemia with  resp failure, mechanical ventilation with ARDS with FiO2 100%, secondary to Cytokine Release Syndrome leading to cytokine storm   -CXR with less opacities  -pneumomediastinum with significant subcutaneous emphysema  -transaminitis secondary to COVID-19   -inflammatory markers significantly elevated with little response to anakinra  -end organ damage with renal failure and significant hepatitis ( both very poor prognostic markers )  -elevated Ddimer and Ferritin are also poor prognostic indicators and differentiate survivors from non survivors  -procalcitonin 0. 53 > 3.53 > 14.4 with possible  bacterial superinfection  -Sputa NGTD  -BCx 4/21,25 NG  -nares ORSA NG  -fungitell 61      RECOMMENDATIONS;   S/p Anakinra 4/14-17  -meropenem 750 mg iv q24h  -convalescent plasma possibly today

## 2020-04-29 NOTE — PROGRESS NOTE ADULT - SUBJECTIVE AND OBJECTIVE BOX
RHIANNON MAHER  55y, Male    All available historical data reviewed    OVERNIGHT EVENTS:  low grade fevers  fio2 80%  on levo    ROS:  unable to obtain history secondary to patient's mental status and/or sedation     VITALS:  T(F): 100.2, Max: 100.3 (04-29-20 @ 04:00)  HR: 118  BP: --  RR: 29Vital Signs Last 24 Hrs  T(C): 37.9 (29 Apr 2020 10:00), Max: 37.9 (29 Apr 2020 04:00)  T(F): 100.2 (29 Apr 2020 10:00), Max: 100.3 (29 Apr 2020 04:00)  HR: 118 (29 Apr 2020 10:00) (94 - 134)  BP: --  BP(mean): --  RR: 29 (29 Apr 2020 10:00) (28 - 30)  SpO2: 97% (29 Apr 2020 10:00) (94% - 100%)    TESTS & MEASUREMENTS:                        9.5    27.56 )-----------( 198      ( 29 Apr 2020 04:15 )             29.6     04-29    134<L>  |  93<L>  |  150<HH>  ----------------------------<  240<H>  4.7   |  22  |  4.9<HH>    Ca    8.8      29 Apr 2020 04:15  Phos  6.5     04-29  Mg     2.7     04-29    TPro  5.6<L>  /  Alb  2.1<L>  /  TBili  0.5  /  DBili  x   /  AST  35  /  ALT  28  /  AlkPhos  116<H>  04-29    LIVER FUNCTIONS - ( 29 Apr 2020 04:15 )  Alb: 2.1 g/dL / Pro: 5.6 g/dL / ALK PHOS: 116 U/L / ALT: 28 U/L / AST: 35 U/L / GGT: x             Culture - Blood (collected 04-25-20 @ 11:15)  Source: .Blood None  Preliminary Report (04-26-20 @ 19:02):    No growth to date.            RADIOLOGY & ADDITIONAL TESTS:  Personal review of radiological diagnostics performed  Echo and EKG results noted when applicable.     MEDICATIONS:  acetaminophen   Tablet .. 650 milliGRAM(s) Oral every 6 hours PRN  aspirin  chewable 81 milliGRAM(s) Oral daily  calcium acetate 2001 milliGRAM(s) Oral three times a day with meals  chlorhexidine 0.12% Liquid 15 milliLiter(s) Oral Mucosa every 12 hours  chlorhexidine 4% Liquid 1 Application(s) Topical <User Schedule>  dextrose 40% Gel 15 Gram(s) Oral once PRN  dextrose 5%. 1000 milliLiter(s) IV Continuous <Continuous>  dextrose 50% Injectable 12.5 Gram(s) IV Push once  dextrose 50% Injectable 25 Gram(s) IV Push once  dextrose 50% Injectable 25 Gram(s) IV Push once  fentaNYL   Infusion. 0.5 MICROgram(s)/kG/Hr IV Continuous <Continuous>  glucagon  Injectable 1 milliGRAM(s) IntraMuscular once PRN  glucagon  Injectable 1 milliGRAM(s) IntraMuscular once PRN  heparin  Infusion 1200 Unit(s)/Hr IV Continuous <Continuous>  insulin regular Infusion 3 Unit(s)/Hr IV Continuous <Continuous>  lactulose Syrup 20 Gram(s) Oral every 12 hours PRN  meropenem  IVPB 500 milliGRAM(s) IV Intermittent every 12 hours  norepinephrine Infusion 0.05 MICROgram(s)/kG/Min IV Continuous <Continuous>  ondansetron Injectable 4 milliGRAM(s) IV Push every 8 hours PRN  pantoprazole   Suspension 40 milliGRAM(s) Oral daily  propofol Infusion 10 MICROgram(s)/kG/Min IV Continuous <Continuous>  senna 2 Tablet(s) Oral at bedtime  sevelamer carbonate Powder 2400 milliGRAM(s) Oral three times a day with meals  sodium bicarbonate 650 milliGRAM(s) Oral every 12 hours      ANTIBIOTICS:  meropenem  IVPB 500 milliGRAM(s) IV Intermittent every 12 hours

## 2020-04-29 NOTE — PROGRESS NOTE ADULT - SUBJECTIVE AND OBJECTIVE BOX
OVERNIGHT EVENTS: intubated 13, levophed 0.09, propofol 35, fentanyl 3. IV heparin    Vital Signs Last 24 Hrs  T(C): 37.9 (29 Apr 2020 04:00), Max: 37.9 (29 Apr 2020 04:00)  T(F): 100.3 (29 Apr 2020 04:00), Max: 100.3 (29 Apr 2020 04:00)  HR: 110 (29 Apr 2020 06:00) (94 - 126)  RR: 30 (29 Apr 2020 06:00) (29 - 30)  SpO2: 99% (29 Apr 2020 07:28) (96% - 100%)    PHYSICAL EXAMINATION:    GENERAL: sedated    HEENT: subq emphysema    NECK: Supple.    LUNGS: dec bs both bases    HEART: Regular rate and rhythm without murmur.    ABDOMEN: Soft, nontender, and nondistended.      EXTREMITIES: Without any cyanosis, clubbing, rash, lesions or edema.    NEUROLOGIC: sedated    SKIN: No ulceration or induration present.      LABS:                        9.5    27.56 )-----------( 198      ( 29 Apr 2020 04:15 )             29.6     04-29    134<L>  |  93<L>  |  150<HH>  ----------------------------<  240<H>  4.7   |  22  |  4.9<HH>    Ca    8.8      29 Apr 2020 04:15  Phos  6.5     04-29  Mg     2.7     04-29    TPro  5.6<L>  /  Alb  2.1<L>  /  TBili  0.5  /  DBili  x   /  AST  35  /  ALT  28  /  AlkPhos  116<H>  04-29    PT/INR - ( 27 Apr 2020 11:40 )   PT: 11.10 sec;   INR: 0.97 ratio         PTT - ( 29 Apr 2020 04:15 )  PTT:90.1 sec    ABG - ( 29 Apr 2020 02:55 )  pH, Arterial: 7.41  pH, Blood: x     /  pCO2: 39    /  pO2: 117   / HCO3: 24    / Base Excess: -0.2  /  SaO2: 99          350/30/70/8  plateau 24                Procalcitonin, Serum: 8.90 ng/mL (04-28-20 @ 16:00)  Procalcitonin, Serum: 9.61 ng/mL (04-26-20 @ 17:04)        04-28-20 @ 07:01  -  04-29-20 @ 07:00  --------------------------------------------------------  IN: 2636.5 mL / OUT: 1805 mL / NET: 831.5 mL        MICROBIOLOGY:  Culture Results:   No growth to date. (04-25 @ 11:15)      MEDICATIONS  (STANDING):  aspirin  chewable 81 milliGRAM(s) Oral daily  calcium acetate 2001 milliGRAM(s) Oral three times a day with meals  chlorhexidine 0.12% Liquid 15 milliLiter(s) Oral Mucosa every 12 hours  chlorhexidine 4% Liquid 1 Application(s) Topical <User Schedule>  dextrose 5%. 1000 milliLiter(s) (50 mL/Hr) IV Continuous <Continuous>  dextrose 50% Injectable 12.5 Gram(s) IV Push once  dextrose 50% Injectable 25 Gram(s) IV Push once  dextrose 50% Injectable 25 Gram(s) IV Push once  fentaNYL   Infusion. 0.5 MICROgram(s)/kG/Hr (4.4 mL/Hr) IV Continuous <Continuous>  heparin  Infusion 1200 Unit(s)/Hr (12 mL/Hr) IV Continuous <Continuous>  insulin regular Infusion 3 Unit(s)/Hr (3 mL/Hr) IV Continuous <Continuous>  meropenem  IVPB 500 milliGRAM(s) IV Intermittent every 12 hours  norepinephrine Infusion 0.05 MICROgram(s)/kG/Min (4.13 mL/Hr) IV Continuous <Continuous>  pantoprazole   Suspension 40 milliGRAM(s) Oral daily  propofol Infusion 10 MICROgram(s)/kG/Min (5.28 mL/Hr) IV Continuous <Continuous>  senna 2 Tablet(s) Oral at bedtime  sevelamer carbonate Powder 2400 milliGRAM(s) Oral three times a day with meals  sodium bicarbonate 650 milliGRAM(s) Oral every 12 hours    MEDICATIONS  (PRN):  acetaminophen   Tablet .. 650 milliGRAM(s) Oral every 6 hours PRN Temp greater or equal to 38C (100.4F)  dextrose 40% Gel 15 Gram(s) Oral once PRN Blood Glucose LESS THAN 70 milliGRAM(s)/deciliter  glucagon  Injectable 1 milliGRAM(s) IntraMuscular once PRN Glucose LESS THAN 70 milligrams/deciliter  glucagon  Injectable 1 milliGRAM(s) IntraMuscular once PRN Glucose LESS THAN 70 milligrams/deciliter  lactulose Syrup 20 Gram(s) Oral every 12 hours PRN constipation  ondansetron Injectable 4 milliGRAM(s) IV Push every 8 hours PRN Nausea and/or Vomiting      RADIOLOGY & ADDITIONAL STUDIES:

## 2020-04-29 NOTE — PROGRESS NOTE ADULT - SUBJECTIVE AND OBJECTIVE BOX
24 h events noted  intubated/ventilated        PAST HISTORY  --------------------------------------------------------------------------------  No significant changes to PMH, PSH, FHx, SHx, unless otherwise noted    ALLERGIES & MEDICATIONS  --------------------------------------------------------------------------------  Allergies    No Known Allergies    Intolerances      Standing Inpatient Medications  aspirin  chewable 81 milliGRAM(s) Oral daily  calcium acetate 2001 milliGRAM(s) Oral three times a day with meals  chlorhexidine 0.12% Liquid 15 milliLiter(s) Oral Mucosa every 12 hours  chlorhexidine 4% Liquid 1 Application(s) Topical <User Schedule>  dextrose 5%. 1000 milliLiter(s) IV Continuous <Continuous>  dextrose 50% Injectable 12.5 Gram(s) IV Push once  dextrose 50% Injectable 25 Gram(s) IV Push once  dextrose 50% Injectable 25 Gram(s) IV Push once  fentaNYL   Infusion. 0.5 MICROgram(s)/kG/Hr IV Continuous <Continuous>  heparin  Infusion 1200 Unit(s)/Hr IV Continuous <Continuous>  insulin regular Infusion 3 Unit(s)/Hr IV Continuous <Continuous>  meropenem  IVPB 500 milliGRAM(s) IV Intermittent every 12 hours  norepinephrine Infusion 0.05 MICROgram(s)/kG/Min IV Continuous <Continuous>  pantoprazole   Suspension 40 milliGRAM(s) Oral daily  propofol Infusion 10 MICROgram(s)/kG/Min IV Continuous <Continuous>  senna 2 Tablet(s) Oral at bedtime  sevelamer carbonate Powder 2400 milliGRAM(s) Oral three times a day with meals  sodium bicarbonate 650 milliGRAM(s) Oral every 12 hours    PRN Inpatient Medications  acetaminophen   Tablet .. 650 milliGRAM(s) Oral every 6 hours PRN  dextrose 40% Gel 15 Gram(s) Oral once PRN  glucagon  Injectable 1 milliGRAM(s) IntraMuscular once PRN  glucagon  Injectable 1 milliGRAM(s) IntraMuscular once PRN  lactulose Syrup 20 Gram(s) Oral every 12 hours PRN  ondansetron Injectable 4 milliGRAM(s) IV Push every 8 hours PRN        VITALS/PHYSICAL EXAM  --------------------------------------------------------------------------------  T(C): 37.9 (04-29-20 @ 04:00), Max: 37.9 (04-29-20 @ 04:00)  HR: 110 (04-29-20 @ 06:00) (94 - 126)  BP: --  RR: 30 (04-29-20 @ 06:00) (29 - 30)  SpO2: 99% (04-29-20 @ 07:28) (96% - 100%)  Wt(kg): --        04-28-20 @ 07:01  -  04-29-20 @ 07:00  --------------------------------------------------------  IN: 2636.5 mL / OUT: 1805 mL / NET: 831.5 mL      Physical Exam:  	Gen:intubated/ventilated  	    LABS/STUDIES  --------------------------------------------------------------------------------              9.5    27.56 >-----------<  198      [04-29-20 @ 04:15]              29.6     134  |  93  |  150  ----------------------------<  240      [04-29-20 @ 04:15]  4.7   |  22  |  4.9        Ca     8.8     [04-29-20 @ 04:15]      Mg     2.7     [04-29-20 @ 04:15]      Phos  6.5     [04-29-20 @ 04:15]    TPro  5.6  /  Alb  2.1  /  TBili  0.5  /  DBili  x   /  AST  35  /  ALT  28  /  AlkPhos  116  [04-29-20 @ 04:15]    PT/INR: PT 11.10, INR 0.97       [04-27-20 @ 11:40]  PTT: 90.1       [04-29-20 @ 04:15]      Creatinine Trend:  SCr 4.9 [04-29 @ 04:15]  SCr 4.7 [04-28 @ 03:52]  SCr 6.1 [04-27 @ 04:40]  SCr 6.7 [04-26 @ 17:04]  SCr 7.3 [04-26 @ 04:30]        Ferritin 1787      [04-22-20 @ 23:30]  Lipid: chol --, , HDL --, LDL --      [04-27-20 @ 20:00]

## 2020-04-30 NOTE — PROGRESS NOTE ADULT - ASSESSMENT
Assessment:    Acute hypoxemic respiratory failure secondary to SARS-COV-2 infection requiring invasive mechanical ventilation  sub q emphysema s/p blow hole s/p plasma, L ptx  ARDS  Possible superimposed bacterial infection  procal improving  TELLO/ non oliguric      PLAN:    CNS: PROPOFOL, FENTANYL    HEENT:  Oral care    PULMONARY:  HOB @ 45 degrees, keep sats 92-96%   / i RR 30, DEC fio2 60%, PEEP 8, CTsx F/UP, PUSH ett 25    CARDIOVASCULAR: dc IVF, lasix once    GI: GI prophylaxis Protonix, bowel regimen,  lactulose                                         Feeding: feeding    RENAL:  F/u  lytes.  Correct as needed. accurate I/O, renal F/UP, repeat CMP    INFECTIOUS DISEASE: IV abx  violetta, ID f/u , plasma convalescent sp    HEMATOLOGICAL:  iv HEP ( increase D DIMER)    ENDOCRINE:  Follow up FS.  Insulin protocol if needed.    CODE STATUS: FULL CODE    DISPOSITION: Patient require continued monitoring in MICU/ cahnge line  poor prognosis

## 2020-04-30 NOTE — PROGRESS NOTE ADULT - ASSESSMENT
Patient is a 55 year old male with PMH of HTN and T2DM diabetes who presented on 4/13 for dyspnea found to have SARS-COV-2 pneumonia. Was intubated 4/17 after desaturating to 84% on 15L NRB and upgraded to CCU    # Acute hypoxic respiratory failure 2/2 SARS-COV-2 lower respiratory tract infection with evidence of cytokine release syndrome -   # PTX and Pneumomediastinum - CXR April 20 - no PTX - Improving Pneumomediastinum - CTSurg Call PRN - Blow hole April 22 to release Subq Emphysema  - Intubated 4/17.   - Vent settings:.  RR (machine): 30 TV (machine): 350 FiO2: 50 PEEP: 8 Will decrease FiO2 as appropriate  - Pressure support: Levo - Wean as tolerated  - Sedation:  Propofol and Precedex and Fentanyl - Lactulose -Off Versed - TG - 311  - COVID + - Isolation - ID following. S/p Plaquenil and Anakinra. Finished Solumedrol April 14 - April 27th   - One dose Lasix 80 mg IV April 21, 29th -   UO - 3.5 L  - Switched to IV Heparin - rising D-Dimer  - Continue with 81 ASA  - Continue IV Meropenem - Started April 17 - Finish course - Increase to 500 q12 according to uptodate based on improving GFR.   - Possibly Convalescent Plasma today April 30th  - Sputum - Yeast and GPC pairs and Chains - Culture Negative  - Blood Culture - NGTD  - MRSA neg  - Fungitell - 60 (<80 negative) -   - D-Dimer 3944(<2976<2026<1950 (<3068<4178) April 29  - Procal 8.9(<9.61<36.4<6.62<17.9<14.4<3.53<.53) April 28  - CRP -36.04(<9.68<30.75<11.42<12.9(<21<11<5) April 29  - LDH - 611(<963<1223<1312<1384) April 29  - Ferritin  - 1375(<1787<2335(<5116<7678<4770) April 29  - Fibrinogen - >700(<604<626<663<700) April 29     # TELLO, no history of CKD with noted decreased UOP and hyperkalemia  - Improving Cr and BUN - UO 3.5 L 24 hours - s/p IV lasix 60 x1 APril 21, 29  - Stop Free water and IV Fluid  - Nepro Feeds  - 650 Bicarb q12  - Nephrology Consult appreciated - No RRT currently  - Continue with Meropenem 750 qD  - Follow up ID  - FeNa - .8   - Hyperkalemia - resolved - Discontinue Lokelma  - Continue with Sevelmar 3/3/3 - powder,  Continue with Phoslo 3/3/3    # History of HTN  - Will hold off Losartan 40 mg daily, given borderline BP  - Resume if becomes hypertensive     # Type 2 Diabetes - Uncontrolled   - HbA1c: 11.6  - Insulin Drip  - Monitor finger sticks    Diet: NPO with tube feeds. F/u nutrition recs  Electrolytes: Replete K<4, Mg<2.   DVT ppx: IV Heparin, ICD  GI ppx: Pantoprazole 40 mg suspension while intubated and on steroids  Activity: bedrest  Dispo: from home, continue MICU monitoring; ID and Nephro Following;Daughter [phone] Patient is a 55 year old male with PMH of HTN and T2DM diabetes who presented on 4/13 for dyspnea found to have SARS-COV-2 pneumonia. Was intubated 4/17 after desaturating to 84% on 15L NRB and upgraded to CCU    # Acute hypoxic respiratory failure 2/2 SARS-COV-2 lower respiratory tract infection with evidence of cytokine release syndrome -   # PTX and Pneumomediastinum - CXR April 20 - no PTX - Improving Pneumomediastinum - CTSurg Call PRN - Blow hole April 22 to release Subq Emphysema  - Intubated 4/17.   - Vent settings:.  RR (machine): 30 TV (machine): 350 FiO2: 50 PEEP: 8 Will decrease FiO2 as appropriate  - Pressure support: Levo - Wean as tolerated  - Sedation:  Propofol and Precedex and Fentanyl - Lactulose -Off Versed - TG - 311  - COVID + - Isolation - ID following. S/p Plaquenil and Anakinra. Finished Solumedrol April 14 - April 27th   - One dose Lasix 80 mg IV April 21, 29th, 30th -   UO - 3.5 L  - Switched to IV Heparin - rising D-Dimer  - Continue with 81 ASA  - Continue IV Meropenem 750 qD - Started April 17 - Follow up ID  - s/p Convalescent Plasma today April 29th  - Sputum - Yeast and GPC pairs and Chains - Culture Negative  - Blood Culture - NGTD  - MRSA neg  - Fungitell - 60 (<80 negative) -   - D-Dimer 3944(<2976<2026<1950 (<3068<4178) April 29  - Procal 8.9(<9.61<36.4<6.62<17.9<14.4<3.53<.53) April 28  - CRP -36.04(<9.68<30.75<11.42<12.9(<21<11<5) April 29  - LDH - 611(<963<1223<1312<1384) April 29  - Ferritin  - 1375(<1787<2335(<5116<7678<4770) April 29  - Fibrinogen - >700(<604<626<663<700) April 29     # TELLO, no history of CKD with noted decreased UOP and hyperkalemia  - Improving Cr and BUN - UO 3.5 L 24 hours - s/p IV lasix 60 x1 APril 21, 29  - Stop Free water and IV Fluid  - Nepro Feeds  - 650 Bicarb q12  - Nephrology Consult appreciated - No RRT currently  - FeNa - .8   - Hyperkalemia - resolved - Discontinue Lokelma  - Continue with Sevelmar 3/3/3 - powder,  Continue with Phoslo 3/3/3    # History of HTN  - Will hold off Losartan 40 mg daily, given borderline BP  - Resume if becomes hypertensive     # Type 2 Diabetes - Uncontrolled   - HbA1c: 11.6  - Insulin Drip  - Monitor finger sticks    Diet: NPO with tube feeds. F/u nutrition recs  Electrolytes: Replete K<4, Mg<2.   DVT ppx: IV Heparin, ICD  GI ppx: Pantoprazole 40 mg suspension while intubated and on steroids  Activity: bedrest  Dispo: from home, continue MICU monitoring; ID and Nephro Following;Daughter [phone] - Spoke with Wife about condition and oor prognosis. Wife is still hopeful to be able to WEan off of Vent, but the discussion about Trach was discussed with her.    FOLLOW UP:  Nephrology Follow up - Kidney Function stable Improved from prior  CTS - Pneumomediastinum s/p Blow hole   ID - On Meropenem since April 17  IV - Heprain - PTT

## 2020-04-30 NOTE — PROGRESS NOTE ADULT - SUBJECTIVE AND OBJECTIVE BOX
RHIANNON MAHER  55y, Male    All available historical data reviewed    OVERNIGHT EVENTS:  no fevers  fio2 80%    ROS:  unable to obtain history secondary to patient's mental status and/or sedation     VITALS:  T(F): 99.7, Max: 99.9 (04-29-20 @ 13:00)  HR: 116  BP: --  RR: 30Vital Signs Last 24 Hrs  T(C): 37.6 (30 Apr 2020 08:00), Max: 37.7 (29 Apr 2020 13:00)  T(F): 99.7 (30 Apr 2020 08:00), Max: 99.9 (29 Apr 2020 13:00)  HR: 116 (30 Apr 2020 09:00) (102 - 122)  BP: --  BP(mean): --  RR: 30 (30 Apr 2020 09:00) (20 - 32)  SpO2: 95% (30 Apr 2020 09:00) (95% - 98%)    TESTS & MEASUREMENTS:                        8.4    15.28 )-----------( 151      ( 30 Apr 2020 05:29 )             26.4     04-30    136  |  97<L>  |  146<HH>  ----------------------------<  151<H>  5.0   |  21  |  5.1<HH>    Ca    8.6      30 Apr 2020 05:29  Phos  7.2     04-30  Mg     2.7     04-30    TPro  5.5<L>  /  Alb  2.0<L>  /  TBili  0.5  /  DBili  x   /  AST  27  /  ALT  22  /  AlkPhos  108  04-30    LIVER FUNCTIONS - ( 30 Apr 2020 05:29 )  Alb: 2.0 g/dL / Pro: 5.5 g/dL / ALK PHOS: 108 U/L / ALT: 22 U/L / AST: 27 U/L / GGT: x             Culture - Sputum (collected 04-29-20 @ 01:00)  Source: .Sputum Sputum  Gram Stain (04-29-20 @ 11:40):    No squamous epithelial cells per low power field    No polymorphonuclear cells seen per low power field    No organisms seen per oil power field    Culture - Blood (collected 04-25-20 @ 11:15)  Source: .Blood None  Preliminary Report (04-26-20 @ 19:02):    No growth to date.            RADIOLOGY & ADDITIONAL TESTS:  Personal review of radiological diagnostics performed  Echo and EKG results noted when applicable.     MEDICATIONS:  acetaminophen   Tablet .. 650 milliGRAM(s) Oral every 6 hours PRN  aspirin  chewable 81 milliGRAM(s) Oral daily  calcium acetate 2001 milliGRAM(s) Oral three times a day with meals  chlorhexidine 0.12% Liquid 15 milliLiter(s) Oral Mucosa every 12 hours  chlorhexidine 4% Liquid 1 Application(s) Topical <User Schedule>  dextrose 40% Gel 15 Gram(s) Oral once PRN  dextrose 5%. 1000 milliLiter(s) IV Continuous <Continuous>  dextrose 50% Injectable 12.5 Gram(s) IV Push once  dextrose 50% Injectable 25 Gram(s) IV Push once  dextrose 50% Injectable 25 Gram(s) IV Push once  fentaNYL   Infusion. 0.5 MICROgram(s)/kG/Hr IV Continuous <Continuous>  glucagon  Injectable 1 milliGRAM(s) IntraMuscular once PRN  glucagon  Injectable 1 milliGRAM(s) IntraMuscular once PRN  heparin  Infusion 1200 Unit(s)/Hr IV Continuous <Continuous>  insulin regular Infusion 3 Unit(s)/Hr IV Continuous <Continuous>  lactulose Syrup 20 Gram(s) Oral every 12 hours  meropenem  IVPB 750 milliGRAM(s) IV Intermittent every 24 hours  norepinephrine Infusion 0.05 MICROgram(s)/kG/Min IV Continuous <Continuous>  ondansetron Injectable 4 milliGRAM(s) IV Push every 8 hours PRN  pantoprazole   Suspension 40 milliGRAM(s) Oral daily  propofol Infusion 10 MICROgram(s)/kG/Min IV Continuous <Continuous>  senna 2 Tablet(s) Oral at bedtime  sevelamer carbonate Powder 2400 milliGRAM(s) Oral three times a day with meals  sodium bicarbonate 650 milliGRAM(s) Oral every 6 hours  sodium zirconium cyclosilicate 10 Gram(s) Oral daily      ANTIBIOTICS:  meropenem  IVPB 750 milliGRAM(s) IV Intermittent every 24 hours

## 2020-04-30 NOTE — PROGRESS NOTE ADULT - SUBJECTIVE AND OBJECTIVE BOX
Patient is a 55y old  Male who presents with a chief complaint of suspected COVID-19 (30 Apr 2020 10:10)  pt remain vented sedated on propofol   current propofol dose give~488kcal   medical /renal f/u noted     ICU Vital Signs Last 24 Hrs  T(C): 38 (30 Apr 2020 16:00), Max: 38 (30 Apr 2020 16:00)  T(F): 100.4 (30 Apr 2020 16:00), Max: 100.4 (30 Apr 2020 16:00)  HR: 126 (30 Apr 2020 16:21) (107 - 126)  ABP: 126/54 (30 Apr 2020 16:00) (88/48 - 170/62)  ABP(mean): 79 (30 Apr 2020 16:00) (60 - 100)  RR: 30 (30 Apr 2020 16:00) (20 - 33)  SpO2: 97% (30 Apr 2020 16:21) (95% - 98%)      Drug Dosing Weight  Height (cm): 170.2 (18 Apr 2020 03:38)  Weight (kg): 88 (18 Apr 2020 03:38)  BMI (kg/m2): 30.4 (18 Apr 2020 03:38)  BSA (m2): 2 (18 Apr 2020 03:38)    I&O's Detail    29 Apr 2020 07:01  -  30 Apr 2020 07:00  --------------------------------------------------------  IN:    Enteral Tube Flush: 240 mL    fentaNYL Infusion.: 633.6 mL    heparin Infusion: 120 mL    heparin Infusion: 84 mL    insulin regular Infusion: 56 mL    IV PiggyBack: 50 mL    Nepro with Carb Steady: 720 mL    norepinephrine Infusion: 63.5 mL    propofol Infusion: 444 mL  Total IN: 2411.1 mL    OUT:    Indwelling Catheter - Urethral: 3500 mL  Total OUT: 3500 mL    Total NET: -1088.9 mL      30 Apr 2020 07:01  -  30 Apr 2020 17:36  --------------------------------------------------------  IN:    Enteral Tube Flush: 200 mL    fentaNYL Infusion.: 264 mL    heparin Infusion: 24 mL    insulin regular Infusion: 38 mL    norepinephrine Infusion: 19.1 mL    propofol Infusion: 185 mL  Total IN: 730.1 mL    OUT:    Indwelling Catheter - Urethral: 1450 mL  Total OUT: 1450 mL    Total NET: -719.9 mL     PHYSICAL EXAM:  Constitutional: remain vented  on enteral tube feed    MEDICATIONS  (STANDING):  aspirin  chewable 81 milliGRAM(s) Oral daily  calcium acetate 2001 milliGRAM(s) Oral three times a day with meals  chlorhexidine 0.12% Liquid 15 milliLiter(s) Oral Mucosa every 12 hours  chlorhexidine 4% Liquid 1 Application(s) Topical <User Schedule>  dextrose 5%. 1000 milliLiter(s) (50 mL/Hr) IV Continuous <Continuous>  dextrose 50% Injectable 12.5 Gram(s) IV Push once  dextrose 50% Injectable 25 Gram(s) IV Push once  dextrose 50% Injectable 25 Gram(s) IV Push once  fentaNYL   Infusion. 0.5 MICROgram(s)/kG/Hr (4.4 mL/Hr) IV Continuous <Continuous>  heparin  Infusion 1200 Unit(s)/Hr (12 mL/Hr) IV Continuous <Continuous>  insulin regular Infusion 3 Unit(s)/Hr (3 mL/Hr) IV Continuous <Continuous>  lactulose Syrup 20 Gram(s) Oral every 12 hours  meropenem  IVPB 750 milliGRAM(s) IV Intermittent every 24 hours  norepinephrine Infusion 0.05 MICROgram(s)/kG/Min (4.13 mL/Hr) IV Continuous <Continuous>  pantoprazole   Suspension 40 milliGRAM(s) Oral daily  propofol Infusion 10 MICROgram(s)/kG/Min (5.28 mL/Hr) IV Continuous <Continuous>  senna 2 Tablet(s) Oral at bedtime  sevelamer carbonate Powder 2400 milliGRAM(s) Oral three times a day with meals  sodium bicarbonate 650 milliGRAM(s) Oral every 6 hours  sodium zirconium cyclosilicate 10 Gram(s) Oral daily      Diet, NPO with Tube Feed:   Tube Feeding Modality: Orogastric  Nepro with Carb Steady  Total Volume for 24 Hours (mL): 720  Bolus  Total Volume of Bolus (mL):  180  Tube Feed Frequency: Every 6 hours   Tube Feed Start Time: 12:00  Bolus Feed Rate (mL per Hour): 180   Bolus Feed Duration (in Hours): 1 (04-28-20 @ 09:47)      LABS  04-30    136  |  97<L>  |  146<HH>  ----------------------------<  151<H>  5.0   |  21  |  5.1<HH>    Ca    8.6      30 Apr 2020 05:29  Phos  7.2     04-30  Mg     2.7     04-30    TPro  5.5<L>  /  Alb  2.0<L>  /  TBili  0.5  /  DBili  x   /  AST  27  /  ALT  22  /  AlkPhos  108  04-30                          8.3    14.10 )-----------( 164      ( 30 Apr 2020 16:00 )             25.7     CAPILLARY BLOOD GLUCOSE      POCT Blood Glucose.: 165 mg/dL (30 Apr 2020 16:14)  POCT Blood Glucose.: 206 mg/dL (30 Apr 2020 13:39)  POCT Blood Glucose.: 235 mg/dL (30 Apr 2020 11:52)  POCT Blood Glucose.: 188 mg/dL (30 Apr 2020 09:46)  POCT Blood Glucose.: 187 mg/dL (30 Apr 2020 08:06)  POCT Blood Glucose.: 154 mg/dL (30 Apr 2020 05:32)  POCT Blood Glucose.: 99 mg/dL (29 Apr 2020 20:55)       RADIOLOGY STUDIES Patient is a 55y old  Male who presents with a chief complaint of suspected COVID-19 (30 Apr 2020 10:10)  pt remain vented sedated on propofol   current propofol dose give~488kcal   medical /renal f/u noted     ICU Vital Signs Last 24 Hrs  T(C): 38 (30 Apr 2020 16:00), Max: 38 (30 Apr 2020 16:00)  T(F): 100.4 (30 Apr 2020 16:00), Max: 100.4 (30 Apr 2020 16:00)  HR: 126 (30 Apr 2020 16:21) (107 - 126)  ABP: 126/54 (30 Apr 2020 16:00) (88/48 - 170/62)  ABP(mean): 79 (30 Apr 2020 16:00) (60 - 100)  RR: 30 (30 Apr 2020 16:00) (20 - 33)  SpO2: 97% (30 Apr 2020 16:21) (95% - 98%)      Drug Dosing Weight  Height (cm): 170.2 (18 Apr 2020 03:38)  Weight (kg): 88 (18 Apr 2020 03:38)  BMI (kg/m2): 30.4 (18 Apr 2020 03:38)  BSA (m2): 2 (18 Apr 2020 03:38)    I&O's Detail    29 Apr 2020 07:01  -  30 Apr 2020 07:00  --------------------------------------------------------  IN:    Enteral Tube Flush: 240 mL    fentaNYL Infusion.: 633.6 mL    heparin Infusion: 120 mL    heparin Infusion: 84 mL    insulin regular Infusion: 56 mL    IV PiggyBack: 50 mL    Nepro with Carb Steady: 720 mL    norepinephrine Infusion: 63.5 mL    propofol Infusion: 444 mL  Total IN: 2411.1 mL    OUT:    Indwelling Catheter - Urethral: 3500 mL  Total OUT: 3500 mL    Total NET: -1088.9 mL      30 Apr 2020 07:01  -  30 Apr 2020 17:36  --------------------------------------------------------  IN:    Enteral Tube Flush: 200 mL    fentaNYL Infusion.: 264 mL    heparin Infusion: 24 mL    insulin regular Infusion: 38 mL    norepinephrine Infusion: 19.1 mL    propofol Infusion: 185 mL  Total IN: 730.1 mL    OUT:    Indwelling Catheter - Urethral: 1450 mL  Total OUT: 1450 mL    Total NET: -719.9 mL     PHYSICAL EXAM:  Constitutional: remain vented  on enteral tube feed    MEDICATIONS  (STANDING):  aspirin  chewable 81 milliGRAM(s) Oral daily  calcium acetate 2001 milliGRAM(s) Oral three times a day with meals  chlorhexidine 0.12% Liquid 15 milliLiter(s) Oral Mucosa every 12 hours  chlorhexidine 4% Liquid 1 Application(s) Topical <User Schedule>  dextrose 50% Injectable 25 Gram(s) IV Push once  fentaNYL   Infusion. 0.5 MICROgram(s)/kG/Hr (4.4 mL/Hr) IV Continuous <Continuous>  heparin  Infusion 1200 Unit(s)/Hr (12 mL/Hr) IV Continuous <Continuous>  insulin regular Infusion 3 Unit(s)/Hr (3 mL/Hr) IV Continuous <Continuous>  lactulose Syrup 20 Gram(s) Oral every 12 hours  meropenem  IVPB 750 milliGRAM(s) IV Intermittent every 24 hours  norepinephrine Infusion 0.05 MICROgram(s)/kG/Min (4.13 mL/Hr) IV Continuous <Continuous>  pantoprazole   Suspension 40 milliGRAM(s) Oral daily  propofol Infusion 10 MICROgram(s)/kG/Min (5.28 mL/Hr) IV Continuous <Continuous>  senna 2 Tablet(s) Oral at bedtime  sevelamer carbonate Powder 2400 milliGRAM(s) Oral three times a day with meals  sodium bicarbonate 650 milliGRAM(s) Oral every 6 hours  sodium zirconium cyclosilicate 10 Gram(s) Oral daily    Diet, NPO with Tube Feed:   Tube Feeding Modality: Orogastric  Nepro with Carb Steady  Total Volume for 24 Hours (mL): 720  Bolus  Total Volume of Bolus (mL):  180  Tube Feed Frequency: Every 6 hours   Tube Feed Start Time: 12:00  Bolus Feed Rate (mL per Hour): 180   Bolus Feed Duration (in Hours): 1 (04-28-20 @ 09:47)      LABS  04-30    136  |  97<L>  |  146<HH>  ----------------------------<  151<H>  5.0   |  21  |  5.1<HH>    Ca    8.6      30 Apr 2020 05:29  Phos  7.2     04-30  Mg     2.7     04-30    TPro  5.5<L>  /  Alb  2.0<L>  /  TBili  0.5  /  DBili  x   /  AST  27  /  ALT  22  /  AlkPhos  108  04-30                          8.3    14.10 )-----------( 164      ( 30 Apr 2020 16:00 )             25.7     CAPILLARY BLOOD GLUCOSE      POCT Blood Glucose.: 165 mg/dL (30 Apr 2020 16:14)  POCT Blood Glucose.: 206 mg/dL (30 Apr 2020 13:39)  POCT Blood Glucose.: 235 mg/dL (30 Apr 2020 11:52)  POCT Blood Glucose.: 188 mg/dL (30 Apr 2020 09:46)  POCT Blood Glucose.: 187 mg/dL (30 Apr 2020 08:06)  POCT Blood Glucose.: 154 mg/dL (30 Apr 2020 05:32)  POCT Blood Glucose.: 99 mg/dL (29 Apr 2020 20:55)       RADIOLOGY STUDIES

## 2020-04-30 NOTE — PROGRESS NOTE ADULT - SUBJECTIVE AND OBJECTIVE BOX
OVERNIGHT EVENTS: intubated 14, s/ plasma, fentanyl, propofol, IV heparin    Vital Signs Last 24 Hrs  T(C): 37.2 (30 Apr 2020 06:00), Max: 37.9 (29 Apr 2020 10:00)  T(F): 99 (30 Apr 2020 06:00), Max: 100.2 (29 Apr 2020 10:00)  HR: 122 (30 Apr 2020 06:00) (102 - 134)  RR: 20 (30 Apr 2020 06:00) (20 - 32)  SpO2: 95% (30 Apr 2020 07:32) (94% - 98%)    PHYSICAL EXAMINATION:    GENERAL: sedated    HEENT: sub q emphysema    NECK: Supple.    LUNGS: bl crackles, dec l side    HEART: Regular rate and rhythm without murmur.    ABDOMEN: Soft, nontender, and nondistended.      EXTREMITIES: Without any cyanosis, clubbing, rash, lesions or edema.    NEUROLOGIC: sedated    SKIN: No ulceration or induration present.      LABS:                        8.4    15.28 )-----------( 151      ( 30 Apr 2020 05:29 )             26.4     04-30    136  |  97<L>  |  146<HH>  ----------------------------<  151<H>  5.0   |  21  |  5.1<HH>    Ca    8.6      30 Apr 2020 05:29  Phos  7.2     04-30  Mg     2.7     04-30    TPro  5.5<L>  /  Alb  2.0<L>  /  TBili  0.5  /  DBili  x   /  AST  27  /  ALT  22  /  AlkPhos  108  04-30    PTT - ( 30 Apr 2020 05:29 )  PTT:54.8 sec    ABG - ( 30 Apr 2020 03:34 )  pH, Arterial: 7.35  pH, Blood: x     /  pCO2: 42    /  pO2: 74    / HCO3: 24    / Base Excess: -2.0  /  SaO2: 94        350/30/50/8  plateau 27            D-Dimer Assay, Quantitative: 3944 ng/mL DDU (04-29-20 @ 16:00)        Procalcitonin, Serum: 8.90 ng/mL (04-28-20 @ 16:00)        04-29-20 @ 07:01  -  04-30-20 @ 07:00  --------------------------------------------------------  IN: 2411.1 mL / OUT: 3500 mL / NET: -1088.9 mL        MICROBIOLOGY:      MEDICATIONS  (STANDING):  aspirin  chewable 81 milliGRAM(s) Oral daily  calcium acetate 2001 milliGRAM(s) Oral three times a day with meals  chlorhexidine 0.12% Liquid 15 milliLiter(s) Oral Mucosa every 12 hours  chlorhexidine 4% Liquid 1 Application(s) Topical <User Schedule>  dextrose 5%. 1000 milliLiter(s) (50 mL/Hr) IV Continuous <Continuous>  dextrose 50% Injectable 12.5 Gram(s) IV Push once  dextrose 50% Injectable 25 Gram(s) IV Push once  dextrose 50% Injectable 25 Gram(s) IV Push once  fentaNYL   Infusion. 0.5 MICROgram(s)/kG/Hr (4.4 mL/Hr) IV Continuous <Continuous>  heparin  Infusion 1200 Unit(s)/Hr (12 mL/Hr) IV Continuous <Continuous>  insulin regular Infusion 3 Unit(s)/Hr (3 mL/Hr) IV Continuous <Continuous>  lactulose Syrup 20 Gram(s) Oral every 12 hours  meropenem  IVPB 750 milliGRAM(s) IV Intermittent every 24 hours  norepinephrine Infusion 0.05 MICROgram(s)/kG/Min (4.13 mL/Hr) IV Continuous <Continuous>  pantoprazole   Suspension 40 milliGRAM(s) Oral daily  propofol Infusion 10 MICROgram(s)/kG/Min (5.28 mL/Hr) IV Continuous <Continuous>  senna 2 Tablet(s) Oral at bedtime  sevelamer carbonate Powder 2400 milliGRAM(s) Oral three times a day with meals  sodium bicarbonate 650 milliGRAM(s) Oral every 12 hours    MEDICATIONS  (PRN):  acetaminophen   Tablet .. 650 milliGRAM(s) Oral every 6 hours PRN Temp greater or equal to 38C (100.4F)  dextrose 40% Gel 15 Gram(s) Oral once PRN Blood Glucose LESS THAN 70 milliGRAM(s)/deciliter  glucagon  Injectable 1 milliGRAM(s) IntraMuscular once PRN Glucose LESS THAN 70 milligrams/deciliter  glucagon  Injectable 1 milliGRAM(s) IntraMuscular once PRN Glucose LESS THAN 70 milligrams/deciliter  ondansetron Injectable 4 milliGRAM(s) IV Push every 8 hours PRN Nausea and/or Vomiting      RADIOLOGY & ADDITIONAL STUDIES:

## 2020-04-30 NOTE — PROGRESS NOTE ADULT - ASSESSMENT
ASSESSMENT/PLAN  - covid 19 pneumonia with acute resp failure and ARDS  - cytokine storm  - TELLO and elevated LFTs secondary to the above  - DM poorly controlled PTA - note HbA1c  - HTN  LOW omega 6:3 ratio, low mOsm, high % protein solution, all of which are in accordance with latest [facility] recs for covid19 crit SIRS pts.  - Nepro not appropriate here, yadira not in combination with propofol  -  change   tube feed to VITAL HP 360x 4feeds/d to [id number] gm protein, 1422 k/d plus the propofol, total 58 meq of K+ and 1110 mg phos per day ASSESSMENT/PLAN  - covid 19 pneumonia with acute resp failure and ARDS  - cytokine storm  - TELLO and elevated LFTs secondary to the above  - DM poorly controlled PTA - note HbA1c  - HTN    suggest providing LOW omega 6:3 ratio, low mOsm, high % protein solution, all of which are in accordance with latest [facility] recs for covid19 crit SIRS pts.  - Nepro not appropriate here, yadira not in combination with propofol  - propofol adds kcal as fat, primarily omega 6fa, as well as added phos from the IVFE the propofol is in  - CHANGE FEEDS TO VITAL HIGH PROTEIN  ML X 4 FEEDS/D to provide 124 gm protein, 1422 k (+that from prop)   - this will provide a total of 58 mEq of K and 1110 mg phos per day from feedings

## 2020-04-30 NOTE — PROGRESS NOTE ADULT - SUBJECTIVE AND OBJECTIVE BOX
24 h events noted  intubated/ventilated         PAST HISTORY  --------------------------------------------------------------------------------  No significant changes to PMH, PSH, FHx, SHx, unless otherwise noted    ALLERGIES & MEDICATIONS  --------------------------------------------------------------------------------  Allergies    No Known Allergies    Intolerances      Standing Inpatient Medications  aspirin  chewable 81 milliGRAM(s) Oral daily  calcium acetate 2001 milliGRAM(s) Oral three times a day with meals  chlorhexidine 0.12% Liquid 15 milliLiter(s) Oral Mucosa every 12 hours  chlorhexidine 4% Liquid 1 Application(s) Topical <User Schedule>  dextrose 5%. 1000 milliLiter(s) IV Continuous <Continuous>  dextrose 50% Injectable 12.5 Gram(s) IV Push once  dextrose 50% Injectable 25 Gram(s) IV Push once  dextrose 50% Injectable 25 Gram(s) IV Push once  fentaNYL   Infusion. 0.5 MICROgram(s)/kG/Hr IV Continuous <Continuous>  furosemide   Injectable 80 milliGRAM(s) IV Push once  heparin  Infusion 1200 Unit(s)/Hr IV Continuous <Continuous>  insulin regular Infusion 3 Unit(s)/Hr IV Continuous <Continuous>  lactulose Syrup 20 Gram(s) Oral every 12 hours  meropenem  IVPB 750 milliGRAM(s) IV Intermittent every 24 hours  norepinephrine Infusion 0.05 MICROgram(s)/kG/Min IV Continuous <Continuous>  pantoprazole   Suspension 40 milliGRAM(s) Oral daily  propofol Infusion 10 MICROgram(s)/kG/Min IV Continuous <Continuous>  senna 2 Tablet(s) Oral at bedtime  sevelamer carbonate Powder 2400 milliGRAM(s) Oral three times a day with meals  sodium bicarbonate 650 milliGRAM(s) Oral every 6 hours    PRN Inpatient Medications  acetaminophen   Tablet .. 650 milliGRAM(s) Oral every 6 hours PRN  dextrose 40% Gel 15 Gram(s) Oral once PRN  glucagon  Injectable 1 milliGRAM(s) IntraMuscular once PRN  glucagon  Injectable 1 milliGRAM(s) IntraMuscular once PRN  ondansetron Injectable 4 milliGRAM(s) IV Push every 8 hours PRN            VITALS/PHYSICAL EXAM  --------------------------------------------------------------------------------  T(C): 37.2 (04-30-20 @ 06:00), Max: 37.9 (04-29-20 @ 10:00)  HR: 122 (04-30-20 @ 06:00) (102 - 134)  BP: --  RR: 20 (04-30-20 @ 06:00) (20 - 32)  SpO2: 95% (04-30-20 @ 07:32) (94% - 98%)  Wt(kg): --        04-29-20 @ 07:01  -  04-30-20 @ 07:00  --------------------------------------------------------  IN: 2411.1 mL / OUT: 3500 mL / NET: -1088.9 mL      Physical Exam:  	Gen: intubated/ventilated       LABS/STUDIES  --------------------------------------------------------------------------------              8.4    15.28 >-----------<  151      [04-30-20 @ 05:29]              26.4     136  |  97  |  146  ----------------------------<  151      [04-30-20 @ 05:29]  5.0   |  21  |  5.1        Ca     8.6     [04-30-20 @ 05:29]      Mg     2.7     [04-30-20 @ 05:29]      Phos  7.2     [04-30-20 @ 05:29]    TPro  5.5  /  Alb  2.0  /  TBili  0.5  /  DBili  x   /  AST  27  /  ALT  22  /  AlkPhos  108  [04-30-20 @ 05:29]      PTT: 54.8       [04-30-20 @ 05:29]          [04-29-20 @ 16:00]    Creatinine Trend:  SCr 5.1 [04-30 @ 05:29]  SCr 4.9 [04-29 @ 04:15]  SCr 4.7 [04-28 @ 03:52]  SCr 6.1 [04-27 @ 04:40]  SCr 6.7 [04-26 @ 17:04]        Ferritin 1375      [04-29-20 @ 16:00]  Lipid: chol --, , HDL --, LDL --      [04-27-20 @ 20:00]

## 2020-04-30 NOTE — CHART NOTE - NSCHARTNOTEFT_GEN_A_CORE
Family member contacted to update (LM). Spoke with Marisol (daughter, [phone]). I explained that her father remains in the CCU in critical condition.     He is intubated on a ventilator.  His FiO2 has remained on similar levels.   Advised that he is currently on the following Drips: Fentalyn, Propofol, Heparin.    Renal function remains a concern, but had improved urinary output, I 2411, O 3500 along with BUN/Cr.   WBC elevated but improved to 15.  Will continue receiving antibiotics and cultures were negative so far.   S/P Convalescent Plasma therapy protocol for COVID will be initiated.     All of her questions were answered. Daughter expressed understanding. I told her they would receive additional phone calls for acute events, but otherwise I would call back tomorrow for another update. Family member contacted to update . Spoke with Marisol (daughter, [phone]). I explained that her father remains in the CCU in critical condition.     He is intubated on a ventilator.  His FiO2 has remained on similar levels.   Advised that he is currently on the following Drips: Fentalyn, Propofol, Heparin.    Renal function remains a concern, but had improved urinary output, I 2411, O 3500 along with BUN/Cr.   WBC elevated but improved to 15.  Will continue receiving antibiotics and cultures were negative so far.   S/P Convalescent Plasma therapy protocol for COVID will be initiated.     All of her questions were answered. Daughter expressed understanding. I told her they would receive additional phone calls for acute events, but otherwise I would call back tomorrow for another update.

## 2020-04-30 NOTE — PROGRESS NOTE ADULT - SUBJECTIVE AND OBJECTIVE BOX
Admit Date: 04-13-20  Length of Stay: 17d    55yMale    Reason for admission to ICU:  Patient is in acute hypoxic respiratory distress requiring intubation and sedation. Imaging c/w ARDS. Admitted to MICU for further observation and management.    INTERVAL HPI/OVERNIGHT EVENTS:  Intubated and sedated - Improving TELLO - Urine output improving - Stable - Follow Nephrology  Stable Subq Emphysema - Blow hole placed by CTS April 22  Urine Output Improving - No RRT today - Nephrology following.  Had episode of High Peak pressure April 26 and Decreased Saturation - Given Paralytic and INcreased FiO2 and improved. Lowered FiO2 to 60   Stable on FiO2 .50  Lines Changed April 30    MEDICATIONS  (STANDING):  aspirin  chewable 81 milliGRAM(s) Oral daily  calcium acetate 2001 milliGRAM(s) Oral three times a day with meals  chlorhexidine 0.12% Liquid 15 milliLiter(s) Oral Mucosa every 12 hours  chlorhexidine 4% Liquid 1 Application(s) Topical <User Schedule>  dextrose 5%. 1000 milliLiter(s) (50 mL/Hr) IV Continuous <Continuous>  dextrose 50% Injectable 12.5 Gram(s) IV Push once  dextrose 50% Injectable 25 Gram(s) IV Push once  dextrose 50% Injectable 25 Gram(s) IV Push once  fentaNYL   Infusion. 0.5 MICROgram(s)/kG/Hr (4.4 mL/Hr) IV Continuous <Continuous>  heparin  Infusion 1200 Unit(s)/Hr (12 mL/Hr) IV Continuous <Continuous>  insulin regular Infusion 3 Unit(s)/Hr (3 mL/Hr) IV Continuous <Continuous>  lactulose Syrup 20 Gram(s) Oral every 12 hours  meropenem  IVPB 750 milliGRAM(s) IV Intermittent every 24 hours  norepinephrine Infusion 0.05 MICROgram(s)/kG/Min (4.13 mL/Hr) IV Continuous <Continuous>  pantoprazole   Suspension 40 milliGRAM(s) Oral daily  propofol Infusion 10 MICROgram(s)/kG/Min (5.28 mL/Hr) IV Continuous <Continuous>  senna 2 Tablet(s) Oral at bedtime  sevelamer carbonate Powder 2400 milliGRAM(s) Oral three times a day with meals  sodium bicarbonate 650 milliGRAM(s) Oral every 12 hours    MEDICATIONS  (PRN):  acetaminophen   Tablet .. 650 milliGRAM(s) Oral every 6 hours PRN Temp greater or equal to 38C (100.4F)  dextrose 40% Gel 15 Gram(s) Oral once PRN Blood Glucose LESS THAN 70 milliGRAM(s)/deciliter  glucagon  Injectable 1 milliGRAM(s) IntraMuscular once PRN Glucose LESS THAN 70 milligrams/deciliter  glucagon  Injectable 1 milliGRAM(s) IntraMuscular once PRN Glucose LESS THAN 70 milligrams/deciliter  ondansetron Injectable 4 milliGRAM(s) IV Push every 8 hours PRN Nausea and/or Vomiting      Vitals:  Vital Signs Last 24 Hrs  T(C): 37.2 (30 Apr 2020 06:00), Max: 37.9 (29 Apr 2020 10:00)  T(F): 99 (30 Apr 2020 06:00), Max: 100.2 (29 Apr 2020 10:00)  HR: 122 (30 Apr 2020 06:00) (102 - 134)  BP: --  BP(mean): --  RR: 20 (30 Apr 2020 06:00) (20 - 32)  SpO2: 97% (30 Apr 2020 06:00) (94% - 99%)    Vitals (Invasive):  ABP: 156/66 (04-30-20 @ 06:00) (100/48 - 170/62)      I&O's Summary    29 Apr 2020 07:01  -  30 Apr 2020 07:00  --------------------------------------------------------  IN: 2411.1 mL / OUT: 3500 mL / NET: -1088.9 mL        Physical Exam:  GEN;        NAD  HEENT:     Pupils React to light equally no icterus ET,OGT in place. Swelling improving.  Chest:         Improving SubQ emphysema present over chest wall and lateral chests and run down B/l Arms. S/p Left side chest wall blowhole (Stable).   Lungs:          CTABL, Decreased Air MOvement R>L (Stable) Symmetrical Inspiration, NO wheezes appreciated.  Cardiac:       + S1 + S2    + RRR   No murmurs appreciated  Abdomen:    Obese habitus + BS     + Soft    + Non-tender    Extremities:  Edema Present Diffuse Slightly improved- no cyanosis, Pulses present in all extremities.  Neuro:        Sedated, Corneal Reflex present, Grimacing to pain    Labs:  COVID:  COVID-19 PCR: Detected (04-13-20 @ 14:30)                          8.4    15.28 )-----------( 151      ( 30 Apr 2020 05:29 )             26.4     04-30    136  |  97<L>  |  x   ----------------------------<  151<H>  5.0   |  21  |  x     Ca    8.6      30 Apr 2020 05:29  Phos  7.2     04-30  Mg     2.7     04-30    TPro  5.5<L>  /  Alb  2.0<L>  /  TBili  0.5  /  DBili  x   /  AST  27  /  ALT  22  /  AlkPhos  108  04-30    PTT - ( 30 Apr 2020 05:29 )  PTT:54.8 sec      Culture - Sputum (collected 04-29-20 @ 01:00)  Source: .Sputum Sputum  Gram Stain (04-29-20 @ 11:40):    No squamous epithelial cells per low power field    No polymorphonuclear cells seen per low power field    No organisms seen per oil power field      COVID related labs:  29 Apr 2020 16:00  D-dimer:  3944<H>  Calcitonin:  x  CRP:  x  LDH:  611<H>  Lactate,Blood:  x  CK:  x  Troponin I:  x  Troponin T:  x  Troponin HS:  x  Ferritin, Serum: 1375<H>  BNP:  x      Blood Gases:  (ARTERIAL):  04-30-20 @ 03:34  pH 7.35 / pCO2 42 / pO2 74 / HCO3 24    FiO2 50  Oxygen Saturation 94    Base Excess, Arterial -2.0    (VENOUS):      Radiology:  < from: Xray Chest 1 View-PORTABLE IMMEDIATE (04.29.20 @ 21:18) >  Impression:   Stable bilateral airspace opacities  < end of copied text >    RR (machine): 30  TV (machine): 350  FiO2: 50  PEEP: 8 Admit Date: 04-13-20  Length of Stay: 17d    55yMale    Reason for admission to ICU:  Patient is in acute hypoxic respiratory distress requiring intubation and sedation. Imaging c/w ARDS. Admitted to MICU for further observation and management.    INTERVAL HPI/OVERNIGHT EVENTS:  Intubated and sedated - Improving TELLO - Urine output improving - Stable - Follow Nephrology  Stable Subq Emphysema - Blow hole placed by CTS April 22  Urine Output Improving - No RRT today - Nephrology following.  Had episode of High Peak pressure April 26 and Decreased Saturation - Given Paralytic and INcreased FiO2 and improved. Lowered FiO2 to 60   Stable on FiO2 .50  Lines Changed April 30    MEDICATIONS  (STANDING):  aspirin  chewable 81 milliGRAM(s) Oral daily  calcium acetate 2001 milliGRAM(s) Oral three times a day with meals  chlorhexidine 0.12% Liquid 15 milliLiter(s) Oral Mucosa every 12 hours  chlorhexidine 4% Liquid 1 Application(s) Topical <User Schedule>  dextrose 5%. 1000 milliLiter(s) (50 mL/Hr) IV Continuous <Continuous>  dextrose 50% Injectable 12.5 Gram(s) IV Push once  dextrose 50% Injectable 25 Gram(s) IV Push once  dextrose 50% Injectable 25 Gram(s) IV Push once  fentaNYL   Infusion. 0.5 MICROgram(s)/kG/Hr (4.4 mL/Hr) IV Continuous <Continuous>  heparin  Infusion 1200 Unit(s)/Hr (12 mL/Hr) IV Continuous <Continuous>  insulin regular Infusion 3 Unit(s)/Hr (3 mL/Hr) IV Continuous <Continuous>  lactulose Syrup 20 Gram(s) Oral every 12 hours  meropenem  IVPB 750 milliGRAM(s) IV Intermittent every 24 hours  norepinephrine Infusion 0.05 MICROgram(s)/kG/Min (4.13 mL/Hr) IV Continuous <Continuous>  pantoprazole   Suspension 40 milliGRAM(s) Oral daily  propofol Infusion 10 MICROgram(s)/kG/Min (5.28 mL/Hr) IV Continuous <Continuous>  senna 2 Tablet(s) Oral at bedtime  sevelamer carbonate Powder 2400 milliGRAM(s) Oral three times a day with meals  sodium bicarbonate 650 milliGRAM(s) Oral every 12 hours    MEDICATIONS  (PRN):  acetaminophen   Tablet .. 650 milliGRAM(s) Oral every 6 hours PRN Temp greater or equal to 38C (100.4F)  dextrose 40% Gel 15 Gram(s) Oral once PRN Blood Glucose LESS THAN 70 milliGRAM(s)/deciliter  glucagon  Injectable 1 milliGRAM(s) IntraMuscular once PRN Glucose LESS THAN 70 milligrams/deciliter  glucagon  Injectable 1 milliGRAM(s) IntraMuscular once PRN Glucose LESS THAN 70 milligrams/deciliter  ondansetron Injectable 4 milliGRAM(s) IV Push every 8 hours PRN Nausea and/or Vomiting      Vitals:  Vital Signs Last 24 Hrs  T(C): 37.2 (30 Apr 2020 06:00), Max: 37.9 (29 Apr 2020 10:00)  T(F): 99 (30 Apr 2020 06:00), Max: 100.2 (29 Apr 2020 10:00)  HR: 122 (30 Apr 2020 06:00) (102 - 134)  BP: --  BP(mean): --  RR: 20 (30 Apr 2020 06:00) (20 - 32)  SpO2: 97% (30 Apr 2020 06:00) (94% - 99%)    Vitals (Invasive):  ABP: 156/66 (04-30-20 @ 06:00) (100/48 - 170/62)      I&O's Summary    29 Apr 2020 07:01  -  30 Apr 2020 07:00  --------------------------------------------------------  IN: 2411.1 mL / OUT: 3500 mL / NET: -1088.9 mL    Physical Exam:  GEN:           NAD  HEENT:       Pupils Constricted, React to light equally, no icterus ET,OGT in place. Swelling improving  Chest:         Improving SubQ emphysema present over chest wall and lateral chests and run down B/l Arms. S/p Left side chest wall blowhole (Stable).   Lungs:         CTABL, Decreased Air MOvement R>L (Stable) Symmetrical Inspiration, NO wheezes appreciated.  Cardiac:      + S1, S2, RRR No murmurs appreciated  Abdomen:   Obese habitus + BS     + Soft    + Non-tender, Slightly Distended (Patient passing stool)   Extremities:  Edema Present Diffuse Slightly improved- no cyanosis, Pulses present in all extremities.  Neuro:         Sedated     Labs:  COVID:  COVID-19 PCR: Detected (04-13-20 @ 14:30)                          8.4    15.28 )-----------( 151      ( 30 Apr 2020 05:29 )             26.4     04-30    136  |  97<L>  |  x   ----------------------------<  151<H>  5.0   |  21  |  x     Ca    8.6      30 Apr 2020 05:29  Phos  7.2     04-30  Mg     2.7     04-30    TPro  5.5<L>  /  Alb  2.0<L>  /  TBili  0.5  /  DBili  x   /  AST  27  /  ALT  22  /  AlkPhos  108  04-30    PTT - ( 30 Apr 2020 05:29 )  PTT:54.8 sec      Culture - Sputum (collected 04-29-20 @ 01:00)  Source: .Sputum Sputum  Gram Stain (04-29-20 @ 11:40):    No squamous epithelial cells per low power field    No polymorphonuclear cells seen per low power field    No organisms seen per oil power field      COVID related labs:  29 Apr 2020 16:00  D-dimer:  3944<H>  Calcitonin:  x  CRP:  x  LDH:  611<H>  Lactate,Blood:  x  CK:  x  Troponin I:  x  Troponin T:  x  Troponin HS:  x  Ferritin, Serum: 1375<H>  BNP:  x      Blood Gases:  (ARTERIAL):  04-30-20 @ 03:34  pH 7.35 / pCO2 42 / pO2 74 / HCO3 24    FiO2 50  Oxygen Saturation 94    Base Excess, Arterial -2.0    (VENOUS):      Radiology:  < from: Xray Chest 1 View-PORTABLE IMMEDIATE (04.29.20 @ 21:18) >  Impression:   Stable bilateral airspace opacities  < end of copied text >    RR (machine): 30  TV (machine): 350  FiO2: 50  PEEP: 8

## 2020-04-30 NOTE — PROGRESS NOTE ADULT - ASSESSMENT
· Assessment		  55 year old male patient with hypertension, diabetes presenting for dyspnea.     IMPRESSION;   COVID19 with septic shock ( fevers, 2 pressors )  with lactic acidemia with  resp failure, mechanical ventilation with ARDS with FiO2 100%, secondary to Cytokine Release Syndrome leading to cytokine storm   -CXR with less opacities  -pneumomediastinum with significant subcutaneous emphysema  -transaminitis secondary to COVID-19   -inflammatory markers significantly elevated with little response to anakinra  -end organ damage with renal failure and significant hepatitis ( both very poor prognostic markers )  -elevated Ddimer and Ferritin are also poor prognostic indicators and differentiate survivors from non survivors  -procalcitonin 0. 53 > 3.53 > 14.4 with possible  bacterial superinfection  -Sputa NGTD  -BCx 4/21,25 NG  -nares ORSA NG  -fungitell 61      RECOMMENDATIONS;   S/p Anakinra 4/14-17  -meropenem 750 mg iv q24h  -S/p convalescent plasma 4/29

## 2020-04-30 NOTE — PROGRESS NOTE ADULT - ASSESSMENT
Acute hypoxemic respiratory failure secondary to SARS-COV-2 infection / TELLO/ hyperkalemia:  # creatinine stable   # non oliguric   # no lasix  # start lokelma 10 q 24   # remains  on meropenem   # feed : nepro  # ph noted, on  renagel 3/3/3 , and phoslo  # continue  sodium bicarbonate 650 q 6  # no  acute indication for rrt   # will follow

## 2020-05-01 NOTE — PROGRESS NOTE ADULT - ASSESSMENT
Acute hypoxemic respiratory failure secondary to SARS-COV-2 infection / TELLO/ hyperkalemia:  # creatinine stable   # non oliguric   # lasix d/c   # continue  lokelma 10 q 24   # remains  on meropenem   # feed : nepro  # ph noted, on  renagel 3/3/3 , and phoslo  # decrease sodium bicarbonate 650 q 12  # no  acute indication for rrt   # will follow

## 2020-05-01 NOTE — PROGRESS NOTE ADULT - ASSESSMENT
A/P  COVID +  Vented  Called to see patient for worsening subcutaneous emphysema  Blowhole was already done over left chest  Wound was reopened to allow air to exit blowhole   Please continue to pack left chest wound daily  discussed with Dr. Zuniga

## 2020-05-01 NOTE — PROGRESS NOTE ADULT - ASSESSMENT
55 year old male with PMH of HTN and T2DM diabetes who presented on 4/13 for dyspnea found to have SARS-COV-2 pneumonia. Was intubated on 4/17 after desaturating to 84% on 15L NRB.    Assessment:    # Acute hypoxemic respiratory failure / ARDS / COVID 19 with evidence of cytokine release syndrome         SP convalescent plasma 4/29  / Plaquenil / Anakinra / Solumedrol   - On IV Heparin (elevated D-dimer 3944)  - Sedated with Propofol / Fentanyl    # Possible superimposed bacterial / Fungal infection  - C/w Meropenem (since 4/17)  - Add Vancomycin x 1 dose & check trough  - Add Caspofungin  - Repeat Fungitell     # TELLO/ non oliguric  Sub Q air       # PTX and Pneumomediastinum - CXR April 20 - no PTX - Improving Pneumomediastinum - CTSurg Call PRN - Blow hole April 22 to release Subq Emphysema  - One dose Lasix 80 mg IV April 21, 29th, 30th -   UO - 3.5 L       # TELLO, no history of CKD with noted decreased UOP and hyperkalemia  - Improving Cr and BUN - UO 3.5 L 24 hours - s/p IV lasix 60 x1 APril 21, 29  - Stop Free water and IV Fluid  - Nepro Feeds  - 650 Bicarb q12  - Nephrology Consult appreciated - No RRT currently  - FeNa - .8   - Hyperkalemia - resolved - Discontinue Lokelma  - Continue with Sevelmar 3/3/3 - powder,  Continue with Phoslo 3/3/3    # History of HTN  - Will hold off Losartan 40 mg daily, given borderline BP  - Resume if becomes hypertensive     # Type 2 Diabetes - Uncontrolled   - HbA1c: 11.6  - Insulin Drip  - Monitor finger sticks    Diet: NPO with tube feeds. F/u nutrition recs  Electrolytes: Replete K<4, Mg<2.   DVT ppx: IV Heparin, ICD  GI ppx: Pantoprazole 40 mg suspension while intubated and on steroids  Activity: bedrest  Dispo: from home, continue MICU monitoring; ID and Nephro Following;Daughter [phone] - Spoke with Wife about condition and oor prognosis. Wife is still hopeful to be able to WEan off of Vent, but the discussion about Trach was discussed with her.    FOLLOW UP:  Nephrology Follow up - Kidney Function stable Improved from prior  CTS - Pneumomediastinum s/p Blow hole   ID - On Meropenem since April 17  IV - Heprain - PTT 55 year old male with PMH of HTN and T2DM diabetes who presented on 4/13 for dyspnea found to have SARS-COV-2 pneumonia. Was intubated on 4/17 after desaturating to 84% on 15L NRB.    Assessment / PLAN    # Acute hypoxemic respiratory failure / ARDS / COVID 19 with evidence of cytokine release syndrome         SP convalescent plasma 4/29  / Plaquenil / Anakinra / Solumedrol   - On IV Heparin (elevated D-dimer 3944) - F/U PTT levels  - Sedated with Propofol / Fentanyl  - No vent changes today. 50% Fio2 & PEEP 8    # Sepsis (fever / sinus tachycardia) secondary to Possible superimposed bacterial / Fungal infection  - C/w Meropenem (since 4/17)  - Add Vancomycin x 1 dose & check trough  - Add Caspofungin  - Repeat Fungitell  -  bolus     # TELLO/ non oliguric  - s/p IV Lasix   - Nephro following - no RRT / c/w Lokelma / c/w oral Bicarb / c/w phoslo    # Sub Q air s/p blowhole 4/22 / worsening  - no interventions today    # Pneumothorax sp resolution    # History of HTN  - Holding Losartan 40    # Type 2 Diabetes - Uncontrolled   - HbA1c: 11.6  - c/w tube feeds  - Insulin Drip per ICU RN protocol    Diet: NPO with tube feeds.   DVT ppx: IV Heparin, ICD  GI ppx: Pantoprazole 40 mg suspension while intubated    Dispo:   From home  Wife and Daughter [phone] involved in care - want all aggressive measures to be taken

## 2020-05-01 NOTE — PROGRESS NOTE ADULT - SUBJECTIVE AND OBJECTIVE BOX
GENERAL SURGERY PROGRESS NOTE     RHIANNON MAHER  55y  Male  Hospital day :18d    Procedure: s/p blowhole for subcutaneous emphysema  OVERNIGHT EVENTS:    T(F): 98.9 (05-01-20 @ 12:00), Max: 101.7 (05-01-20 @ 02:00)  HR: 136 (05-01-20 @ 14:00) (106 - 136)  BP: --  ABP: 136/66 (05-01-20 @ 14:00) (92/52 - 154/64)  ABP(mean): 88 (05-01-20 @ 14:00) (64 - 94)  RR: 29 (05-01-20 @ 14:00) (22 - 31)  SpO2: 91% (05-01-20 @ 14:00) (89% - 98%)    DIET/FLUIDS: calcium acetate 2001 milliGRAM(s) Oral three times a day with meals  dextrose 5%. 1000 milliLiter(s) IV Continuous <Continuous>  sodium bicarbonate 650 milliGRAM(s) Oral every 12 hours      URINE:   04-30-20 @ 07:01  -  05-01-20 @ 07:00  --------------------------------------------------------  OUT: 2825 mL     Indwelling Urethral Catheter:     Connect To:  Straight Drainage/[place]    Indication:  Urinary Retention / Obstruction (05-01-20 @ 15:16)    GI proph:  pantoprazole   Suspension 40 milliGRAM(s) Oral daily    AC/ proph: aspirin  chewable 81 milliGRAM(s) Oral daily  heparin  Infusion 1200 Unit(s)/Hr IV Continuous <Continuous>    ABx: caspofungin IVPB      meropenem  IVPB 750 milliGRAM(s) IV Intermittent every 24 hours      PHYSICAL EXAM: Intubated  GENERAL: Subcutaneous air        POCT Blood Glucose.: 194 mg/dL (01 May 2020 09:46)  POCT Blood Glucose.: 154 mg/dL (01 May 2020 07:54)  POCT Blood Glucose.: 97 mg/dL (01 May 2020 06:27)  POCT Blood Glucose.: 141 mg/dL (01 May 2020 03:10)  POCT Blood Glucose.: 170 mg/dL (30 Apr 2020 23:47)  POCT Blood Glucose.: 146 mg/dL (30 Apr 2020 22:39)  POCT Blood Glucose.: 167 mg/dL (30 Apr 2020 20:52)  POCT Blood Glucose.: 172 mg/dL (30 Apr 2020 18:19)  POCT Blood Glucose.: 165 mg/dL (30 Apr 2020 16:14)                          9.1    13.86 )-----------( 180      ( 01 May 2020 04:35 )             27.9       Auto Neutrophil %: 86.6 % (05-01-20 @ 04:35)  Auto Immature Granulocyte %: 2.5 % (05-01-20 @ 04:35)    05-01    138  |  98  |  136<HH>  ----------------------------<  115<H>  4.2   |  24  |  5.2<HH>      Calcium, Total Serum: 7.8 mg/dL (05-01-20 @ 04:35)      LFTs:             5.9  | 0.8  | 33       ------------------[112     ( 01 May 2020 04:35 )  2.1  | x    | 19          Lipase:x      Amylase:x         Blood Gas Arterial, Lactate: 0.9 mmoL/L (05-01-20 @ 03:20)  Blood Gas Arterial, Lactate: 1.2 mmoL/L (04-30-20 @ 03:34)  Blood Gas Arterial, Lactate: 1.6 mmoL/L (04-29-20 @ 02:55)    ABG - ( 01 May 2020 03:20 )  pH: 7.41  /  pCO2: 42    /  pO2: 60    / HCO3: 27    / Base Excess: 1.8   /  SaO2: 91              ABG - ( 30 Apr 2020 03:34 )  pH: 7.35  /  pCO2: 42    /  pO2: 74    / HCO3: 24    / Base Excess: -2.0  /  SaO2: 94              ABG - ( 29 Apr 2020 02:55 )  pH: 7.41  /  pCO2: 39    /  pO2: 117   / HCO3: 24    / Base Excess: -0.2  /  SaO2: 99                Coags:     x      ----< x       ( 01 May 2020 10:30 )     55.0                    Culture - Sputum (collected 29 Apr 2020 01:00)  Source: .Sputum Sputum  Gram Stain (29 Apr 2020 11:40):    No squamous epithelial cells per low power field    No polymorphonuclear cells seen per low power field    No organisms seen per oil power field  Final Report (01 May 2020 08:30):    Normal Respiratory Reyna present          RADIOLOGY & ADDITIONAL TESTS:CXR    Stable diffuse bilateral opacities.     Small left apical pneumothorax is identified (obscured by overlying subcutaneous emphysema).    Stable pneumomediastinum

## 2020-05-01 NOTE — CHART NOTE - NSCHARTNOTEFT_GEN_A_CORE
Spoke with daughter, Marisol, [phone]. Updated her on patient's status. Answered questions, addressed concerns.

## 2020-05-01 NOTE — PROGRESS NOTE ADULT - ASSESSMENT
Assessment:    Acute hypoxemic respiratory failure  ARDS  COVID 19  SP convalescent plasma    Possible superimposed bacterial infection   TELLO/ non oliguric  Sub Q air     PLAN:    CNS: SAT.  Might need CTH     HEENT:  Oral care    PULMONARY:  HOB @ 45 degrees, keep sats 92-96%.  NO vent changes for now Advance et 2 cms    CARDIOVASCULAR:  I=O.  LR BOlus     GI: GI prophylaxis Protonix, bowel regimen,                                         Feeding: feeding    RENAL:  F/u  lytes.  Correct as needed. accurate I/O, renal F/UP, repeat CMP    INFECTIOUS DISEASE:  Continue ABX>  Repeat cultures.  Add Caspo and VAcn for now.  Repeat Fungitel     HEMATOLOGICAL:  IV HEP ( increase D DIMER).  FU PTT     ENDOCRINE:  Follow up FS.  Insulin protocol if needed.    CODE STATUS: FULL CODE    DISPOSITION: Patient require continued monitoring in MICU    Poor prognosis

## 2020-05-01 NOTE — PROGRESS NOTE ADULT - SUBJECTIVE AND OBJECTIVE BOX
RHIANNON MAHER  55y, Male    All available historical data reviewed    OVERNIGHT EVENTS:  fevers  off pressors  sedated, non responsive  fio2 50%      ROS:  unable to obtain history secondary to patient's mental status and/or sedation     VITALS:  T(F): 98.2, Max: 101.7 (05-01-20 @ 02:00)  HR: 128  BP: --  RR: 31Vital Signs Last 24 Hrs  T(C): 36.8 (01 May 2020 04:00), Max: 38.7 (01 May 2020 02:00)  T(F): 98.2 (01 May 2020 04:00), Max: 101.7 (01 May 2020 02:00)  HR: 128 (01 May 2020 08:09) (106 - 128)  BP: --  BP(mean): --  RR: 31 (01 May 2020 08:00) (25 - 33)  SpO2: 91% (01 May 2020 08:09) (89% - 98%)    TESTS & MEASUREMENTS:                        9.1    13.86 )-----------( 180      ( 01 May 2020 04:35 )             27.9     05-01    138  |  98  |  136<HH>  ----------------------------<  115<H>  4.2   |  24  |  5.2<HH>    Ca    7.8<L>      01 May 2020 04:35  Phos  5.8     05-01  Mg     2.6     05-01    TPro  5.9<L>  /  Alb  2.1<L>  /  TBili  0.8  /  DBili  x   /  AST  33  /  ALT  19  /  AlkPhos  112  05-01    LIVER FUNCTIONS - ( 01 May 2020 04:35 )  Alb: 2.1 g/dL / Pro: 5.9 g/dL / ALK PHOS: 112 U/L / ALT: 19 U/L / AST: 33 U/L / GGT: x             Culture - Sputum (collected 04-29-20 @ 01:00)  Source: .Sputum Sputum  Gram Stain (04-29-20 @ 11:40):    No squamous epithelial cells per low power field    No polymorphonuclear cells seen per low power field    No organisms seen per oil power field  Final Report (05-01-20 @ 08:30):    Normal Respiratory Reyna present    Culture - Blood (collected 04-25-20 @ 11:15)  Source: .Blood None  Final Report (04-30-20 @ 19:00):    No Growth Final            RADIOLOGY & ADDITIONAL TESTS:  Personal review of radiological diagnostics performed  Echo and EKG results noted when applicable.     MEDICATIONS:  acetaminophen   Tablet .. 650 milliGRAM(s) Oral every 6 hours PRN  aspirin  chewable 81 milliGRAM(s) Oral daily  calcium acetate 2001 milliGRAM(s) Oral three times a day with meals  caspofungin IVPB      caspofungin IVPB 50 milliGRAM(s) IV Intermittent once  chlorhexidine 0.12% Liquid 15 milliLiter(s) Oral Mucosa every 12 hours  chlorhexidine 4% Liquid 1 Application(s) Topical <User Schedule>  dextrose 40% Gel 15 Gram(s) Oral once PRN  dextrose 5%. 1000 milliLiter(s) IV Continuous <Continuous>  dextrose 50% Injectable 12.5 Gram(s) IV Push once  dextrose 50% Injectable 25 Gram(s) IV Push once  dextrose 50% Injectable 25 Gram(s) IV Push once  fentaNYL   Infusion. 0.5 MICROgram(s)/kG/Hr IV Continuous <Continuous>  glucagon  Injectable 1 milliGRAM(s) IntraMuscular once PRN  glucagon  Injectable 1 milliGRAM(s) IntraMuscular once PRN  heparin  Infusion 1200 Unit(s)/Hr IV Continuous <Continuous>  insulin regular Infusion 3 Unit(s)/Hr IV Continuous <Continuous>  lactated ringers Bolus 500 milliLiter(s) IV Bolus once  lactulose Syrup 20 Gram(s) Oral every 12 hours  meropenem  IVPB 750 milliGRAM(s) IV Intermittent every 24 hours  norepinephrine Infusion 0.05 MICROgram(s)/kG/Min IV Continuous <Continuous>  ondansetron Injectable 4 milliGRAM(s) IV Push every 8 hours PRN  pantoprazole   Suspension 40 milliGRAM(s) Oral daily  propofol Infusion 10 MICROgram(s)/kG/Min IV Continuous <Continuous>  senna 2 Tablet(s) Oral at bedtime  sevelamer carbonate Powder 2400 milliGRAM(s) Oral three times a day with meals  sodium bicarbonate 650 milliGRAM(s) Oral every 6 hours  sodium zirconium cyclosilicate 10 Gram(s) Oral daily  vancomycin  IVPB 1000 milliGRAM(s) IV Intermittent once      ANTIBIOTICS:  caspofungin IVPB      caspofungin IVPB 50 milliGRAM(s) IV Intermittent once  meropenem  IVPB 750 milliGRAM(s) IV Intermittent every 24 hours  vancomycin  IVPB 1000 milliGRAM(s) IV Intermittent once

## 2020-05-01 NOTE — PROGRESS NOTE ADULT - SUBJECTIVE AND OBJECTIVE BOX
Patient is a 55y old  Male who presents with a chief complaint of suspected COVID-19 (01 May 2020 07:48)        Over Night Events:  on MV.  Off pressors.  Sedated.          ROS:     All ROS are negative except HPI         PHYSICAL EXAM    ICU Vital Signs Last 24 Hrs  T(C): 36.8 (01 May 2020 04:00), Max: 38.7 (01 May 2020 02:00)  T(F): 98.2 (01 May 2020 04:00), Max: 101.7 (01 May 2020 02:00)  HR: 128 (01 May 2020 08:00) (106 - 128)  BP: --  BP(mean): --  ABP: 126/64 (01 May 2020 08:00) (88/48 - 166/68)  ABP(mean): 82 (01 May 2020 08:00) (60 - 100)  RR: 31 (01 May 2020 08:00) (25 - 33)  SpO2: 92% (01 May 2020 08:00) (89% - 98%)      CONSTITUTIONAL:   Ill appearing.  Well nourished.  NAD    ENT:   Airway patent,   Mouth with normal mucosa.   No thrush    EYES:   Pupils equal,   Round and reactive to light.    CARDIAC:   Tachycardic   Regular rhythm.    No edema      Vascular:  Normal systolic impulse  No Carotid bruits    RESPIRATORY:   No wheezing  Bilateral BS  Normal chest expansion  Not tachypneic,  No use of accessory muscles    GASTROINTESTINAL:  Abdomen soft,   Non-tender,   No guarding,   + BS    GENITOURINARY  normal genitalia for sex  no edema    MUSCULOSKELETAL:   Range of motion is not limited,  No clubbing, cyanosis    NEUROLOGICAL:   Sedated     SKIN:   Skin normal color for race,   Warm and dry and intact.   No evidence of rash.    PSYCHIATRIC:   No apparent risk to self or others.    HEMATOLOGICAL:  No cervical  lymphadenopathy.  no inguinal lymphadenopathy      04-30-20 @ 07:01  -  05-01-20 @ 07:00  --------------------------------------------------------  IN:    Enteral Tube Flush: 400 mL    fentaNYL Infusion.: 607.2 mL    heparin Infusion: 202 mL    insulin regular Infusion: 86 mL    IV PiggyBack: 50 mL    norepinephrine Infusion: 56.5 mL    propofol Infusion: 499.1 mL    Vital High Protein: 720 mL  Total IN: 2620.8 mL    OUT:    Indwelling Catheter - Urethral: 2825 mL  Total OUT: 2825 mL    Total NET: -204.2 mL          LABS:                            9.1    13.86 )-----------( 180      ( 01 May 2020 04:35 )             27.9                                               05-01    138  |  98  |  136<HH>  ----------------------------<  115<H>  4.2   |  24  |  5.2<HH>    Ca    7.8<L>      01 May 2020 04:35  Phos  5.8     05-01  Mg     2.6     05-01    TPro  5.9<L>  /  Alb  2.1<L>  /  TBili  0.8  /  DBili  x   /  AST  33  /  ALT  19  /  AlkPhos  112  05-01      PTT - ( 30 Apr 2020 23:54 )  PTT:46.2 sec                                                                                     LIVER FUNCTIONS - ( 01 May 2020 04:35 )  Alb: 2.1 g/dL / Pro: 5.9 g/dL / ALK PHOS: 112 U/L / ALT: 19 U/L / AST: 33 U/L / GGT: x                                                  Culture - Sputum (collected 29 Apr 2020 01:00)  Source: .Sputum Sputum  Gram Stain (29 Apr 2020 11:40):    No squamous epithelial cells per low power field    No polymorphonuclear cells seen per low power field    No organisms seen per oil power field  Preliminary Report (30 Apr 2020 13:18):    Normal Respiratory Reyna present                                                   Mode: AC/ CMV (Assist Control/ Continuous Mandatory Ventilation)  RR (machine): 30  TV (machine): 350  FiO2: 50  PEEP: 8  MAP: 14  PIP: 28                                      ABG - ( 01 May 2020 03:20 )  pH, Arterial: 7.41  pH, Blood: x     /  pCO2: 42    /  pO2: 60    / HCO3: 27    / Base Excess: 1.8   /  SaO2: 91    PPL 18              MEDICATIONS  (STANDING):  aspirin  chewable 81 milliGRAM(s) Oral daily  calcium acetate 2001 milliGRAM(s) Oral three times a day with meals  chlorhexidine 0.12% Liquid 15 milliLiter(s) Oral Mucosa every 12 hours  chlorhexidine 4% Liquid 1 Application(s) Topical <User Schedule>  dextrose 5%. 1000 milliLiter(s) (50 mL/Hr) IV Continuous <Continuous>  dextrose 50% Injectable 12.5 Gram(s) IV Push once  dextrose 50% Injectable 25 Gram(s) IV Push once  dextrose 50% Injectable 25 Gram(s) IV Push once  fentaNYL   Infusion. 0.5 MICROgram(s)/kG/Hr (4.4 mL/Hr) IV Continuous <Continuous>  heparin  Infusion 1200 Unit(s)/Hr (14 mL/Hr) IV Continuous <Continuous>  insulin regular Infusion 3 Unit(s)/Hr (3 mL/Hr) IV Continuous <Continuous>  lactulose Syrup 20 Gram(s) Oral every 12 hours  meropenem  IVPB 750 milliGRAM(s) IV Intermittent every 24 hours  norepinephrine Infusion 0.05 MICROgram(s)/kG/Min (4.13 mL/Hr) IV Continuous <Continuous>  pantoprazole   Suspension 40 milliGRAM(s) Oral daily  propofol Infusion 10 MICROgram(s)/kG/Min (5.28 mL/Hr) IV Continuous <Continuous>  senna 2 Tablet(s) Oral at bedtime  sevelamer carbonate Powder 2400 milliGRAM(s) Oral three times a day with meals  sodium bicarbonate 650 milliGRAM(s) Oral every 6 hours  sodium zirconium cyclosilicate 10 Gram(s) Oral daily    MEDICATIONS  (PRN):  acetaminophen   Tablet .. 650 milliGRAM(s) Oral every 6 hours PRN Temp greater or equal to 38C (100.4F)  dextrose 40% Gel 15 Gram(s) Oral once PRN Blood Glucose LESS THAN 70 milliGRAM(s)/deciliter  glucagon  Injectable 1 milliGRAM(s) IntraMuscular once PRN Glucose LESS THAN 70 milligrams/deciliter  glucagon  Injectable 1 milliGRAM(s) IntraMuscular once PRN Glucose LESS THAN 70 milligrams/deciliter  ondansetron Injectable 4 milliGRAM(s) IV Push every 8 hours PRN Nausea and/or Vomiting      New X-rays reviewed:                                                                                  ECHO    CXR interpreted by me:  ET high.  OG OK>  Bilateral infiltrates   Subq air

## 2020-05-01 NOTE — PROGRESS NOTE ADULT - SUBJECTIVE AND OBJECTIVE BOX
24 h events noted  intubated/ventilated  no pressors         PAST HISTORY  --------------------------------------------------------------------------------  No significant changes to PMH, PSH, FHx, SHx, unless otherwise noted    ALLERGIES & MEDICATIONS  --------------------------------------------------------------------------------  Allergies    No Known Allergies    Intolerances      Standing Inpatient Medications  aspirin  chewable 81 milliGRAM(s) Oral daily  calcium acetate 2001 milliGRAM(s) Oral three times a day with meals  chlorhexidine 0.12% Liquid 15 milliLiter(s) Oral Mucosa every 12 hours  chlorhexidine 4% Liquid 1 Application(s) Topical <User Schedule>  dextrose 5%. 1000 milliLiter(s) IV Continuous <Continuous>  dextrose 50% Injectable 12.5 Gram(s) IV Push once  dextrose 50% Injectable 25 Gram(s) IV Push once  dextrose 50% Injectable 25 Gram(s) IV Push once  fentaNYL   Infusion. 0.5 MICROgram(s)/kG/Hr IV Continuous <Continuous>  heparin  Infusion 1200 Unit(s)/Hr IV Continuous <Continuous>  insulin regular Infusion 3 Unit(s)/Hr IV Continuous <Continuous>  lactulose Syrup 20 Gram(s) Oral every 12 hours  meropenem  IVPB 750 milliGRAM(s) IV Intermittent every 24 hours  norepinephrine Infusion 0.05 MICROgram(s)/kG/Min IV Continuous <Continuous>  pantoprazole   Suspension 40 milliGRAM(s) Oral daily  propofol Infusion 10 MICROgram(s)/kG/Min IV Continuous <Continuous>  senna 2 Tablet(s) Oral at bedtime  sevelamer carbonate Powder 2400 milliGRAM(s) Oral three times a day with meals  sodium bicarbonate 650 milliGRAM(s) Oral every 6 hours  sodium zirconium cyclosilicate 10 Gram(s) Oral daily    PRN Inpatient Medications  acetaminophen   Tablet .. 650 milliGRAM(s) Oral every 6 hours PRN  dextrose 40% Gel 15 Gram(s) Oral once PRN  glucagon  Injectable 1 milliGRAM(s) IntraMuscular once PRN  glucagon  Injectable 1 milliGRAM(s) IntraMuscular once PRN  ondansetron Injectable 4 milliGRAM(s) IV Push every 8 hours PRN          VITALS/PHYSICAL EXAM  --------------------------------------------------------------------------------  T(C): 36.8 (05-01-20 @ 04:00), Max: 38.7 (05-01-20 @ 02:00)  HR: 122 (05-01-20 @ 07:00) (106 - 128)  BP: --  RR: 25 (05-01-20 @ 07:00) (25 - 33)  SpO2: 94% (05-01-20 @ 07:00) (89% - 98%)  Wt(kg): --        04-30-20 @ 07:01  -  05-01-20 @ 07:00  --------------------------------------------------------  IN: 2620.8 mL / OUT: 2825 mL / NET: -204.2 mL      Physical Exam:  	Gen: intubated/ventilated     LABS/STUDIES  --------------------------------------------------------------------------------              9.1    13.86 >-----------<  180      [05-01-20 @ 04:35]              27.9     138  |  98  |  136  ----------------------------<  115      [05-01-20 @ 04:35]  4.2   |  24  |  5.2        Ca     7.8     [05-01-20 @ 04:35]      Mg     2.6     [05-01-20 @ 04:35]      Phos  5.8     [05-01-20 @ 04:35]    TPro  5.9  /  Alb  2.1  /  TBili  0.8  /  DBili  x   /  AST  33  /  ALT  19  /  AlkPhos  112  [05-01-20 @ 04:35]      PTT: 46.2       [04-30-20 @ 23:54]          [04-29-20 @ 16:00]    Creatinine Trend:  SCr 5.2 [05-01 @ 04:35]  SCr 5.1 [04-30 @ 05:29]  SCr 4.9 [04-29 @ 04:15]  SCr 4.7 [04-28 @ 03:52]  SCr 6.1 [04-27 @ 04:40]        Ferritin 1375      [04-29-20 @ 16:00]  Lipid: chol --, , HDL --, LDL --      [04-27-20 @ 20:00]

## 2020-05-01 NOTE — PROGRESS NOTE ADULT - SUBJECTIVE AND OBJECTIVE BOX
Admit Date: 04-13-20  Length of Stay: 18d    55yMale    Reason for admission to ICU:  Patient is in acute hypoxic respiratory distress requiring intubation and sedation. Imaging c/w ARDS. Admitted to MICU for further observation and management.    INTERVAL HPI/OVERNIGHT EVENTS:   during SAT the patient was agitated, bucking the vent, sedation was then resumed.      MEDICATIONS  (STANDING):  aspirin  chewable 81 milliGRAM(s) Oral daily  calcium acetate 2001 milliGRAM(s) Oral three times a day with meals  caspofungin IVPB      chlorhexidine 0.12% Liquid 15 milliLiter(s) Oral Mucosa every 12 hours  chlorhexidine 4% Liquid 1 Application(s) Topical <User Schedule>  dextrose 5%. 1000 milliLiter(s) (50 mL/Hr) IV Continuous <Continuous>  dextrose 50% Injectable 12.5 Gram(s) IV Push once  dextrose 50% Injectable 25 Gram(s) IV Push once  dextrose 50% Injectable 25 Gram(s) IV Push once  fentaNYL   Infusion. 0.5 MICROgram(s)/kG/Hr (4.4 mL/Hr) IV Continuous <Continuous>  heparin  Infusion 1200 Unit(s)/Hr (14 mL/Hr) IV Continuous <Continuous>  insulin regular Infusion 3 Unit(s)/Hr (3 mL/Hr) IV Continuous <Continuous>  lactulose Syrup 20 Gram(s) Oral every 12 hours  meropenem  IVPB 750 milliGRAM(s) IV Intermittent every 24 hours  pantoprazole   Suspension 40 milliGRAM(s) Oral daily  propofol Infusion 10 MICROgram(s)/kG/Min (5.28 mL/Hr) IV Continuous <Continuous>  senna 2 Tablet(s) Oral at bedtime  sevelamer carbonate Powder 2400 milliGRAM(s) Oral three times a day with meals  sodium bicarbonate 650 milliGRAM(s) Oral every 12 hours  sodium zirconium cyclosilicate 10 Gram(s) Oral daily    MEDICATIONS  (PRN):  acetaminophen   Tablet .. 650 milliGRAM(s) Oral every 6 hours PRN Temp greater or equal to 38C (100.4F)  dextrose 40% Gel 15 Gram(s) Oral once PRN Blood Glucose LESS THAN 70 milliGRAM(s)/deciliter  glucagon  Injectable 1 milliGRAM(s) IntraMuscular once PRN Glucose LESS THAN 70 milligrams/deciliter  glucagon  Injectable 1 milliGRAM(s) IntraMuscular once PRN Glucose LESS THAN 70 milligrams/deciliter  ondansetron Injectable 4 milliGRAM(s) IV Push every 8 hours PRN Nausea and/or Vomiting      Vitals:  Vital Signs Last 24 Hrs  T(C): 37.2 (01 May 2020 12:00), Max: 38.7 (01 May 2020 02:00)  T(F): 98.9 (01 May 2020 12:00), Max: 101.7 (01 May 2020 02:00)  HR: 130 (01 May 2020 12:00) (106 - 134)  BP: --  BP(mean): --  RR: 30 (01 May 2020 12:00) (25 - 31)  SpO2: 91% (01 May 2020 12:00) (89% - 98%)    Vitals (Invasive):  ABP: 132/66 (05-01-20 @ 12:00) (92/52 - 154/64)    I&O's Summary    30 Apr 2020 07:01  -  01 May 2020 07:00  --------------------------------------------------------  IN: 2620.8 mL / OUT: 2825 mL / NET: -204.2 mL    01 May 2020 07:01  -  01 May 2020 13:27  --------------------------------------------------------  IN: 1657.2 mL / OUT: 725 mL / NET: 932.2 mL        Physical Exam:  HEENT:     + NCAT  + EOMI  - Conjunctival edema   - Icterus   - Thrush   - ETT  - NGT/OGT  Neck:         + FROM    + JVD     - Nodes     - Masses    + Mid-line trachea   - Tracheostomy  Chest:         - Sternal click  - Sternal drainage  - Chest tubes  - SubQ emphysema  Lungs:          + CTA   - Rhonchi    - Rales    - Wheezing     - Decreased BS   - Dullness R L  Cardiac:       + S1 + S2    + RRR   - Irregular   - S3  - S4    - Murmurs   - Rub   - Hamman’s sign   Abdomen:    + BS     + Soft    + Non-tender     - Distended    - Organomegaly  - PEG  Extremities:   - Cyanosis U/L   - Clubbing  U/L  - LE/UE Edema   + Capillary refill    + Pulses   Neuro:        - Awake   -  Alert   - Confused   - Lethargic   + Sedated   + Paralyzed  - Generalized weakness  Skin:        - Rashes    - Erythema   + Normal incisions   + IV sites intact  - Sacral decubitus    Labs:  COVID:  COVID-19 PCR: Detected (04-13-20 @ 14:30)                          9.1    13.86 )-----------( 180      ( 01 May 2020 04:35 )             27.9     05-01    138  |  98  |  136<HH>  ----------------------------<  115<H>  4.2   |  24  |  5.2<HH>    Ca    7.8<L>      01 May 2020 04:35  Phos  5.8     05-01  Mg     2.6     05-01    TPro  5.9<L>  /  Alb  2.1<L>  /  TBili  0.8  /  DBili  x   /  AST  33  /  ALT  19  /  AlkPhos  112  05-01    PTT - ( 01 May 2020 10:30 )  PTT:55.0 sec      Culture - Sputum (collected 04-29-20 @ 01:00)  Source: .Sputum Sputum  Gram Stain (04-29-20 @ 11:40):    No squamous epithelial cells per low power field    No polymorphonuclear cells seen per low power field    No organisms seen per oil power field  Final Report (05-01-20 @ 08:30):    Normal Respiratory Reyna present      COVID related labs:  29 Apr 2020 16:00  D-dimer:  3944<H>  Calcitonin:  x  CRP:  x  LDH:  611<H>  Lactate,Blood:  x  CK:  x  Troponin I:  x  Troponin T:  x  Troponin HS:  x  Ferritin, Serum: 1375<H>  BNP:  x      Blood Gases:  (ARTERIAL):  05-01-20 @ 03:20  pH 7.41 / pCO2 42 / pO2 60 / HCO3 27  Total CO2 --  FiO2 --  Oxygen Saturation 91  Total Hemoglobin, Calculated --  Hematocrit, Calculated --  Oxygen Content --  Blood Gas Arterial - Calcium, Ionized --  Blood Gas Arterial - Chloride --  Blood Gas Arterial - Glucose --  Blood Gas Arterial - Potassium --  Blood Gas Arterial - Sodium --  Blood Gas Arterial - Creatinine --  Base Excess, Arterial 1.8    (VENOUS):      Radiology:  CT chest results consistent with multifocal bilateral ground glass opacities  ID consulted, follow up recommendations Admit Date: 04-13-20  Length of Stay: 18d    55yMale    Reason for admission to ICU:  Patient is in acute hypoxic respiratory distress requiring intubation and sedation. Imaging c/w ARDS. Admitted to MICU for further observation and management.    INTERVAL HPI/OVERNIGHT EVENTS:   during SAT the patient was agitated, bucking the vent, sedation was then resumed.      MEDICATIONS  (STANDING):  aspirin  chewable 81 milliGRAM(s) Oral daily  calcium acetate 2001 milliGRAM(s) Oral three times a day with meals  caspofungin IVPB      chlorhexidine 0.12% Liquid 15 milliLiter(s) Oral Mucosa every 12 hours  chlorhexidine 4% Liquid 1 Application(s) Topical <User Schedule>  dextrose 5%. 1000 milliLiter(s) (50 mL/Hr) IV Continuous <Continuous>  dextrose 50% Injectable 12.5 Gram(s) IV Push once  dextrose 50% Injectable 25 Gram(s) IV Push once  dextrose 50% Injectable 25 Gram(s) IV Push once  fentaNYL   Infusion. 0.5 MICROgram(s)/kG/Hr (4.4 mL/Hr) IV Continuous <Continuous>  heparin  Infusion 1200 Unit(s)/Hr (14 mL/Hr) IV Continuous <Continuous>  insulin regular Infusion 3 Unit(s)/Hr (3 mL/Hr) IV Continuous <Continuous>  lactulose Syrup 20 Gram(s) Oral every 12 hours  meropenem  IVPB 750 milliGRAM(s) IV Intermittent every 24 hours  pantoprazole   Suspension 40 milliGRAM(s) Oral daily  propofol Infusion 10 MICROgram(s)/kG/Min (5.28 mL/Hr) IV Continuous <Continuous>  senna 2 Tablet(s) Oral at bedtime  sevelamer carbonate Powder 2400 milliGRAM(s) Oral three times a day with meals  sodium bicarbonate 650 milliGRAM(s) Oral every 12 hours  sodium zirconium cyclosilicate 10 Gram(s) Oral daily    MEDICATIONS  (PRN):  acetaminophen   Tablet .. 650 milliGRAM(s) Oral every 6 hours PRN Temp greater or equal to 38C (100.4F)  dextrose 40% Gel 15 Gram(s) Oral once PRN Blood Glucose LESS THAN 70 milliGRAM(s)/deciliter  glucagon  Injectable 1 milliGRAM(s) IntraMuscular once PRN Glucose LESS THAN 70 milligrams/deciliter  glucagon  Injectable 1 milliGRAM(s) IntraMuscular once PRN Glucose LESS THAN 70 milligrams/deciliter  ondansetron Injectable 4 milliGRAM(s) IV Push every 8 hours PRN Nausea and/or Vomiting      Vitals:  Vital Signs Last 24 Hrs  T(C): 37.2 (01 May 2020 12:00), Max: 38.7 (01 May 2020 02:00)  T(F): 98.9 (01 May 2020 12:00), Max: 101.7 (01 May 2020 02:00)  HR: 130 (01 May 2020 12:00) (106 - 134)  BP: --  BP(mean): --  RR: 30 (01 May 2020 12:00) (25 - 31)  SpO2: 91% (01 May 2020 12:00) (89% - 98%)    Vitals (Invasive):  ABP: 132/66 (05-01-20 @ 12:00) (92/52 - 154/64)    I&O's Summary    30 Apr 2020 07:01  -  01 May 2020 07:00  --------------------------------------------------------  IN: 2620.8 mL / OUT: 2825 mL / NET: -204.2 mL    01 May 2020 07:01  -  01 May 2020 13:27  --------------------------------------------------------  IN: 1657.2 mL / OUT: 725 mL / NET: 932.2 mL        Physical Exam:  HEENT:     + NCAT  + EOMI  - Conjunctival edema   - Icterus   - Thrush   - ETT  - NGT/OGT  Neck:         + FROM    + JVD     - Nodes     - Masses    + Mid-line trachea   - Tracheostomy  Chest:         - Sternal click  - Sternal drainage  - Chest tubes  - SubQ emphysema  Lungs:          + CTA   - Rhonchi    - Rales    - Wheezing     - Decreased BS   - Dullness R L  Cardiac:       + S1 + S2    + RRR   - Irregular   - S3  - S4    - Murmurs   - Rub   - Hamman’s sign   Abdomen:    + BS     + Soft    + Non-tender     - Distended    - Organomegaly  - PEG  Extremities:   - Cyanosis U/L   - Clubbing  U/L  - LE/UE Edema   + Capillary refill    + Pulses   Neuro:        - Awake   -  Alert   - Confused   - Lethargic   + Sedated   + Paralyzed  - Generalized weakness  Skin:        - Rashes    - Erythema   + Normal incisions   + IV sites intact  - Sacral decubitus    Labs:  COVID:  COVID-19 PCR: Detected (04-13-20 @ 14:30)                          9.1    13.86 )-----------( 180      ( 01 May 2020 04:35 )             27.9     05-01    138  |  98  |  136<HH>  ----------------------------<  115<H>  4.2   |  24  |  5.2<HH>    Ca    7.8<L>      01 May 2020 04:35  Phos  5.8     05-01  Mg     2.6     05-01    TPro  5.9<L>  /  Alb  2.1<L>  /  TBili  0.8  /  DBili  x   /  AST  33  /  ALT  19  /  AlkPhos  112  05-01    PTT - ( 01 May 2020 10:30 )  PTT:55.0 sec      Culture - Sputum (collected 04-29-20 @ 01:00)  Source: .Sputum Sputum  Gram Stain (04-29-20 @ 11:40):    No squamous epithelial cells per low power field    No polymorphonuclear cells seen per low power field    No organisms seen per oil power field  Final Report (05-01-20 @ 08:30):    Normal Respiratory Reyna present      COVID related labs:  29 Apr 2020 16:00  D-dimer:  3944<H>  Calcitonin:  x  CRP:  x  LDH:  611<H>  Lactate,Blood:  x  CK:  x  Troponin I:  x  Troponin T:  x  Troponin HS:  x  Ferritin, Serum: 1375<H>  BNP:  x      Blood Gases:  (ARTERIAL):  05-01-20 @ 03:20  pH 7.41 / pCO2 42 / pO2 60 / HCO3 27  Total CO2 --  FiO2 --  Oxygen Saturation 91  Base Excess, Arterial 1.8    (VENOUS):

## 2020-05-01 NOTE — PROGRESS NOTE ADULT - ASSESSMENT
· Assessment		  55 year old male patient with hypertension, diabetes presenting for dyspnea.     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 53 > 3.53 > 14.4 with possible  bacterial superinfection LLL  -Sputa NGTD  -BCx 4/21,25 NG  -nares HOPE NG  -fungitell 61      RECOMMENDATIONS;   S/p Anakinra 4/14-17  -S/p convalescent plasma 4/29  -meropenem 750 mg iv q24h  -s/p vanco 1 gm iv x once given today ( broderick ARNETT NG 4/26 )  -caspofungin 50 mg iv q24h since 5/1    Little response to therapy and high probability of a bacterial/fungal co/superinfection with progression, all very poor prognostic indicators

## 2020-05-02 NOTE — PROGRESS NOTE ADULT - SUBJECTIVE AND OBJECTIVE BOX
RHIANNON MAHER  55y Male   [id number]    Procedure: s/p blowhole for subcutaneous emphysema  24 hour EVENTS: worsening subcutaneous emphysema  Wound was reopened to allow air to exit blowhole   Patient is a 55y old  Male who presents with a chief complaint of suspected COVID-19 (01 May 2020 15:46)    PAST MEDICAL & SURGICAL HISTORY:  Diabetes  Hypertension  No significant past surgical history      Vital Signs Last 24 Hrs  T(C): 37.6 (02 May 2020 00:00), Max: 38.7 (01 May 2020 02:00)  T(F): 99.6 (02 May 2020 00:00), Max: 101.7 (01 May 2020 02:00)  HR: 102 (02 May 2020 00:00) (100 - 136)  BP: --  BP(mean): --  RR: 29 (02 May 2020 00:00) (22 - 33)  SpO2: 94% (02 May 2020 00:00) (89% - 97%)    Mode: AC/ CMV (Assist Control/ Continuous Mandatory Ventilation), RR (machine): 30, TV (machine): 350, FiO2: 70, PEEP: 8, MAP: 20, PIP: 41    Diet, NPO with Tube Feed:   Tube Feeding Modality: Orogastric  Vital High Protein  Total Volume for 24 Hours (mL): 1440  Bolus  Total Volume of Bolus (mL):  360  Tube Feed Frequency: Every 6 hours   Tube Feed Start Time: 21:00  Bolus Feed Rate (mL per Hour): 500   Bolus Feed Duration (in Hours): 0.75 (04-30-20 @ 20:34)      I&O's Detail    30 Apr 2020 07:01  -  01 May 2020 07:00  --------------------------------------------------------  IN:    Enteral Tube Flush: 400 mL    fentaNYL Infusion.: 607.2 mL    heparin Infusion: 202 mL    insulin regular Infusion: 86 mL    IV PiggyBack: 50 mL    norepinephrine Infusion: 56.5 mL    propofol Infusion: 499.1 mL    Vital High Protein: 720 mL  Total IN: 2620.8 mL    OUT:    Indwelling Catheter - Urethral: 2825 mL  Total OUT: 2825 mL    Total NET: -204.2 mL      01 May 2020 07:01  -  02 May 2020 00:15  --------------------------------------------------------  IN:    Enteral Tube Flush: 150 mL    esmolol Infusion: 145.2 mL    fentaNYL Infusion.: 260.8 mL    heparin Infusion: 238 mL    insulin regular Infusion: 72 mL    IV PiggyBack: 500 mL    Lactated Ringers IV Bolus: 500 mL    propofol Infusion: 228.1 mL    Vital High Protein: 1080 mL  Total IN: 3174.1 mL    OUT:    Indwelling Catheter - Urethral: 2155 mL  Total OUT: 2155 mL    Total NET: 1019.1 mL          MEDICATIONS  (STANDING):  aspirin  chewable 81 milliGRAM(s) Oral daily  calcium acetate 2001 milliGRAM(s) Oral three times a day with meals  caspofungin IVPB      caspofungin IVPB 50 milliGRAM(s) IV Intermittent every 24 hours  chlorhexidine 0.12% Liquid 15 milliLiter(s) Oral Mucosa every 12 hours  chlorhexidine 4% Liquid 1 Application(s) Topical <User Schedule>  dextrose 5%. 1000 milliLiter(s) (50 mL/Hr) IV Continuous <Continuous>  dextrose 50% Injectable 12.5 Gram(s) IV Push once  dextrose 50% Injectable 25 Gram(s) IV Push once  dextrose 50% Injectable 25 Gram(s) IV Push once  esMOLOL  Infusion 50 MICROgram(s)/kG/Min (26.4 mL/Hr) IV Continuous <Continuous>  fentaNYL   Infusion. 0.5 MICROgram(s)/kG/Hr (4.4 mL/Hr) IV Continuous <Continuous>  heparin  Infusion 1200 Unit(s)/Hr (14 mL/Hr) IV Continuous <Continuous>  insulin regular Infusion 3 Unit(s)/Hr (3 mL/Hr) IV Continuous <Continuous>  lactulose Syrup 20 Gram(s) Oral every 12 hours  meropenem  IVPB 750 milliGRAM(s) IV Intermittent every 24 hours  pantoprazole   Suspension 40 milliGRAM(s) Oral daily  propofol Infusion 10 MICROgram(s)/kG/Min (5.28 mL/Hr) IV Continuous <Continuous>  senna 2 Tablet(s) Oral at bedtime  sevelamer carbonate Powder 2400 milliGRAM(s) Oral three times a day with meals  sodium bicarbonate 650 milliGRAM(s) Oral every 12 hours  sodium zirconium cyclosilicate 10 Gram(s) Oral daily    MEDICATIONS  (PRN):  acetaminophen   Tablet .. 650 milliGRAM(s) Oral every 6 hours PRN Temp greater or equal to 38C (100.4F)  dextrose 40% Gel 15 Gram(s) Oral once PRN Blood Glucose LESS THAN 70 milliGRAM(s)/deciliter  glucagon  Injectable 1 milliGRAM(s) IntraMuscular once PRN Glucose LESS THAN 70 milligrams/deciliter  glucagon  Injectable 1 milliGRAM(s) IntraMuscular once PRN Glucose LESS THAN 70 milligrams/deciliter  ondansetron Injectable 4 milliGRAM(s) IV Push every 8 hours PRN Nausea and/or Vomiting      PHYSICAL EXAM:  GENERAL:  Intubated  Chest: Subcutaneous air    LABS:                         9.1    13.86 )-----------( 180      ( 01 May 2020 04:35 )             27.9        05-01    138  |  98  |  136<HH>  ----------------------------<  115<H>  4.2   |  24  |  5.2<HH>    Ca    7.8<L>      01 May 2020 04:35  Phos  5.8     05-01  Mg     2.6     05-01    TPro  5.9<L>  /  Alb  2.1<L>  /  TBili  0.8  /  DBili  x   /  AST  33  /  ALT  19  /  AlkPhos  112  05-01    LIVER FUNCTIONS - ( 01 May 2020 04:35 )  Alb: 2.1 g/dL / Pro: 5.9 g/dL / ALK PHOS: 112 U/L / ALT: 19 U/L / AST: 33 U/L / GGT: x           PTT - ( 01 May 2020 20:20 )  PTT:48.9 sec      Culture - Sputum (collected 29 Apr 2020 01:00)  Source: .Sputum Sputum  Gram Stain (29 Apr 2020 11:40):    No squamous epithelial cells per low power field    No polymorphonuclear cells seen per low power field    No organisms seen per oil power field  Final Report (01 May 2020 08:30):    Normal Respiratory Reyna present        IMAGING:  < from: Xray Chest 1 View-PORTABLE IMMEDIATE (05.01.20 @ 10:55) >  Impression:    Stable diffuse bilateral opacities.     Small left apical pneumothorax is identified (obscured by overlying subcutaneous emphysema).    Stable pneumomediastinum      Support devices, in satisfactory position.    < end of copied text >

## 2020-05-02 NOTE — PROGRESS NOTE ADULT - ASSESSMENT
Acute hypoxemic respiratory failure secondary to SARS-COV-2 infection / TELLO/ hyperkalemia:  # creatinine trending down   # non oliguric   # continue  lokelma 10 q 24   # remains  on meropenem   # feed : nepro, d/c high protein   # ph noted, on  renagel 3/3/3 , and phoslo  # d/c sodium bicarbonate   # no  acute indication for rrt   # will follow

## 2020-05-02 NOTE — PROGRESS NOTE ADULT - SUBJECTIVE AND OBJECTIVE BOX
24 h events noted  intubated/ventilated         PAST HISTORY  --------------------------------------------------------------------------------  No significant changes to PMH, PSH, FHx, SHx, unless otherwise noted    ALLERGIES & MEDICATIONS  --------------------------------------------------------------------------------  Allergies    No Known Allergies    Intolerances      Standing Inpatient Medications  aspirin  chewable 81 milliGRAM(s) Oral daily  calcium acetate 2001 milliGRAM(s) Oral three times a day with meals  caspofungin IVPB      caspofungin IVPB 50 milliGRAM(s) IV Intermittent every 24 hours  chlorhexidine 0.12% Liquid 15 milliLiter(s) Oral Mucosa every 12 hours  chlorhexidine 4% Liquid 1 Application(s) Topical <User Schedule>  dextrose 5%. 1000 milliLiter(s) IV Continuous <Continuous>  dextrose 50% Injectable 12.5 Gram(s) IV Push once  dextrose 50% Injectable 25 Gram(s) IV Push once  dextrose 50% Injectable 25 Gram(s) IV Push once  esMOLOL  Infusion 50 MICROgram(s)/kG/Min IV Continuous <Continuous>  fentaNYL   Infusion. 0.5 MICROgram(s)/kG/Hr IV Continuous <Continuous>  heparin  Infusion 1200 Unit(s)/Hr IV Continuous <Continuous>  insulin regular Infusion 3 Unit(s)/Hr IV Continuous <Continuous>  lactulose Syrup 20 Gram(s) Oral every 12 hours  meropenem  IVPB 750 milliGRAM(s) IV Intermittent every 24 hours  pantoprazole   Suspension 40 milliGRAM(s) Oral daily  propofol Infusion 10 MICROgram(s)/kG/Min IV Continuous <Continuous>  senna 2 Tablet(s) Oral at bedtime  sevelamer carbonate Powder 2400 milliGRAM(s) Oral three times a day with meals  sodium bicarbonate 650 milliGRAM(s) Oral every 12 hours  sodium zirconium cyclosilicate 10 Gram(s) Oral daily    PRN Inpatient Medications  acetaminophen   Tablet .. 650 milliGRAM(s) Oral every 6 hours PRN  dextrose 40% Gel 15 Gram(s) Oral once PRN  glucagon  Injectable 1 milliGRAM(s) IntraMuscular once PRN  glucagon  Injectable 1 milliGRAM(s) IntraMuscular once PRN  ondansetron Injectable 4 milliGRAM(s) IV Push every 8 hours PRN        VITALS/PHYSICAL EXAM  --------------------------------------------------------------------------------  T(C): 36.8 (05-02-20 @ 04:00), Max: 38.1 (05-01-20 @ 08:00)  HR: 118 (05-02-20 @ 05:00) (100 - 136)  BP: --  RR: 29 (05-02-20 @ 05:00) (22 - 33)  SpO2: 91% (05-02-20 @ 05:00) (91% - 97%)  Wt(kg): --        04-30-20 @ 07:01  -  05-01-20 @ 07:00  --------------------------------------------------------  IN: 2620.8 mL / OUT: 2825 mL / NET: -204.2 mL    05-01-20 @ 07:01  -  05-02-20 @ 05:50  --------------------------------------------------------  IN: 4185.2 mL / OUT: 2655 mL / NET: 1530.2 mL      Physical Exam:  	Gen: intubated/ventilated   	    LABS/STUDIES  --------------------------------------------------------------------------------              8.5    15.52 >-----------<  160      [05-02-20 @ 04:00]              27.9     140  |  98  |  125  ----------------------------<  197      [05-02-20 @ 04:00]  4.6   |  23  |  4.7        Ca     7.7     [05-02-20 @ 04:00]      Mg     2.6     [05-01-20 @ 04:35]      Phos  5.8     [05-01-20 @ 04:35]    TPro  5.7  /  Alb  1.8  /  TBili  0.5  /  DBili  x   /  AST  45  /  ALT  21  /  AlkPhos  122  [05-02-20 @ 04:00]      PTT: 53.6       [05-02-20 @ 04:00]      Creatinine Trend:  SCr 4.7 [05-02 @ 04:00]  SCr 5.2 [05-01 @ 04:35]  SCr 5.1 [04-30 @ 05:29]  SCr 4.9 [04-29 @ 04:15]  SCr 4.7 [04-28 @ 03:52]        Ferritin 1375      [04-29-20 @ 16:00]  Lipid: chol --, , HDL --, LDL --      [05-01-20 @ 10:30]

## 2020-05-02 NOTE — PROGRESS NOTE ADULT - ASSESSMENT
56 y/o male being treated for COVID pneumonia, developed subqutenous air s/p blowhole 4/22 now with worsening subcutaneous emphysema, Wound was reopened to allow air to exit blowhole     Plan:   packing of the left chest wound daily  daily cxr

## 2020-05-02 NOTE — CHART NOTE - NSCHARTNOTEFT_GEN_A_CORE
Spoke with daughter, Marisol, [phone]. Updated her on patient's status. Answered questions, addressed concerns.

## 2020-05-02 NOTE — PROGRESS NOTE ADULT - SUBJECTIVE AND OBJECTIVE BOX
Patient is a 55y old  Male who presents with a chief complaint of suspected COVID-19 (02 May 2020 05:50)        Over Night Events:  On MV.  Off pressors.  Agitated off sedation.  Now sedated.          ROS:     All ROS are negative except HPI         PHYSICAL EXAM    ICU Vital Signs Last 24 Hrs  T(C): 36.8 (02 May 2020 04:00), Max: 37.6 (02 May 2020 00:00)  T(F): 98.3 (02 May 2020 04:00), Max: 99.6 (02 May 2020 00:00)  HR: 116 (02 May 2020 07:00) (100 - 136)  BP: --  BP(mean): --  ABP: 102/51 (02 May 2020 07:00) (90/46 - 146/66)  ABP(mean): 68 (02 May 2020 07:00) (58 - 94)  RR: 29 (02 May 2020 07:00) (22 - 33)  SpO2: 96% (02 May 2020 07:00) (91% - 97%)      CONSTITUTIONAL:   Ill appearing.  Well nourished.  NAD    ENT:   Airway patent,   Mouth with normal mucosa.   No thrush    EYES:   Pupils equal,   Round and reactive to light.    CARDIAC:   Tachycardic   Regular rhythm.    No edema      Vascular:  Normal systolic impulse  No Carotid bruits    RESPIRATORY:   No wheezing  Bilateral BS  Normal chest expansion  Not tachypneic,  No use of accessory muscles    GASTROINTESTINAL:  Abdomen soft,   Non-tender,   No guarding,   + BS    GENITOURINARY  normal genitalia for sex  no edema    MUSCULOSKELETAL:   Range of motion is not limited,  No clubbing, cyanosis    NEUROLOGICAL:   Sedated  Agitated off sedation     SKIN:   Skin normal color for race,   Warm and dry   No evidence of rash.    PSYCHIATRIC:   No apparent risk to self or others.    HEMATOLOGICAL:  No cervical  lymphadenopathy.  no inguinal lymphadenopathy      05-01-20 @ 07:01  -  05-02-20 @ 07:00  --------------------------------------------------------  IN:    Enteral Tube Flush: 150 mL    esmolol Infusion: 163.3 mL    fentaNYL Infusion.: 400.8 mL    heparin Infusion: 336 mL    insulin regular Infusion: 102 mL    IV PiggyBack: 800 mL    Lactated Ringers IV Bolus: 500 mL    propofol Infusion: 354.9 mL    Vital High Protein: 1440 mL  Total IN: 4247 mL    OUT:    Indwelling Catheter - Urethral: 2730 mL  Total OUT: 2730 mL    Total NET: 1517 mL          LABS:                            8.5    15.52 )-----------( 160      ( 02 May 2020 04:00 )             27.9                                               05-02    140  |  98  |  125<HH>  ----------------------------<  197<H>  4.6   |  23  |  4.7<HH>    02 May 2020 04:00    140    |  98     |  125<HH>  ----------------------------<  197<H>  4.6     |  23     |  4.7<HH>  01 May 2020 04:35    138    |  98     |  136<HH>  ----------------------------<  115<H>  4.2     |  24     |  5.2<HH>    Ca    7.7<L>      02 May 2020 04:00  Ca    7.8<L>      01 May 2020 04:35  Phos  5.8<H>     01 May 2020 04:35  Mg     2.6<H>     01 May 2020 04:35    TPro  5.7<L>  /  Alb  1.8<L>  /  TBili  0.5    /  DBili  x      /  AST  45<H>  /  ALT  21     /  AlkPhos  122<H>  02 May 2020 04:00  TPro  5.9<L>  /  Alb  2.1<L>  /  TBili  0.8    /  DBili  x      /  AST  33     /  ALT  19     /  AlkPhos  112    01 May 2020 04:35      Ca    7.7<L>      02 May 2020 04:00  Phos  5.8     05-01  Mg     2.6     05-01    TPro  5.7<L>  /  Alb  1.8<L>  /  TBili  0.5  /  DBili  x   /  AST  45<H>  /  ALT  21  /  AlkPhos  122<H>  05-02      PTT - ( 02 May 2020 04:00 )  PTT:53.6 sec                                                                                     LIVER FUNCTIONS - ( 02 May 2020 04:00 )  Alb: 1.8 g/dL / Pro: 5.7 g/dL / ALK PHOS: 122 U/L / ALT: 21 U/L / AST: 45 U/L / GGT: x                                                                                               Mode: AC/ CMV (Assist Control/ Continuous Mandatory Ventilation)  RR (machine): 30  TV (machine): 350  FiO2: 70  PEEP: 8  MAP: 20  PIP: 41                                      ABG - ( 02 May 2020 02:05 )  pH, Arterial: 7.25  pH, Blood: x     /  pCO2: 60    /  pO2: 64    / HCO3: 26    / Base Excess: -1.2  /  SaO2: 89    Lac 1.0               MEDICATIONS  (STANDING):  aspirin  chewable 81 milliGRAM(s) Oral daily  calcium acetate 2001 milliGRAM(s) Oral three times a day with meals  caspofungin IVPB      caspofungin IVPB 50 milliGRAM(s) IV Intermittent every 24 hours  chlorhexidine 0.12% Liquid 15 milliLiter(s) Oral Mucosa every 12 hours  chlorhexidine 4% Liquid 1 Application(s) Topical <User Schedule>  dextrose 5%. 1000 milliLiter(s) (50 mL/Hr) IV Continuous <Continuous>  dextrose 50% Injectable 12.5 Gram(s) IV Push once  dextrose 50% Injectable 25 Gram(s) IV Push once  dextrose 50% Injectable 25 Gram(s) IV Push once  esMOLOL  Infusion 50 MICROgram(s)/kG/Min (26.4 mL/Hr) IV Continuous <Continuous>  fentaNYL   Infusion. 0.5 MICROgram(s)/kG/Hr (4.4 mL/Hr) IV Continuous <Continuous>  heparin  Infusion 1500 Unit(s)/Hr (15 mL/Hr) IV Continuous <Continuous>  insulin regular Infusion 3 Unit(s)/Hr (3 mL/Hr) IV Continuous <Continuous>  lactulose Syrup 20 Gram(s) Oral every 12 hours  meropenem  IVPB 750 milliGRAM(s) IV Intermittent every 24 hours  pantoprazole   Suspension 40 milliGRAM(s) Oral daily  propofol Infusion 10 MICROgram(s)/kG/Min (5.28 mL/Hr) IV Continuous <Continuous>  senna 2 Tablet(s) Oral at bedtime  sevelamer carbonate Powder 2400 milliGRAM(s) Oral three times a day with meals  sodium bicarbonate 650 milliGRAM(s) Oral every 12 hours  sodium zirconium cyclosilicate 10 Gram(s) Oral daily    MEDICATIONS  (PRN):  acetaminophen   Tablet .. 650 milliGRAM(s) Oral every 6 hours PRN Temp greater or equal to 38C (100.4F)  dextrose 40% Gel 15 Gram(s) Oral once PRN Blood Glucose LESS THAN 70 milliGRAM(s)/deciliter  glucagon  Injectable 1 milliGRAM(s) IntraMuscular once PRN Glucose LESS THAN 70 milligrams/deciliter  glucagon  Injectable 1 milliGRAM(s) IntraMuscular once PRN Glucose LESS THAN 70 milligrams/deciliter  ondansetron Injectable 4 milliGRAM(s) IV Push every 8 hours PRN Nausea and/or Vomiting      New X-rays reviewed:                                                                                  ECHO    CXR interpreted by me:  TE OG OK>  Bilateral infiltrates.  Subq air

## 2020-05-02 NOTE — PROGRESS NOTE ADULT - ASSESSMENT
Assessment:    Acute hypoxemic respiratory failure  ARDS  COVID 19  SP convalescent plasma    Possible superimposed bacterial infection   TELLO/ non oliguric  Sub Q air     PLAN:    CNS: keep[ sedated today.  Intermittent paralysis if needed.  CTH     HEENT:  Oral care    PULMONARY:  HOB @ 45 degrees, keep sats 92-96%.  No vent changes for now.  Wean O2 as tolerated     CARDIOVASCULAR:  I=O.      GI: GI prophylaxis Protonix, bowel regimen,                                         Feeding: feeding    RENAL:  F/u  lytes.  Correct as needed. accurate I/O, renal F/UP, repeat CMP    INFECTIOUS DISEASE:  Continue ABX>  Repeat cultures.  One more dose of Vanc (Level 15).  FU fungitel      HEMATOLOGICAL:  IV HEP ( increase D DIMER).  FU PTT     ENDOCRINE:  Follow up FS.  Insulin protocol if needed.    CODE STATUS: FULL CODE    DISPOSITION: Patient require continued monitoring in MICU    Poor prognosis

## 2020-05-03 NOTE — PROGRESS NOTE ADULT - ASSESSMENT
Assessment:    Acute hypoxemic respiratory failure  ARDS  COVID 19  SP convalescent plasma    Possible superimposed bacterial infection   TELLO/ non oliguric  Sub Q air     PLAN:    CNS: keep sedated today. Continuous paralysis.  MRI Head when more stable.      HEENT:  Oral care    PULMONARY:  HOB @ 45 degrees, keep sats 92-96%.  No vent changes for now.  Wean O2 as tolerated.  PEEP 10     CARDIOVASCULAR:  I=O.      GI: GI prophylaxis Protonix, bowel regimen,                                         Feeding: OG feeding    RENAL:  F/u  lytes.  Correct as needed. accurate I/O, renal F/UP, repeat CMP    INFECTIOUS DISEASE:  Continue ABX>  Repeat cultures.  DC Caspofungin.  DC Vanc     HEMATOLOGICAL:  IV HEP ( increase D DIMER).  FU PTT     ENDOCRINE:  Follow up FS.  Insulin protocol if needed.    CODE STATUS: FULL CODE    DISPOSITION: Patient require continued monitoring in MICU    Poor prognosis Assessment:    Acute hypoxemic respiratory failure  ARDS  COVID 19  SP convalescent plasma    Possible superimposed bacterial infection   TELLO/ non oliguric  Sub Q air     PLAN:    CNS: keep sedated today. Continuous paralysis.  MRI Head when more stable.      HEENT:  Oral care    PULMONARY:  HOB @ 45 degrees, keep sats 92-96%.  No vent changes for now.  Wean O2 as tolerated.  PEEP 10.  Advance ET 4 cms and repeat CXR.      CARDIOVASCULAR:  I=O.      GI: GI prophylaxis Protonix, bowel regimen,                                         Feeding: OG feeding    RENAL:  F/u  lytes.  Correct as needed. accurate I/O, renal F/UP, repeat CMP    INFECTIOUS DISEASE:  Continue ABX>  Repeat cultures.  DC Caspofungin.  DC Vanc.  Solumedrol 60 mg Q8 for 2-5 days     HEMATOLOGICAL:  IV HEP ( increase D DIMER).  FU PTT     ENDOCRINE:  Follow up FS.  Insulin protocol if needed.    CODE STATUS: FULL CODE    DISPOSITION: Patient require continued monitoring in MICU    Poor prognosis

## 2020-05-03 NOTE — PROGRESS NOTE ADULT - SUBJECTIVE AND OBJECTIVE BOX
RHIANNON MAHER  55y, Male  Allergy: No Known Allergies      LOS  20d    CHIEF COMPLAINT: suspected COVID-19 (03 May 2020 14:08)      INTERVAL EVENTS/HPI  - T(F): , Max: 99.4 (05-03-20 @ 02:00)  - WBC Count: 11.37 (05-03-20 @ 04:30)  WBC Count: 15.52 (05-02-20 @ 04:00)  - Creatinine, Serum: 4.9 (05-03-20 @ 04:30)  Creatinine, Serum: 4.7 (05-02-20 @ 04:00)     -   -   -     ROS  cannot obtain secondary to patient's sedation and/or mental status    VITALS:  T(F): 97.9, Max: 99.4 (05-03-20 @ 02:00)  HR: 102  BP: --  RR: 30Vital Signs Last 24 Hrs  T(C): 36.6 (03 May 2020 12:00), Max: 37.4 (03 May 2020 02:00)  T(F): 97.9 (03 May 2020 12:00), Max: 99.4 (03 May 2020 02:00)  HR: 102 (03 May 2020 12:00) (96 - 121)  BP: --  BP(mean): --  RR: 30 (03 May 2020 12:00) (30 - 32)  SpO2: 94% (03 May 2020 12:00) (89% - 97%)    PHYSICAL EXAM:  Gen: Intubated  CV: RRR  Lungs: Bilateral chest expansion  Neuro: Sedated  Lines    FH: Non-contributory  Social Hx: Non-contributory    TESTS & MEASUREMENTS:                        7.8    11.37 )-----------( 169      ( 03 May 2020 04:30 )             25.2     05-03    146  |  102  |  134<HH>  ----------------------------<  127<H>  4.3   |  19  |  4.9<HH>    Ca    8.5      03 May 2020 04:30    TPro  6.1  /  Alb  1.8<L>  /  TBili  0.5  /  DBili  x   /  AST  52<H>  /  ALT  28  /  AlkPhos  124<H>  05-03    eGFR if Non African American: 12 mL/min/1.73M2 (05-03-20 @ 04:30)  eGFR if African American: 14 mL/min/1.73M2 (05-03-20 @ 04:30)    LIVER FUNCTIONS - ( 03 May 2020 04:30 )  Alb: 1.8 g/dL / Pro: 6.1 g/dL / ALK PHOS: 124 U/L / ALT: 28 U/L / AST: 52 U/L / GGT: x               Culture - Blood (collected 05-01-20 @ 11:29)  Source: .Blood None  Preliminary Report (05-02-20 @ 22:01):    No growth to date.    Culture - Sputum (collected 04-29-20 @ 01:00)  Source: .Sputum Sputum  Gram Stain (04-29-20 @ 11:40):    No squamous epithelial cells per low power field    No polymorphonuclear cells seen per low power field    No organisms seen per oil power field  Final Report (05-01-20 @ 08:30):    Normal Respiratory Reyna present    Culture - Blood (collected 04-25-20 @ 11:15)  Source: .Blood None  Final Report (04-30-20 @ 19:00):    No Growth Final    Culture - Blood (collected 04-21-20 @ 04:39)  Source: .Blood None  Final Report (04-26-20 @ 14:00):    No Growth Final    Culture - Sputum (collected 04-21-20 @ 04:30)  Source: .Sputum Sputum  Gram Stain (04-21-20 @ 15:15):    Moderate polymorphonuclear leukocytes per low power field    Moderate Squamous epithelial cells per low power field    Moderate Yeast per oil power field    Moderate Gram Positive Cocci in Pairs and Chains per oil power field  Final Report (04-23-20 @ 08:57):    Normal Respiratory Reyna present            INFECTIOUS DISEASES TESTING  Procalcitonin, Serum: 8.42 (04-30-20 @ 16:00)  Procalcitonin, Serum: 8.90 (04-28-20 @ 16:00)  Procalcitonin, Serum: 9.61 (04-26-20 @ 17:04)  MRSA PCR Result.: Negative (04-26-20 @ 13:10)  Procalcitonin, Serum: 36.40 (04-24-20 @ 03:50)  Procalcitonin, Serum: 6.62 (04-22-20 @ 23:30)  Fungitell: 60 (04-21-20 @ 13:43)  Procalcitonin, Serum: 17.90 (04-20-20 @ 23:30)  Procalcitonin, Serum: 14.40 (04-18-20 @ 23:40)  Procalcitonin, Serum: 3.53 (04-18-20 @ 04:30)  COVID-19 PCR: Detected (04-13-20 @ 14:30)  Procalcitonin, Serum: 0.53 (04-13-20 @ 14:30)      INFLAMMATORY MARKERS  C-Reactive Protein, Serum: 36.04 mg/dL (04-29-20 @ 16:00)  C-Reactive Protein, Serum: 9.68 mg/dL (04-27-20 @ 20:00)  C-Reactive Protein, Serum: 30.75 mg/dL (04-24-20 @ 17:14)  C-Reactive Protein, Serum: 11.42 mg/dL (04-22-20 @ 23:30)      RADIOLOGY & ADDITIONAL TESTS:  I have personally reviewed the last available Chest xray  CXR  Xray Chest 1 View- PORTABLE-Urgent:   EXAM:  XR CHEST PORTABLE URGENT 1V            PROCEDURE DATE:  05/03/2020            INTERPRETATION:  Clinical History / Reason for exam: Endotracheal tube advancement    Comparison : Chest radiograph earlier in the day.    Technique/Positioning: Frontal, portable.    Findings:    Support devices: Tip of endotracheal tube appears to be just below the clavicular heads. Feeding tube is seen coursing below diaphragm, tip not seen. Tubing and telemetry leads overlie patient. Right IJ central line is stable.    Cardiac/mediastinum/hilum: Diffuse subcutaneous emphysema limits evaluation the mediastinum.  Apparent pneumomediastinum appears stable    Lung parenchyma/Pleura: Diffuse subcutaneous emphysema limits evaluation the lung fields. Again seen are bilateral lung opacities    Skeleton/soft tissues: Stable    Impression:      Diffuse subcutaneous emphysema limits evaluation the lung fields. Small pneumothorax cannot be excluded    Again seen are bilateral lung opacities    Apparent stable pneumomediastinum                  KIARA ARIZA M.D., ATTENDING RADIOLOGIST  This document has been electronically signed. May  3 2020  9:52AM             (05-03-20 @ 09:12)      CT      CARDIOLOGY TESTING      MEDICATIONS  aspirin  chewable 81  calcium acetate 2001  chlorhexidine 0.12% Liquid 15  chlorhexidine 4% Liquid 1  cisatracurium Infusion 3  fentaNYL   Infusion. 0.5  heparin  Infusion 1500  insulin regular Infusion 3  lactulose Syrup 20  meropenem  IVPB 750  methylPREDNISolone sodium succinate Injectable 60  pantoprazole   Suspension 40  propofol Infusion 10  rocuronium Injectable 50  senna 2  sevelamer carbonate Powder 2400  sodium bicarbonate 650  sodium zirconium cyclosilicate 10      WEIGHT  Weight (kg): 88 (04-18-20 @ 03:38)  Creatinine, Serum: 4.9 mg/dL (05-03-20 @ 04:30)      ANTIBIOTICS:  meropenem  IVPB 750 milliGRAM(s) IV Intermittent every 24 hours      All available historical records have been reviewed

## 2020-05-03 NOTE — CHART NOTE - NSCHARTNOTEFT_GEN_A_CORE
Spoke with daughter, Marisol, [phone]. Updated her on patient's status. Answered questions, addressed concerns.

## 2020-05-03 NOTE — PROGRESS NOTE ADULT - ASSESSMENT
· Assessment		  55 year old male patient with hypertension, diabetes presenting for dyspnea.     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 53 > 3.53 > 14.4 with possible  bacterial superinfection LLL  -Sputa NGTD  -BCx 4/21,25 NG  -nares HOPE NG  -fungitell 61      RECOMMENDATIONS;   repeat procalcitonin   S/p Anakinra 4/14-17  -S/p convalescent plasma 4/29  -meropenem 750 mg iv q24h  -s/p vanco 1 gm iv x 1 ( nares ORSA NG 4/26 )

## 2020-05-03 NOTE — PROGRESS NOTE ADULT - ASSESSMENT
Acute hypoxemic respiratory failure secondary to SARS-COV-2 infection / TELLO/ hyperkalemia:  # creatinine  imrpoved from peak  # non oliguric   # continue  lokelma 10 q 24   # remains  on meropenem   # feed : nepro, d/c high protein   # ph noted, on  renagel 3/3/3 , and phoslo  # cont sodium bicarbonate   # no  acute indication for rrt   # will follow

## 2020-05-03 NOTE — PROGRESS NOTE ADULT - SUBJECTIVE AND OBJECTIVE BOX
RHIANNON MAHER  55y Male   [id number]    Procedure: s/p blowhole for subcutaneous emphysema  24 hour EVENTS: dressing was changed today    Patient is a 55y old  Male who presents with a chief complaint of suspected COVID-19 (02 May 2020 08:17)    PAST MEDICAL & SURGICAL HISTORY:  Diabetes  Hypertension  No significant past surgical history    Vital Signs Last 24 Hrs  T(C): 37.2 (02 May 2020 20:00), Max: 37.3 (02 May 2020 16:00)  T(F): 98.9 (02 May 2020 20:00), Max: 99.2 (02 May 2020 16:00)  HR: 112 (02 May 2020 23:00) (108 - 118)  BP: --  BP(mean): --  RR: 30 (02 May 2020 23:00) (29 - 31)  SpO2: 93% (02 May 2020 23:00) (91% - 97%)    Mode: AC/ CMV (Assist Control/ Continuous Mandatory Ventilation), RR (machine): 30, TV (machine): 350, FiO2: 80, PEEP: 8, ITime: 1, MAP: 23, PIP: 39    Diet, NPO with Tube Feed:   Tube Feeding Modality: Orogastric  Nepro with Carb Steady  Total Volume for 24 Hours (mL): 1440  Bolus  Total Volume of Bolus (mL):  360  Tube Feed Frequency: Every 6 hours   Tube Feed Start Time: 21:00  Bolus Feed Rate (mL per Hour): 500   Bolus Feed Duration (in Hours): 0.75 (05-02-20 @ 10:37)      I&O's Detail    01 May 2020 07:01  -  02 May 2020 07:00  --------------------------------------------------------  IN:    Enteral Tube Flush: 150 mL    esmolol Infusion: 163.3 mL    fentaNYL Infusion.: 400.8 mL    heparin Infusion: 336 mL    insulin regular Infusion: 102 mL    IV PiggyBack: 800 mL    Lactated Ringers IV Bolus: 500 mL    propofol Infusion: 354.9 mL    Vital High Protein: 1440 mL  Total IN: 4247 mL    OUT:    Indwelling Catheter - Urethral: 2730 mL  Total OUT: 2730 mL    Total NET: 1517 mL      02 May 2020 07:01  -  03 May 2020 02:33  --------------------------------------------------------  IN:    Enteral Tube Flush: 120 mL    fentaNYL Infusion.: 40 mL    fentaNYL Infusion.: 300 mL    heparin Infusion: 255 mL    insulin regular Infusion: 58 mL    Nepro: 360 mL    Nepro with Carb Steady: 300 mL    propofol Infusion: 158 mL    propofol Infusion: 31.6 mL  Total IN: 1622.6 mL    OUT:    Indwelling Catheter - Urethral: 1345 mL  Total OUT: 1345 mL    Total NET: 277.6 mL    MEDICATIONS  (STANDING):  aspirin  chewable 81 milliGRAM(s) Oral daily  calcium acetate 2001 milliGRAM(s) Oral three times a day with meals  caspofungin IVPB      caspofungin IVPB 50 milliGRAM(s) IV Intermittent every 24 hours  chlorhexidine 0.12% Liquid 15 milliLiter(s) Oral Mucosa every 12 hours  chlorhexidine 4% Liquid 1 Application(s) Topical <User Schedule>  fentaNYL   Infusion. 0.5 MICROgram(s)/kG/Hr (4.4 mL/Hr) IV Continuous <Continuous>  heparin  Infusion 1500 Unit(s)/Hr (15 mL/Hr) IV Continuous <Continuous>  insulin regular Infusion 3 Unit(s)/Hr (3 mL/Hr) IV Continuous <Continuous>  lactulose Syrup 20 Gram(s) Oral every 12 hours  meropenem  IVPB 750 milliGRAM(s) IV Intermittent every 24 hours  pantoprazole   Suspension 40 milliGRAM(s) Oral daily  propofol Infusion 10 MICROgram(s)/kG/Min (5.28 mL/Hr) IV Continuous <Continuous>  senna 2 Tablet(s) Oral at bedtime  sevelamer carbonate Powder 2400 milliGRAM(s) Oral three times a day with meals  sodium bicarbonate 650 milliGRAM(s) Oral every 12 hours  sodium zirconium cyclosilicate 10 Gram(s) Oral daily    MEDICATIONS  (PRN):  acetaminophen   Tablet .. 650 milliGRAM(s) Oral every 6 hours PRN Temp greater or equal to 38C (100.4F)  ondansetron Injectable 4 milliGRAM(s) IV Push every 8 hours PRN Nausea and/or Vomiting  rocuronium Injectable 50 milliGRAM(s) IV Push once PRN before the CT head non con      PHYSICAL EXAM:  GENERAL:  Intubated  Chest: Subcutaneous air    LABS:                         8.5    15.52 )-----------( 160      ( 02 May 2020 04:00 )             27.9        05-02    140  |  98  |  125<HH>  ----------------------------<  197<H>  4.6   |  23  |  4.7<HH>    Ca    7.7<L>      02 May 2020 04:00  Phos  5.8     05-01  Mg     2.6     05-01    TPro  5.7<L>  /  Alb  1.8<L>  /  TBili  0.5  /  DBili  x   /  AST  45<H>  /  ALT  21  /  AlkPhos  122<H>  05-02    LIVER FUNCTIONS - ( 02 May 2020 04:00 )  Alb: 1.8 g/dL / Pro: 5.7 g/dL / ALK PHOS: 122 U/L / ALT: 21 U/L / AST: 45 U/L / GGT: x           PTT - ( 02 May 2020 04:00 )  PTT:53.6 sec      Culture - Blood (collected 01 May 2020 11:29)  Source: .Blood None  Preliminary Report (02 May 2020 22:01):    No growth to date.      IMAGING:  < from: Xray Chest 1 View- PORTABLE-Routine (05.02.20 @ 05:12) >  IMPRESSION:     Stable small left apical pneumothorax.    Unchanged diffuse bilateral opacities.    < end of copied text >

## 2020-05-03 NOTE — PROGRESS NOTE ADULT - SUBJECTIVE AND OBJECTIVE BOX
Nephrology progress note    Patient was seen events over the last 24 h noted .  cr stable    Allergies:  No Known Allergies    Hospital Medications:   MEDICATIONS  (STANDING):  aspirin  chewable 81 milliGRAM(s) Oral daily  calcium acetate 2001 milliGRAM(s) Oral three times a day with meals  chlorhexidine 0.12% Liquid 15 milliLiter(s) Oral Mucosa every 12 hours  chlorhexidine 4% Liquid 1 Application(s) Topical <User Schedule>  cisatracurium Infusion 3 MICROgram(s)/kG/Min (15.8 mL/Hr) IV Continuous <Continuous>  fentaNYL   Infusion. 0.5 MICROgram(s)/kG/Hr (4.4 mL/Hr) IV Continuous <Continuous>  heparin  Infusion 1500 Unit(s)/Hr (15 mL/Hr) IV Continuous <Continuous>  insulin regular Infusion 3 Unit(s)/Hr (3 mL/Hr) IV Continuous <Continuous>  lactulose Syrup 20 Gram(s) Oral every 12 hours  meropenem  IVPB 750 milliGRAM(s) IV Intermittent every 24 hours  methylPREDNISolone sodium succinate Injectable 60 milliGRAM(s) IV Push two times a day  pantoprazole   Suspension 40 milliGRAM(s) Oral daily  propofol Infusion 10 MICROgram(s)/kG/Min (5.28 mL/Hr) IV Continuous <Continuous>  rocuronium Injectable 50 milliGRAM(s) IV Push once  senna 2 Tablet(s) Oral at bedtime  sevelamer carbonate Powder 2400 milliGRAM(s) Oral three times a day with meals  sodium bicarbonate 650 milliGRAM(s) Oral every 12 hours  sodium zirconium cyclosilicate 10 Gram(s) Oral daily        VITALS:  T(F): 97.9 (05-03-20 @ 12:00), Max: 99.4 (05-03-20 @ 02:00)  HR: 102 (05-03-20 @ 12:00)  BP: --  RR: 30 (05-03-20 @ 12:00)  SpO2: 94% (05-03-20 @ 12:00)  Wt(kg): --    05-01 @ 07:01  -  05-02 @ 07:00  --------------------------------------------------------  IN: 4247 mL / OUT: 2730 mL / NET: 1517 mL    05-02 @ 07:01  -  05-03 @ 07:00  --------------------------------------------------------  IN: 2132.4 mL / OUT: 2070 mL / NET: 62.4 mL    05-03 @ 07:01  -  05-03 @ 14:08  --------------------------------------------------------  IN: 408.9 mL / OUT: 280 mL / NET: 128.9 mL          PHYSICAL EXAM:  Gen: intubated/ventilated     LABS:  05-03    146  |  102  |  134<HH>  ----------------------------<  127<H>  4.3   |  19  |  4.9<HH>    Ca    8.5      03 May 2020 04:30    TPro  6.1  /  Alb  1.8<L>  /  TBili  0.5  /  DBili      /  AST  52<H>  /  ALT  28  /  AlkPhos  124<H>  05-03                          7.8    11.37 )-----------( 169      ( 03 May 2020 04:30 )             25.2       Urine Studies:      RADIOLOGY & ADDITIONAL STUDIES:

## 2020-05-03 NOTE — PROGRESS NOTE ADULT - SUBJECTIVE AND OBJECTIVE BOX
Patient is a 55y old  Male who presents with a chief complaint of suspected COVID-19 (03 May 2020 02:33)        Over Night Events:  on MV.  Off pressors.  Sedated.  SP CTH.          ROS:     All ROS are negative except HPI         PHYSICAL EXAM    ICU Vital Signs Last 24 Hrs  T(C): 37.4 (03 May 2020 02:00), Max: 37.4 (03 May 2020 02:00)  T(F): 99.4 (03 May 2020 02:00), Max: 99.4 (03 May 2020 02:00)  HR: 96 (03 May 2020 06:00) (96 - 116)  BP: --  BP(mean): --  ABP: 116/44 (03 May 2020 06:00) (88/40 - 128/60)  ABP(mean): 62 (03 May 2020 06:00) (52 - 82)  RR: 31 (03 May 2020 06:00) (30 - 32)  SpO2: 96% (03 May 2020 06:00) (89% - 97%)      CONSTITUTIONAL:   Ill appearing.  Well nourished.  NAD    ENT:   Airway patent,   Mouth with normal mucosa.   No thrush    EYES:   Pupils equal,   Round and reactive to light.    CARDIAC:   Tachycardic   Regular rhythm.    No edema      Vascular:  Normal systolic impulse  No Carotid bruits    RESPIRATORY:   No wheezing  Bilateral BS  Normal chest expansion  Not tachypneic,  No use of accessory muscles    GASTROINTESTINAL:  Abdomen soft,   Non-tender,   No guarding,   + BS    GENITOURINARY  normal genitalia for sex  no edema    MUSCULOSKELETAL:   Range of motion is not limited,  No clubbing, cyanosis    NEUROLOGICAL:   Sedated.      SKIN:   Skin normal color for race,   Warm and dry and intact.     PSYCHIATRIC:   No apparent risk to self or others.    HEMATOLOGICAL:  No cervical  lymphadenopathy.  no inguinal lymphadenopathy      05-02-20 @ 07:01  -  05-03-20 @ 07:00  --------------------------------------------------------  IN:    Enteral Tube Flush: 120 mL    fentaNYL Infusion.: 40 mL    fentaNYL Infusion.: 440 mL    heparin Infusion: 360 mL    insulin regular Infusion: 72 mL    Nepro: 360 mL    Nepro with Carb Steady: 300 mL    propofol Infusion: 31.6 mL    propofol Infusion: 408.8 mL  Total IN: 2132.4 mL    OUT:    Indwelling Catheter - Urethral: 2070 mL  Total OUT: 2070 mL    Total NET: 62.4 mL          LABS:                            7.8    11.37 )-----------( 169      ( 03 May 2020 04:30 )             25.2                                               05-02    140  |  98  |  125<HH>  ----------------------------<  197<H>  4.6   |  23  |  4.7<HH>    Ca    7.7<L>      02 May 2020 04:00    TPro  5.7<L>  /  Alb  1.8<L>  /  TBili  0.5  /  DBili  x   /  AST  45<H>  /  ALT  21  /  AlkPhos  122<H>  05-02      PTT - ( 02 May 2020 04:00 )  PTT:53.6 sec                                                                                     LIVER FUNCTIONS - ( 02 May 2020 04:00 )  Alb: 1.8 g/dL / Pro: 5.7 g/dL / ALK PHOS: 122 U/L / ALT: 21 U/L / AST: 45 U/L / GGT: x                                                  Culture - Blood (collected 01 May 2020 11:29)  Source: .Blood None  Preliminary Report (02 May 2020 22:01):    No growth to date.                                                   Mode: AC/ CMV (Assist Control/ Continuous Mandatory Ventilation)  RR (machine): 30  TV (machine): 350  FiO2: 80  PEEP: 8  ITime: 1  MAP: 23  PIP: 39                                      ABG - ( 03 May 2020 02:03 )  pH, Arterial: 7.31  pH, Blood: x     /  pCO2: 54    /  pO2: 66    / HCO3: 27    / Base Excess: 0.8   /  SaO2: 92                  MEDICATIONS  (STANDING):  aspirin  chewable 81 milliGRAM(s) Oral daily  calcium acetate 2001 milliGRAM(s) Oral three times a day with meals  caspofungin IVPB      caspofungin IVPB 50 milliGRAM(s) IV Intermittent every 24 hours  chlorhexidine 0.12% Liquid 15 milliLiter(s) Oral Mucosa every 12 hours  chlorhexidine 4% Liquid 1 Application(s) Topical <User Schedule>  fentaNYL   Infusion. 0.5 MICROgram(s)/kG/Hr (4.4 mL/Hr) IV Continuous <Continuous>  heparin  Infusion 1500 Unit(s)/Hr (15 mL/Hr) IV Continuous <Continuous>  insulin regular Infusion 3 Unit(s)/Hr (3 mL/Hr) IV Continuous <Continuous>  lactulose Syrup 20 Gram(s) Oral every 12 hours  meropenem  IVPB 750 milliGRAM(s) IV Intermittent every 24 hours  pantoprazole   Suspension 40 milliGRAM(s) Oral daily  propofol Infusion 10 MICROgram(s)/kG/Min (5.28 mL/Hr) IV Continuous <Continuous>  rocuronium Injectable 50 milliGRAM(s) IV Push once  senna 2 Tablet(s) Oral at bedtime  sevelamer carbonate Powder 2400 milliGRAM(s) Oral three times a day with meals  sodium bicarbonate 650 milliGRAM(s) Oral every 12 hours  sodium zirconium cyclosilicate 10 Gram(s) Oral daily    MEDICATIONS  (PRN):  acetaminophen   Tablet .. 650 milliGRAM(s) Oral every 6 hours PRN Temp greater or equal to 38C (100.4F)  ondansetron Injectable 4 milliGRAM(s) IV Push every 8 hours PRN Nausea and/or Vomiting  rocuronium Injectable 50 milliGRAM(s) IV Push once PRN before the CT head non con      New X-rays reviewed:                                                                                  ECHO    CXR interpreted by me:  ET high.  OG OK>  Bilateral infiltrates.  SUbQ air

## 2020-05-03 NOTE — PROGRESS NOTE ADULT - ASSESSMENT
56 y/o male being treated for COVID pneumonia, developed subqutenous air s/p blowhole 4/22 now with worsening subcutaneous emphysema, Wound was reopened to allow air to exit blowhole     Plan:   packing of the left chest wound daily  daily cxr  will continue to follow

## 2020-05-04 NOTE — PROGRESS NOTE ADULT - ASSESSMENT
54 y/o male being treated for COVID pneumonia, developed subqutenous air s/p blowhole 4/22 now with worsening subcutaneous emphysema, Wound was reopened to allow air to exit blowhole     Plan:   packing of the left chest wound daily  daily cxr  will continue to follow

## 2020-05-04 NOTE — PROGRESS NOTE ADULT - SUBJECTIVE AND OBJECTIVE BOX
Patient is a 55y old  Male who presents with a chief complaint of suspected COVID-19 (04 May 2020 08:18)        Over Night Events:  On MV.  Sedated and  paralyzed.  Off pressors.          ROS:     All ROS are negative except HPI         PHYSICAL EXAM    ICU Vital Signs Last 24 Hrs  T(C): 36.8 (04 May 2020 04:00), Max: 37 (03 May 2020 16:00)  T(F): 98.2 (04 May 2020 04:00), Max: 98.6 (03 May 2020 16:00)  HR: 102 (04 May 2020 07:00) (100 - 116)  BP: --  BP(mean): --  ABP: 150/74 (04 May 2020 07:00) (92/52 - 164/80)  ABP(mean): 101 (04 May 2020 07:00) (66 - 107)  RR: 30 (04 May 2020 07:00) (30 - 30)  SpO2: 96% (04 May 2020 07:00) (91% - 97%)      CONSTITUTIONAL:   Ill appearing.  Well nourished.  NAD    ENT:   Airway patent,   Mouth with normal mucosa.   No thrush    EYES:   Pupils equal,   Round and reactive to light.    CARDIAC:   Tachy  Regular rhythm.    No edema      Vascular:  Normal systolic impulse  No Carotid bruits    RESPIRATORY:   No wheezing  Bilateral BS  Normal chest expansion  Not tachypneic,  No use of accessory muscles    GASTROINTESTINAL:  Abdomen soft,   Non-tender,   No guarding,   + BS    GENITOURINARY  normal genitalia for sex  no edema    MUSCULOSKELETAL:   Range of motion is not limited,  No clubbing, cyanosis    NEUROLOGICAL:   Sedated and paralyzed     SKIN:   Skin normal color for race,   Warm and dry and intact.   No evidence of rash.    PSYCHIATRIC:   No apparent risk to self or others.    HEMATOLOGICAL:  No cervical  lymphadenopathy.  no inguinal lymphadenopathy      05-03-20 @ 07:01  -  05-04-20 @ 07:00  --------------------------------------------------------  IN:    cisatracurium Infusion: 222.6 mL    Enteral Tube Flush: 180 mL    fentaNYL Infusion.: 844.8 mL    heparin Infusion: 360 mL    insulin regular Infusion: 116 mL    IV PiggyBack: 50 mL    Nepro with Carb Steady: 1490 mL    propofol Infusion: 527.6 mL  Total IN: 3791 mL    OUT:    Indwelling Catheter - Urethral: 2545 mL  Total OUT: 2545 mL    Total NET: 1246 mL          LABS:                            8.7    11.76 )-----------( 173      ( 04 May 2020 05:45 )             26.9                                               05-04    143  |  99  |  132<HH>  ----------------------------<  162<H>  3.6   |  24  |  4.3<HH>    04 May 2020 05:45    143    |  99     |  132<HH>  ----------------------------<  162<H>  3.6     |  24     |  4.3<HH>  03 May 2020 04:30    146    |  102    |  134<HH>  ----------------------------<  127<H>  4.3     |  19     |  4.9<HH>    Ca    8.2<L>      04 May 2020 05:45  Ca    8.5        03 May 2020 04:30  Mg     2.7<H>     04 May 2020 05:45    TPro  6.2    /  Alb  1.8<L>  /  TBili  0.5    /  DBili  x      /  AST  66<H>  /  ALT  44<H>  /  AlkPhos  124<H>  04 May 2020 05:45  TPro  6.1    /  Alb  1.8<L>  /  TBili  0.5    /  DBili  x      /  AST  52<H>  /  ALT  28     /  AlkPhos  124<H>  03 May 2020 04:30      Ca    8.2<L>      04 May 2020 05:45  Mg     2.7     05-04    TPro  6.2  /  Alb  1.8<L>  /  TBili  0.5  /  DBili  x   /  AST  66<H>  /  ALT  44<H>  /  AlkPhos  124<H>  05-04      PTT - ( 03 May 2020 20:14 )  PTT:67.7 sec                                                                                     LIVER FUNCTIONS - ( 04 May 2020 05:45 )  Alb: 1.8 g/dL / Pro: 6.2 g/dL / ALK PHOS: 124 U/L / ALT: 44 U/L / AST: 66 U/L / GGT: x                                                  Culture - Blood (collected 01 May 2020 11:29)  Source: .Blood None  Preliminary Report (02 May 2020 22:01):    No growth to date.                                                   Mode: AC/ CMV (Assist Control/ Continuous Mandatory Ventilation)  RR (machine): 30  TV (machine): 350  FiO2: 80  PEEP: 10  MAP: 21  PIP: 37                                      ABG - ( 04 May 2020 01:47 )  pH, Arterial: 7.34  pH, Blood: x     /  pCO2: 50    /  pO2: 69    / HCO3: 27    / Base Excess: 1.0   /  SaO2: 93    PPL 37.  Lac 1.4              MEDICATIONS  (STANDING):  aspirin  chewable 81 milliGRAM(s) Oral daily  calcium acetate 2001 milliGRAM(s) Oral three times a day with meals  chlorhexidine 0.12% Liquid 15 milliLiter(s) Oral Mucosa every 12 hours  chlorhexidine 4% Liquid 1 Application(s) Topical <User Schedule>  cisatracurium Infusion 3 MICROgram(s)/kG/Min (15.8 mL/Hr) IV Continuous <Continuous>  fentaNYL   Infusion. 0.5 MICROgram(s)/kG/Hr (4.4 mL/Hr) IV Continuous <Continuous>  heparin  Infusion 1500 Unit(s)/Hr (15 mL/Hr) IV Continuous <Continuous>  insulin regular Infusion 3 Unit(s)/Hr (3 mL/Hr) IV Continuous <Continuous>  lactulose Syrup 20 Gram(s) Oral every 12 hours  meropenem  IVPB 750 milliGRAM(s) IV Intermittent every 24 hours  methylPREDNISolone sodium succinate Injectable 60 milliGRAM(s) IV Push two times a day  pantoprazole   Suspension 40 milliGRAM(s) Oral daily  propofol Infusion 10 MICROgram(s)/kG/Min (5.28 mL/Hr) IV Continuous <Continuous>  rocuronium Injectable 50 milliGRAM(s) IV Push once  senna 2 Tablet(s) Oral at bedtime  sevelamer carbonate Powder 2400 milliGRAM(s) Oral three times a day with meals  sodium bicarbonate 650 milliGRAM(s) Oral every 12 hours  sodium zirconium cyclosilicate 10 Gram(s) Oral daily    MEDICATIONS  (PRN):  acetaminophen   Tablet .. 650 milliGRAM(s) Oral every 6 hours PRN Temp greater or equal to 38C (100.4F)  ondansetron Injectable 4 milliGRAM(s) IV Push every 8 hours PRN Nausea and/or Vomiting  rocuronium Injectable 50 milliGRAM(s) IV Push once PRN before the CT head non con      New X-rays reviewed:                                                                                  ECHO    CXR interpreted by me:  ET high.  OG OK>  Bilateral infiltrates.  Sub air

## 2020-05-04 NOTE — CHART NOTE - NSCHARTNOTEFT_GEN_A_CORE
Attempted to contact daughter, Marisol, [phone], three times. Left a voicemail regarding patient's status.

## 2020-05-04 NOTE — PROGRESS NOTE ADULT - ASSESSMENT
ASSESSMENT/PLAN  - covid 19 pneumonia with acute resp failure and ARDS  - cytokine storm  - TELLO   - DM   - HTN    suggest providing LOW omega 6:3 ratio, low mOsm, high % protein solution, all of which are in accordance with latest [facility] recs for covid19 crit SIRS pts.  - Nepro not appropriate here, yadira not in combination with propofol  - propofol adds kcal as fat, primarily omega 6fa, as well as added phos from the IVFE the propofol is in  - CHANGE FEEDS TO VITAL HIGH PROTEIN  ML X 4 FEEDS/D to provide 124 gm protein, 1422 k (+that from prop)   - this will provide a total of 58 mEq of K and 1110 mg phos per day from feedings ASSESSMENT/PLAN  - covid 19 pneumonia with acute resp failure and ARDS  - cytokine storm  - TELLO   - DM   - HTN    suggest providing LOW omega 6:3 ratio, low mOsm, high % protein solution, all of which are in accordance with latest [facility] recs for covid19 crit SIRS pts.  - Nepro not appropriate here, yadira not in combination with propofol    - current Nepro provides 2639 kcal/d + the 581 k from propofol in past 24h = grossly overfeeding, which is not adviseable.    - this dose also gives 1054 mg phos/d, plus the phos inherent in the IVFE given with the propofol  - propofol adds kcal as fat, primarily omega 6fa, as well as added phos from the IVFE the propofol is in  - CHANGE FEEDS TO VITAL HIGH PROTEIN  ML X 4 FEEDS/D to provide 124 gm protein, 1422 k (+that from prop)   - this will provide a total of 58 mEq of K and 1110 mg phos per day from feedings

## 2020-05-04 NOTE — PROGRESS NOTE ADULT - ASSESSMENT
Assessment:    Acute hypoxemic respiratory failure  ARDS  COVID 19  SP convalescent plasma    Possible superimposed bacterial infection   TELLO/ non oliguric  Sub Q air     PLAN:    CNS: keep sedated today. Continuous paralysis.  MRI Head when more stable.      HEENT:  Oral care    PULMONARY:  HOB @ 45 degrees, keep sats 92-96%.  No vent changes for now.  Wean O2 as tolerated.  PEEP 10.  Advance ET 3 cms and repeat CXR.  I:E 1:1    CARDIOVASCULAR:  I=O.      GI: GI prophylaxis Protonix, bowel regimen,                                         Feeding: OG feeding    RENAL:  F/u  lytes.  Correct as needed. accurate I/O, renal F/UP, repeat CMP    INFECTIOUS DISEASE:  Continue ABX>  Repeat cultures. Solumedrol 60 mg Q12 for 2-5 days     HEMATOLOGICAL:  DC AC.  LE Duplex.       ENDOCRINE:  Follow up FS.  Insulin protocol if needed.    CODE STATUS: FULL CODE    DISPOSITION: Patient require continued monitoring in MICU    Poor prognosis

## 2020-05-04 NOTE — PROGRESS NOTE ADULT - SUBJECTIVE AND OBJECTIVE BOX
Nephrology progress note    THIS IS AN INCOMPLETE NOTE . FULL NOTE TO FOLLOW SHORTLY    Patient is seen and examined, events over the last 24 h noted .    Allergies:  No Known Allergies    Hospital Medications:   MEDICATIONS  (STANDING):  aspirin  chewable 81 milliGRAM(s) Oral daily  calcium acetate 2001 milliGRAM(s) Oral three times a day with meals  chlorhexidine 0.12% Liquid 15 milliLiter(s) Oral Mucosa every 12 hours  chlorhexidine 4% Liquid 1 Application(s) Topical <User Schedule>  cisatracurium Infusion 3 MICROgram(s)/kG/Min (15.8 mL/Hr) IV Continuous <Continuous>  fentaNYL   Infusion. 0.5 MICROgram(s)/kG/Hr (4.4 mL/Hr) IV Continuous <Continuous>  insulin regular Infusion 3 Unit(s)/Hr (3 mL/Hr) IV Continuous <Continuous>  lactulose Syrup 20 Gram(s) Oral every 12 hours  meropenem  IVPB 750 milliGRAM(s) IV Intermittent every 24 hours  methylPREDNISolone sodium succinate Injectable 60 milliGRAM(s) IV Push two times a day  pantoprazole   Suspension 40 milliGRAM(s) Oral daily  propofol Infusion 10 MICROgram(s)/kG/Min (5.28 mL/Hr) IV Continuous <Continuous>  rocuronium Injectable 50 milliGRAM(s) IV Push once  senna 2 Tablet(s) Oral at bedtime  sevelamer carbonate Powder 2400 milliGRAM(s) Oral three times a day with meals  sodium bicarbonate 650 milliGRAM(s) Oral every 12 hours  sodium zirconium cyclosilicate 10 Gram(s) Oral daily        VITALS:  T(F): 98.2 (05-04-20 @ 04:00), Max: 98.6 (05-03-20 @ 16:00)  HR: 102 (05-04-20 @ 07:00)  BP: --  RR: 30 (05-04-20 @ 07:00)  SpO2: 96% (05-04-20 @ 07:00)  Wt(kg): --    05-02 @ 07:01  -  05-03 @ 07:00  --------------------------------------------------------  IN: 2132.4 mL / OUT: 2070 mL / NET: 62.4 mL    05-03 @ 07:01  -  05-04 @ 07:00  --------------------------------------------------------  IN: 3791 mL / OUT: 2545 mL / NET: 1246 mL          PHYSICAL EXAM:  Constitutional: NAD  HEENT: anicteric sclera, oropharynx clear, MMM  Neck: No JVD  Respiratory: CTAB, no wheezes, rales or rhonchi  Cardiovascular: S1, S2, RRR  Gastrointestinal: BS+, soft, NT/ND  Extremities: No cyanosis or clubbing. No peripheral edema  :  No mendoza.   Skin: No rashes    LABS:  05-04    143  |  99  |  132<HH>  ----------------------------<  162<H>  3.6   |  24  |  4.3<HH>    Ca    8.2<L>      04 May 2020 05:45  Mg     2.7     05-04    TPro  6.2  /  Alb  1.8<L>  /  TBili  0.5  /  DBili      /  AST  66<H>  /  ALT  44<H>  /  AlkPhos  124<H>  05-04                          8.7    11.76 )-----------( 173      ( 04 May 2020 05:45 )             26.9       Urine Studies:      RADIOLOGY & ADDITIONAL STUDIES: Nephrology progress note    THIS IS AN INCOMPLETE NOTE . FULL NOTE TO FOLLOW SHORTLY    Patient is seen and examined, events over the last 24 h noted .    Allergies:  No Known Allergies    Hospital Medications:   MEDICATIONS  (STANDING):  aspirin  chewable 81 milliGRAM(s) Oral daily  calcium acetate 2001 milliGRAM(s) Oral three times a day with meals  chlorhexidine 0.12% Liquid 15 milliLiter(s) Oral Mucosa every 12 hours  chlorhexidine 4% Liquid 1 Application(s) Topical <User Schedule>  cisatracurium Infusion 3 MICROgram(s)/kG/Min (15.8 mL/Hr) IV Continuous <Continuous>  fentaNYL   Infusion. 0.5 MICROgram(s)/kG/Hr (4.4 mL/Hr) IV Continuous <Continuous>  insulin regular Infusion 3 Unit(s)/Hr (3 mL/Hr) IV Continuous <Continuous>  lactulose Syrup 20 Gram(s) Oral every 12 hours  meropenem  IVPB 750 milliGRAM(s) IV Intermittent every 24 hours  methylPREDNISolone sodium succinate Injectable 60 milliGRAM(s) IV Push two times a day  pantoprazole   Suspension 40 milliGRAM(s) Oral daily  propofol Infusion 10 MICROgram(s)/kG/Min (5.28 mL/Hr) IV Continuous <Continuous>  rocuronium Injectable 50 milliGRAM(s) IV Push once  senna 2 Tablet(s) Oral at bedtime  sevelamer carbonate Powder 2400 milliGRAM(s) Oral three times a day with meals  sodium bicarbonate 650 milliGRAM(s) Oral every 12 hours  sodium zirconium cyclosilicate 10 Gram(s) Oral daily        VITALS:  T(F): 98.2 (05-04-20 @ 04:00), Max: 98.6 (05-03-20 @ 16:00)  HR: 102 (05-04-20 @ 07:00)  BP: --  RR: 30 (05-04-20 @ 07:00)  SpO2: 96% (05-04-20 @ 07:00)  Wt(kg): --    05-02 @ 07:01  -  05-03 @ 07:00  --------------------------------------------------------  IN: 2132.4 mL / OUT: 2070 mL / NET: 62.4 mL    05-03 @ 07:01  -  05-04 @ 07:00  --------------------------------------------------------  IN: 3791 mL / OUT: 2545 mL / NET: 1246 mL          PHYSICAL EXAM:  Constitutional: NAD  HEENT: anicteric sclera, oropharynx clear, MMM  Neck: No JVD  Respiratory: CTAB, no wheezes, rales or rhonchi  Cardiovascular: S1, S2, RRR  Gastrointestinal: BS+, soft, NT/ND  Extremities: No cyanosis or clubbing. No peripheral edema  :  No mendoza.   Skin: No rashes    LABS:  05-04    143  |  99  |  132<HH>  ----------------------------<  162<H>  3.6   |  24  |  4.3<HH>    Ca    8.2<L>      04 May 2020 05:45  Mg     2.7     05-04  Creatinine Trend: 4.3<--, 4.9<--, 4.7<--, 5.2<--, 5.1<--, 4.9<--  TPro  6.2  /  Alb  1.8<L>  /  TBili  0.5  /  DBili      /  AST  66<H>  /  ALT  44<H>  /  AlkPhos  124<H>  05-04                          8.7    11.76 )-----------( 173      ( 04 May 2020 05:45 )             26.9       Urine Studies:      RADIOLOGY & ADDITIONAL STUDIES: Nephrology progress note  Patient is seen and examined, events over the last 24 h noted .  intubated still in PACU  has subcutaneous emphysema     Allergies:  No Known Allergies    Hospital Medications:   MEDICATIONS  (STANDING):  aspirin  chewable 81 milliGRAM(s) Oral daily  calcium acetate 2001 milliGRAM(s) Oral three times a day with meals  cisatracurium Infusion 3 MICROgram(s)/kG/Min (15.8 mL/Hr) IV Continuous <Continuous>  fentaNYL   Infusion. 0.5 MICROgram(s)/kG/Hr (4.4 mL/Hr) IV Continuous <Continuous>  insulin regular Infusion 3 Unit(s)/Hr (3 mL/Hr) IV Continuous <Continuous>  lactulose Syrup 20 Gram(s) Oral every 12 hours  meropenem  IVPB 750 milliGRAM(s) IV Intermittent every 24 hours  methylPREDNISolone sodium succinate Injectable 60 milliGRAM(s) IV Push two times a day  pantoprazole   Suspension 40 milliGRAM(s) Oral daily  propofol Infusion 10 MICROgram(s)/kG/Min (5.28 mL/Hr) IV Continuous <Continuous>  rocuronium Injectable 50 milliGRAM(s) IV Push once  senna 2 Tablet(s) Oral at bedtime  sevelamer carbonate Powder 2400 milliGRAM(s) Oral three times a day with meals  sodium bicarbonate 650 milliGRAM(s) Oral every 12 hours  sodium zirconium cyclosilicate 10 Gram(s) Oral daily        VITALS:  T(F): 98.2 (05-04-20 @ 04:00), Max: 98.6 (05-03-20 @ 16:00)  HR: 102 (05-04-20 @ 07:00)  BP: 95/65   RR: 30 (05-04-20 @ 07:00)  SpO2: 96% (05-04-20 @ 07:00)  Wt(kg): --    05-02 @ 07:01  -  05-03 @ 07:00  --------------------------------------------------------  IN: 2132.4 mL / OUT: 2070 mL / NET: 62.4 mL    05-03 @ 07:01  -  05-04 @ 07:00  --------------------------------------------------------  IN: 3791 mL / OUT: 2545 mL / NET: 1246 mL          PHYSICAL EXAM:  Constitutional: NAD  HEENT: facial swelling / subcutaneous emphesyma   Neck: No JVD  Respiratory: CTAB, no wheezes, rales or rhonchi  Cardiovascular: S1, S2, RRR  Gastrointestinal: BS+, soft, NT/ND  Extremities: No cyanosis or clubbing. No peripheral edema  :  No mendoza.   Skin: No rashes    LABS:  05-04    143  |  99  |  132<HH>  ----------------------------<  162<H>  3.6   |  24  |  4.3<HH>    Ca    8.2<L>      04 May 2020 05:45  Mg     2.7     05-04    Creatinine Trend: 4.3<--, 4.9<--, 4.7<--, 5.2<--, 5.1<--, 4.9<--    TPro  6.2  /  Alb  1.8<L>  /  TBili  0.5  /  DBili      /  AST  66<H>  /  ALT  44<H>  /  AlkPhos  124<H>  05-04                          8.7    11.76 )-----------( 173      ( 04 May 2020 05:45 )             26.9       Urine Studies:      RADIOLOGY & ADDITIONAL STUDIES:

## 2020-05-04 NOTE — PROGRESS NOTE ADULT - SUBJECTIVE AND OBJECTIVE BOX
RHIANNON MAHER  55y Male   [id number]    Procedure: s/p blowhole for subcutaneous emphysema  24 hour EVENTS: dressing was changed today  Patient is a 55y old  Male who presents with a chief complaint of suspected COVID-19 (03 May 2020 14:24)    PAST MEDICAL & SURGICAL HISTORY:  Diabetes  Hypertension  No significant past surgical history      Vital Signs Last 24 Hrs  T(C): 36.6 (04 May 2020 00:00), Max: 37.4 (03 May 2020 02:00)  T(F): 97.8 (04 May 2020 00:00), Max: 99.4 (03 May 2020 02:00)  HR: 104 (04 May 2020 00:00) (96 - 121)  BP: --  BP(mean): --  RR: 30 (04 May 2020 00:00) (30 - 32)  SpO2: 96% (04 May 2020 00:00) (89% - 97%)    Mode: AC/ CMV (Assist Control/ Continuous Mandatory Ventilation), RR (machine): 30, TV (machine): 350, FiO2: 80, PEEP: 10, ITime: 1, MAP: 21, PIP: 37    Diet, NPO with Tube Feed:   Tube Feeding Modality: Orogastric  Nepro with Carb Steady  Total Volume for 24 Hours (mL): 1440  Bolus  Total Volume of Bolus (mL):  360  Tube Feed Frequency: Every 6 hours   Tube Feed Start Time: 21:00  Bolus Feed Rate (mL per Hour): 500   Bolus Feed Duration (in Hours): 0.75 (05-02-20 @ 10:37)      I&O's Detail    02 May 2020 07:01  -  03 May 2020 07:00  --------------------------------------------------------  IN:    Enteral Tube Flush: 120 mL    fentaNYL Infusion.: 440 mL    fentaNYL Infusion.: 40 mL    heparin Infusion: 360 mL    insulin regular Infusion: 72 mL    Nepro: 360 mL    Nepro with Carb Steady: 300 mL    propofol Infusion: 31.6 mL    propofol Infusion: 408.8 mL  Total IN: 2132.4 mL    OUT:    Indwelling Catheter - Urethral: 2070 mL  Total OUT: 2070 mL    Total NET: 62.4 mL      03 May 2020 07:01  -  04 May 2020 00:12  --------------------------------------------------------  IN:    cisatracurium Infusion: 148.4 mL    Enteral Tube Flush: 80 mL    fentaNYL Infusion.: 598.4 mL    heparin Infusion: 255 mL    insulin regular Infusion: 60 mL    Nepro with Carb Steady: 1130 mL    propofol Infusion: 379.9 mL  Total IN: 2651.7 mL    OUT:    Indwelling Catheter - Urethral: 1625 mL  Total OUT: 1625 mL    Total NET: 1026.7 mL          MEDICATIONS  (STANDING):  aspirin  chewable 81 milliGRAM(s) Oral daily  calcium acetate 2001 milliGRAM(s) Oral three times a day with meals  chlorhexidine 0.12% Liquid 15 milliLiter(s) Oral Mucosa every 12 hours  chlorhexidine 4% Liquid 1 Application(s) Topical <User Schedule>  cisatracurium Infusion 3 MICROgram(s)/kG/Min (15.8 mL/Hr) IV Continuous <Continuous>  fentaNYL   Infusion. 0.5 MICROgram(s)/kG/Hr (4.4 mL/Hr) IV Continuous <Continuous>  heparin  Infusion 1500 Unit(s)/Hr (15 mL/Hr) IV Continuous <Continuous>  insulin regular Infusion 3 Unit(s)/Hr (3 mL/Hr) IV Continuous <Continuous>  lactulose Syrup 20 Gram(s) Oral every 12 hours  meropenem  IVPB 750 milliGRAM(s) IV Intermittent every 24 hours  methylPREDNISolone sodium succinate Injectable 60 milliGRAM(s) IV Push two times a day  pantoprazole   Suspension 40 milliGRAM(s) Oral daily  propofol Infusion 10 MICROgram(s)/kG/Min (5.28 mL/Hr) IV Continuous <Continuous>  rocuronium Injectable 50 milliGRAM(s) IV Push once  senna 2 Tablet(s) Oral at bedtime  sevelamer carbonate Powder 2400 milliGRAM(s) Oral three times a day with meals  sodium bicarbonate 650 milliGRAM(s) Oral every 12 hours  sodium zirconium cyclosilicate 10 Gram(s) Oral daily    MEDICATIONS  (PRN):  acetaminophen   Tablet .. 650 milliGRAM(s) Oral every 6 hours PRN Temp greater or equal to 38C (100.4F)  ondansetron Injectable 4 milliGRAM(s) IV Push every 8 hours PRN Nausea and/or Vomiting  rocuronium Injectable 50 milliGRAM(s) IV Push once PRN before the CT head non con      PHYSICAL EXAM:  GENERAL:  Intubated  Chest: Subcutaneous air    LABS:                         7.8    11.37 )-----------( 169      ( 03 May 2020 04:30 )             25.2        05-03    146  |  102  |  134<HH>  ----------------------------<  127<H>  4.3   |  19  |  4.9<HH>    Ca    8.5      03 May 2020 04:30    TPro  6.1  /  Alb  1.8<L>  /  TBili  0.5  /  DBili  x   /  AST  52<H>  /  ALT  28  /  AlkPhos  124<H>  05-03    LIVER FUNCTIONS - ( 03 May 2020 04:30 )  Alb: 1.8 g/dL / Pro: 6.1 g/dL / ALK PHOS: 124 U/L / ALT: 28 U/L / AST: 52 U/L / GGT: x           PTT - ( 03 May 2020 20:14 )  PTT:67.7 sec          Culture - Blood (collected 01 May 2020 11:29)  Source: .Blood None  Preliminary Report (02 May 2020 22:01):    No growth to date.        IMAGING:  < from: Xray Chest 1 View- PORTABLE-Urgent (05.03.20 @ 09:12) >    Diffuse subcutaneous emphysema limits evaluation the lung fields. Small pneumothorax cannot be excluded    Again seen are bilateral lung opacities    Apparent stable pneumomediastinum    < end of copied text >

## 2020-05-04 NOTE — PROGRESS NOTE ADULT - ASSESSMENT
55 year old male with PMH of HTN and T2DM diabetes who presented on 4/13 for dyspnea found to have SARS-COV-2 pneumonia. Was intubated on 4/17 after desaturating to 84% on 15L NRB.    Patient assessed this morning. Sedated and paralyzed. No significant overnight events.    Vent Settings : Vt 350 FiO2 80 RR 30 PEEP 8.  Sedation : Fentalnyl, propofol, cisatracurium   Pressors : NO    # Acute hypoxemic respiratory failure / ARDS / COVID 19 with evidence of cytokine release syndrome     - s/p convalescent plasma 4/29  / Plaquenil / Anakinra / Solumedrol   - Patient was in IV heparin for elevated D-Dimers, will DC today  - Lower ext Duplex ordered  - 2D - echo ordered     # Sepsis (fever / sinus tachycardia) secondary to Possible superimposed bacterial / Fungal infection  - on meropenem since 4/17, Vanco and caspofungin discontinued because of low fungitell levels.    # TELLO/ non oliguric  - Nephro following - no RRT / c/w Lokelma / c/w oral Bicarb / c/w phoslo    # Sub Q air s/p blowhole 4/22 / worsening  - Daily dressing changeslowhole  on 4/22, blow hole reopened due worsening s/c emphysema    # History of HTN  - Holding Losartan 40    # Type 2 Diabetes - Uncontrolled   - HbA1c: 11.6  - c/w tube feeds  - Insulin Drip per [facility] RN protocol    Diet: NPO with tube feeds.   DVT ppx: IV Heparin, ICD  GI ppx: Pantoprazole 40 mg suspension while intubated    Dispo:   From home  Wife and Daughter [phone] involved in care - want all aggressive measures to be taken

## 2020-05-04 NOTE — PROGRESS NOTE ADULT - SUBJECTIVE AND OBJECTIVE BOX
RHIANNON MAHER  55y, Male    All available historical data reviewed    OVERNIGHT EVENTS:    no fevers  fio2 80%  no pressors  no change in sc emphysema  ROS:  unable to obtain history secondary to patient's mental status and/or sedation     VITALS:  T(F): 98.2, Max: 98.6 (05-03-20 @ 16:00)  HR: 102  BP: --  RR: 30Vital Signs Last 24 Hrs  T(C): 36.8 (04 May 2020 04:00), Max: 37 (03 May 2020 16:00)  T(F): 98.2 (04 May 2020 04:00), Max: 98.6 (03 May 2020 16:00)  HR: 102 (04 May 2020 07:00) (100 - 116)  BP: --  BP(mean): --  RR: 30 (04 May 2020 07:00) (30 - 30)  SpO2: 96% (04 May 2020 07:00) (91% - 97%)    TESTS & MEASUREMENTS:                        8.7    11.76 )-----------( 173      ( 04 May 2020 05:45 )             26.9     05-04    143  |  99  |  132<HH>  ----------------------------<  162<H>  3.6   |  24  |  4.3<HH>    Ca    8.2<L>      04 May 2020 05:45  Mg     2.7     05-04    TPro  6.2  /  Alb  1.8<L>  /  TBili  0.5  /  DBili  x   /  AST  66<H>  /  ALT  44<H>  /  AlkPhos  124<H>  05-04    LIVER FUNCTIONS - ( 04 May 2020 05:45 )  Alb: 1.8 g/dL / Pro: 6.2 g/dL / ALK PHOS: 124 U/L / ALT: 44 U/L / AST: 66 U/L / GGT: x             Culture - Blood (collected 05-01-20 @ 11:29)  Source: .Blood None  Preliminary Report (05-02-20 @ 22:01):    No growth to date.    Culture - Sputum (collected 04-29-20 @ 01:00)  Source: .Sputum Sputum  Gram Stain (04-29-20 @ 11:40):    No squamous epithelial cells per low power field    No polymorphonuclear cells seen per low power field    No organisms seen per oil power field  Final Report (05-01-20 @ 08:30):    Normal Respiratory Reyna present            RADIOLOGY & ADDITIONAL TESTS:  Personal review of radiological diagnostics performed  Echo and EKG results noted when applicable.     MEDICATIONS:  acetaminophen   Tablet .. 650 milliGRAM(s) Oral every 6 hours PRN  aspirin  chewable 81 milliGRAM(s) Oral daily  calcium acetate 2001 milliGRAM(s) Oral three times a day with meals  chlorhexidine 0.12% Liquid 15 milliLiter(s) Oral Mucosa every 12 hours  chlorhexidine 4% Liquid 1 Application(s) Topical <User Schedule>  cisatracurium Infusion 3 MICROgram(s)/kG/Min IV Continuous <Continuous>  fentaNYL   Infusion. 0.5 MICROgram(s)/kG/Hr IV Continuous <Continuous>  heparin  Infusion 1500 Unit(s)/Hr IV Continuous <Continuous>  insulin regular Infusion 3 Unit(s)/Hr IV Continuous <Continuous>  lactulose Syrup 20 Gram(s) Oral every 12 hours  meropenem  IVPB 750 milliGRAM(s) IV Intermittent every 24 hours  methylPREDNISolone sodium succinate Injectable 60 milliGRAM(s) IV Push two times a day  ondansetron Injectable 4 milliGRAM(s) IV Push every 8 hours PRN  pantoprazole   Suspension 40 milliGRAM(s) Oral daily  propofol Infusion 10 MICROgram(s)/kG/Min IV Continuous <Continuous>  rocuronium Injectable 50 milliGRAM(s) IV Push once PRN  rocuronium Injectable 50 milliGRAM(s) IV Push once  senna 2 Tablet(s) Oral at bedtime  sevelamer carbonate Powder 2400 milliGRAM(s) Oral three times a day with meals  sodium bicarbonate 650 milliGRAM(s) Oral every 12 hours  sodium zirconium cyclosilicate 10 Gram(s) Oral daily      ANTIBIOTICS:  meropenem  IVPB 750 milliGRAM(s) IV Intermittent every 24 hours

## 2020-05-04 NOTE — PROGRESS NOTE ADULT - ASSESSMENT
Acute hypoxemic respiratory failure secondary to SARS-COV-2 infection / TELLO/ hyperkalemia:  # creatinine  improving slowly   # non oliguric   # Hold lokelma please   # remains  on meropenem   # ph noted, on  renagel 3/3/3 , and phoslo  # cont sodium bicarbonate   # no  acute indication for RRT  # prognosis poor   # will follow

## 2020-05-04 NOTE — PROGRESS NOTE ADULT - SUBJECTIVE AND OBJECTIVE BOX
Patient is a 55y old  Male who presents with a chief complaint of suspected COVID-19 (04 May 2020 11:03)  pt remain vented/sedated  on ngt feed    ICU Vital Signs Last 24 Hrs  T(C): 36.3 (04 May 2020 12:00), Max: 36.8 (04 May 2020 04:00)  T(F): 97.4 (04 May 2020 12:00), Max: 98.2 (04 May 2020 04:00)  HR: 104 (04 May 2020 12:00) (100 - 110)  ABP: 102/60 (04 May 2020 12:00) (102/60 - 164/80)  ABP(mean): 73 (04 May 2020 12:00) (73 - 107)  RR: 26 (04 May 2020 12:00) (26 - 30)  SpO2: 95% (04 May 2020 12:00) (86% - 97%)      Drug Dosing Weight  Height (cm): 170.2 (18 Apr 2020 03:38)  Weight (kg): 88 (18 Apr 2020 03:38)  BMI (kg/m2): 30.4 (18 Apr 2020 03:38)  BSA (m2): 2 (18 Apr 2020 03:38)    I&O's Detail    03 May 2020 07:01  -  04 May 2020 07:00  --------------------------------------------------------  IN:    cisatracurium Infusion: 222.6 mL    Enteral Tube Flush: 180 mL    fentaNYL Infusion.: 844.8 mL    heparin Infusion: 360 mL    insulin regular Infusion: 116 mL    IV PiggyBack: 50 mL    Nepro with Carb Steady: 1490 mL    propofol Infusion: 527.6 mL  Total IN: 3791 mL    OUT:    Indwelling Catheter - Urethral: 2545 mL  Total OUT: 2545 mL    Total NET: 1246 mL      04 May 2020 07:01  -  04 May 2020 16:22  --------------------------------------------------------  IN:    cisatracurium Infusion: 84.8 mL    Enteral Tube Flush: 100 mL    fentaNYL Infusion.: 281.6 mL    heparin Infusion: 15 mL    insulin regular Infusion: 96 mL    Nepro with Carb Steady: 360 mL    propofol Infusion: 133.2 mL  Total IN: 1070.6 mL    OUT:    Indwelling Catheter - Urethral: 452 mL  Total OUT: 452 mL    Total NET: 618.6 mL     PHYSICAL EXAM:  Constitutional:remain vented  on enteral tube feed  MEDICATIONS  (STANDING):  aspirin  chewable 81 milliGRAM(s) Oral daily  calcium acetate 2001 milliGRAM(s) Oral three times a day with meals  chlorhexidine 0.12% Liquid 15 milliLiter(s) Oral Mucosa every 12 hours  chlorhexidine 4% Liquid 1 Application(s) Topical <User Schedule>  cisatracurium Infusion 3 MICROgram(s)/kG/Min (15.8 mL/Hr) IV Continuous <Continuous>  fentaNYL   Infusion. 0.5 MICROgram(s)/kG/Hr (4.4 mL/Hr) IV Continuous <Continuous>  insulin regular Infusion 3 Unit(s)/Hr (3 mL/Hr) IV Continuous <Continuous>  lactulose Syrup 20 Gram(s) Oral every 12 hours  meropenem  IVPB 750 milliGRAM(s) IV Intermittent every 24 hours  methylPREDNISolone sodium succinate Injectable 60 milliGRAM(s) IV Push two times a day  pantoprazole   Suspension 40 milliGRAM(s) Oral daily  propofol Infusion 10 MICROgram(s)/kG/Min (5.28 mL/Hr) IV Continuous <Continuous>  rocuronium Injectable 50 milliGRAM(s) IV Push once  senna 2 Tablet(s) Oral at bedtime  sevelamer carbonate Powder 2400 milliGRAM(s) Oral three times a day with meals  sodium bicarbonate 650 milliGRAM(s) Oral every 12 hours  sodium zirconium cyclosilicate 10 Gram(s) Oral daily      Diet, NPO with Tube Feed:   Tube Feeding Modality: Orogastric  Nepro with Carb Steady  Total Volume for 24 Hours (mL): 1440  Total Volume of Bolus (mL):  360  Tube Feed Frequency: Every 6 hours   Tube Feed Start Time: 21:00  Bolus Feed Rate (mL per Hour): 500   Bolus Feed Duration (in Hours): 0.75 (05-02-20 @ 10:37)      LABS  05-04    143  |  99  |  132<HH>  ----------------------------<  162<H>  3.6   |  24  |  4.3<HH>    Ca    8.2<L>      04 May 2020 05:45  Mg     2.7     05-04    TPro  6.2  /  Alb  1.8<L>  /  TBili  0.5  /  DBili  x   /  AST  66<H>  /  ALT  44<H>  /  AlkPhos  124<H>  05-04                          8.7    11.76 )-----------( 173      ( 04 May 2020 05:45 )             26.9     CAPILLARY BLOOD GLUCOSE  POCT Blood Glucose.: 113 mg/dL (04 May 2020 16:06)  POCT Blood Glucose.: 134 mg/dL (04 May 2020 14:13)  POCT Blood Glucose.: 209 mg/dL (04 May 2020 12:10)   RADIOLOGY STUDIES  < from: Xray Chest 1 View-PORTABLE IMMEDIATE (05.04.20 @ 10:06) >  Impression:    Diffuse subcutaneous emphysema limits evaluation.  Stable bilateral lung opacities  Likely stable pneumomediastinum

## 2020-05-04 NOTE — PROGRESS NOTE ADULT - ASSESSMENT
· Assessment		  55 year old male patient with hypertension, diabetes presenting for dyspnea.     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 53 > 3.53 > 14.4 with possible  bacterial superinfection LLL  -Sputa 5/1 NGTD  -BCx 4/21,25, 5/1 NG  -nares ORSA NG  -fungitell 61      RECOMMENDATIONS;   S/p Anakinra 4/14-17  -S/p convalescent plasma 4/29  -meropenem 750 mg iv q24h  -caspofungin 50 mg iv q24h since 5/1    Little response to therapy and high probability of a bacterial/fungal co/superinfection with progression, all very poor prognostic indicators

## 2020-05-04 NOTE — PROGRESS NOTE ADULT - SUBJECTIVE AND OBJECTIVE BOX
LENGTH OF HOSPITAL STAY: 21d    CHIEF COMPLAINT:   Patient is a 55y old  Male who presents with a chief complaint of suspected COVID-19 (04 May 2020 08:55)      HISTORY OF PRESENTING ILLNESS:    HPI:  55 year old male patient with hypertension, diabetes presenting for dyspnea.   The patient states that he has experienced non productive cough as well as low grade fever (between 90 and 100) for the past week associated with loss of appetite. These symptoms stopped last Friday. Since yesterday he has been experiencing dyspnea mostly on exertion that has been worsening which prompted him to call EMS today. His son works at a rehab center, has been experiencing the same symptoms however has been denied COVID-19 testing   In the ED, SpO2 was 78 % on room air and the patient was placed on 6 L NC with improvement of his SpO2 to 90 - 92 % and subjective improvement endorsed by the patient as well. Otherwise, tachycardic to 104, hemodynamically stable and afebrile. Chest  x ray was done and showed bilateral infiltrates (official report pending), SARS-CoV-2 PCR sent and patient was admitted for suspected COVID-19 (13 Apr 2020 15:47)    PAST MEDICAL & SURGICAL HISTORY  PAST MEDICAL & SURGICAL HISTORY:  Diabetes  Hypertension  No significant past surgical history    SOCIAL HISTORY:    ALLERGIES:  No Known Allergies    MEDICATIONS:  STANDING MEDICATIONS  aspirin  chewable 81 milliGRAM(s) Oral daily  calcium acetate 2001 milliGRAM(s) Oral three times a day with meals  chlorhexidine 0.12% Liquid 15 milliLiter(s) Oral Mucosa every 12 hours  chlorhexidine 4% Liquid 1 Application(s) Topical <User Schedule>  cisatracurium Infusion 3 MICROgram(s)/kG/Min IV Continuous <Continuous>  fentaNYL   Infusion. 0.5 MICROgram(s)/kG/Hr IV Continuous <Continuous>  insulin regular Infusion 3 Unit(s)/Hr IV Continuous <Continuous>  lactulose Syrup 20 Gram(s) Oral every 12 hours  meropenem  IVPB 750 milliGRAM(s) IV Intermittent every 24 hours  methylPREDNISolone sodium succinate Injectable 60 milliGRAM(s) IV Push two times a day  pantoprazole   Suspension 40 milliGRAM(s) Oral daily  propofol Infusion 10 MICROgram(s)/kG/Min IV Continuous <Continuous>  rocuronium Injectable 50 milliGRAM(s) IV Push once  senna 2 Tablet(s) Oral at bedtime  sevelamer carbonate Powder 2400 milliGRAM(s) Oral three times a day with meals  sodium bicarbonate 650 milliGRAM(s) Oral every 12 hours  sodium zirconium cyclosilicate 10 Gram(s) Oral daily    PRN MEDICATIONS  acetaminophen   Tablet .. 650 milliGRAM(s) Oral every 6 hours PRN  ondansetron Injectable 4 milliGRAM(s) IV Push every 8 hours PRN  rocuronium Injectable 50 milliGRAM(s) IV Push once PRN    VITALS:   T(F): 98.1  HR: 104  BP: --  RR: 30  SpO2: 86%    LABS:                        8.7    11.76 )-----------( 173      ( 04 May 2020 05:45 )             26.9     05-04    143  |  99  |  132<HH>  ----------------------------<  162<H>  3.6   |  24  |  4.3<HH>    Ca    8.2<L>      04 May 2020 05:45  Mg     2.7     05-04    TPro  6.2  /  Alb  1.8<L>  /  TBili  0.5  /  DBili  x   /  AST  66<H>  /  ALT  44<H>  /  AlkPhos  124<H>  05-04    PTT - ( 03 May 2020 20:14 )  PTT:67.7 sec    ABG - ( 04 May 2020 01:47 )  pH, Arterial: 7.34  pH, Blood: x     /  pCO2: 50    /  pO2: 69    / HCO3: 27    / Base Excess: 1.0   /  SaO2: 93                    Culture - Blood (collected 01 May 2020 11:29)  Source: .Blood None  Preliminary Report (02 May 2020 22:01):    No growth to date.          RADIOLOGY:  < from: Xray Chest 1 View-PORTABLE IMMEDIATE (05.04.20 @ 10:06) >    Impression:      Diffuse subcutaneous emphysema limits evaluation.    Stable bilateral lung opacities      < end of copied text >    PHYSICAL EXAM:  GEN: Patient sedated and ventilated  HEENT: AT, NC   LUNGS: decreased heart sounds  HEART: S1/S2 present. RRR.   ABD: Soft, non-tender, non-distended. Bowel sounds present  EXT: edematous, Cyanosis/clubbing absent  NEURO: sedated, paralyzed

## 2020-05-05 NOTE — PROGRESS NOTE ADULT - SUBJECTIVE AND OBJECTIVE BOX
Patient is a 55y old  Male who presents with a chief complaint of suspected COVID-19 (05 May 2020 07:42)        Over Night Events: Intubated Day 19, On levo 0.08, nimbex 3, versed 3    ROS:   Unable to assess due to current clinical condition      PHYSICAL EXAM    ICU Vital Signs Last 24 Hrs  T(C): 37.5 (05 May 2020 08:00), Max: 37.5 (05 May 2020 08:00)  T(F): 99.5 (05 May 2020 08:00), Max: 99.5 (05 May 2020 08:00)  HR: 124 (05 May 2020 08:00) (102 - 124)  ABP: 126/65 (05 May 2020 08:00) (99/49 - 126/65)  ABP(mean): 82 (05 May 2020 08:00) (63 - 82)  RR: 27 (05 May 2020 08:00) (26 - 27)  SpO2: 98% (05 May 2020 08:00) (86% - 100%)      CONSTITUTIONAL:   Ill appearing.  NAD    ENT:   SQ emphysema  ETT+  Mouth with normal mucosa.   No thrush    EYES:   Pupils equal,   Round and reactive to light.    CARDIAC:   Tachy  Regular rhythm.    No edema    Vascular:  Normal systolic impulse  No Carotid bruits    RESPIRATORY:   Bilateral BS  Normal chest expansion  Not tachypneic,  No use of accessory muscles    GASTROINTESTINAL:  Abdomen soft,   No guarding,   + BS    GENITOURINARY:  no edema    MUSCULOSKELETAL:   No muscle or joint tenderness  No clubbing, cyanosis    NEUROLOGICAL:   Sedated    SKIN:   Skin normal color for race,   Warm and dry and intact.   No evidence of rash.    PSYCHIATRIC:   No apparent risk to self or others.    HEME LYMPH:   No splenomegaly.  No cervical  lymphadenopathy.  no inguinal lymphadenopathy      05-04-20 @ 07:01  -  05-05-20 @ 07:00  --------------------------------------------------------  IN:    cisatracurium Infusion: 243.8 mL    Enteral Tube Flush: 200 mL    fentaNYL Infusion.: 606.6 mL    heparin Infusion: 15 mL    insulin regular Infusion: 198 mL    IV PiggyBack: 100 mL    Nepro with Carb Steady: 1080 mL    propofol Infusion: 285.8 mL  Total IN: 2729.2 mL    OUT:    Indwelling Catheter - Urethral: 1282 mL  Total OUT: 1282 mL    Total NET: 1447.2 mL      05-05-20 @ 07:01  -  05-05-20 @ 08:46  --------------------------------------------------------  IN:    cisatracurium Infusion: 52.8 mL    insulin regular Infusion: 17 mL    midazolam Infusion: 6 mL    norepinephrine Infusion: 13.2 mL  Total IN: 89 mL    OUT:    Indwelling Catheter - Urethral: 150 mL  Total OUT: 150 mL    Total NET: -61 mL          LABS:                            7.2    12.64 )-----------( 155      ( 05 May 2020 04:05 )             23.6                                               05-05    145  |  103  |  148<HH>  ----------------------------<  254<H>  3.7   |  25  |  4.6<HH>    Ca    7.9<L>      05 May 2020 04:40  Mg     2.7     05-04    TPro  5.5<L>  /  Alb  1.8<L>  /  TBili  0.4  /  DBili  x   /  AST  102<H>  /  ALT  97<H>  /  AlkPhos  188<H>  05-05      PTT - ( 03 May 2020 20:14 )  PTT:67.7 sec                                                                                     LIVER FUNCTIONS - ( 05 May 2020 04:40 )  Alb: 1.8 g/dL / Pro: 5.5 g/dL / ALK PHOS: 188 U/L / ALT: 97 U/L / AST: 102 U/L / GGT: x                                              Mode: AC/ CMV (Assist Control/ Continuous Mandatory Ventilation)  RR (machine): 26  TV (machine): 390  FiO2: 100  PEEP: 10  ITime: 1  MAP: 21  PIP: 34                                      ABG - ( 05 May 2020 01:51 )  pH, Arterial: 7.24  pH, Blood: x     /  pCO2: 60    /  pO2: 124   / HCO3: 26    / Base Excess: -1.8  /  SaO2: 99        MEDICATIONS  (STANDING):  aspirin  chewable 81 milliGRAM(s) Oral daily  calcium acetate 2001 milliGRAM(s) Oral three times a day with meals  chlorhexidine 0.12% Liquid 15 milliLiter(s) Oral Mucosa every 12 hours  chlorhexidine 4% Liquid 1 Application(s) Topical <User Schedule>  cisatracurium Infusion 3 MICROgram(s)/kG/Min (15.8 mL/Hr) IV Continuous <Continuous>  insulin regular Infusion 3 Unit(s)/Hr (3 mL/Hr) IV Continuous <Continuous>  lactulose Syrup 20 Gram(s) Oral every 12 hours  meropenem  IVPB 750 milliGRAM(s) IV Intermittent every 24 hours  methylPREDNISolone sodium succinate Injectable 60 milliGRAM(s) IV Push two times a day  midazolam Infusion 0.02 mG/kG/Hr (1.76 mL/Hr) IV Continuous <Continuous>  norepinephrine Infusion 0.05 MICROgram(s)/kG/Min (4.13 mL/Hr) IV Continuous <Continuous>  pantoprazole   Suspension 40 milliGRAM(s) Oral daily  rocuronium Injectable 50 milliGRAM(s) IV Push once  senna 2 Tablet(s) Oral at bedtime  sevelamer carbonate Powder 2400 milliGRAM(s) Oral three times a day with meals  sodium bicarbonate 650 milliGRAM(s) Oral every 12 hours  sodium zirconium cyclosilicate 10 Gram(s) Oral daily    MEDICATIONS  (PRN):  acetaminophen   Tablet .. 650 milliGRAM(s) Oral every 6 hours PRN Temp greater or equal to 38C (100.4F)  ondansetron Injectable 4 milliGRAM(s) IV Push every 8 hours PRN Nausea and/or Vomiting  rocuronium Injectable 50 milliGRAM(s) IV Push once PRN before the CT head non con        LE duplex:  Impression:    Limited study.    No evidence of DVT in the bilateral popliteal and gastrocnemius veins    New X-rays reviewed:

## 2020-05-05 NOTE — PROGRESS NOTE ADULT - ASSESSMENT
55 year old male with PMH of HTN and T2DM diabetes who presented on 4/13 for dyspnea found to have SARS-COV-2 pneumonia. Was intubated on 4/17 after desaturating to 84% on 15L NRB.    Patient assessed this morning. Overnight patient was hypotensive. Patient was started on the levophed and sedation was discontinued. This morning patient was again started on Versed.     Vent Settings : Vt 350 FiO2 80 RR 26 PEEP 10  Sedation/Paralysis : Versed, will DC cisatracurium  Pressors : Levophed 0.4v mcg    # Acute hypoxemic respiratory failure / ARDS / COVID 19 with evidence of cytokine release syndrome     - s/p convalescent plasma 4/29  / Plaquenil / Anakinra / Solumedrol   - solumedrol from 4/17- 4/27, restarted on solumedrol 60 mg Q12hrs 5/3 (day# 3)  - Patient was in IV heparin for elevated D-Dimers, DC'd because of excessive bleeding  - Lower ext Duplex - negative for DVT ( limited study) 5/4  - 2D - echo ordered   - F/U CBC at 4 pm, transfuse if Hb < 7    # Sepsis (fever / sinus tachycardia) secondary to Possible superimposed bacterial / Fungal infection  - on meropenem since 4/17, Vanco and caspofungin discontinued because of low fungitell levels.    # TELLO/ non oliguric  - Nephro following - no RRT / c/w Lokelma / c/w oral Bicarb / c/w phoslo  - Lasix 60mg x 1, then 40mg Q12 hrs ( 5/5)    # Sub Q air s/p blowhole 4/22 / worsening  - Daily dressing changes, blow hole  on 4/22, 2nd blow hole made due to worsening emphysema 5/4    # History of HTN  - Holding Losartan 40  - patient requiring pressors    # Type 2 Diabetes - Uncontrolled   - HbA1c: 11.6  - c/w tube feeds  - Insulin Drip per IU RN protocol    Diet: NPO with tube feeds.   DVT ppx: IV Heparin, ICD  GI ppx: Pantoprazole 40 mg suspension while intubated    Dispo:   From home  Wife and Daughter [phone] involved in care - want all aggressive measures to be taken 55 year old male with PMH of HTN and T2DM diabetes who presented on 4/13 for dyspnea found to have SARS-COV-2 pneumonia. Was intubated on 4/17 after desaturating to 84% on 15L NRB.    Patient assessed this morning. Overnight patient was hypotensive. Patient was started on the levophed and sedation was discontinued. This morning patient was again started on Versed. Patient is intubated day # 19    Vent Settings : Vt 350 FiO2 80 RR 26 PEEP 10  Sedation/Paralysis : Versed, will DC cisatracurium  Pressors : Levophed 0.4v mcg    # Acute hypoxemic respiratory failure / ARDS / COVID 19 with evidence of cytokine release syndrome     - s/p convalescent plasma 4/29  / Plaquenil / Anakinra / Solumedrol   - solumedrol from 4/17- 4/27, restarted on solumedrol 60 mg Q12hrs 5/3 (day# 3)  - Patient was in IV heparin for elevated D-Dimers, DC'd because of excessive bleeding  - Lower ext Duplex - negative for DVT ( limited study) 5/4  - 2D - echo ordered   - F/U CBC at 4 pm, transfuse if Hb < 7    # Sepsis (fever / sinus tachycardia) secondary to Possible superimposed bacterial / Fungal infection  - on meropenem since 4/17, Vanco and caspofungin discontinued because of low fungitell levels.    # TELLO/ non oliguric  - Nephro following - no RRT / c/w Lokelma / c/w oral Bicarb / c/w phoslo  - Lasix 60mg x 1, then 40mg Q12 hrs ( 5/5)    # Sub Q air s/p blowhole 4/22 / worsening  - Daily dressing changes, blow hole  on 4/22, 2nd blow hole made due to worsening emphysema 5/4    # History of HTN  - Holding Losartan 40  - patient requiring pressors    # Type 2 Diabetes - Uncontrolled   - HbA1c: 11.6  - c/w tube feeds  - Insulin Drip per IU RN protocol    Diet: NPO with tube feeds.   DVT ppx: IV Heparin, ICD  GI ppx: Pantoprazole 40 mg suspension while intubated    Dispo:   From home  Wife and Daughter [phone] involved in care - want all aggressive measures to be taken

## 2020-05-05 NOTE — CHART NOTE - NSCHARTNOTEFT_GEN_A_CORE
Spoke with daughter, Marisol, [phone]. Updated her on patient's status. Answered questions, addressed concerns.

## 2020-05-05 NOTE — PROGRESS NOTE ADULT - SUBJECTIVE AND OBJECTIVE BOX
RHIANNON MAHER  55y, Male  Allergy: No Known Allergies      CHIEF COMPLAINT: suspected COVID-19 (04 May 2020 16:14)      INTERVAL EVENTS/HPI  - No acute events overnight  - T(F): , Max: 99.3 (05-05-20 @ 02:00)  - Denies any worsening symptoms  - Tolerating medication  - WBC Count: 12.64 K/uL (05-05-20 @ 04:05)      ROS  General: Denies fevers, chills, nightsweats, weight loss  HEENT: Denies headache, rhinorrhea, sore throat, eye pain  CV: Denies CP, palpitations  PULM: Denies SOB, cough  GI: Denies abdominal pain, diarrhea  : Denies dysuria, hematuria  MSK: Denies arthralgias  SKIN: Denies rash   NEURO: Denies paresthesias, weakness  PSYCH: Denies depression    FH non-contributory   Social Hx non-contributory    VITALS:  T(F): 99.1, Max: 99.3 (05-05-20 @ 02:00)  HR: 102  BP: --  RR: 26Vital Signs Last 24 Hrs  T(C): 37.3 (05 May 2020 04:00), Max: 37.4 (05 May 2020 02:00)  T(F): 99.1 (05 May 2020 04:00), Max: 99.3 (05 May 2020 02:00)  HR: 102 (05 May 2020 06:00) (102 - 113)  BP: --  BP(mean): --  RR: 26 (05 May 2020 06:00) (26 - 30)  SpO2: 97% (05 May 2020 06:00) (86% - 100%)    PHYSICAL EXAM:  Gen: NAD, resting in bed  HEENT: Normocephalic, atraumatic  Neck: supple, no lymphadenopathy  CV: Regular rate & regular rhythm  Lungs: decreased BS at bases, no fremitus  Abdomen: Soft, BS present  Ext: Warm, well perfused  Neuro: non focal, awake  Skin: no rash, no erythema      TESTS & MEASUREMENTS:                        7.2    12.64 )-----------( 155      ( 05 May 2020 04:05 )             23.6     05-05    145  |  103  |  148<HH>  ----------------------------<  254<H>  3.7   |  25  |  4.6<HH>    Ca    7.9<L>      05 May 2020 04:40  Mg     2.7     05-04    TPro  5.5<L>  /  Alb  1.8<L>  /  TBili  0.4  /  DBili  x   /  AST  102<H>  /  ALT  97<H>  /  AlkPhos  188<H>  05-05    eGFR if Non African American: 13 mL/min/1.73M2 (05-05-20 @ 04:40)  eGFR if African American: 15 mL/min/1.73M2 (05-05-20 @ 04:40)    LIVER FUNCTIONS - ( 05 May 2020 04:40 )  Alb: 1.8 g/dL / Pro: 5.5 g/dL / ALK PHOS: 188 U/L / ALT: 97 U/L / AST: 102 U/L / GGT: x               Culture - Blood (collected 05-01-20 @ 11:29)  Source: .Blood None  Preliminary Report (05-02-20 @ 22:01):    No growth to date.    Culture - Sputum (collected 04-29-20 @ 01:00)  Source: .Sputum Sputum  Gram Stain (04-29-20 @ 11:40):    No squamous epithelial cells per low power field    No polymorphonuclear cells seen per low power field    No organisms seen per oil power field  Final Report (05-01-20 @ 08:30):    Normal Respiratory Reyna present            INFECTIOUS DISEASES TESTING  MRSA PCR Result.: Negative (04-26-20 @ 13:10)  Fungitell: 60 pg/mL (04-21-20 @ 13:43)      RADIOLOGY & ADDITIONAL TESTS:  I have personally reviewed the last Chest xray  CXR  Xray Chest 1 View- PORTABLE-Urgent:   EXAM:  XR CHEST PORTABLE URGENT 1V            PROCEDURE DATE:  05/03/2020            INTERPRETATION:  Clinical History / Reason for exam: Endotracheal tube advancement    Comparison : Chest radiograph earlier in the day.    Technique/Positioning: Frontal, portable.    Findings:    Support devices: Tip of endotracheal tube appears to be just below the clavicular heads. Feeding tube is seen coursing below diaphragm, tip not seen. Tubing and telemetry leads overlie patient. Right IJ central line is stable.    Cardiac/mediastinum/hilum: Diffuse subcutaneous emphysema limits evaluation the mediastinum.  Apparent pneumomediastinum appears stable    Lung parenchyma/Pleura: Diffuse subcutaneous emphysema limits evaluation the lung fields. Again seen are bilateral lung opacities    Skeleton/soft tissues: Stable    Impression:      Diffuse subcutaneous emphysema limits evaluation the lung fields. Small pneumothorax cannot be excluded    Again seen are bilateral lung opacities    Apparent stable pneumomediastinum                  KIARA ARIZA M.D., ATTENDING RADIOLOGIST  This document has been electronically signed. May  3 2020  9:52AM             (05-03-20 @ 09:12)      CT      CARDIOLOGY TESTING      MEDICATIONS  aspirin  chewable 81  calcium acetate 2001  chlorhexidine 0.12% Liquid 15  chlorhexidine 4% Liquid 1  cisatracurium Infusion 3  insulin regular Infusion 3  lactulose Syrup 20  meropenem  IVPB 750  methylPREDNISolone sodium succinate Injectable 60  midazolam Infusion 0.02  norepinephrine Infusion 0.05  pantoprazole   Suspension 40  rocuronium Injectable 50  senna 2  sevelamer carbonate Powder 2400  sodium bicarbonate 650  sodium zirconium cyclosilicate 10      ANTIBIOTICS:  meropenem  IVPB 750 milliGRAM(s) IV Intermittent every 24 hours      All available historical data has been reviewed

## 2020-05-05 NOTE — PROGRESS NOTE ADULT - ASSESSMENT
Assessment:    Acute hypoxemic respiratory failure  ARDS  COVID 19  SP convalescent plasma    Possible superimposed bacterial infection   TELLO/ non oliguric  Sub Q air     PLAN:    CNS: Hold nimbex, Wean off versed.     HEENT:  Oral care    PULMONARY:  HOB @ 45 degrees, keep sats 92-96%.  No vent changes for now.  Wean O2 as tolerated to 80%.    CARDIOVASCULAR:  Keep negative. Lasix 60mg IVx1 then 40 q12    GI: GI prophylaxis Protonix, bowel regimen. Feeding: OG feeding    RENAL:  F/u  lytes.  Correct as needed. accurate I/O, renal F/UP, repeat CMP    INFECTIOUS DISEASE:  Continue ABX>  Repeat cultures. Solumedrol 60 mg Q12 Day 3 . Meropenem day 6    HEMATOLOGICAL:  DC AC. Repeat CBC in PM. Keep hb>7    ENDOCRINE:  Follow up FS.  Insulin protocol if needed.    CODE STATUS: FULL CODE    DISPOSITION: Patient require continued monitoring in MICU    Poor prognosis Assessment:    Acute hypoxemic respiratory failure  ARDS  COVID 19  SP convalescent plasma    Possible superimposed bacterial infection   TELLO/ non oliguric  Sub Q air     PLAN:    CNS: Hold nimbex,  Adequate sedation     HEENT:  Oral care    PULMONARY:  HOB @ 45 degrees, keep Sats 92-96%.  No vent changes for now.  Wean O2 as tolerated to 80%.    CARDIOVASCULAR:  Keep negative. Lasix 60mg IVx1 then 40 q12    GI: GI prophylaxis Protonix, bowel regimen. Feeding: OG feeding    RENAL:  F/u  lytes.  Correct as needed. accurate I/O, renal F/UP, repeat CMP    INFECTIOUS DISEASE:  Continue ABX>  Repeat cultures. Solumedrol 60 mg Q12 Day 3 . Meropenem day 6    HEMATOLOGICAL:  DC AC. Repeat CBC in PM. Keep hb>7    ENDOCRINE:  Follow up FS.  Insulin protocol if needed.    CODE STATUS: FULL CODE    DISPOSITION: Patient require continued monitoring in MICU    Poor prognosis

## 2020-05-05 NOTE — PROGRESS NOTE ADULT - SUBJECTIVE AND OBJECTIVE BOX
GENERAL SURGERY PROGRESS NOTE     RHIANNON MAHER  55y  Male    Procedure: s/p left side blowhole for subcutaneous emphysema on 4/22 and right side on 5/4    24hr EVENTS: Left side was oozing, heparin drip held and packing changed during the day. S/P Right side blow hole. No acute events overnight.     Vital Signs Last 24 Hrs  T(C): 36.7 (04 May 2020 20:00), Max: 36.9 (04 May 2020 16:57)  T(F): 98 (04 May 2020 20:00), Max: 98.4 (04 May 2020 16:57)  HR: 113 (04 May 2020 21:17) (102 - 113)  ABP: 124/63 (04 May 2020 20:00) (102/60 - 164/80)  ABP(mean): 82 (04 May 2020 20:00) (73 - 107)  RR: 26 (04 May 2020 20:00) (26 - 30)  SpO2: 100% (04 May 2020 21:17) (86% - 100%)      DIET/FLUIDS: calcium acetate 2001 milliGRAM(s) Oral three times a day with meals  sodium bicarbonate 650 milliGRAM(s) Oral every 12 hours      URINE:   05-03-20 @ 07:01  -  05-04-20 @ 07:00  --------------------------------------------------------  OUT: 2545 mL       GI proph:  pantoprazole   Suspension 40 milliGRAM(s) Oral daily    AC/ proph: aspirin  chewable 81 milliGRAM(s) Oral daily    ABx: meropenem  IVPB 750 milliGRAM(s) IV Intermittent every 24 hours      PHYSICAL EXAM:  CHEST/LUNG: B/L blow holes. Left side packing blood tinge, gauze c/d/i. Right side dry and clean.    LABS:    POCT Blood Glucose.: 268 mg/dL (04 May 2020 22:42)  POCT Blood Glucose.: 193 mg/dL (04 May 2020 20:45)  POCT Blood Glucose.: 130 mg/dL (04 May 2020 17:45)  POCT Blood Glucose.: 113 mg/dL (04 May 2020 16:06)  POCT Blood Glucose.: 134 mg/dL (04 May 2020 14:13)  POCT Blood Glucose.: 209 mg/dL (04 May 2020 12:10)  POCT Blood Glucose.: 223 mg/dL (04 May 2020 10:47)  POCT Blood Glucose.: 197 mg/dL (04 May 2020 07:55)  POCT Blood Glucose.: 162 mg/dL (04 May 2020 06:02)  POCT Blood Glucose.: 163 mg/dL (04 May 2020 04:17)  POCT Blood Glucose.: 165 mg/dL (04 May 2020 01:57)                          8.7    11.76 )-----------( 173      ( 04 May 2020 05:45 )             26.9         05-04    143  |  99  |  132<HH>  ----------------------------<  162<H>  3.6   |  24  |  4.3<HH>      Calcium, Total Serum: 8.2 mg/dL (05-04-20 @ 05:45)      LFTs:             6.2  | 0.5  | 66       ------------------[124     ( 04 May 2020 05:45 )  1.8  | x    | 44          Lipase:x      Amylase:x         Blood Gas Arterial, Lactate: 2.0 mmoL/L (05-04-20 @ 13:15)  Blood Gas Arterial, Lactate: 1.4 mmoL/L (05-04-20 @ 01:47)  Blood Gas Arterial, Lactate: 1.1 mmoL/L (05-03-20 @ 02:03)  Blood Gas Arterial, Lactate: 1.0 mmoL/L (05-02-20 @ 02:05)    ABG - ( 04 May 2020 13:15 )  pH: 7.33  /  pCO2: 50    /  pO2: 60    / HCO3: 26    / Base Excess: x     /  SaO2: 88              ABG - ( 04 May 2020 01:47 )  pH: 7.34  /  pCO2: 50    /  pO2: 69    / HCO3: 27    / Base Excess: 1.0   /  SaO2: 93              ABG - ( 03 May 2020 02:03 )  pH: 7.31  /  pCO2: 54    /  pO2: 66    / HCO3: 27    / Base Excess: 0.8   /  SaO2: 92                Coags:     x      ----< x       ( 03 May 2020 20:14 )     67.7          A/P: s/p bilateral blow holes for extensive subcutaneous emphysema  -Daily occlusive dressings changes and when saturated

## 2020-05-05 NOTE — PROGRESS NOTE ADULT - ASSESSMENT
Acute hypoxemic respiratory failure secondary to SARS-COV-2 infection / TELLO/ hyperkalemia:  # creatinine  stable   # non oliguric   #continue lasix IV   # remains  on meropenem   # ph noted, on  renagel 3/3/3 , and phoslo  # cont sodium bicarbonate   # no  acute indication for RRT  # subcutaneous emphesyma sp decompression   # prognosis poor   # will follow

## 2020-05-05 NOTE — PROGRESS NOTE ADULT - ASSESSMENT
55 year old male patient with hypertension, diabetes presenting for dyspnea.     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 53 > 3.53 > 14.4 with possible  bacterial superinfection LLL  -Sputa 5/1 NGTD  -BCx 4/21,25, 5/1 NG  -nares ORSA NG  -fungitell 61 (normal) off caspo  S/p Anakinra 4/14-17  -S/p convalescent plasma 4/29    On meropenem  IVPB 750 milliGRAM(s) IV Intermittent every 24 hours    RECOMMENDATIONS;   Continue meropenem 750 mg iv q24h

## 2020-05-05 NOTE — PROGRESS NOTE ADULT - SUBJECTIVE AND OBJECTIVE BOX
Nephrology progress note    THIS IS AN INCOMPLETE NOTE . FULL NOTE TO FOLLOW SHORTLY    Patient is seen and examined, events over the last 24 h noted .    Allergies:  No Known Allergies    Hospital Medications:   MEDICATIONS  (STANDING):  aspirin  chewable 81 milliGRAM(s) Oral daily  calcium acetate 2001 milliGRAM(s) Oral three times a day with meals  chlorhexidine 0.12% Liquid 15 milliLiter(s) Oral Mucosa every 12 hours  chlorhexidine 4% Liquid 1 Application(s) Topical <User Schedule>  furosemide   Injectable 40 milliGRAM(s) IV Push two times a day  insulin regular Infusion 3 Unit(s)/Hr (3 mL/Hr) IV Continuous <Continuous>  lactulose Syrup 20 Gram(s) Oral every 12 hours  meropenem  IVPB 750 milliGRAM(s) IV Intermittent every 24 hours  methylPREDNISolone sodium succinate Injectable 60 milliGRAM(s) IV Push two times a day  midazolam Infusion 0.02 mG/kG/Hr (1.76 mL/Hr) IV Continuous <Continuous>  norepinephrine Infusion 0.05 MICROgram(s)/kG/Min (4.13 mL/Hr) IV Continuous <Continuous>  pantoprazole   Suspension 40 milliGRAM(s) Oral daily  rocuronium Injectable 50 milliGRAM(s) IV Push once  senna 2 Tablet(s) Oral at bedtime  sevelamer carbonate Powder 2400 milliGRAM(s) Oral three times a day with meals  sodium bicarbonate 650 milliGRAM(s) Oral every 12 hours        VITALS:  T(F): 99.5 (05-05-20 @ 08:00), Max: 99.5 (05-05-20 @ 08:00)  HR: 124 (05-05-20 @ 08:00)  BP: --  RR: 27 (05-05-20 @ 08:00)  SpO2: 98% (05-05-20 @ 08:00)  Wt(kg): --    05-03 @ 07:01  -  05-04 @ 07:00  --------------------------------------------------------  IN: 3791 mL / OUT: 2545 mL / NET: 1246 mL    05-04 @ 07:01  -  05-05 @ 07:00  --------------------------------------------------------  IN: 2729.2 mL / OUT: 1282 mL / NET: 1447.2 mL    05-05 @ 07:01  -  05-05 @ 09:36  --------------------------------------------------------  IN: 89 mL / OUT: 150 mL / NET: -61 mL          PHYSICAL EXAM:  Constitutional: NAD  HEENT: anicteric sclera, oropharynx clear, MMM  Neck: No JVD  Respiratory: CTAB, no wheezes, rales or rhonchi  Cardiovascular: S1, S2, RRR  Gastrointestinal: BS+, soft, NT/ND  Extremities: No cyanosis or clubbing. No peripheral edema  :  No mendoza.   Skin: No rashes    LABS:  05-05    145  |  103  |  148<HH>  ----------------------------<  254<H>  3.7   |  25  |  4.6<HH>    Ca    7.9<L>      05 May 2020 04:40  Mg     2.7     05-04    TPro  5.5<L>  /  Alb  1.8<L>  /  TBili  0.4  /  DBili      /  AST  102<H>  /  ALT  97<H>  /  AlkPhos  188<H>  05-05                          7.2    12.64 )-----------( 155      ( 05 May 2020 04:05 )             23.6       Urine Studies:      RADIOLOGY & ADDITIONAL STUDIES: Nephrology progress note    Patient is seen and examined, events over the last 24 h noted .  intubated on MV  sp Chest tubes insertion     Allergies:  No Known Allergies    Hospital Medications:   MEDICATIONS  (STANDING):  aspirin  chewable 81 milliGRAM(s) Oral daily  calcium acetate 2001 milliGRAM(s) Oral three times a day with meals  furosemide   Injectable 40 milliGRAM(s) IV Push two times a day  insulin regular Infusion 3 Unit(s)/Hr (3 mL/Hr) IV Continuous <Continuous>  lactulose Syrup 20 Gram(s) Oral every 12 hours  meropenem  IVPB 750 milliGRAM(s) IV Intermittent every 24 hours  methylPREDNISolone sodium succinate Injectable 60 milliGRAM(s) IV Push two times a day  midazolam Infusion 0.02 mG/kG/Hr (1.76 mL/Hr) IV Continuous <Continuous>  norepinephrine Infusion 0.05 MICROgram(s)/kG/Min (4.13 mL/Hr) IV Continuous <Continuous>  pantoprazole   Suspension 40 milliGRAM(s) Oral daily  rocuronium Injectable 50 milliGRAM(s) IV Push once  senna 2 Tablet(s) Oral at bedtime  sevelamer carbonate Powder 2400 milliGRAM(s) Oral three times a day with meals  sodium bicarbonate 650 milliGRAM(s) Oral every 12 hours        VITALS:  T(F): 99.5 (05-05-20 @ 08:00), Max: 99.5 (05-05-20 @ 08:00)  HR: 124 (05-05-20 @ 08:00)  BP: 98/65  RR: 27 (05-05-20 @ 08:00)  SpO2: 98% (05-05-20 @ 08:00)  Wt(kg): --    05-03 @ 07:01  -  05-04 @ 07:00  --------------------------------------------------------  IN: 3791 mL / OUT: 2545 mL / NET: 1246 mL    05-04 @ 07:01  -  05-05 @ 07:00  --------------------------------------------------------  IN: 2729.2 mL / OUT: 1282 mL / NET: 1447.2 mL    05-05 @ 07:01  -  05-05 @ 09:36  --------------------------------------------------------  IN: 89 mL / OUT: 150 mL / NET: -61 mL          PHYSICAL EXAM:  Constitutional: intubated on MV   HEENT: facial swelling better compared to yesterday   Neck: No JVD  Respiratory: CTAB, no wheezes, rales or rhonchi  Cardiovascular: S1, S2, RRR  Gastrointestinal: BS+, soft, NT/ND  Extremities: No cyanosis or clubbing. plus one edema   :  No mendoza.   Skin: No rashes    LABS:  05-05    145  |  103  |  148<HH>  ----------------------------<  254<H>  3.7   |  25  |  4.6<HH>  Creatinine Trend: 4.6<--, 4.3<--, 4.9<--, 4.7<--, 5.2<--, 5.1<--  Ca    7.9<L>      05 May 2020 04:40  Mg     2.7     05-04    TPro  5.5<L>  /  Alb  1.8<L>  /  TBili  0.4  /  DBili      /  AST  102<H>  /  ALT  97<H>  /  AlkPhos  188<H>  05-05                          7.2    12.64 )-----------( 155      ( 05 May 2020 04:05 )             23.6       Urine Studies:      RADIOLOGY & ADDITIONAL STUDIES:

## 2020-05-06 NOTE — PROGRESS NOTE ADULT - SUBJECTIVE AND OBJECTIVE BOX
Patient is a 55y old  Male who presents with a chief complaint of suspected COVID-19 (06 May 2020 09:22)        Over Night Events:  On MV.  On Levophed 0.08.  Sedated         ROS:     All ROS are negative except HPI         PHYSICAL EXAM    ICU Vital Signs Last 24 Hrs  T(C): 36.4 (06 May 2020 06:00), Max: 37.1 (05 May 2020 14:00)  T(F): 97.5 (06 May 2020 06:00), Max: 98.8 (05 May 2020 14:00)  HR: 104 (06 May 2020 09:00) (90 - 126)  BP: --  BP(mean): --  ABP: 124/57 (06 May 2020 09:00) (94/55 - 149/70)  ABP(mean): 76 (06 May 2020 09:00) (67 - 95)  RR: 29 (06 May 2020 06:00) (26 - 31)  SpO2: 95% (06 May 2020 09:00) (76% - 96%)      CONSTITUTIONAL:  Ill appearing  Well nourished.  NAD    ENT:   Airway patent,   Mouth with normal mucosa.   No thrush    EYES:   Pupils equal,   Round and reactive to light.    CARDIAC:   Tachycardic   Regular rhythm.    No edema      Vascular:  Normal systolic impulse  No Carotid bruits    RESPIRATORY:   No wheezing  Bilateral BS  Normal chest expansion  Not tachypneic,  No use of accessory muscles    GASTROINTESTINAL:  Abdomen soft,   Non-tender,   No guarding,   + BS    GENITOURINARY  normal genitalia for sex  no edema    MUSCULOSKELETAL:   Range of motion is not limited,  No clubbing, cyanosis    NEUROLOGICAL:   Sedated     SKIN:   Skin normal color for race,   Warm and dry and intact.   No evidence of rash.    PSYCHIATRIC:   Normal mood and affect.   No apparent risk to self or others.    HEMATOLOGICAL:  No cervical  lymphadenopathy.  no inguinal lymphadenopathy      05-05-20 @ 07:01  -  05-06-20 @ 07:00  --------------------------------------------------------  IN:    cisatracurium Infusion: 52.8 mL    dexmedetomidine Infusion: 528 mL    Enteral Tube Flush: 300 mL    fentaNYL Infusion.: 316.8 mL    insulin regular Infusion: 107 mL    midazolam Infusion: 30 mL    norepinephrine Infusion: 244.3 mL    propofol Infusion: 422 mL    Vital High Protein: 1440 mL  Total IN: 3440.9 mL    OUT:    Indwelling Catheter - Urethral: 3070 mL  Total OUT: 3070 mL    Total NET: 370.9 mL          LABS:                            7.6    15.22 )-----------( 187      ( 06 May 2020 03:50 )             23.1                                               05-06    144  |  101  |  166<HH>  ----------------------------<  214<H>  3.8   |  25  |  4.5<HH>    Ca    8.2<L>      06 May 2020 03:50  Phos  6.4     05-06  Mg     3.1     05-06    TPro  5.5<L>  /  Alb  1.8<L>  /  TBili  0.4  /  DBili  x   /  AST  102<H>  /  ALT  97<H>  /  AlkPhos  188<H>  05-05                                                                                           LIVER FUNCTIONS - ( 05 May 2020 04:40 )  Alb: 1.8 g/dL / Pro: 5.5 g/dL / ALK PHOS: 188 U/L / ALT: 97 U/L / AST: 102 U/L / GGT: x                                              390 26 100% PEEP 10                                                                                      ABG - ( 06 May 2020 03:33 )  pH, Arterial: 7.36  pH, Blood: x     /  pCO2: 46    /  pO2: 85    / HCO3: 26    / Base Excess: 0.2   /  SaO2: 97                  MEDICATIONS  (STANDING):  aspirin  chewable 81 milliGRAM(s) Oral daily  calcium acetate 2001 milliGRAM(s) Oral three times a day with meals  chlorhexidine 0.12% Liquid 15 milliLiter(s) Oral Mucosa every 12 hours  chlorhexidine 4% Liquid 1 Application(s) Topical <User Schedule>  dexMEDEtomidine Infusion 1.5 MICROgram(s)/kG/Hr (33 mL/Hr) IV Continuous <Continuous>  dexMEDEtomidine Infusion 0.2 MICROgram(s)/kG/Hr (4.4 mL/Hr) IV Continuous <Continuous>  fentaNYL   Infusion. 0.5 MICROgram(s)/kG/Hr (4.4 mL/Hr) IV Continuous <Continuous>  furosemide   Injectable 40 milliGRAM(s) IV Push two times a day  insulin regular Infusion 3 Unit(s)/Hr (3 mL/Hr) IV Continuous <Continuous>  lactulose Syrup 20 Gram(s) Oral every 12 hours  meropenem  IVPB 750 milliGRAM(s) IV Intermittent every 24 hours  methylPREDNISolone sodium succinate Injectable 60 milliGRAM(s) IV Push two times a day  norepinephrine Infusion 0.05 MICROgram(s)/kG/Min (4.13 mL/Hr) IV Continuous <Continuous>  pantoprazole   Suspension 40 milliGRAM(s) Oral daily  propofol Infusion 10 MICROgram(s)/kG/Min (5.28 mL/Hr) IV Continuous <Continuous>  rocuronium Injectable 50 milliGRAM(s) IV Push once  senna 2 Tablet(s) Oral at bedtime  sevelamer carbonate Powder 2400 milliGRAM(s) Oral three times a day with meals  sodium bicarbonate 650 milliGRAM(s) Oral every 12 hours    MEDICATIONS  (PRN):  acetaminophen   Tablet .. 650 milliGRAM(s) Oral every 6 hours PRN Temp greater or equal to 38C (100.4F)  ondansetron Injectable 4 milliGRAM(s) IV Push every 8 hours PRN Nausea and/or Vomiting      New X-rays reviewed:                                                                                  ECHO    CXR interpreted by me:  ET OG OK>  Bilateral infiltrates and subq air

## 2020-05-06 NOTE — PROGRESS NOTE ADULT - SUBJECTIVE AND OBJECTIVE BOX
SUBJECTIVE:    Patient is a 55y old Male who presents with a chief complaint of suspected COVID-19 (06 May 2020 09:38)    Currently admitted to medicine with the primary diagnosis of Suspected 2019 novel coronavirus infection     Today is hospital day 23d. This morning he is resting comfortably in bed and reports no new issues or overnight events.     PAST MEDICAL & SURGICAL HISTORY  Diabetes  Hypertension  No significant past surgical history    SOCIAL HISTORY:  Negative for smoking/alcohol/drug use.     ALLERGIES:  No Known Allergies    MEDICATIONS:  STANDING MEDICATIONS  aspirin  chewable 81 milliGRAM(s) Oral daily  calcium acetate 2001 milliGRAM(s) Oral three times a day with meals  chlorhexidine 0.12% Liquid 15 milliLiter(s) Oral Mucosa every 12 hours  chlorhexidine 4% Liquid 1 Application(s) Topical <User Schedule>  dexMEDEtomidine Infusion 1.5 MICROgram(s)/kG/Hr IV Continuous <Continuous>  dexMEDEtomidine Infusion 0.2 MICROgram(s)/kG/Hr IV Continuous <Continuous>  fentaNYL   Infusion. 0.5 MICROgram(s)/kG/Hr IV Continuous <Continuous>  furosemide   Injectable 40 milliGRAM(s) IV Push two times a day  heparin   Injectable 5000 Unit(s) SubCutaneous every 8 hours  insulin regular Infusion 3 Unit(s)/Hr IV Continuous <Continuous>  lactulose Syrup 20 Gram(s) Oral every 12 hours  meropenem  IVPB 750 milliGRAM(s) IV Intermittent every 24 hours  methylPREDNISolone sodium succinate Injectable 60 milliGRAM(s) IV Push two times a day  norepinephrine Infusion 0.05 MICROgram(s)/kG/Min IV Continuous <Continuous>  pantoprazole   Suspension 40 milliGRAM(s) Oral daily  rocuronium Injectable 50 milliGRAM(s) IV Push once  senna 2 Tablet(s) Oral at bedtime  sevelamer carbonate Powder 2400 milliGRAM(s) Oral three times a day with meals  sodium bicarbonate 650 milliGRAM(s) Oral every 12 hours    PRN MEDICATIONS  acetaminophen   Tablet .. 650 milliGRAM(s) Oral every 6 hours PRN  ondansetron Injectable 4 milliGRAM(s) IV Push every 8 hours PRN    VITALS:   T(F): 97.5  HR: 104  BP: --  RR: 29  SpO2: 95%    LABS:                        7.6    15.22 )-----------( 187      ( 06 May 2020 03:50 )             23.1     05-06    144  |  101  |  166<HH>  ----------------------------<  214<H>  3.8   |  25  |  4.5<HH>    Ca    8.2<L>      06 May 2020 03:50  Phos  6.4     05-06  Mg     3.1     05-06    TPro  5.5<L>  /  Alb  1.8<L>  /  TBili  0.4  /  DBili  x   /  AST  102<H>  /  ALT  97<H>  /  AlkPhos  188<H>  05-05        ABG - ( 06 May 2020 03:33 )  pH, Arterial: 7.36  pH, Blood: x     /  pCO2: 46    /  pO2: 85    / HCO3: 26    / Base Excess: 0.2   /  SaO2: 97                        RADIOLOGY:    PHYSICAL EXAM:  GEN: No acute distress  LUNGS: Clear to auscultation bilaterally   HEART: S1/S2 present. RRR.   ABD: Soft, non-tender, non-distended. Bowel sounds present  EXT: NC/NC/NE/2+PP/LEUNG  NEURO: AAOX3

## 2020-05-06 NOTE — PROGRESS NOTE ADULT - ASSESSMENT
Assessment:    Acute hypoxemic respiratory failure  ARDS  COVID 19  SP convalescent plasma    Possible superimposed bacterial infection   TELLO/ non oliguric  Sub Q air     PLAN:    CNS: Hold paralysis. SAT.      HEENT:  Oral care    PULMONARY:  HOB @ 45 degrees, keep Sats 92-96%.  No vent changes for now.  Wean O2 as tolerated.   PEEP 12. (PPL 20 450 is still leess than 8 ml / Kg)     CARDIOVASCULAR:  Avoid volume overload     GI: GI prophylaxis Protonix, bowel regimen. Feeding: OG feeding    RENAL:  F/u  lytes.  Correct as needed. accurate I/O, renal F/UP, repeat CMP    INFECTIOUS DISEASE:  Continue ABX>  Repeat cultures. Solumedrol 60 mg Q12 Day 4 . Meropenem day 7    HEMATOLOGICAL:  DVT Prophylaxis.  FU CBC     ENDOCRINE:  Follow up FS.  Insulin protocol if needed.    CODE STATUS: FULL CODE    DISPOSITION: Patient require continued monitoring in MICU    Poor prognosis

## 2020-05-06 NOTE — PROGRESS NOTE ADULT - ASSESSMENT
Acute hypoxemic respiratory failure secondary to SARS-COV-2 infection / TELLO/ hyperkalemia:  # creatinine  stable   # non oliguric / high BUN due to steroids use  #continue lasix IV   # remains  on meropenem   # ph noted, on  renagel 3/3/3 , and phoslo  # cont sodium bicarbonate   # no  acute indication for RRT  # subcutaneous emphesyma sp decompression   # prognosis poor   # will follow

## 2020-05-06 NOTE — PROGRESS NOTE ADULT - ASSESSMENT
Assessment:    Acute hypoxemic respiratory failure  ARDS  COVID 19  SP convalescent plasma    Possible superimposed bacterial infection   TELLO/ non oliguric  Sub Q air     PLAN:    CNS: Hold paralysis. SAT.      HEENT:  Oral care    PULMONARY:  HOB @ 45 degrees, keep Sats 92-96%.  No vent changes for now.  Wean O2 as tolerated.   PEEP 12. (PPL 20 450 is still leess than 8 ml / Kg)     CARDIOVASCULAR:  Avoid volume overload     GI: GI prophylaxis Protonix, bowel regimen. Feeding: OG feeding    RENAL:  F/u  lytes.  Correct as needed. accurate I/O, renal F/UP, repeat CMP    INFECTIOUS DISEASE:    #) Acute hypoxemic respiratory failure, ARDS, COVID 19- Probable superimposed infection  -SP convalescent plasma    -Solumedrol 60 mg Q12 Day 4   #) Possible superimposed bacterial infection   Continue ABX>  Repeat cultures. Meropenem day 7    HEMATOLOGICAL:  DVT Prophylaxis.  FU CBC     ENDOCRINE:  Follow up FS.  Insulin protocol if needed.    CODE STATUS: FULL CODE    DISPOSITION: Patient require continued monitoring in MICU    Poor prognosis Assessment:    Acute hypoxemic respiratory failure  ARDS  COVID 19  SP convalescent plasma    Possible superimposed bacterial infection   TELLO/ non oliguric  Sub Q air     PLAN:    CNS: Hold paralysis. SAT.      HEENT:  Oral care    PULMONARY:  HOB @ 45 degrees, keep Sats 92-96%.  No vent changes for now.  Wean O2 as tolerated.   PEEP 12. (PPL 20 450 is still leess than 8 ml / Kg)     CARDIOVASCULAR:  Avoid volume overload     GI: GI prophylaxis Protonix, bowel regimen. Feeding: OG feeding    RENAL:    #) TELLO/ non oliguric  F/u  lytes.  Correct as needed. accurate I/O, renal F/UP, repeat CMP    INFECTIOUS DISEASE:    #) Acute hypoxemic respiratory failure, ARDS, COVID 19- Probable superimposed infection  -SP convalescent plasma    -Solumedrol 60 mg Q12 Day 4   #) Possible superimposed bacterial infection   Continue ABX>  Repeat cultures. Meropenem day 7    HEMATOLOGICAL:  DVT Prophylaxis.  FU CBC     ENDOCRINE:  Follow up FS.  Insulin protocol if needed.    CODE STATUS: FULL CODE    DISPOSITION: Patient require continued monitoring in MICU    Poor prognosis

## 2020-05-06 NOTE — PROGRESS NOTE ADULT - SUBJECTIVE AND OBJECTIVE BOX
Nephrology progress note    THIS IS AN INCOMPLETE NOTE . FULL NOTE TO FOLLOW SHORTLY    Patient is seen and examined, events over the last 24 h noted .    Allergies:  No Known Allergies    Hospital Medications:   MEDICATIONS  (STANDING):  aspirin  chewable 81 milliGRAM(s) Oral daily  calcium acetate 2001 milliGRAM(s) Oral three times a day with meals  chlorhexidine 0.12% Liquid 15 milliLiter(s) Oral Mucosa every 12 hours  chlorhexidine 4% Liquid 1 Application(s) Topical <User Schedule>  dexMEDEtomidine Infusion 1.5 MICROgram(s)/kG/Hr (33 mL/Hr) IV Continuous <Continuous>  dexMEDEtomidine Infusion 0.2 MICROgram(s)/kG/Hr (4.4 mL/Hr) IV Continuous <Continuous>  fentaNYL   Infusion. 0.5 MICROgram(s)/kG/Hr (4.4 mL/Hr) IV Continuous <Continuous>  furosemide   Injectable 40 milliGRAM(s) IV Push two times a day  insulin regular Infusion 3 Unit(s)/Hr (3 mL/Hr) IV Continuous <Continuous>  lactulose Syrup 20 Gram(s) Oral every 12 hours  meropenem  IVPB 750 milliGRAM(s) IV Intermittent every 24 hours  methylPREDNISolone sodium succinate Injectable 60 milliGRAM(s) IV Push two times a day  norepinephrine Infusion 0.05 MICROgram(s)/kG/Min (4.13 mL/Hr) IV Continuous <Continuous>  pantoprazole   Suspension 40 milliGRAM(s) Oral daily  propofol Infusion 10 MICROgram(s)/kG/Min (5.28 mL/Hr) IV Continuous <Continuous>  rocuronium Injectable 50 milliGRAM(s) IV Push once  senna 2 Tablet(s) Oral at bedtime  sevelamer carbonate Powder 2400 milliGRAM(s) Oral three times a day with meals  sodium bicarbonate 650 milliGRAM(s) Oral every 12 hours        VITALS:  T(F): 97.5 (05-06-20 @ 06:00), Max: 98.8 (05-05-20 @ 14:00)  HR: 90 (05-06-20 @ 06:00)  BP: --  RR: 29 (05-06-20 @ 06:00)  SpO2: 93% (05-06-20 @ 06:00)  Wt(kg): --    05-04 @ 07:01  -  05-05 @ 07:00  --------------------------------------------------------  IN: 2729.2 mL / OUT: 1282 mL / NET: 1447.2 mL    05-05 @ 07:01  -  05-06 @ 07:00  --------------------------------------------------------  IN: 3440.9 mL / OUT: 3070 mL / NET: 370.9 mL          PHYSICAL EXAM:  Constitutional: NAD  HEENT: anicteric sclera, oropharynx clear, MMM  Neck: No JVD  Respiratory: CTAB, no wheezes, rales or rhonchi  Cardiovascular: S1, S2, RRR  Gastrointestinal: BS+, soft, NT/ND  Extremities: No cyanosis or clubbing. No peripheral edema  :  No mendoza.   Skin: No rashes    LABS:  05-06    144  |  101  |  166<HH>  ----------------------------<  214<H>  3.8   |  25  |  4.5<HH>    Ca    8.2<L>      06 May 2020 03:50  Phos  6.4     05-06  Mg     3.1     05-06    TPro  5.5<L>  /  Alb  1.8<L>  /  TBili  0.4  /  DBili      /  AST  102<H>  /  ALT  97<H>  /  AlkPhos  188<H>  05-05                          7.6    15.22 )-----------( 187      ( 06 May 2020 03:50 )             23.1       Urine Studies:      RADIOLOGY & ADDITIONAL STUDIES: Nephrology progress note  Patient is seen and examined, events over the last 24 h noted .  still intubated on MV  non oliguric     Allergies:  No Known Allergies    Hospital Medications:   MEDICATIONS  (STANDING):  aspirin  chewable 81 milliGRAM(s) Oral daily  calcium acetate 2001 milliGRAM(s) Oral three times a day with meals  dexMEDEtomidine Infusion 1.5 MICROgram(s)/kG/Hr (33 mL/Hr) IV Continuous <Continuous>  dexMEDEtomidine Infusion 0.2 MICROgram(s)/kG/Hr (4.4 mL/Hr) IV Continuous <Continuous>  fentaNYL   Infusion. 0.5 MICROgram(s)/kG/Hr (4.4 mL/Hr) IV Continuous <Continuous>  furosemide   Injectable 40 milliGRAM(s) IV Push two times a day  insulin regular Infusion 3 Unit(s)/Hr (3 mL/Hr) IV Continuous <Continuous>  lactulose Syrup 20 Gram(s) Oral every 12 hours  meropenem  IVPB 750 milliGRAM(s) IV Intermittent every 24 hours  methylPREDNISolone sodium succinate Injectable 60 milliGRAM(s) IV Push two times a day  norepinephrine Infusion 0.05 MICROgram(s)/kG/Min (4.13 mL/Hr) IV Continuous <Continuous>  pantoprazole   Suspension 40 milliGRAM(s) Oral daily  propofol Infusion 10 MICROgram(s)/kG/Min (5.28 mL/Hr) IV Continuous <Continuous>  rocuronium Injectable 50 milliGRAM(s) IV Push once  senna 2 Tablet(s) Oral at bedtime  sevelamer carbonate Powder 2400 milliGRAM(s) Oral three times a day with meals  sodium bicarbonate 650 milliGRAM(s) Oral every 12 hours        VITALS:  T(F): 97.5 (05-06-20 @ 06:00), Max: 98.8 (05-05-20 @ 14:00)  HR: 90 (05-06-20 @ 06:00)  RR: 29 (05-06-20 @ 06:00)  SpO2: 93% (05-06-20 @ 06:00)  Wt(kg): --    05-04 @ 07:01  -  05-05 @ 07:00  --------------------------------------------------------  IN: 2729.2 mL / OUT: 1282 mL / NET: 1447.2 mL    05-05 @ 07:01  -  05-06 @ 07:00  --------------------------------------------------------  IN: 3440.9 mL / OUT: 3070 mL / NET: 370.9 mL          PHYSICAL EXAM:  Constitutional: intubated on MV   HEENT: anicteric sclera, oropharynx clear, MMM  Neck: No JVD  Respiratory: CTAB, no wheezes, rales or rhonchi  Cardiovascular: S1, S2, RRR  Gastrointestinal: BS+, soft, NT/ND  Extremities: No cyanosis or clubbing. No peripheral edema  :  positive mendoza   Skin: No rashes    LABS:  05-06    144  |  101  |  166<HH>  ----------------------------<  214<H>  3.8   |  25  |  4.5<HH>  Creatinine Trend: 4.5<--, 4.6<--, 4.3<--, 4.9<--, 4.7<--, 5.2<--  Ca    8.2<L>      06 May 2020 03:50  Phos  6.4     05-06  Mg     3.1     05-06    TPro  5.5<L>  /  Alb  1.8<L>  /  TBili  0.4  /  DBili      /  AST  102<H>  /  ALT  97<H>  /  AlkPhos  188<H>  05-05                          7.6    15.22 )-----------( 187      ( 06 May 2020 03:50 )             23.1       Urine Studies:      RADIOLOGY & ADDITIONAL STUDIES:

## 2020-05-06 NOTE — PROGRESS NOTE ADULT - SUBJECTIVE AND OBJECTIVE BOX
SURGERY PROGRESS NOTE     RHIANNON MAHER  55y  Male    Procedure: s/p left side blowhole for subcutaneous emphysema on 4/22 and right side on 5/4    24hr EVENTS: Afternoon CXR with question of new left apical ptx, repeat cxr stable. No acute events overnight.     ICU Vital Signs Last 24 Hrs  T(C): 36.1 (06 May 2020 00:00), Max: 37.5 (05 May 2020 08:00)  T(F): 97 (06 May 2020 00:00), Max: 99.5 (05 May 2020 08:00)  HR: 90 (05 May 2020 22:00) (90 - 126)  ABP: 149/70 (05 May 2020 22:00) (94/55 - 149/70)  ABP(mean): 94 (05 May 2020 22:00) (63 - 95)  RR: 29 (05 May 2020 22:00) (26 - 31)  SpO2: 93% (05 May 2020 22:00) (76% - 100%)      DIET/FLUIDS: calcium acetate 2001 milliGRAM(s) Oral three times a day with meals  sodium bicarbonate 650 milliGRAM(s) Oral every 12 hours      URINE:   05-04-20 @ 07:01  -  05-05-20 @ 07:00  --------------------------------------------------------  OUT: 1282 mL       GI proph:  pantoprazole   Suspension 40 milliGRAM(s) Oral daily    AC/ proph: aspirin  chewable 81 milliGRAM(s) Oral daily    ABx: meropenem  IVPB 750 milliGRAM(s) IV Intermittent every 24 hours      PHYSICAL EXAM:  GENERAL: NAD, well-appearing  CHEST/LUNG: Clear to auscultation bilaterally  HEART: Regular rate and rhythm  ABDOMEN: Soft, Nontender, Nondistended;   EXTREMITIES:  No clubbing, cyanosis, or edema      LABS  Labs:  CAPILLARY BLOOD GLUCOSE      POCT Blood Glucose.: 248 mg/dL (05 May 2020 23:57)  POCT Blood Glucose.: 231 mg/dL (05 May 2020 23:25)  POCT Blood Glucose.: 194 mg/dL (05 May 2020 22:10)  POCT Blood Glucose.: 193 mg/dL (05 May 2020 20:57)  POCT Blood Glucose.: 167 mg/dL (05 May 2020 19:38)  POCT Blood Glucose.: 127 mg/dL (05 May 2020 18:37)  POCT Blood Glucose.: 108 mg/dL (05 May 2020 17:42)  POCT Blood Glucose.: 142 mg/dL (05 May 2020 16:39)  POCT Blood Glucose.: 169 mg/dL (05 May 2020 15:33)  POCT Blood Glucose.: 225 mg/dL (05 May 2020 13:51)  POCT Blood Glucose.: 182 mg/dL (05 May 2020 11:45)  POCT Blood Glucose.: 150 mg/dL (05 May 2020 10:13)  POCT Blood Glucose.: 142 mg/dL (05 May 2020 09:13)  POCT Blood Glucose.: 110 mg/dL (05 May 2020 07:41)  POCT Blood Glucose.: 178 mg/dL (05 May 2020 06:25)  POCT Blood Glucose.: 199 mg/dL (05 May 2020 05:28)  POCT Blood Glucose.: 228 mg/dL (05 May 2020 04:25)  POCT Blood Glucose.: 277 mg/dL (05 May 2020 03:22)  POCT Blood Glucose.: 291 mg/dL (05 May 2020 00:48)                          7.6    16.42 )-----------( 198      ( 05 May 2020 16:50 )             24.4       Auto Neutrophil %: 81.1 % (05-05-20 @ 16:50)  Auto Immature Granulocyte %: 9.3 % (05-05-20 @ 16:50)    05-05    145  |  103  |  148<HH>  ----------------------------<  254<H>  3.7   |  25  |  4.6<HH>      Calcium, Total Serum: 7.9 mg/dL (05-05-20 @ 04:40)      LFTs:             5.5  | 0.4  | 102      ------------------[188     ( 05 May 2020 04:40 )  1.8  | x    | 97          Lipase:x      Amylase:x         Blood Gas Arterial, Lactate: 2.5 mmoL/L (05-05-20 @ 01:51)  Blood Gas Arterial, Lactate: 2.0 mmoL/L (05-04-20 @ 13:15)  Blood Gas Arterial, Lactate: 1.4 mmoL/L (05-04-20 @ 01:47)  Blood Gas Arterial, Lactate: 1.1 mmoL/L (05-03-20 @ 02:03)    ABG - ( 05 May 2020 01:51 )  pH: 7.24  /  pCO2: 60    /  pO2: 124   / HCO3: 26    / Base Excess: -1.8  /  SaO2: 99              ABG - ( 04 May 2020 13:15 )  pH: 7.33  /  pCO2: 50    /  pO2: 60    / HCO3: 26    / Base Excess: x     /  SaO2: 88              ABG - ( 04 May 2020 01:47 )  pH: 7.34  /  pCO2: 50    /  pO2: 69    / HCO3: 27    / Base Excess: 1.0   /  SaO2: 93            RADIOLOGY & ADDITIONAL TESTS:    < from: Xray Chest 1 View- PORTABLE-Urgent (05.05.20 @ 21:15) >  Impression:      Stable extensive bilateral airspace opacities. Subcutaneous emphysema, pneumomediastinum, small right apical pneumothorax are again noted. The air gap on the right is 1.2 cm. The pneumothorax and air gap on the left is not clearly visualized.    < end of copied text >        A/P: s/p bilateral blow holes for extensive subcutaneous emphysema, concern for new/worsening ptx  -CXR stable  -Daily occlusive dressings changes and when saturated    -AM CXR SURGERY PROGRESS NOTE     RHIANNON MAHER  55y  Male    Procedure: s/p left side blowhole for subcutaneous emphysema on 4/22 and right side on 5/4    24hr EVENTS: Afternoon CXR with question of new left apical ptx, repeat cxr stable. No acute events overnight.     ICU Vital Signs Last 24 Hrs  T(C): 36.1 (06 May 2020 00:00), Max: 37.5 (05 May 2020 08:00)  T(F): 97 (06 May 2020 00:00), Max: 99.5 (05 May 2020 08:00)  HR: 90 (05 May 2020 22:00) (90 - 126)  ABP: 149/70 (05 May 2020 22:00) (94/55 - 149/70)  ABP(mean): 94 (05 May 2020 22:00) (63 - 95)  RR: 29 (05 May 2020 22:00) (26 - 31)  SpO2: 93% (05 May 2020 22:00) (76% - 100%)      DIET/FLUIDS: calcium acetate 2001 milliGRAM(s) Oral three times a day with meals  sodium bicarbonate 650 milliGRAM(s) Oral every 12 hours      URINE:   05-04-20 @ 07:01  -  05-05-20 @ 07:00  --------------------------------------------------------  OUT: 1282 mL       GI proph:  pantoprazole   Suspension 40 milliGRAM(s) Oral daily    AC/ proph: aspirin  chewable 81 milliGRAM(s) Oral daily    ABx: meropenem  IVPB 750 milliGRAM(s) IV Intermittent every 24 hours      LABS  Labs:  CAPILLARY BLOOD GLUCOSE      POCT Blood Glucose.: 248 mg/dL (05 May 2020 23:57)  POCT Blood Glucose.: 231 mg/dL (05 May 2020 23:25)  POCT Blood Glucose.: 194 mg/dL (05 May 2020 22:10)  POCT Blood Glucose.: 193 mg/dL (05 May 2020 20:57)  POCT Blood Glucose.: 167 mg/dL (05 May 2020 19:38)  POCT Blood Glucose.: 127 mg/dL (05 May 2020 18:37)  POCT Blood Glucose.: 108 mg/dL (05 May 2020 17:42)  POCT Blood Glucose.: 142 mg/dL (05 May 2020 16:39)  POCT Blood Glucose.: 169 mg/dL (05 May 2020 15:33)  POCT Blood Glucose.: 225 mg/dL (05 May 2020 13:51)  POCT Blood Glucose.: 182 mg/dL (05 May 2020 11:45)  POCT Blood Glucose.: 150 mg/dL (05 May 2020 10:13)  POCT Blood Glucose.: 142 mg/dL (05 May 2020 09:13)  POCT Blood Glucose.: 110 mg/dL (05 May 2020 07:41)  POCT Blood Glucose.: 178 mg/dL (05 May 2020 06:25)  POCT Blood Glucose.: 199 mg/dL (05 May 2020 05:28)  POCT Blood Glucose.: 228 mg/dL (05 May 2020 04:25)  POCT Blood Glucose.: 277 mg/dL (05 May 2020 03:22)  POCT Blood Glucose.: 291 mg/dL (05 May 2020 00:48)                          7.6    16.42 )-----------( 198      ( 05 May 2020 16:50 )             24.4       Auto Neutrophil %: 81.1 % (05-05-20 @ 16:50)  Auto Immature Granulocyte %: 9.3 % (05-05-20 @ 16:50)    05-05    145  |  103  |  148<HH>  ----------------------------<  254<H>  3.7   |  25  |  4.6<HH>      Calcium, Total Serum: 7.9 mg/dL (05-05-20 @ 04:40)      LFTs:             5.5  | 0.4  | 102      ------------------[188     ( 05 May 2020 04:40 )  1.8  | x    | 97          Lipase:x      Amylase:x         Blood Gas Arterial, Lactate: 2.5 mmoL/L (05-05-20 @ 01:51)  Blood Gas Arterial, Lactate: 2.0 mmoL/L (05-04-20 @ 13:15)  Blood Gas Arterial, Lactate: 1.4 mmoL/L (05-04-20 @ 01:47)  Blood Gas Arterial, Lactate: 1.1 mmoL/L (05-03-20 @ 02:03)    ABG - ( 05 May 2020 01:51 )  pH: 7.24  /  pCO2: 60    /  pO2: 124   / HCO3: 26    / Base Excess: -1.8  /  SaO2: 99              ABG - ( 04 May 2020 13:15 )  pH: 7.33  /  pCO2: 50    /  pO2: 60    / HCO3: 26    / Base Excess: x     /  SaO2: 88              ABG - ( 04 May 2020 01:47 )  pH: 7.34  /  pCO2: 50    /  pO2: 69    / HCO3: 27    / Base Excess: 1.0   /  SaO2: 93            RADIOLOGY & ADDITIONAL TESTS:    < from: Xray Chest 1 View- PORTABLE-Urgent (05.05.20 @ 21:15) >  Impression:      Stable extensive bilateral airspace opacities. Subcutaneous emphysema, pneumomediastinum, small right apical pneumothorax are again noted. The air gap on the right is 1.2 cm. The pneumothorax and air gap on the left is not clearly visualized.    < end of copied text >        A/P: s/p bilateral blow holes for extensive subcutaneous emphysema, concern for new/worsening ptx  -CXR stable  -Daily occlusive dressings changes and when saturated    -AM CXR

## 2020-05-07 NOTE — PROGRESS NOTE ADULT - ASSESSMENT
Assessment:    Acute hypoxemic respiratory failure  ARDS  COVID 19  SP convalescent plasma    Possible superimposed bacterial infection   TELLO/ non oliguric.  Post ATN diuresis   Sub Q air     PLAN:    CNS: Adequate sedation     HEENT:  Oral care    PULMONARY:    #) Acute hypoxemic respiratory failure/ ARDS  -HOB @ 45 degrees, keep Sats 92-96%.    CARDIOVASCULAR:    #) Hypovolemic Hypernatremia, post ATN diuresis  -Avoid volume overload.   cc per hour for now.  FU UO ( Now 250 cc per hour UO). Added free water 300 q4h.    GI: GI prophylaxis Protonix, bowel regimen. Feeding: OG feeding.  Free H2O.      RENAL:    #) TELLO/ non oliguric.  F/u  lytes.  Correct as needed. accurate I/O, renal F/UP, repeat CMP    INFECTIOUS DISEASE:    #) COVID 19, SP convalescent plasma, Possible superimposed bacterial infection   -Continue ABX>  Repeat cultures. Solumedrol 60 mg Q24 Day 6 . DC Meropenem     HEMATOLOGICAL:  DVT Prophylaxis.  FU CBC     ENDOCRINE:  Follow up FS.  Insulin protocol if needed.    CODE STATUS: FULL CODE    DISPOSITION: Patient require continued monitoring in MICU    Poor prognosis

## 2020-05-07 NOTE — PROGRESS NOTE ADULT - SUBJECTIVE AND OBJECTIVE BOX
RHIANNON MAHER  55y, Male    All available historical data reviewed    OVERNIGHT EVENTS:  low grade fevers  fio2 100%    ROS:  unable to obtain history secondary to patient's mental status and/or sedation     VITALS:  T(F): 96.8, Max: 100.4 (05-06-20 @ 12:00)  HR: 88  BP: --  RR: 26Vital Signs Last 24 Hrs  T(C): 36 (07 May 2020 10:00), Max: 38 (06 May 2020 12:00)  T(F): 96.8 (07 May 2020 10:00), Max: 100.4 (06 May 2020 12:00)  HR: 88 (07 May 2020 10:00) (76 - 135)  BP: --  BP(mean): --  RR: 26 (07 May 2020 10:00) (26 - 28)  SpO2: 100% (07 May 2020 10:00) (85% - 100%)    TESTS & MEASUREMENTS:                        7.7    16.24 )-----------( 214      ( 07 May 2020 04:02 )             24.2     05-07    150<H>  |  106  |  190<HH>  ----------------------------<  197<H>  3.9   |  27  |  4.4<HH>    Ca    8.4<L>      07 May 2020 04:02  Phos  6.4     05-06  Mg     3.3     05-07    TPro  5.5<L>  /  Alb  1.8<L>  /  TBili  0.6  /  DBili  x   /  AST  89<H>  /  ALT  122<H>  /  AlkPhos  167<H>  05-07    LIVER FUNCTIONS - ( 07 May 2020 04:02 )  Alb: 1.8 g/dL / Pro: 5.5 g/dL / ALK PHOS: 167 U/L / ALT: 122 U/L / AST: 89 U/L / GGT: x             Culture - Blood (collected 05-01-20 @ 11:29)  Source: .Blood None  Final Report (05-06-20 @ 22:00):    No Growth Final            RADIOLOGY & ADDITIONAL TESTS:  Personal review of radiological diagnostics performed  Echo and EKG results noted when applicable.     MEDICATIONS:  acetaminophen   Tablet .. 650 milliGRAM(s) Oral every 6 hours PRN  aspirin  chewable 81 milliGRAM(s) Oral daily  BACItracin   Ointment 1 Application(s) Topical two times a day  calcium acetate 2001 milliGRAM(s) Oral three times a day with meals  chlorhexidine 0.12% Liquid 15 milliLiter(s) Oral Mucosa every 12 hours  chlorhexidine 4% Liquid 1 Application(s) Topical <User Schedule>  dexMEDEtomidine Infusion 1.5 MICROgram(s)/kG/Hr IV Continuous <Continuous>  dexMEDEtomidine Infusion 0.2 MICROgram(s)/kG/Hr IV Continuous <Continuous>  fentaNYL   Infusion. 0.5 MICROgram(s)/kG/Hr IV Continuous <Continuous>  furosemide   Injectable 40 milliGRAM(s) IV Push two times a day  heparin   Injectable 5000 Unit(s) SubCutaneous every 8 hours  insulin regular Infusion 3 Unit(s)/Hr IV Continuous <Continuous>  lactated ringers. 1000 milliLiter(s) IV Continuous <Continuous>  lactulose Syrup 20 Gram(s) Oral every 12 hours  methylPREDNISolone sodium succinate Injectable 60 milliGRAM(s) IV Push every 24 hours  midazolam Infusion 0.057 mG/kG/Hr IV Continuous <Continuous>  norepinephrine Infusion 0.05 MICROgram(s)/kG/Min IV Continuous <Continuous>  ondansetron Injectable 4 milliGRAM(s) IV Push every 8 hours PRN  pantoprazole   Suspension 40 milliGRAM(s) Oral daily  rocuronium Injectable 100 milliGRAM(s) IV Push once  senna 2 Tablet(s) Oral at bedtime  sevelamer carbonate Powder 2400 milliGRAM(s) Oral three times a day with meals  sodium bicarbonate 650 milliGRAM(s) Oral every 12 hours      ANTIBIOTICS:

## 2020-05-07 NOTE — PROGRESS NOTE ADULT - ASSESSMENT
Acute hypoxemic respiratory failure secondary to SARS-COV-2 infection / TELLO/ hyperkalemia/ hypernatremia   # creatinine  stable   # non oliguric / high BUN due to steroids use  #continue lasix IV decrease to 40 IV q24h / start D5w at 100 cc per hour   # remains  on meropenem   # ph noted, on  renagel 3/3/3 , and phoslo  # DC sodium bicarbonate   # no  acute indication for RRT  # subcutaneous emphesyma sp decompression   # prognosis poor   # will follow

## 2020-05-07 NOTE — PROGRESS NOTE ADULT - SUBJECTIVE AND OBJECTIVE BOX
Patient is a 55y old  Male who presents with a chief complaint of suspected COVID-19 (06 May 2020 10:29)        Over Night Events:  On MV.  Sedated.  On levophed.          ROS:     All ROS are negative except HPI         PHYSICAL EXAM    ICU Vital Signs Last 24 Hrs  T(C): 35.9 (07 May 2020 08:00), Max: 38 (06 May 2020 12:00)  T(F): 96.7 (07 May 2020 08:00), Max: 100.4 (06 May 2020 12:00)  HR: 90 (07 May 2020 08:00) (76 - 135)  BP: --  BP(mean): --  ABP: 142/65 (07 May 2020 08:00) (91/44 - 176/80)  ABP(mean): 88 (07 May 2020 08:00) (58 - 109)  RR: 28 (07 May 2020 08:00) (26 - 28)  SpO2: 100% (07 May 2020 08:00) (85% - 100%)      CONSTITUTIONAL:   Ill appearing.  Well nourished.  NAD    ENT:   Airway patent,   Mouth with normal mucosa.   No thrush    EYES:   Pupils equal,   Round and reactive to light.    CARDIAC:   Normal rate,   Regular rhythm.    No edema      Vascular:  Normal systolic impulse  No Carotid bruits    RESPIRATORY:   No wheezing  Bilateral BS  Normal chest expansion  Not tachypneic,  No use of accessory muscles    GASTROINTESTINAL:  Abdomen soft,   Non-tender,   No guarding,   + BS    GENITOURINARY  normal genitalia for sex  no edema    MUSCULOSKELETAL:   Range of motion is not limited,  No clubbing, cyanosis    NEUROLOGICAL:   Sedated     SKIN:   Skin normal color for race,   Warm and dry   No evidence of rash.    PSYCHIATRIC:   Normal mood and affect.   No apparent risk to self or others.    HEMATOLOGICAL:  No cervical  lymphadenopathy.  no inguinal lymphadenopathy      05-06-20 @ 07:01  -  05-07-20 @ 07:00  --------------------------------------------------------  IN:    Enteral Tube Flush: 200 mL    Vital High Protein: 1080 mL  Total IN: 1280 mL    OUT:    Indwelling Catheter - Urethral: 3095 mL    Rectal Tube: 700 mL  Total OUT: 3795 mL    Total NET: -2515 mL      05-07-20 @ 07:01  -  05-07-20 @ 08:51  --------------------------------------------------------  IN:    insulin regular Infusion: 9 mL  Total IN: 9 mL    OUT:    Indwelling Catheter - Urethral: 200 mL  Total OUT: 200 mL    Total NET: -191 mL          LABS:                            7.7    16.24 )-----------( 214      ( 07 May 2020 04:02 )             24.2                                               05-07    150<H>  |  106  |  190<HH>  ----------------------------<  197<H>  3.9   |  27  |  4.4<HH>    07 May 2020 04:02    150<H>  |  106    |  190<HH>  ----------------------------<  197<H>  3.9     |  27     |  4.4<HH>  06 May 2020 03:50    144    |  101    |  166<HH>  ----------------------------<  214<H>  3.8     |  25     |  4.5<HH>    Ca    8.4<L>      07 May 2020 04:02  Ca    8.2<L>      06 May 2020 03:50  Phos  6.4<H>     06 May 2020 03:50  Mg     3.3<HH>     07 May 2020 04:02  Mg     3.1<HH>     06 May 2020 03:50    TPro  5.5<L>  /  Alb  1.8<L>  /  TBili  0.6    /  DBili  x      /  AST  89<H>  /  ALT  122<H>  /  AlkPhos  167<H>  07 May 2020 04:02      Ca    8.4<L>      07 May 2020 04:02  Phos  6.4     05-06  Mg     3.3     05-07    TPro  5.5<L>  /  Alb  1.8<L>  /  TBili  0.6  /  DBili  x   /  AST  89<H>  /  ALT  122<H>  /  AlkPhos  167<H>  05-07                                                                                           LIVER FUNCTIONS - ( 07 May 2020 04:02 )  Alb: 1.8 g/dL / Pro: 5.5 g/dL / ALK PHOS: 167 U/L / ALT: 122 U/L / AST: 89 U/L / GGT: x                                                                                                                                   ABG - ( 07 May 2020 02:29 )  pH, Arterial: 7.34  pH, Blood: x     /  pCO2: 51    /  pO2: 84    / HCO3: 28    / Base Excess: 1.5   /  SaO2: 96    PPL 24.                MEDICATIONS  (STANDING):  aspirin  chewable 81 milliGRAM(s) Oral daily  BACItracin   Ointment 1 Application(s) Topical two times a day  calcium acetate 2001 milliGRAM(s) Oral three times a day with meals  chlorhexidine 0.12% Liquid 15 milliLiter(s) Oral Mucosa every 12 hours  chlorhexidine 4% Liquid 1 Application(s) Topical <User Schedule>  dexMEDEtomidine Infusion 1.5 MICROgram(s)/kG/Hr (33 mL/Hr) IV Continuous <Continuous>  dexMEDEtomidine Infusion 0.2 MICROgram(s)/kG/Hr (4.4 mL/Hr) IV Continuous <Continuous>  fentaNYL   Infusion. 0.5 MICROgram(s)/kG/Hr (4.4 mL/Hr) IV Continuous <Continuous>  furosemide   Injectable 40 milliGRAM(s) IV Push two times a day  heparin   Injectable 5000 Unit(s) SubCutaneous every 8 hours  insulin regular Infusion 3 Unit(s)/Hr (3 mL/Hr) IV Continuous <Continuous>  lactulose Syrup 20 Gram(s) Oral every 12 hours  meropenem  IVPB 750 milliGRAM(s) IV Intermittent every 24 hours  methylPREDNISolone sodium succinate Injectable 60 milliGRAM(s) IV Push two times a day  midazolam Infusion 0.057 mG/kG/Hr (5 mL/Hr) IV Continuous <Continuous>  norepinephrine Infusion 0.05 MICROgram(s)/kG/Min (4.13 mL/Hr) IV Continuous <Continuous>  pantoprazole   Suspension 40 milliGRAM(s) Oral daily  rocuronium Injectable 100 milliGRAM(s) IV Push once  senna 2 Tablet(s) Oral at bedtime  sevelamer carbonate Powder 2400 milliGRAM(s) Oral three times a day with meals  sodium bicarbonate 650 milliGRAM(s) Oral every 12 hours    MEDICATIONS  (PRN):  acetaminophen   Tablet .. 650 milliGRAM(s) Oral every 6 hours PRN Temp greater or equal to 38C (100.4F)  ondansetron Injectable 4 milliGRAM(s) IV Push every 8 hours PRN Nausea and/or Vomiting      New X-rays reviewed:                                                                                  ECHO    CXR interpreted by me:

## 2020-05-07 NOTE — PROGRESS NOTE ADULT - SUBJECTIVE AND OBJECTIVE BOX
Nephrology progress note    THIS IS AN INCOMPLETE NOTE . FULL NOTE TO FOLLOW SHORTLY    Patient is seen and examined, events over the last 24 h noted .    Allergies:  No Known Allergies    Hospital Medications:   MEDICATIONS  (STANDING):  aspirin  chewable 81 milliGRAM(s) Oral daily  BACItracin   Ointment 1 Application(s) Topical two times a day  calcium acetate 2001 milliGRAM(s) Oral three times a day with meals  chlorhexidine 0.12% Liquid 15 milliLiter(s) Oral Mucosa every 12 hours  chlorhexidine 4% Liquid 1 Application(s) Topical <User Schedule>  dexMEDEtomidine Infusion 1.5 MICROgram(s)/kG/Hr (33 mL/Hr) IV Continuous <Continuous>  dexMEDEtomidine Infusion 0.2 MICROgram(s)/kG/Hr (4.4 mL/Hr) IV Continuous <Continuous>  fentaNYL   Infusion. 0.5 MICROgram(s)/kG/Hr (4.4 mL/Hr) IV Continuous <Continuous>  furosemide   Injectable 40 milliGRAM(s) IV Push two times a day  heparin   Injectable 5000 Unit(s) SubCutaneous every 8 hours  insulin regular Infusion 3 Unit(s)/Hr (3 mL/Hr) IV Continuous <Continuous>  lactated ringers. 1000 milliLiter(s) (150 mL/Hr) IV Continuous <Continuous>  lactulose Syrup 20 Gram(s) Oral every 12 hours  methylPREDNISolone sodium succinate Injectable 60 milliGRAM(s) IV Push every 24 hours  midazolam Infusion 0.057 mG/kG/Hr (5 mL/Hr) IV Continuous <Continuous>  norepinephrine Infusion 0.05 MICROgram(s)/kG/Min (4.13 mL/Hr) IV Continuous <Continuous>  pantoprazole   Suspension 40 milliGRAM(s) Oral daily  rocuronium Injectable 100 milliGRAM(s) IV Push once  senna 2 Tablet(s) Oral at bedtime  sevelamer carbonate Powder 2400 milliGRAM(s) Oral three times a day with meals  sodium bicarbonate 650 milliGRAM(s) Oral every 12 hours        VITALS:  T(F): 96.7 (05-07-20 @ 08:00), Max: 100.4 (05-06-20 @ 12:00)  HR: 90 (05-07-20 @ 08:00)  BP: --  RR: 28 (05-07-20 @ 08:00)  SpO2: 100% (05-07-20 @ 08:00)  Wt(kg): --    05-05 @ 07:01  -  05-06 @ 07:00  --------------------------------------------------------  IN: 3440.9 mL / OUT: 3070 mL / NET: 370.9 mL    05-06 @ 07:01  -  05-07 @ 07:00  --------------------------------------------------------  IN: 1280 mL / OUT: 3795 mL / NET: -2515 mL    05-07 @ 07:01  -  05-07 @ 09:05  --------------------------------------------------------  IN: 9 mL / OUT: 450 mL / NET: -441 mL      06 May 2020 07:01  -  07 May 2020 07:00  --------------------------------------------------------  IN:    Enteral Tube Flush: 200 mL    Vital High Protein: 1080 mL  Total IN: 1280 mL    OUT:    Indwelling Catheter - Urethral: 3095 mL    Rectal Tube: 700 mL  Total OUT: 3795 mL    Total NET: -2515 mL      07 May 2020 07:01  -  07 May 2020 09:06  --------------------------------------------------------  IN:    insulin regular Infusion: 9 mL  Total IN: 9 mL    OUT:    Indwelling Catheter - Urethral: 450 mL  Total OUT: 450 mL    Total NET: -441 mL              PHYSICAL EXAM:  Constitutional: NAD  HEENT: anicteric sclera, oropharynx clear, MMM  Neck: No JVD  Respiratory: CTAB, no wheezes, rales or rhonchi  Cardiovascular: S1, S2, RRR  Gastrointestinal: BS+, soft, NT/ND  Extremities: No cyanosis or clubbing. No peripheral edema  :  No mendoza.   Skin: No rashes    LABS:  05-07    150<H>  |  106  |  190<HH>  ----------------------------<  197<H>  3.9   |  27  |  4.4<HH>  Creatinine Trend: 4.4<--, 4.5<--, 4.6<--, 4.3<--, 4.9<--, 4.7<--  Ca    8.4<L>      07 May 2020 04:02  Phos  6.4     05-06  Mg     3.3     05-07    TPro  5.5<L>  /  Alb  1.8<L>  /  TBili  0.6  /  DBili      /  AST  89<H>  /  ALT  122<H>  /  AlkPhos  167<H>  05-07                          7.7    16.24 )-----------( 214      ( 07 May 2020 04:02 )             24.2       Urine Studies:      RADIOLOGY & ADDITIONAL STUDIES: Nephrology progress note  Patient is seen and examined, events over the last 24 h noted .  still intubated   non oliguric     Allergies:  No Known Allergies    Hospital Medications:   MEDICATIONS  (STANDING):  aspirin  chewable 81 milliGRAM(s) Oral daily  BACItracin   Ointment 1 Application(s) Topical two times a day  calcium acetate 2001 milliGRAM(s) Oral three times a day with meals  dexMEDEtomidine Infusion 1.5 MICROgram(s)/kG/Hr (33 mL/Hr) IV Continuous <Continuous>  dexMEDEtomidine Infusion 0.2 MICROgram(s)/kG/Hr (4.4 mL/Hr) IV Continuous <Continuous>  fentaNYL   Infusion. 0.5 MICROgram(s)/kG/Hr (4.4 mL/Hr) IV Continuous <Continuous>  furosemide   Injectable 40 milliGRAM(s) IV Push two times a day  heparin   Injectable 5000 Unit(s) SubCutaneous every 8 hours  insulin regular Infusion 3 Unit(s)/Hr (3 mL/Hr) IV Continuous <Continuous>  lactated ringers. 1000 milliLiter(s) (150 mL/Hr) IV Continuous <Continuous>  lactulose Syrup 20 Gram(s) Oral every 12 hours  methylPREDNISolone sodium succinate Injectable 60 milliGRAM(s) IV Push every 24 hours  midazolam Infusion 0.057 mG/kG/Hr (5 mL/Hr) IV Continuous <Continuous>  norepinephrine Infusion 0.05 MICROgram(s)/kG/Min (4.13 mL/Hr) IV Continuous <Continuous>  pantoprazole   Suspension 40 milliGRAM(s) Oral daily  rocuronium Injectable 100 milliGRAM(s) IV Push once  senna 2 Tablet(s) Oral at bedtime  sevelamer carbonate Powder 2400 milliGRAM(s) Oral three times a day with meals  sodium bicarbonate 650 milliGRAM(s) Oral every 12 hours        VITALS:  T(F): 96.7 (05-07-20 @ 08:00), Max: 100.4 (05-06-20 @ 12:00)  HR: 90 (05-07-20 @ 08:00)  BP:85/67  RR: 28 (05-07-20 @ 08:00)  SpO2: 100% (05-07-20 @ 08:00)  Wt(kg): --    05-05 @ 07:01  -  05-06 @ 07:00  --------------------------------------------------------  IN: 3440.9 mL / OUT: 3070 mL / NET: 370.9 mL    05-06 @ 07:01  -  05-07 @ 07:00  --------------------------------------------------------  IN: 1280 mL / OUT: 3795 mL / NET: -2515 mL    05-07 @ 07:01  -  05-07 @ 09:05  --------------------------------------------------------  IN: 9 mL / OUT: 450 mL / NET: -441 mL      06 May 2020 07:01  -  07 May 2020 07:00  --------------------------------------------------------  IN:    Enteral Tube Flush: 200 mL    Vital High Protein: 1080 mL  Total IN: 1280 mL    OUT:    Indwelling Catheter - Urethral: 3095 mL    Rectal Tube: 700 mL  Total OUT: 3795 mL    Total NET: -2515 mL      07 May 2020 07:01  -  07 May 2020 09:06  --------------------------------------------------------  IN:    insulin regular Infusion: 9 mL  Total IN: 9 mL    OUT:    Indwelling Catheter - Urethral: 450 mL  Total OUT: 450 mL    Total NET: -441 mL              PHYSICAL EXAM:  Constitutional:  intubated on MV   HEENT: anicteric sclera, oropharynx clear, MMM  Neck: No JVD/ ETT   Respiratory: CTAB, no wheezes, rales or rhonchi  Cardiovascular: S1, S2, RRR  Gastrointestinal: BS+, soft, NT/ND  Extremities: No cyanosis or clubbing. No peripheral edema  :  No mendoza.   Skin: No rashes    LABS:  05-07    150<H>  |  106  |  190<HH>  ----------------------------<  197<H>  3.9   |  27  |  4.4<HH>    Creatinine Trend: 4.4<--, 4.5<--, 4.6<--, 4.3<--, 4.9<--, 4.7<--    Ca    8.4<L>      07 May 2020 04:02  Phos  6.4     05-06  Mg     3.3     05-07    TPro  5.5<L>  /  Alb  1.8<L>  /  TBili  0.6  /  DBili      /  AST  89<H>  /  ALT  122<H>  /  AlkPhos  167<H>  05-07                          7.7    16.24 )-----------( 214      ( 07 May 2020 04:02 )             24.2       Urine Studies:      RADIOLOGY & ADDITIONAL STUDIES:

## 2020-05-07 NOTE — PROGRESS NOTE ADULT - ASSESSMENT
Assessment:    Acute hypoxemic respiratory failure  ARDS  COVID 19  SP convalescent plasma    Possible superimposed bacterial infection   TELLO/ non oliguric.  Post ATN diuresis   Sub Q air     PLAN:    CNS: Adequate sedation     HEENT:  Oral care    PULMONARY:  HOB @ 45 degrees, keep Sats 92-96%.  No vent changes for now.  Wean O2 as tolerated.       CARDIOVASCULAR:  Avoid volume overload.   cc per hour for now.  FU UO ( Now 250 cc per hour UO)     GI: GI prophylaxis Protonix, bowel regimen. Feeding: OG feeding.  Free H2O.      RENAL:  F/u  lytes.  Correct as needed. accurate I/O, renal F/UP, repeat CMP    INFECTIOUS DISEASE:  Continue ABX>  Repeat cultures. Solumedrol 60 mg Q24 Day 6 . DC Meropenem     HEMATOLOGICAL:  DVT Prophylaxis.  FU CBC     ENDOCRINE:  Follow up FS.  Insulin protocol if needed.    CODE STATUS: FULL CODE    DISPOSITION: Patient require continued monitoring in MICU    Poor prognosis

## 2020-05-07 NOTE — PROGRESS NOTE ADULT - ASSESSMENT
· Assessment		  55 year old male patient with hypertension, diabetes presenting for dyspnea.     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 53 > 3.53 > 14.4 >6.08  -Sputa 5/1 NGTD  -BCx 4/21,25, 5/1 NG  -nares ORSA NG  -fungitell 61 (normal) off caspo  S/p Anakinra 4/14-17  -S/p convalescent plasma 4/29    RECOMMENDATIONS;   Presently off meropenem 750 mg iv q24h

## 2020-05-07 NOTE — PROGRESS NOTE ADULT - SUBJECTIVE AND OBJECTIVE BOX
SUBJECTIVE:    Patient is a 55y old Male who presents with a chief complaint of suspected COVID-19 (07 May 2020 09:05)    Currently admitted to medicine with the primary diagnosis of Suspected 2019 novel coronavirus infection     Today is hospital day 24d. This morning he is resting comfortably in bed and reports no new issues or overnight events.     PAST MEDICAL & SURGICAL HISTORY  Diabetes  Hypertension  No significant past surgical history    SOCIAL HISTORY:  Negative for smoking/alcohol/drug use.     ALLERGIES:  No Known Allergies    MEDICATIONS:  STANDING MEDICATIONS  aspirin  chewable 81 milliGRAM(s) Oral daily  BACItracin   Ointment 1 Application(s) Topical two times a day  calcium acetate 2001 milliGRAM(s) Oral three times a day with meals  chlorhexidine 0.12% Liquid 15 milliLiter(s) Oral Mucosa every 12 hours  chlorhexidine 4% Liquid 1 Application(s) Topical <User Schedule>  dexMEDEtomidine Infusion 1.5 MICROgram(s)/kG/Hr IV Continuous <Continuous>  dexMEDEtomidine Infusion 0.2 MICROgram(s)/kG/Hr IV Continuous <Continuous>  fentaNYL   Infusion. 0.5 MICROgram(s)/kG/Hr IV Continuous <Continuous>  furosemide   Injectable 40 milliGRAM(s) IV Push two times a day  heparin   Injectable 5000 Unit(s) SubCutaneous every 8 hours  insulin regular Infusion 3 Unit(s)/Hr IV Continuous <Continuous>  lactated ringers. 1000 milliLiter(s) IV Continuous <Continuous>  lactulose Syrup 20 Gram(s) Oral every 12 hours  methylPREDNISolone sodium succinate Injectable 60 milliGRAM(s) IV Push every 24 hours  midazolam Infusion 0.057 mG/kG/Hr IV Continuous <Continuous>  norepinephrine Infusion 0.05 MICROgram(s)/kG/Min IV Continuous <Continuous>  pantoprazole   Suspension 40 milliGRAM(s) Oral daily  rocuronium Injectable 100 milliGRAM(s) IV Push once  senna 2 Tablet(s) Oral at bedtime  sevelamer carbonate Powder 2400 milliGRAM(s) Oral three times a day with meals  sodium bicarbonate 650 milliGRAM(s) Oral every 12 hours    PRN MEDICATIONS  acetaminophen   Tablet .. 650 milliGRAM(s) Oral every 6 hours PRN  ondansetron Injectable 4 milliGRAM(s) IV Push every 8 hours PRN    VITALS:   T(F): 96.7  HR: 90  BP: --  RR: 28  SpO2: 100%    LABS:                        7.7    16.24 )-----------( 214      ( 07 May 2020 04:02 )             24.2     05-07    150<H>  |  106  |  190<HH>  ----------------------------<  197<H>  3.9   |  27  |  4.4<HH>    Ca    8.4<L>      07 May 2020 04:02  Phos  6.4     05-06  Mg     3.3     05-07    TPro  5.5<L>  /  Alb  1.8<L>  /  TBili  0.6  /  DBili  x   /  AST  89<H>  /  ALT  122<H>  /  AlkPhos  167<H>  05-07        ABG - ( 07 May 2020 02:29 )  pH, Arterial: 7.34  pH, Blood: x     /  pCO2: 51    /  pO2: 84    / HCO3: 28    / Base Excess: 1.5   /  SaO2: 96                        RADIOLOGY:    PHYSICAL EXAM:  GEN: No acute distress  LUNGS: Clear to auscultation bilaterally   HEART: S1/S2 present. RRR.   ABD: Soft, non-tender, non-distended. Bowel sounds present  EXT: NC/NC/NE/2+PP/LEUNG  NEURO: AAOX3

## 2020-05-08 NOTE — PROGRESS NOTE ADULT - ASSESSMENT
· Assessment		  55 year old male patient with hypertension, diabetes presenting for dyspnea.     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 53 > 3.53 > 14.4 >6.08  -Sputa 5/1 NGTD  -BCx 4/21,25, 5/1 NG  -nares ORSA NG  -fungitell 61 (normal) off caspo  S/p Anakinra 4/14-17  -S/p convalescent plasma 4/29    RECOMMENDATIONS;   Presently off meropenem 750 mg iv q24h  please reculture if temp>100.6

## 2020-05-08 NOTE — PROGRESS NOTE ADULT - SUBJECTIVE AND OBJECTIVE BOX
Patient is a 55y old  Male who presents with a chief complaint of suspected COVID-19 (08 May 2020 07:42)        Over Night Events:    on MV.  Off Levophed Sedated.          ROS:     All ROS are negative except HPI         PHYSICAL EXAM    ICU Vital Signs Last 24 Hrs  T(C): 36 (08 May 2020 07:45), Max: 36.4 (07 May 2020 22:00)  T(F): 96.8 (08 May 2020 07:45), Max: 97.6 (07 May 2020 22:00)  HR: 86 (08 May 2020 06:00) (73 - 94)  BP: --  BP(mean): --  ABP: 170/63 (08 May 2020 08:00) (91/47 - 269/259)  ABP(mean): 75 (08 May 2020 07:45) (62 - 269)  RR: 26 (08 May 2020 08:00) (26 - 28)  SpO2: 96% (08 May 2020 08:00) (92% - 100%)      CONSTITUTIONAL:   Ill appearing.  Well nourished.  NAD    ENT:   Airway patent,   Mouth with normal mucosa.   No thrush    EYES:   Pupils equal,   Round and reactive to light.    CARDIAC:   Tachycardic   Regular rhythm.    No edema      Vascular:  Normal systolic impulse  No Carotid bruits    RESPIRATORY:   No wheezing  Bilateral BS  Normal chest expansion  Not tachypneic,  No use of accessory muscles    GASTROINTESTINAL:  Abdomen soft,   Non-tender,   No guarding,   + BS    GENITOURINARY  normal genitalia for sex   edema    MUSCULOSKELETAL:   Range of motion is not limited,  No clubbing, cyanosis    NEUROLOGICAL:   Sedated.      SKIN:   Skin normal color for race,   Warm and dry  No evidence of rash.    PSYCHIATRIC:   No apparent risk to self or others.    HEMATOLOGICAL:  No cervical  lymphadenopathy.  no inguinal lymphadenopathy      05-07-20 @ 07:01  -  05-08-20 @ 07:00  --------------------------------------------------------  IN:    dexmedetomidine Infusion: 396 mL    Enteral Tube Flush: 110 mL    fentaNYL Infusion.: 422.4 mL    Free Water: 1700 mL    insulin regular Infusion: 98 mL    lactated ringers.: 8250 mL    midazolam Infusion: 60 mL    norepinephrine Infusion: 60 mL    Vital High Protein: 1440 mL  Total IN: 12750.4 mL    OUT:    Indwelling Catheter - Urethral: 5185 mL    Rectal Tube: 700 mL  Total OUT: 5885 mL    Total NET: 6651.4 mL          LABS:                            7.0    12.47 )-----------( 198      ( 08 May 2020 07:54 )             22.2                                               05-08    153<H>  |  112<H>  |  158<HH>  ----------------------------<  146<H>  4.3   |  26  |  3.1<H>    08 May 2020 05:03    153<H>  |  112<H>  |  158<HH>  ----------------------------<  146<H>  4.3     |  26     |  3.1<H>  07 May 2020 17:24    153<H>  |  110    |  178<HH>  ----------------------------<  77     3.7     |  28     |  3.9<H>    Ca    7.5<L>      08 May 2020 05:03  Ca    8.0<L>      07 May 2020 17:24  Mg     2.5<H>     08 May 2020 05:03  Mg     3.3<HH>     07 May 2020 04:02    TPro  5.1<L>  /  Alb  1.7<L>  /  TBili  0.7    /  DBili  x      /  AST  77<H>  /  ALT  106<H>  /  AlkPhos  159<H>  08 May 2020 05:03  TPro  5.5<L>  /  Alb  1.8<L>  /  TBili  0.6    /  DBili  x      /  AST  89<H>  /  ALT  122<H>  /  AlkPhos  167<H>  07 May 2020 04:02      Ca    7.5<L>      08 May 2020 05:03  Mg     2.5     05-08    TPro  5.1<L>  /  Alb  1.7<L>  /  TBili  0.7  /  DBili  x   /  AST  77<H>  /  ALT  106<H>  /  AlkPhos  159<H>  05-08                                                                                           LIVER FUNCTIONS - ( 08 May 2020 05:03 )  Alb: 1.7 g/dL / Pro: 5.1 g/dL / ALK PHOS: 159 U/L / ALT: 106 U/L / AST: 77 U/L / GGT: x                                                                                                                                   ABG - ( 08 May 2020 02:59 )  pH, Arterial: 7.37  pH, Blood: x     /  pCO2: 51    /  pO2: 77    / HCO3: 29    / Base Excess: 3.6   /  SaO2: 95                  MEDICATIONS  (STANDING):  aspirin  chewable 81 milliGRAM(s) Oral daily  BACItracin   Ointment 1 Application(s) Topical two times a day  calcium acetate 2001 milliGRAM(s) Oral three times a day with meals  chlorhexidine 0.12% Liquid 15 milliLiter(s) Oral Mucosa every 12 hours  chlorhexidine 4% Liquid 1 Application(s) Topical <User Schedule>  collagenase Ointment 1 Application(s) Topical every 12 hours  dexMEDEtomidine Infusion 1.5 MICROgram(s)/kG/Hr (33 mL/Hr) IV Continuous <Continuous>  dexMEDEtomidine Infusion 0.2 MICROgram(s)/kG/Hr (4.4 mL/Hr) IV Continuous <Continuous>  fentaNYL   Infusion. 0.5 MICROgram(s)/kG/Hr (4.4 mL/Hr) IV Continuous <Continuous>  furosemide   Injectable 40 milliGRAM(s) IV Push two times a day  heparin   Injectable 5000 Unit(s) SubCutaneous every 8 hours  insulin regular Infusion 3 Unit(s)/Hr (3 mL/Hr) IV Continuous <Continuous>  lactated ringers. 1000 milliLiter(s) (375 mL/Hr) IV Continuous <Continuous>  lactulose Syrup 20 Gram(s) Oral every 12 hours  methylPREDNISolone sodium succinate Injectable 60 milliGRAM(s) IV Push every 24 hours  midazolam Infusion 0.057 mG/kG/Hr (5 mL/Hr) IV Continuous <Continuous>  norepinephrine Infusion 0.05 MICROgram(s)/kG/Min (4.13 mL/Hr) IV Continuous <Continuous>  pantoprazole   Suspension 40 milliGRAM(s) Oral daily  senna 2 Tablet(s) Oral at bedtime  sevelamer carbonate Powder 2400 milliGRAM(s) Oral three times a day with meals  sodium bicarbonate 650 milliGRAM(s) Oral every 12 hours    MEDICATIONS  (PRN):  acetaminophen   Tablet .. 650 milliGRAM(s) Oral every 6 hours PRN Temp greater or equal to 38C (100.4F)  ondansetron Injectable 4 milliGRAM(s) IV Push every 8 hours PRN Nausea and/or Vomiting      New X-rays reviewed:                                                                                  ECHO    CXR interpreted by me:  ET OG OK>  Bilateral infiltrate improved subq air

## 2020-05-08 NOTE — PROGRESS NOTE ADULT - SUBJECTIVE AND OBJECTIVE BOX
Nephrology progress note    THIS IS AN INCOMPLETE NOTE . FULL NOTE TO FOLLOW SHORTLY    Patient is seen and examined, events over the last 24 h noted .    Allergies:  No Known Allergies    Hospital Medications:   MEDICATIONS  (STANDING):  aspirin  chewable 81 milliGRAM(s) Oral daily  BACItracin   Ointment 1 Application(s) Topical two times a day  calcium acetate 2001 milliGRAM(s) Oral three times a day with meals  chlorhexidine 0.12% Liquid 15 milliLiter(s) Oral Mucosa every 12 hours  chlorhexidine 4% Liquid 1 Application(s) Topical <User Schedule>  cisatracurium Infusion 4 MICROgram(s)/kG/Min (21.1 mL/Hr) IV Continuous <Continuous>  cisatracurium Injectable 10 milliGRAM(s) IV Push once  cisatracurium Injectable 10 milliGRAM(s) IV Push once  collagenase Ointment 1 Application(s) Topical every 12 hours  dexMEDEtomidine Infusion 1.5 MICROgram(s)/kG/Hr (33 mL/Hr) IV Continuous <Continuous>  dexMEDEtomidine Infusion 0.2 MICROgram(s)/kG/Hr (4.4 mL/Hr) IV Continuous <Continuous>  dextrose 5%. 1000 milliLiter(s) (100 mL/Hr) IV Continuous <Continuous>  fentaNYL   Infusion. 0.5 MICROgram(s)/kG/Hr (4.4 mL/Hr) IV Continuous <Continuous>  heparin   Injectable 5000 Unit(s) SubCutaneous every 8 hours  insulin regular Infusion 3 Unit(s)/Hr (3 mL/Hr) IV Continuous <Continuous>  lactated ringers. 1000 milliLiter(s) (375 mL/Hr) IV Continuous <Continuous>  lactulose Syrup 20 Gram(s) Oral every 12 hours  methylPREDNISolone sodium succinate Injectable 60 milliGRAM(s) IV Push every 24 hours  midazolam Infusion 0.057 mG/kG/Hr (5 mL/Hr) IV Continuous <Continuous>  norepinephrine Infusion 0.05 MICROgram(s)/kG/Min (4.13 mL/Hr) IV Continuous <Continuous>  pantoprazole   Suspension 40 milliGRAM(s) Oral daily  senna 2 Tablet(s) Oral at bedtime  sevelamer carbonate Powder 2400 milliGRAM(s) Oral three times a day with meals  sodium bicarbonate 650 milliGRAM(s) Oral every 12 hours        VITALS:  T(F): 96.8 (05-08-20 @ 07:45), Max: 97.6 (05-07-20 @ 22:00)  HR: 86 (05-08-20 @ 06:00)  BP: --  RR: 26 (05-08-20 @ 08:00)  SpO2: 96% (05-08-20 @ 08:00)  Wt(kg): --    05-06 @ 07:01  -  05-07 @ 07:00  --------------------------------------------------------  IN: 1280 mL / OUT: 3795 mL / NET: -2515 mL    05-07 @ 07:01  -  05-08 @ 07:00  --------------------------------------------------------  IN: 95374.4 mL / OUT: 5885 mL / NET: 6651.4 mL          PHYSICAL EXAM:  Constitutional: NAD  HEENT: anicteric sclera, oropharynx clear, MMM  Neck: No JVD  Respiratory: CTAB, no wheezes, rales or rhonchi  Cardiovascular: S1, S2, RRR  Gastrointestinal: BS+, soft, NT/ND  Extremities: No cyanosis or clubbing. No peripheral edema  :  No mendoza.   Skin: No rashes    LABS:  05-08    153<H>  |  112<H>  |  158<HH>  ----------------------------<  146<H>  4.3   |  26  |  3.1<H>    Ca    7.5<L>      08 May 2020 05:03  Mg     2.5     05-08    TPro  5.1<L>  /  Alb  1.7<L>  /  TBili  0.7  /  DBili      /  AST  77<H>  /  ALT  106<H>  /  AlkPhos  159<H>  05-08                          7.0    12.47 )-----------( 198      ( 08 May 2020 07:54 )             22.2       Urine Studies:      RADIOLOGY & ADDITIONAL STUDIES: Nephrology progress note  Patient is seen and examined, events over the last 24 h noted .  still intubated on MV  Non oliguric     Allergies:  No Known Allergies    Hospital Medications:   MEDICATIONS  (STANDING):  aspirin  chewable 81 milliGRAM(s) Oral daily  BACItracin   Ointment 1 Application(s) Topical two times a day  calcium acetate 2001 milliGRAM(s) Oral three times a day with meals  chlorhexidine 4% Liquid 1 Application(s) Topical <User Schedule>  cisatracurium Infusion 4 MICROgram(s)/kG/Min (21.1 mL/Hr) IV Continuous <Continuous>  cisatracurium Injectable 10 milliGRAM(s) IV Push once  cisatracurium Injectable 10 milliGRAM(s) IV Push once  collagenase Ointment 1 Application(s) Topical every 12 hours  dexMEDEtomidine Infusion 1.5 MICROgram(s)/kG/Hr (33 mL/Hr) IV Continuous <Continuous>  dexMEDEtomidine Infusion 0.2 MICROgram(s)/kG/Hr (4.4 mL/Hr) IV Continuous <Continuous>  dextrose 5%. 1000 milliLiter(s) (100 mL/Hr) IV Continuous <Continuous>  fentaNYL   Infusion. 0.5 MICROgram(s)/kG/Hr (4.4 mL/Hr) IV Continuous <Continuous>  heparin   Injectable 5000 Unit(s) SubCutaneous every 8 hours  insulin regular Infusion 3 Unit(s)/Hr (3 mL/Hr) IV Continuous <Continuous>  lactated ringers. 1000 milliLiter(s) (375 mL/Hr) IV Continuous <Continuous>  lactulose Syrup 20 Gram(s) Oral every 12 hours  methylPREDNISolone sodium succinate Injectable 60 milliGRAM(s) IV Push every 24 hours  midazolam Infusion 0.057 mG/kG/Hr (5 mL/Hr) IV Continuous <Continuous>  norepinephrine Infusion 0.05 MICROgram(s)/kG/Min (4.13 mL/Hr) IV Continuous <Continuous>  pantoprazole   Suspension 40 milliGRAM(s) Oral daily  senna 2 Tablet(s) Oral at bedtime  sevelamer carbonate Powder 2400 milliGRAM(s) Oral three times a day with meals  sodium bicarbonate 650 milliGRAM(s) Oral every 12 hours        VITALS:  T(F): 96.8 (05-08-20 @ 07:45), Max: 97.6 (05-07-20 @ 22:00)  HR: 86 (05-08-20 @ 06:00)  BP: 94/62  RR: 26 (05-08-20 @ 08:00)  SpO2: 96% (05-08-20 @ 08:00)  Wt(kg): --    05-06 @ 07:01  -  05-07 @ 07:00  --------------------------------------------------------  IN: 1280 mL / OUT: 3795 mL / NET: -2515 mL    05-07 @ 07:01  -  05-08 @ 07:00  --------------------------------------------------------  IN: 25994.4 mL / OUT: 5885 mL / NET: 6651.4 mL      07 May 2020 07:01  -  08 May 2020 07:00  --------------------------------------------------------  IN:    dexmedetomidine Infusion: 396 mL    Enteral Tube Flush: 110 mL    fentaNYL Infusion.: 422.4 mL    Free Water: 1700 mL    insulin regular Infusion: 98 mL    lactated ringers.: 8250 mL    midazolam Infusion: 60 mL    norepinephrine Infusion: 60 mL    Vital High Protein: 1440 mL  Total IN: 40283.4 mL    OUT:    Indwelling Catheter - Urethral: 5185 mL    Rectal Tube: 700 mL  Total OUT: 5885 mL    Total NET: 6651.4 mL      08 May 2020 07:01  -  08 May 2020 11:19  --------------------------------------------------------  IN:    insulin regular Infusion: 6 mL  Total IN: 6 mL    OUT:  Total OUT: 0 mL    Total NET: 6 mL              PHYSICAL EXAM:  Constitutional: intubated on MV   HEENT: anicteric sclera, oropharynx clear, MMM  Neck: No JVD  Respiratory: CTAB, no wheezes, rales or rhonchi  Cardiovascular: S1, S2, RRR  Gastrointestinal: BS+, soft, NT/ND  Extremities: No cyanosis or clubbing. No peripheral edema  :  No mendoza.   Skin: No rashes    LABS:  05-08    153<H>  |  112<H>  |  158<HH>  ----------------------------<  146<H>  4.3   |  26  |  3.1<H>  Creatinine Trend: 3.1<--, 3.9<--, 4.2<--, 4.4<--, 4.5<--, 4.6<--  SODIUM TREND:  Sodium 153 [05-08 @ 05:03]  Sodium 153 [05-07 @ 17:24]  Sodium 150 [05-07 @ 11:00]  Sodium 150 [05-07 @ 04:02]  Sodium 144 [05-06 @ 03:50]  Sodium 145 [05-05 @ 04:40]  Sodium 143 [05-04 @ 05:45]  Sodium 146 [05-03 @ 04:30]  Sodium 140 [05-02 @ 04:00]  Sodium 138 [05-01 @ 04:35]    Ca    7.5<L>      08 May 2020 05:03  Mg     2.5     05-08    TPro  5.1<L>  /  Alb  1.7<L>  /  TBili  0.7  /  DBili      /  AST  77<H>  /  ALT  106<H>  /  AlkPhos  159<H>  05-08                          7.0    12.47 )-----------( 198      ( 08 May 2020 07:54 )             22.2       Urine Studies:      RADIOLOGY & ADDITIONAL STUDIES:

## 2020-05-08 NOTE — PROGRESS NOTE ADULT - ASSESSMENT
Assessment:    Acute hypoxemic respiratory failure  ARDS  COVID 19  SP convalescent plasma    Possible superimposed bacterial infection   TELLO/ non oliguric.  Post ATN diuresis   Sub Q air     PLAN:    CNS: fentanyl, versed, precedex, and nimbex    HEENT:  Oral care    PULMONARY:    #) Acute hypoxemic respiratory failure/ ARDS  -HOB @ 45 degrees, keep Sats 92-96%.  patient starts to desat w/ high pressures when not adequately sedated and paralyzed  -started nimbex drip in addition to fentanyl, versed, and precedex    CARDIOVASCULAR:    #) Hypovolemic Hypernatremia, post ATN diuresis  -Avoid volume overload.    -Free water 300 q4h  -was on LR at 375 cc/hr, due to hypernatremia, started D5W at 100 cc/hr  -d/c'd lasix  -Nephro following    GI: GI prophylaxis Protonix, bowel regimen. Feeding: OG feeding.  Free H2O.      RENAL:    #) TELLO/ non oliguric.  F/u  lytes.  Correct as needed. accurate I/O, renal F/UP, repeat CMP    INFECTIOUS DISEASE:    #) COVID 19, SP convalescent plasma, Possible superimposed bacterial infection   -Continue ABX>  Repeat cultures pending  -s/p 5 days solumderol 60mg q12h, now on day 2 of solumedrol 60 q24h, 4 more days  -off merrem  -ID following    HEMATOLOGICAL:  DVT Prophylaxis.  FU CBC     ENDOCRINE:  Follow up FS.  Insulin protocol if needed.    CODE STATUS: FULL CODE    DISPOSITION: Patient require continued monitoring in MICU    Poor prognosis Assessment:    Acute hypoxemic respiratory failure  ARDS  COVID 19  SP convalescent plasma    Possible superimposed bacterial infection   TELLO/ non oliguric.  Post ATN diuresis   Sub Q air     Overnight: patient had a Hgb drop from 7.7 to 6.9    PLAN:    CNS: fentanyl, versed, precedex, and nimbex    HEENT:  Oral care    PULMONARY:    #) Acute hypoxemic respiratory failure/ ARDS  -HOB @ 45 degrees, keep Sats 92-96%.  patient starts to desat w/ high pressures when not adequately sedated and paralyzed  -started nimbex drip in addition to fentanyl, versed, and precedex    CARDIOVASCULAR:    #) Hypovolemic Hypernatremia, post ATN diuresis  -Avoid volume overload.    -Free water 300 q4h  -was on LR at 375 cc/hr, due to hypernatremia, started D5W at 100 cc/hr  -d/c'd lasix  -Nephro following    GI: GI prophylaxis Protonix, bowel regimen. Feeding: OG feeding.  Free H2O.      RENAL:    #) TELLO/ non oliguric.  F/u  lytes.  Correct as needed. accurate I/O, renal F/UP, repeat CMP    INFECTIOUS DISEASE:    #) COVID 19, SP convalescent plasma, Possible superimposed bacterial infection   -Continue ABX>  Repeat cultures pending  -s/p 5 days solumderol 60mg q12h, now on day 2 of solumedrol 60 q24h, 4 more days  -off merrem  -ID following    HEMATOLOGICAL  #) Overnight Hgb drop from 7.7 to 6.9, suspected dilutional so repeat cbc was done Hgb 7.0 on repeat  -no overt signs of bleeding, patient had BM this morning, no melena  -will transfuse one unit pRBC as patient is tachycardic  #) DVT ppx  -on HepSubQ    ENDOCRINE:  Follow up FS.  Insulin protocol if needed.    CODE STATUS: FULL CODE    DISPOSITION: Patient require continued monitoring in MICU    Poor prognosis

## 2020-05-08 NOTE — PROGRESS NOTE ADULT - SUBJECTIVE AND OBJECTIVE BOX
Patient is a 55y old  Male who presents with a chief complaint of suspected COVID-19 (08 May 2020 09:53)  pt remain vented/sedated  on enteral tube feed   on pressor  renal f/u noted  ICU Vital Signs Last 24 Hrs  T(C): 35.9 (08 May 2020 16:00), Max: 36.4 (07 May 2020 22:00)  T(F): 96.7 (08 May 2020 16:00), Max: 97.6 (07 May 2020 22:00)  HR: 108 (08 May 2020 16:00) (73 - 110)  ABP: 132/59 (08 May 2020 16:00) (82/42 - 269/259)  ABP(mean): 83 (08 May 2020 16:00) (54 - 269)  RR: 26 (08 May 2020 16:00) (26 - 28)  SpO2: 94% (08 May 2020 16:00) (86% - 100%)      Drug Dosing Weight  Height (cm): 170.2 (18 Apr 2020 03:38)  Weight (kg): 88 (18 Apr 2020 03:38)  BMI (kg/m2): 30.4 (18 Apr 2020 03:38)  BSA (m2): 2 (18 Apr 2020 03:38)    I&O's Detail    07 May 2020 07:01  -  08 May 2020 07:00  --------------------------------------------------------  IN:    dexmedetomidine Infusion: 396 mL    Enteral Tube Flush: 110 mL    fentaNYL Infusion.: 422.4 mL    Free Water: 1700 mL    insulin regular Infusion: 98 mL    lactated ringers.: 8250 mL    midazolam Infusion: 60 mL    norepinephrine Infusion: 60 mL    Vital High Protein: 1440 mL  Total IN: 18353.4 mL    OUT:    Indwelling Catheter - Urethral: 5185 mL    Rectal Tube: 700 mL  Total OUT: 5885 mL    Total NET: 6651.4 mL      08 May 2020 07:01  -  08 May 2020 17:28  --------------------------------------------------------  IN:    cisatracurium Infusion: 147.7 mL    dexmedetomidine Infusion: 330 mL    dextrose 5%.: 800 mL    fentaNYL Infusion.: 352 mL    Free Water: 600 mL    insulin regular Infusion: 47 mL    lactated ringers.: 750 mL    midazolam Infusion: 25 mL    norepinephrine Infusion: 69.8 mL    Packed Red Blood Cells: 250 mL  Total IN: 3371.5 mL    OUT:    Indwelling Catheter - Urethral: 2200 mL  Total OUT: 2200 mL    Total NET: 1171.5 mL    PHYSICAL EXAM:  Constitutional:  remain vented   on OGT feed  MEDICATIONS  (STANDING):  aspirin  chewable 81 milliGRAM(s) Oral daily  BACItracin   Ointment 1 Application(s) Topical two times a day  calcium acetate 2001 milliGRAM(s) Oral three times a day with meals  chlorhexidine 0.12% Liquid 15 milliLiter(s) Oral Mucosa every 12 hours  chlorhexidine 4% Liquid 1 Application(s) Topical <User Schedule>  cisatracurium Infusion 4 MICROgram(s)/kG/Min (21.1 mL/Hr) IV Continuous <Continuous>  collagenase Ointment 1 Application(s) Topical every 12 hours  dexMEDEtomidine Infusion 1.5 MICROgram(s)/kG/Hr (33 mL/Hr) IV Continuous <Continuous>  dexMEDEtomidine Infusion 0.2 MICROgram(s)/kG/Hr (4.4 mL/Hr) IV Continuous <Continuous>  dextrose 5%. 1000 milliLiter(s) (100 mL/Hr) IV Continuous <Continuous>  fentaNYL   Infusion. 0.5 MICROgram(s)/kG/Hr (4.4 mL/Hr) IV Continuous <Continuous>  heparin   Injectable 5000 Unit(s) SubCutaneous every 8 hours  insulin regular Infusion 3 Unit(s)/Hr (3 mL/Hr) IV Continuous <Continuous>  lactulose Syrup 20 Gram(s) Oral every 12 hours  methylPREDNISolone sodium succinate Injectable 60 milliGRAM(s) IV Push every 24 hours  midazolam Infusion 0.057 mG/kG/Hr (5 mL/Hr) IV Continuous <Continuous>  norepinephrine Infusion 0.05 MICROgram(s)/kG/Min (4.13 mL/Hr) IV Continuous <Continuous>  pantoprazole   Suspension 40 milliGRAM(s) Oral daily  senna 2 Tablet(s) Oral at bedtime  sevelamer carbonate Powder 2400 milliGRAM(s) Oral three times a day with meals  sodium bicarbonate 650 milliGRAM(s) Oral every 12 hours      Diet, NPO with Tube Feed:   Tube Feeding Modality: Orogastric  Vital High Protein  Total Volume for 24 Hours (mL): 1440  Bolus  Total Volume of Bolus (mL):  360  Tube Feed Frequency: Every 6 hours   Tube Feed Start Time: 21:00  Bolus Feed Rate (mL per Hour): 500   Bolus Feed Duration (in Hours): 0.45 (05-04-20 @ 20:39)      LABS  05-08    153<H>  |  112<H>  |  158<HH>  ----------------------------<  146<H>  4.3   |  26  |  3.1<H>    Ca    7.5<L>      08 May 2020 05:03  Mg     2.5     05-08    TPro  5.1<L>  /  Alb  1.7<L>  /  TBili  0.7  /  DBili  x   /  AST  77<H>  /  ALT  106<H>  /  AlkPhos  159<H>  05-08                          7.0    12.47 )-----------( 198      ( 08 May 2020 07:54 )             22.2     CAPILLARY BLOOD GLUCOSE  POCT Blood Glucose.: 178 mg/dL (08 May 2020 17:27)  POCT Blood Glucose.: 173 mg/dL (08 May 2020 16:37)   RADIOLOGY STUDIES  < from: Xray Chest 1 View-PORTABLE IMMEDIATE (05.08.20 @ 10:24) >  Impression:    Bilateral opacifications with left-sided pneumothorax. Support devices as described.  Without change. Patient is a 55y old  Male who presents with a chief complaint of suspected COVID-19 (08 May 2020 09:53)  pt remain vented/sedated  on enteral tube feedings  on pressor  renal f/u noted  ICU Vital Signs Last 24 Hrs  T(C): 35.9 (08 May 2020 16:00), Max: 36.4 (07 May 2020 22:00)  T(F): 96.7 (08 May 2020 16:00), Max: 97.6 (07 May 2020 22:00)  HR: 108 (08 May 2020 16:00) (73 - 110)  ABP: 132/59 (08 May 2020 16:00) (82/42 - 269/259)  ABP(mean): 83 (08 May 2020 16:00) (54 - 269)  RR: 26 (08 May 2020 16:00) (26 - 28)  SpO2: 94% (08 May 2020 16:00) (86% - 100%)    Drug Dosing Weight  Height (cm): 170.2 (18 Apr 2020 03:38)  Weight (kg): 88 (18 Apr 2020 03:38)  BMI (kg/m2): 30.4 (18 Apr 2020 03:38)  BSA (m2): 2 (18 Apr 2020 03:38)    I&O's Detail    07 May 2020 07:01  -  08 May 2020 07:00  --------------------------------------------------------  IN:    dexmedetomidine Infusion: 396 mL    Enteral Tube Flush: 110 mL    fentaNYL Infusion.: 422.4 mL    Free Water: 1700 mL    insulin regular Infusion: 98 mL    lactated ringers.: 8250 mL    midazolam Infusion: 60 mL    norepinephrine Infusion: 60 mL    Vital High Protein: 1440 mL  Total IN: 47006.4 mL    OUT:    Indwelling Catheter - Urethral: 5185 mL    Rectal Tube: 700 mL  Total OUT: 5885 mL    Total NET: 6651.4 mL      08 May 2020 07:01  -  08 May 2020 17:28  --------------------------------------------------------  IN:    cisatracurium Infusion: 147.7 mL    dexmedetomidine Infusion: 330 mL    dextrose 5%.: 800 mL    fentaNYL Infusion.: 352 mL    Free Water: 600 mL    insulin regular Infusion: 47 mL    lactated ringers.: 750 mL    midazolam Infusion: 25 mL    norepinephrine Infusion: 69.8 mL    Packed Red Blood Cells: 250 mL  Total IN: 3371.5 mL    OUT:    Indwelling Catheter - Urethral: 2200 mL  Total OUT: 2200 mL    Total NET: 1171.5 mL    PHYSICAL EXAM:  Constitutional:  remain vented   on OGT feed  MEDICATIONS  (STANDING):  aspirin  chewable 81 milliGRAM(s) Oral daily  BACItracin   Ointment 1 Application(s) Topical two times a day  calcium acetate 2001 milliGRAM(s) Oral three times a day with meals  chlorhexidine 0.12% Liquid 15 milliLiter(s) Oral Mucosa every 12 hours  chlorhexidine 4% Liquid 1 Application(s) Topical <User Schedule>  cisatracurium Infusion 4 MICROgram(s)/kG/Min (21.1 mL/Hr) IV Continuous <Continuous>  collagenase Ointment 1 Application(s) Topical every 12 hours  dexMEDEtomidine Infusion 1.5 MICROgram(s)/kG/Hr (33 mL/Hr) IV Continuous <Continuous>  dexMEDEtomidine Infusion 0.2 MICROgram(s)/kG/Hr (4.4 mL/Hr) IV Continuous <Continuous>  dextrose 5%. 1000 milliLiter(s) (100 mL/Hr) IV Continuous <Continuous>  fentaNYL   Infusion. 0.5 MICROgram(s)/kG/Hr (4.4 mL/Hr) IV Continuous <Continuous>  heparin   Injectable 5000 Unit(s) SubCutaneous every 8 hours  insulin regular Infusion 3 Unit(s)/Hr (3 mL/Hr) IV Continuous <Continuous>  lactulose Syrup 20 Gram(s) Oral every 12 hours  methylPREDNISolone sodium succinate Injectable 60 milliGRAM(s) IV Push every 24 hours  midazolam Infusion 0.057 mG/kG/Hr (5 mL/Hr) IV Continuous <Continuous>  norepinephrine Infusion 0.05 MICROgram(s)/kG/Min (4.13 mL/Hr) IV Continuous <Continuous>  pantoprazole   Suspension 40 milliGRAM(s) Oral daily  senna 2 Tablet(s) Oral at bedtime  sevelamer carbonate Powder 2400 milliGRAM(s) Oral three times a day with meals  sodium bicarbonate 650 milliGRAM(s) Oral every 12 hours    Diet, NPO with Tube Feed:   Tube Feeding Modality: Orogastric  Vital High Protein  Total Volume for 24 Hours (mL): 1440  Bolus  Total Volume of Bolus (mL):  360  Tube Feed Frequency: Every 6 hours   Tube Feed Start Time: 21:00  Bolus Feed Rate (mL per Hour): 500   Bolus Feed Duration (in Hours): 0.45 (05-04-20 @ 20:39)    LABS  05-08    153<H>  |  112<H>  |  158<HH>  ----------------------------<  146<H>  4.3   |  26  |  3.1<H>    Ca    7.5<L>      08 May 2020 05:03  Mg     2.5     05-08    TPro  5.1<L>  /  Alb  1.7<L>  /  TBili  0.7  /  DBili  x   /  AST  77<H>  /  ALT  106<H>  /  AlkPhos  159<H>  05-08  Phosphorus Level, Serum in AM (05.06.20 @ 03:50)    Phosphorus Level, Serum: 6.4 mg/dL    no zinc or 25oh vitamin D levels                        7.0    12.47 )-----------( 198      ( 08 May 2020 07:54 )             22.2     CAPILLARY BLOOD GLUCOSE  POCT Blood Glucose.: 178 mg/dL (08 May 2020 17:27)  POCT Blood Glucose.: 173 mg/dL (08 May 2020 16:37)   RADIOLOGY STUDIES  < from: Xray Chest 1 View-PORTABLE IMMEDIATE (05.08.20 @ 10:24) >  Impression:    Bilateral opacifications with left-sided pneumothorax. Support devices as described.  Without change.

## 2020-05-08 NOTE — PROGRESS NOTE ADULT - ASSESSMENT
Assessment:    Acute hypoxemic respiratory failure  ARDS  COVID 19  SP convalescent plasma    Possible superimposed bacterial infection SP ABX  TELLO/ non oliguric.  Post ATN diuresis   Sub Q air     PLAN:    CNS: Adequate sedation and continuous paralysis     HEENT:  Oral care    PULMONARY:  HOB @ 45 degrees, keep Sats 92-96%.  No vent changes for now.  ABG after p[aralysis and adjust vent settiongs.       CARDIOVASCULAR:  Avoid volume overload.  D5W 100 cc per hour      GI: GI prophylaxis Protonix, bowel regimen. Feeding: OG feeding.  Free H2O.      RENAL:  F/u  lytes.  Correct as needed. accurate I/O, renal F/UP, repeat CMP    INFECTIOUS DISEASE:  FU Repeat cultures.      HEMATOLOGICAL:  DVT Prophylaxis.  one Unit PRBC and FU CBC     ENDOCRINE:  Follow up FS.  Insulin protocol if needed.  Solumedrol 60 mg Q24 Day 7 .    CODE STATUS: FULL CODE    DISPOSITION: Patient require continued monitoring in MICU    Poor prognosis Assessment:    Acute hypoxemic respiratory failure  ARDS  COVID 19  SP convalescent plasma    Possible superimposed bacterial infection SP ABX  TELLO/ non oliguric.  Post ATN diuresis   Sub Q air     PLAN:    CNS: Adequate sedation and continuous paralysis     HEENT:  Oral care    PULMONARY:  HOB @ 45 degrees, keep Sats 92-96%.  vent Changes RR 20   ABG after paralysis and adjust vent settings       CARDIOVASCULAR:  Avoid volume overload.  D5W 100 cc per hour      GI: GI prophylaxis Protonix, bowel regimen. Feeding: OG feeding.  Free H2O.      RENAL:  F/u  lytes.  Correct as needed. accurate I/O, renal F/UP, repeat CMP    INFECTIOUS DISEASE:  FU Repeat cultures.      HEMATOLOGICAL:  DVT Prophylaxis.  one Unit PRBC and FU CBC     ENDOCRINE:  Follow up FS.  Insulin protocol if needed.  Solumedrol 60 mg Q24 Day 7 .    CODE STATUS: FULL CODE    DISPOSITION: Patient require continued monitoring in MICU    Poor prognosis

## 2020-05-08 NOTE — PROGRESS NOTE ADULT - SUBJECTIVE AND OBJECTIVE BOX
RHIANNON MAHER  55y, Male    All available historical data reviewed    OVERNIGHT EVENTS:  no fevers  fio2 100%    ROS:  unable to obtain history secondary to patient's mental status and/or sedation     VITALS:  T(F): 96.6, Max: 97.6 (05-07-20 @ 22:00)  HR: 86  BP: --  RR: 26Vital Signs Last 24 Hrs  T(C): 35.9 (08 May 2020 06:00), Max: 36.4 (07 May 2020 22:00)  T(F): 96.6 (08 May 2020 06:00), Max: 97.6 (07 May 2020 22:00)  HR: 86 (08 May 2020 06:00) (73 - 94)  BP: --  BP(mean): --  RR: 26 (08 May 2020 06:00) (26 - 28)  SpO2: 96% (08 May 2020 06:00) (92% - 100%)    TESTS & MEASUREMENTS:                        6.9    14.91 )-----------( 204      ( 08 May 2020 05:03 )             21.8     05-08    153<H>  |  112<H>  |  158<HH>  ----------------------------<  146<H>  4.3   |  26  |  3.1<H>    Ca    7.5<L>      08 May 2020 05:03  Mg     2.5     05-08    TPro  5.1<L>  /  Alb  1.7<L>  /  TBili  0.7  /  DBili  x   /  AST  77<H>  /  ALT  106<H>  /  AlkPhos  159<H>  05-08    LIVER FUNCTIONS - ( 08 May 2020 05:03 )  Alb: 1.7 g/dL / Pro: 5.1 g/dL / ALK PHOS: 159 U/L / ALT: 106 U/L / AST: 77 U/L / GGT: x             Culture - Blood (collected 05-01-20 @ 11:29)  Source: .Blood None  Final Report (05-06-20 @ 22:00):    No Growth Final            RADIOLOGY & ADDITIONAL TESTS:  Personal review of radiological diagnostics performed  Echo and EKG results noted when applicable.     MEDICATIONS:  acetaminophen   Tablet .. 650 milliGRAM(s) Oral every 6 hours PRN  aspirin  chewable 81 milliGRAM(s) Oral daily  BACItracin   Ointment 1 Application(s) Topical two times a day  calcium acetate 2001 milliGRAM(s) Oral three times a day with meals  chlorhexidine 0.12% Liquid 15 milliLiter(s) Oral Mucosa every 12 hours  chlorhexidine 4% Liquid 1 Application(s) Topical <User Schedule>  collagenase Ointment 1 Application(s) Topical every 12 hours  dexMEDEtomidine Infusion 1.5 MICROgram(s)/kG/Hr IV Continuous <Continuous>  dexMEDEtomidine Infusion 0.2 MICROgram(s)/kG/Hr IV Continuous <Continuous>  fentaNYL   Infusion. 0.5 MICROgram(s)/kG/Hr IV Continuous <Continuous>  furosemide   Injectable 40 milliGRAM(s) IV Push two times a day  heparin   Injectable 5000 Unit(s) SubCutaneous every 8 hours  insulin regular Infusion 3 Unit(s)/Hr IV Continuous <Continuous>  lactated ringers. 1000 milliLiter(s) IV Continuous <Continuous>  lactulose Syrup 20 Gram(s) Oral every 12 hours  methylPREDNISolone sodium succinate Injectable 60 milliGRAM(s) IV Push every 24 hours  midazolam Infusion 0.057 mG/kG/Hr IV Continuous <Continuous>  norepinephrine Infusion 0.05 MICROgram(s)/kG/Min IV Continuous <Continuous>  ondansetron Injectable 4 milliGRAM(s) IV Push every 8 hours PRN  pantoprazole   Suspension 40 milliGRAM(s) Oral daily  senna 2 Tablet(s) Oral at bedtime  sevelamer carbonate Powder 2400 milliGRAM(s) Oral three times a day with meals  sodium bicarbonate 650 milliGRAM(s) Oral every 12 hours      ANTIBIOTICS:

## 2020-05-08 NOTE — PROGRESS NOTE ADULT - ASSESSMENT
Acute hypoxemic respiratory failure secondary to SARS-COV-2 infection / TELLO/ hyperkalemia/ hypernatremia   # creatinine  better / polyuric post ATN   # non oliguric / high BUN due to steroids use better   #agree to hold lasix for now / free water via nGT at 400cc q4h/ consider 1/2 NS at 75 cc per hour IVF   # ph noted, on  renagel 3/3/3 , and phoslo/ repeat Phosphorus   # DC sodium bicarbonate please   # no   indication for RRT  # subcutaneous emphesyma sp decompression   # prognosis poor   # will follow

## 2020-05-08 NOTE — PROGRESS NOTE ADULT - ASSESSMENT
ASSESSMENT/PLAN  - covid 19 pneumonia with acute resp failure and ARDS  - cytokine storm  - TELLO   - DM   - HTN- covid 19 pneumonia with acute resp failure and ARDS  hypernatrmia  continue with vital hp at 360ml x4 feeds/d  check bmp/phos/mg and correct lytes ASSESSMENT/PLAN  - covid 19 pneumonia with acute resp failure and ARDS  - cytokine storm  - TELLO   - DM   - HTN- covid 19 pneumonia with acute resp failure and ARDS  - hypernatrmia      continue with vital hp at 360ml x4 feeds/d for now  if pt stays off propofol will change feeds to Peptamen AF  Nepro is not appropriate, mainly die to high omega 6fa content  check bmp/phos/mg and correct lytes

## 2020-05-08 NOTE — PROGRESS NOTE ADULT - SUBJECTIVE AND OBJECTIVE BOX
SUBJECTIVE:    Patient is a 55y old Male who presents with a chief complaint of suspected COVID-19 (08 May 2020 09:29)    Currently admitted to medicine with the primary diagnosis of Suspected 2019 novel coronavirus infection     Today is hospital day 25d. This morning he is resting comfortably in bed and reports no new issues or overnight events.     PAST MEDICAL & SURGICAL HISTORY  Diabetes  Hypertension  No significant past surgical history    SOCIAL HISTORY:  Negative for smoking/alcohol/drug use.     ALLERGIES:  No Known Allergies    MEDICATIONS:  STANDING MEDICATIONS  aspirin  chewable 81 milliGRAM(s) Oral daily  BACItracin   Ointment 1 Application(s) Topical two times a day  calcium acetate 2001 milliGRAM(s) Oral three times a day with meals  chlorhexidine 0.12% Liquid 15 milliLiter(s) Oral Mucosa every 12 hours  chlorhexidine 4% Liquid 1 Application(s) Topical <User Schedule>  cisatracurium Infusion 4 MICROgram(s)/kG/Min IV Continuous <Continuous>  cisatracurium Injectable 10 milliGRAM(s) IV Push once  cisatracurium Injectable 10 milliGRAM(s) IV Push once  collagenase Ointment 1 Application(s) Topical every 12 hours  dexMEDEtomidine Infusion 1.5 MICROgram(s)/kG/Hr IV Continuous <Continuous>  dexMEDEtomidine Infusion 0.2 MICROgram(s)/kG/Hr IV Continuous <Continuous>  dextrose 5%. 1000 milliLiter(s) IV Continuous <Continuous>  fentaNYL   Infusion. 0.5 MICROgram(s)/kG/Hr IV Continuous <Continuous>  heparin   Injectable 5000 Unit(s) SubCutaneous every 8 hours  insulin regular Infusion 3 Unit(s)/Hr IV Continuous <Continuous>  lactated ringers. 1000 milliLiter(s) IV Continuous <Continuous>  lactulose Syrup 20 Gram(s) Oral every 12 hours  methylPREDNISolone sodium succinate Injectable 60 milliGRAM(s) IV Push every 24 hours  midazolam Infusion 0.057 mG/kG/Hr IV Continuous <Continuous>  norepinephrine Infusion 0.05 MICROgram(s)/kG/Min IV Continuous <Continuous>  pantoprazole   Suspension 40 milliGRAM(s) Oral daily  senna 2 Tablet(s) Oral at bedtime  sevelamer carbonate Powder 2400 milliGRAM(s) Oral three times a day with meals  sodium bicarbonate 650 milliGRAM(s) Oral every 12 hours    PRN MEDICATIONS  acetaminophen   Tablet .. 650 milliGRAM(s) Oral every 6 hours PRN  ondansetron Injectable 4 milliGRAM(s) IV Push every 8 hours PRN    VITALS:   T(F): 96.8  HR: 86  BP: --  RR: 26  SpO2: 96%    LABS:                        7.0    12.47 )-----------( 198      ( 08 May 2020 07:54 )             22.2     05-08    153<H>  |  112<H>  |  158<HH>  ----------------------------<  146<H>  4.3   |  26  |  3.1<H>    Ca    7.5<L>      08 May 2020 05:03  Mg     2.5     05-08    TPro  5.1<L>  /  Alb  1.7<L>  /  TBili  0.7  /  DBili  x   /  AST  77<H>  /  ALT  106<H>  /  AlkPhos  159<H>  05-08        ABG - ( 08 May 2020 02:59 )  pH, Arterial: 7.37  pH, Blood: x     /  pCO2: 51    /  pO2: 77    / HCO3: 29    / Base Excess: 3.6   /  SaO2: 95                        RADIOLOGY:    PHYSICAL EXAM:  GEN: No acute distress  LUNGS: Clear to auscultation bilaterally   HEART: S1/S2 present. RRR.   ABD: Soft, non-tender, non-distended. Bowel sounds present  EXT: NC/NC/NE/2+PP/LEUNG  NEURO: AAOX3

## 2020-05-09 NOTE — PROGRESS NOTE ADULT - SUBJECTIVE AND OBJECTIVE BOX
RHIANNON MAHER  55y, Male    All available historical data reviewed    OVERNIGHT EVENTS:  no fevers  fio2 100%    ROS:  unable to obtain history secondary to patient's mental status and/or sedation     VITALS:  T(F): 98.2, Max: 100.6 (05-09-20 @ 02:00)  HR: 117  BP: --  RR: 21Vital Signs Last 24 Hrs  T(C): 36.8 (09 May 2020 10:00), Max: 38.1 (09 May 2020 02:00)  T(F): 98.2 (09 May 2020 10:00), Max: 100.6 (09 May 2020 02:00)  HR: 117 (09 May 2020 10:00) (89 - 135)  BP: --  BP(mean): --  RR: 21 (09 May 2020 10:00) (20 - 26)  SpO2: 92% (09 May 2020 10:00) (53% - 94%)    TESTS & MEASUREMENTS:                        8.1    19.64 )-----------( 220      ( 09 May 2020 05:30 )             26.3     05-09    142  |  104  |  154<HH>  ----------------------------<  238<H>  4.7   |  27  |  2.9<H>    Ca    7.2<L>      09 May 2020 05:30  Mg     2.5     05-09    TPro  5.0<L>  /  Alb  1.8<L>  /  TBili  0.6  /  DBili  x   /  AST  55<H>  /  ALT  85<H>  /  AlkPhos  176<H>  05-09    LIVER FUNCTIONS - ( 09 May 2020 05:30 )  Alb: 1.8 g/dL / Pro: 5.0 g/dL / ALK PHOS: 176 U/L / ALT: 85 U/L / AST: 55 U/L / GGT: x                   RADIOLOGY & ADDITIONAL TESTS:  Personal review of radiological diagnostics performed  Echo and EKG results noted when applicable.     MEDICATIONS:  acetaminophen   Tablet .. 650 milliGRAM(s) Oral every 6 hours PRN  ALBUTerol    90 MICROgram(s) HFA Inhaler 2 Puff(s) Inhalation once  aspirin  chewable 81 milliGRAM(s) Oral daily  BACItracin   Ointment 1 Application(s) Topical two times a day  calcium acetate 2001 milliGRAM(s) Oral three times a day with meals  chlorhexidine 0.12% Liquid 15 milliLiter(s) Oral Mucosa every 12 hours  chlorhexidine 4% Liquid 1 Application(s) Topical <User Schedule>  cisatracurium Infusion 4 MICROgram(s)/kG/Min IV Continuous <Continuous>  collagenase Ointment 1 Application(s) Topical every 12 hours  dexMEDEtomidine Infusion 1.5 MICROgram(s)/kG/Hr IV Continuous <Continuous>  dexMEDEtomidine Infusion 0.2 MICROgram(s)/kG/Hr IV Continuous <Continuous>  dextrose 5%. 1000 milliLiter(s) IV Continuous <Continuous>  fentaNYL   Infusion. 0.5 MICROgram(s)/kG/Hr IV Continuous <Continuous>  heparin   Injectable 5000 Unit(s) SubCutaneous every 8 hours  insulin regular Infusion 3 Unit(s)/Hr IV Continuous <Continuous>  lactulose Syrup 20 Gram(s) Oral every 12 hours  methylPREDNISolone sodium succinate Injectable 60 milliGRAM(s) IV Push every 24 hours  midazolam Infusion 0.057 mG/kG/Hr IV Continuous <Continuous>  norepinephrine Infusion 0.05 MICROgram(s)/kG/Min IV Continuous <Continuous>  ondansetron Injectable 4 milliGRAM(s) IV Push every 8 hours PRN  pantoprazole   Suspension 40 milliGRAM(s) Oral daily  phenylephrine    Infusion 0.1 MICROgram(s)/kG/Min IV Continuous <Continuous>  senna 2 Tablet(s) Oral at bedtime  sevelamer carbonate Powder 2400 milliGRAM(s) Oral three times a day with meals  vasopressin Infusion 0.02 Unit(s)/Min IV Continuous <Continuous>      ANTIBIOTICS:

## 2020-05-09 NOTE — PROGRESS NOTE ADULT - ASSESSMENT
Assessment:    Acute hypoxemic respiratory failure  ARDS  COVID 19  SP convalescent plasma    Possible superimposed bacterial infection SP ABX  TELLO/ non oliguric.  Post ATN diuresis   Sub Q air     PLAN:    CNS: c/w Adequate sedation - Precedex    HEENT: Oral care    PULMONARY:  HOB @ 45 degrees, keep Sats 92-96%.  vent Changes RR 20 , I:E 1:2 . PEEP 10 . Repeat ABG and will adjust vent settings     CARDIOVASCULAR:  Avoid volume overload.  D5W 100 cc per hour  . Free water     GI: GI prophylaxis Protonix, bowel regimen. Feeding: OG feeding.  Free H2O.      RENAL:  F/u  lytes. d/c bicarb . accurate I/O, renal F/UP     INFECTIOUS DISEASE:  FU Repeat cultures     HEMATOLOGICAL:  DVT Prophylaxis.      ENDOCRINE:  Follow up FS.  Insulin protocol if needed.  Solumedrol 60 mg Q24 x 10 days (day 8 today)    CODE STATUS: FULL CODE    DISPOSITION: Patient require continued monitoring in MICU    Poor prognosis Assessment:    Acute hypoxemic respiratory failure  ARDS  COVID 19  SP convalescent plasma    Possible superimposed bacterial infection SP ABX  TELLO/ non oliguric.  Post ATN diuresis   Sub Q air     PLAN:    CNS:  Sedation and paralysis for now      HEENT: Oral care    PULMONARY:  HOB @ 45 degrees, keep Sats 92-96%.  Vent Changes aftert paralysis and repeat ABG      CARDIOVASCULAR:  Avoid volume overload. DC D5W.  Add Vasopressin and wean Ed      GI: GI prophylaxis Protonix, bowel regimen. Feeding: OG feeding.  Free H2O.      RENAL:  F/u  lytes. Continue bicarb oral. accurate I/O, renal F/UP     INFECTIOUS DISEASE:  FU Repeat cultures     HEMATOLOGICAL:  DVT therapy for now.  IV Heparin and repeat LE Duplex.  Unstable for CT     ENDOCRINE:  Follow up FS.  Insulin protocol if needed.  Solumedrol 60 mg Q24 x 10 days (day 8 today)    CODE STATUS: FULL CODE    DISPOSITION: Patient require continued monitoring in MICU    Poor prognosis

## 2020-05-09 NOTE — PROGRESS NOTE ADULT - SUBJECTIVE AND OBJECTIVE BOX
24 h events noted  intubated/ventilated         PAST HISTORY  --------------------------------------------------------------------------------  No significant changes to PMH, PSH, FHx, SHx, unless otherwise noted    ALLERGIES & MEDICATIONS  --------------------------------------------------------------------------------  Allergies    No Known Allergies    Intolerances      Standing Inpatient Medications  ALBUTerol    90 MICROgram(s) HFA Inhaler 2 Puff(s) Inhalation once  aspirin  chewable 81 milliGRAM(s) Oral daily  BACItracin   Ointment 1 Application(s) Topical two times a day  calcium acetate 2001 milliGRAM(s) Oral three times a day with meals  chlorhexidine 0.12% Liquid 15 milliLiter(s) Oral Mucosa every 12 hours  chlorhexidine 4% Liquid 1 Application(s) Topical <User Schedule>  cisatracurium Infusion 4 MICROgram(s)/kG/Min IV Continuous <Continuous>  collagenase Ointment 1 Application(s) Topical every 12 hours  dexMEDEtomidine Infusion 1.5 MICROgram(s)/kG/Hr IV Continuous <Continuous>  dexMEDEtomidine Infusion 0.2 MICROgram(s)/kG/Hr IV Continuous <Continuous>  dextrose 5%. 1000 milliLiter(s) IV Continuous <Continuous>  fentaNYL   Infusion. 0.5 MICROgram(s)/kG/Hr IV Continuous <Continuous>  heparin   Injectable 5000 Unit(s) SubCutaneous every 8 hours  insulin regular Infusion 3 Unit(s)/Hr IV Continuous <Continuous>  lactulose Syrup 20 Gram(s) Oral every 12 hours  methylPREDNISolone sodium succinate Injectable 60 milliGRAM(s) IV Push every 24 hours  midazolam Infusion 0.057 mG/kG/Hr IV Continuous <Continuous>  norepinephrine Infusion 0.05 MICROgram(s)/kG/Min IV Continuous <Continuous>  pantoprazole   Suspension 40 milliGRAM(s) Oral daily  senna 2 Tablet(s) Oral at bedtime  sevelamer carbonate Powder 2400 milliGRAM(s) Oral three times a day with meals  sodium bicarbonate 650 milliGRAM(s) Oral every 12 hours  vasopressin Infusion 0.02 Unit(s)/Min IV Continuous <Continuous>    PRN Inpatient Medications  acetaminophen   Tablet .. 650 milliGRAM(s) Oral every 6 hours PRN  ondansetron Injectable 4 milliGRAM(s) IV Push every 8 hours PRN          VITALS/PHYSICAL EXAM  --------------------------------------------------------------------------------  T(C): 38.1 (05-09-20 @ 02:00), Max: 38.1 (05-09-20 @ 02:00)  HR: 127 (05-09-20 @ 02:00) (91 - 127)  BP: --  RR: 24 (05-09-20 @ 02:00) (24 - 26)  SpO2: 92% (05-09-20 @ 02:00) (86% - 97%)  Wt(kg): --        05-07-20 @ 07:01  -  05-08-20 @ 07:00  --------------------------------------------------------  IN: 04194.4 mL / OUT: 5885 mL / NET: 6651.4 mL    05-08-20 @ 07:01  -  05-09-20 @ 06:46  --------------------------------------------------------  IN: 6600.6 mL / OUT: 3180 mL / NET: 3420.6 mL      Physical Exam:  	Gen: intubated/ventilated       LABS/STUDIES  --------------------------------------------------------------------------------              8.1    19.64 >-----------<  220      [05-09-20 @ 05:30]              26.3     142  |  104  |  154  ----------------------------<  238      [05-09-20 @ 05:30]  4.7   |  27  |  2.9        Ca     7.2     [05-09-20 @ 05:30]      Mg     2.5     [05-09-20 @ 05:30]    TPro  5.0  /  Alb  1.8  /  TBili  0.6  /  DBili  x   /  AST  55  /  ALT  85  /  AlkPhos  176  [05-09-20 @ 05:30]          Creatinine Trend:  SCr 2.9 [05-09 @ 05:30]  SCr 2.8 [05-08 @ 21:36]  SCr 3.0 [05-08 @ 18:06]  SCr 3.1 [05-08 @ 05:03]  SCr 3.9 [05-07 @ 17:24]        Ferritin 1375      [04-29-20 @ 16:00]  Lipid: chol --, , HDL --, LDL --      [05-01-20 @ 10:30]

## 2020-05-09 NOTE — PROGRESS NOTE ADULT - ASSESSMENT
#Acute hypoxemic respiratory failure with ARDS and COVID 19 SP convalescent plasma  complicated by Possible superimposed bacterial infection   intubated , complicated by acidosis , continue with vent adjustments and repeat abg   off abx  solumderol    #TELLO/ non oliguric. and Post ATN diuresis   mildly improving   cw lactated ringers and d5w     #Sub Q air resolved     # hypertension    # diabetes  CAPILLARY BLOOD GLUCOSE      POCT Blood Glucose.: 140 mg/dL (09 May 2020 17:31)  POCT Blood Glucose.: 150 mg/dL (09 May 2020 16:15)  POCT Blood Glucose.: 154 mg/dL (09 May 2020 14:43)  POCT Blood Glucose.: 112 mg/dL (09 May 2020 12:45)  POCT Blood Glucose.: 101 mg/dL (09 May 2020 11:25)  POCT Blood Glucose.: 107 mg/dL (09 May 2020 10:35)  POCT Blood Glucose.: 127 mg/dL (09 May 2020 09:41)  POCT Blood Glucose.: 173 mg/dL (09 May 2020 08:24)  POCT Blood Glucose.: 201 mg/dL (09 May 2020 07:32)  POCT Blood Glucose.: 224 mg/dL (09 May 2020 05:20)  POCT Blood Glucose.: 250 mg/dL (09 May 2020 02:53)  POCT Blood Glucose.: 256 mg/dL (09 May 2020 02:00)  POCT Blood Glucose.: 219 mg/dL (09 May 2020 00:52)  POCT Blood Glucose.: 192 mg/dL (08 May 2020 23:42)  POCT Blood Glucose.: 178 mg/dL (08 May 2020 18:51)  controlled

## 2020-05-09 NOTE — PROGRESS NOTE ADULT - ASSESSMENT
Acute hypoxemic respiratory failure secondary to SARS-COV-2 infection / TELLO/ hyperkalemia/ hypernatremia   # creatinine  better / polyuric post ATN   # non oliguric   # BUN noted , d/c protein in diet , on steroids   # ns at 75 cc/h  # check repeat ph   # DC sodium bicarbonate   # no acute   indication for RRT  # prognosis poor   # will follow

## 2020-05-09 NOTE — PROGRESS NOTE ADULT - SUBJECTIVE AND OBJECTIVE BOX
RHIANNON MAHER  55y  [facility]  A      Patient is a 55y old  Male who presents with a chief complaint of suspected COVID-19 (09 May 2020 10:45)      INTERVAL HPI/OVERNIGHT EVENTS:    mild temeprature overnight      REVIEW OF SYSTEMS:  Unable to ROS     T(C): 36 (05-09-20 @ 16:00), Max: 38.1 (05-09-20 @ 02:00)  HR: 103 (05-09-20 @ 17:00) (89 - 135)  BP: --  RR: 25 (05-09-20 @ 17:00) (20 - 26)  SpO2: 90% (05-09-20 @ 17:00) (53% - 93%)  Wt(kg): --Vital Signs Last 24 Hrs  T(C): 36 (09 May 2020 16:00), Max: 38.1 (09 May 2020 02:00)  T(F): 96.8 (09 May 2020 16:00), Max: 100.6 (09 May 2020 02:00)  HR: 103 (09 May 2020 17:00) (89 - 135)  BP: --  BP(mean): --  RR: 25 (09 May 2020 17:00) (20 - 26)  SpO2: 90% (09 May 2020 17:00) (53% - 93%)    PHYSICAL EXAM:  GEN Lying in no acute distress  HEENT Pupils equal and reactive to light and accommodationSupple Neck  PULM Clear to auscultation bilaterally  CV s1s2 regular rate and rhythm  GI + bowel sounds nontnender  EXT no cyanosis or edema  PSYCH sedated   INTEG No Lesions        LABS:                            8.1    19.64 )-----------( 220      ( 09 May 2020 05:30 )             26.3   05-09    142  |  104  |  154<HH>  ----------------------------<  238<H>  4.7   |  27  |  2.9<H>    Ca    7.2<L>      09 May 2020 05:30  Mg     2.5     05-09    TPro  5.0<L>  /  Alb  1.8<L>  /  TBili  0.6  /  DBili  x   /  AST  55<H>  /  ALT  85<H>  /  AlkPhos  176<H>  05-09            acetaminophen   Tablet .. 650 milliGRAM(s) Oral every 6 hours PRN  ALBUTerol    90 MICROgram(s) HFA Inhaler 2 Puff(s) Inhalation once  aspirin  chewable 81 milliGRAM(s) Oral daily  BACItracin   Ointment 1 Application(s) Topical two times a day  calcium acetate 2001 milliGRAM(s) Oral three times a day with meals  chlorhexidine 0.12% Liquid 15 milliLiter(s) Oral Mucosa every 12 hours  chlorhexidine 4% Liquid 1 Application(s) Topical <User Schedule>  cisatracurium Infusion 4 MICROgram(s)/kG/Min IV Continuous <Continuous>  collagenase Ointment 1 Application(s) Topical every 12 hours  dexMEDEtomidine Infusion 1.5 MICROgram(s)/kG/Hr IV Continuous <Continuous>  dexMEDEtomidine Infusion 0.2 MICROgram(s)/kG/Hr IV Continuous <Continuous>  fentaNYL   Infusion. 0.5 MICROgram(s)/kG/Hr IV Continuous <Continuous>  heparin  Infusion 1200 Unit(s)/Hr IV Continuous <Continuous>  insulin regular Infusion 3 Unit(s)/Hr IV Continuous <Continuous>  lactulose Syrup 20 Gram(s) Oral every 12 hours  methylPREDNISolone sodium succinate Injectable 60 milliGRAM(s) IV Push every 24 hours  midazolam Infusion 0.057 mG/kG/Hr IV Continuous <Continuous>  norepinephrine Infusion 0.05 MICROgram(s)/kG/Min IV Continuous <Continuous>  ondansetron Injectable 4 milliGRAM(s) IV Push every 8 hours PRN  pantoprazole   Suspension 40 milliGRAM(s) Oral daily  phenylephrine    Infusion 0.1 MICROgram(s)/kG/Min IV Continuous <Continuous>  senna 2 Tablet(s) Oral at bedtime  sevelamer carbonate Powder 2400 milliGRAM(s) Oral three times a day with meals  sodium bicarbonate 650 milliGRAM(s) Oral three times a day  vasopressin Infusion 0.02 Unit(s)/Min IV Continuous <Continuous>      HEALTH ISSUES - PROBLEM Dx:          Case Discussed with House Staff   45 minutes spent on total encounter; more than 50% of the visit was spent counseling and/or coordinating care by the attending physician.   Spectra [phone]

## 2020-05-09 NOTE — PROGRESS NOTE ADULT - ASSESSMENT
55 year old male patient with hypertension, diabetes presenting for dyspnea.     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 53 > 3.53 > 14.4 >6.08  -Sputa 5/1 NGTD  -BCx 4/21,25, 5/1 NG  -nares ORSA NG  -fungitell 61 (normal) off caspo  S/p Anakinra 4/14-17  -S/p convalescent plasma 4/29    RECOMMENDATIONS;   Presently off meropenem 750 mg iv q24h  please reculture if temp>100.6

## 2020-05-09 NOTE — PROGRESS NOTE ADULT - SUBJECTIVE AND OBJECTIVE BOX
Patient is a 55y old  Male who presents with a chief complaint of suspected COVID-19 (09 May 2020 06:45)    Over Night Events: Febrile overnight; On levophed 0.3 , norsynephrine 6 , sedated with precedex 1.5   IV Drips:         ROS:     CONSTITUTIONAL:   +fever   no chills.  no weight gain   no weight loss    EYES:   no discharge,   no pain  no redness,   no visual changes.    ENT:   Ears: no ear pain and no hearing problems.  Nose: no nasal congestion and no nasal drainage.  Mouth/Throat: no dysphagia,  no hoarseness and no throat pain.  Neck: no lumps, no pain, no stiffness and no swollen glands.     CARDIOVASCULAR:   as per HPI    RESPIRATORY:  as per HPI     GASTROINTESTINAL:   no abdominal pain,   no constipation,   no diarrhea,   no vomiting.    GENITOURINARY:  no dysuria,   no frequency,   no urgency  no hematuria.    MUSCULOSKELETAL:   no back pain,   no musculoskeletal pain,  no weakness.    SKIN:   no jaundice,   no lesions,   no pruritis,   no rashes.    NEURO:   sedated    PSYCHIATRIC:   no known mental health issues  no anxiety  no depression    ALLERGIC/IMMUNOLOGIC:   No active allergic or immunologic issues        PHYSICAL EXAM    ICU Vital Signs Last 24 Hrs  T(C): 37.4 (09 May 2020 06:00), Max: 38.1 (09 May 2020 02:00)  T(F): 99.4 (09 May 2020 06:00), Max: 100.6 (09 May 2020 02:00)  HR: 89 (09 May 2020 06:00) (89 - 130)  BP: --  BP(mean): --  ABP: 58/37 (09 May 2020 06:00) (58/37 - 132/59)  ABP(mean): 44 (09 May 2020 06:00) (44 - 83)  RR: 24 (09 May 2020 02:00) (24 - 26)  SpO2: 91%  (09 May 2020 06:00) (53% - 96%)      CONSTITUTIONAL:  Well nourished.  NAD    ENT:   ET+  Mouth with normal mucosa.   No thrush    EYES:   Pupils equal,   Round and reactive to light.    CARDIAC:   tachycardia   Regular rhythm.    No edema      Vascular:  Normal systolic impulse  No Carotid bruits    RESPIRATORY:   No wheezing  Bilateral BS  Normal chest expansion  Not tachypneic,  No use of accessory muscles    GASTROINTESTINAL:  Abdomen soft,   Non-tender,   No guarding,   + BS    GENITOURINARY  normal genitalia for sex  no edema    MUSCULOSKELETAL:   Range of motion is not limited,  No muscle or joint tenderness  No clubbing, cyanosis    NEUROLOGICAL:   sedated      SKIN:   Skin normal color for race,   Warm and dry and intact.   No evidence of rash.    PSYCHIATRIC:   Normal mood and affect.   No apparent risk to self or others.          05-08-20 @ 07:01  -  05-09-20 @ 07:00  --------------------------------------------------------  IN:    cisatracurium Infusion: 353.5 mL    dexmedetomidine Infusion: 759 mL    dextrose 5%.: 2100 mL    fentaNYL Infusion.: 677.6 mL    Free Water: 1700 mL    insulin regular Infusion: 135 mL    lactated ringers.: 750 mL    midazolam Infusion: 30 mL    norepinephrine Infusion: 144.5 mL    Packed Red Blood Cells: 250 mL    Vital High Protein: 360 mL  Total IN: 7259.6 mL    OUT:    Indwelling Catheter - Urethral: 3510 mL  Total OUT: 3510 mL    Total NET: 3749.6 mL          LABS:                            8.1    19.64 )-----------( 220      ( 09 May 2020 05:30 )             26.3                                               05-09             8.1    19.64 )-----------( 220      ( 05-09 @ 05:30 )             26.3                7.9    25.83 )-----------( 220      ( 05-08 @ 21:36 )             26.4                7.0    12.47 )-----------( 198      ( 05-08 @ 07:54 )             22.2                6.9    14.91 )-----------( 204      ( 05-08 @ 05:03 )             21.8                7.7    16.24 )-----------( 214      ( 05-07 @ 04:02 )             24.2       142  |  104  |  154<HH>  ----------------------------<  238<H>  4.7   |  27  |  2.9<H>    09 May 2020 05:30    142    |  104    |  154<HH>  ----------------------------<  238<H>  4.7     |  27     |  2.9<H>  08 May 2020 21:36    144    |  103    |  141<HH>  ----------------------------<  128<H>  4.3     |  26     |  2.8<H>    Ca    7.2<L>      09 May 2020 05:30  Ca    7.3<L>      08 May 2020 21:36  Mg     2.5<H>     09 May 2020 05:30  Mg     2.5<H>     08 May 2020 05:03    TPro  5.0<L>  /  Alb  1.8<L>  /  TBili  0.6    /  DBili  x      /  AST  55<H>  /  ALT  85<H>  /  AlkPhos  176<H>  09 May 2020 05:30  TPro  5.1<L>  /  Alb  1.7<L>  /  TBili  0.7    /  DBili  x      /  AST  77<H>  /  ALT  106<H>  /  AlkPhos  159<H>  08 May 2020 05:03    Ca    7.2<L>      09 May 2020 05:30  Mg     2.5     05-09    TPro  5.0<L>  /  Alb  1.8<L>  /  TBili  0.6  /  DBili  x   /  AST  55<H>  /  ALT  85<H>  /  AlkPhos  176<H>  05-09                                                                                           LIVER FUNCTIONS - ( 09 May 2020 05:30 )  Alb: 1.8 g/dL / Pro: 5.0 g/dL / ALK PHOS: 176 U/L / ALT: 85 U/L / AST: 55 U/L / GGT: x                                                                                               Mode: AC/ CMV (Assist Control/ Continuous Mandatory Ventilation)  RR (machine): 28  TV (machine): 400  FiO2: 100  PEEP: 12  ITime: 1.2  MAP: 22  PIP: 40                                      ABG - ( 09 May 2020 03:44 )  pH, Arterial: 7.18  pH, Blood: x     /  pCO2: 71    /  pO2: 66    / HCO3: 27    / Base Excess: -2.1  /  SaO2: 90    lact 2.5              MEDICATIONS  (STANDING):  ALBUTerol    90 MICROgram(s) HFA Inhaler 2 Puff(s) Inhalation once  aspirin  chewable 81 milliGRAM(s) Oral daily  BACItracin   Ointment 1 Application(s) Topical two times a day  calcium acetate 2001 milliGRAM(s) Oral three times a day with meals  chlorhexidine 0.12% Liquid 15 milliLiter(s) Oral Mucosa every 12 hours  chlorhexidine 4% Liquid 1 Application(s) Topical <User Schedule>  cisatracurium Infusion 4 MICROgram(s)/kG/Min (21.1 mL/Hr) IV Continuous <Continuous>  collagenase Ointment 1 Application(s) Topical every 12 hours  dexMEDEtomidine Infusion 1.5 MICROgram(s)/kG/Hr (33 mL/Hr) IV Continuous <Continuous>  dexMEDEtomidine Infusion 0.2 MICROgram(s)/kG/Hr (4.4 mL/Hr) IV Continuous <Continuous>  dextrose 5%. 1000 milliLiter(s) (100 mL/Hr) IV Continuous <Continuous>  fentaNYL   Infusion. 0.5 MICROgram(s)/kG/Hr (4.4 mL/Hr) IV Continuous <Continuous>  heparin   Injectable 5000 Unit(s) SubCutaneous every 8 hours  insulin regular Infusion 3 Unit(s)/Hr (3 mL/Hr) IV Continuous <Continuous>  lactulose Syrup 20 Gram(s) Oral every 12 hours  methylPREDNISolone sodium succinate Injectable 60 milliGRAM(s) IV Push every 24 hours  midazolam Infusion 0.057 mG/kG/Hr (5 mL/Hr) IV Continuous <Continuous>  norepinephrine Infusion 0.05 MICROgram(s)/kG/Min (4.13 mL/Hr) IV Continuous <Continuous>  pantoprazole   Suspension 40 milliGRAM(s) Oral daily  phenylephrine    Infusion 0.1 MICROgram(s)/kG/Min (1.65 mL/Hr) IV Continuous <Continuous>  senna 2 Tablet(s) Oral at bedtime  sevelamer carbonate Powder 2400 milliGRAM(s) Oral three times a day with meals  sodium bicarbonate 650 milliGRAM(s) Oral every 12 hours  vasopressin Infusion 0.02 Unit(s)/Min (1.2 mL/Hr) IV Continuous <Continuous>    MEDICATIONS  (PRN):  acetaminophen   Tablet .. 650 milliGRAM(s) Oral every 6 hours PRN Temp greater or equal to 38C (100.4F)  ondansetron Injectable 4 milliGRAM(s) IV Push every 8 hours PRN Nausea and/or Vomiting      New X-rays reviewed:      < from: Xray Chest 1 View- PORTABLE-Routine (05.09.20 @ 05:28) >  No significant interval change in bilateral parenchymal airspace opacities. Previously described left pneumothorax not well delineated.    Stable support devices.    < end of copied text >                                                                              ECHO not done    CXR interpreted by me: Patient is a 55y old  Male who presents with a chief complaint of suspected COVID-19 (09 May 2020 06:45)    Over Night Events: Febrile overnight; On levophed 0.3 , Neosynephrine 6 , sedated with precedex 1.5   IV Drips:         ROS:     CONSTITUTIONAL:   +fever   no chills.  no weight gain   no weight loss    EYES:   no discharge,   no pain  no redness,   no visual changes.    ENT:   Ears: no ear pain and no hearing problems.  Nose: no nasal congestion and no nasal drainage.  Mouth/Throat: no dysphagia,  no hoarseness and no throat pain.  Neck: no lumps, no pain, no stiffness and no swollen glands.     CARDIOVASCULAR:   as per HPI    RESPIRATORY:  as per HPI     GASTROINTESTINAL:   no abdominal pain,   no constipation,   no diarrhea,   no vomiting.    GENITOURINARY:  no dysuria,   no frequency,   no urgency  no hematuria.    MUSCULOSKELETAL:   no back pain,   no musculoskeletal pain,  no weakness.    SKIN:   no jaundice,   no lesions,   no pruritis,   no rashes.    NEURO:   sedated    PSYCHIATRIC:   no known mental health issues  no anxiety  no depression    ALLERGIC/IMMUNOLOGIC:   No active allergic or immunologic issues        PHYSICAL EXAM    ICU Vital Signs Last 24 Hrs  T(C): 37.4 (09 May 2020 06:00), Max: 38.1 (09 May 2020 02:00)  T(F): 99.4 (09 May 2020 06:00), Max: 100.6 (09 May 2020 02:00)  HR: 89 (09 May 2020 06:00) (89 - 130)  BP: --  BP(mean): --  ABP: 58/37 (09 May 2020 06:00) (58/37 - 132/59)  ABP(mean): 44 (09 May 2020 06:00) (44 - 83)  RR: 24 (09 May 2020 02:00) (24 - 26)  SpO2: 91%  (09 May 2020 06:00) (53% - 96%)      CONSTITUTIONAL:  Well nourished.  NAD    ENT:   ET+  Mouth with normal mucosa.   No thrush    EYES:   Pupils equal,   Round and reactive to light.    CARDIAC:   tachycardia   Regular rhythm.    No edema      Vascular:  Normal systolic impulse  No Carotid bruits    RESPIRATORY:   No wheezing  Bilateral BS  Normal chest expansion  Not tachypneic,  No use of accessory muscles    GASTROINTESTINAL:  Abdomen soft,   Non-tender,   No guarding,   + BS    GENITOURINARY  normal genitalia for sex  no edema    MUSCULOSKELETAL:   Range of motion is not limited,  No muscle or joint tenderness  No clubbing, cyanosis    NEUROLOGICAL:   sedated      SKIN:   Skin normal color for race,   Warm and dry and intact.   No evidence of rash.    PSYCHIATRIC:   Normal mood and affect.   No apparent risk to self or others.          05-08-20 @ 07:01  -  05-09-20 @ 07:00  --------------------------------------------------------  IN:    cisatracurium Infusion: 353.5 mL    dexmedetomidine Infusion: 759 mL    dextrose 5%.: 2100 mL    fentaNYL Infusion.: 677.6 mL    Free Water: 1700 mL    insulin regular Infusion: 135 mL    lactated ringers.: 750 mL    midazolam Infusion: 30 mL    norepinephrine Infusion: 144.5 mL    Packed Red Blood Cells: 250 mL    Vital High Protein: 360 mL  Total IN: 7259.6 mL    OUT:    Indwelling Catheter - Urethral: 3510 mL  Total OUT: 3510 mL    Total NET: 3749.6 mL          LABS:                            8.1    19.64 )-----------( 220      ( 09 May 2020 05:30 )             26.3                                               05-09             8.1    19.64 )-----------( 220      ( 05-09 @ 05:30 )             26.3                7.9    25.83 )-----------( 220      ( 05-08 @ 21:36 )             26.4                7.0    12.47 )-----------( 198      ( 05-08 @ 07:54 )             22.2                6.9    14.91 )-----------( 204      ( 05-08 @ 05:03 )             21.8                7.7    16.24 )-----------( 214      ( 05-07 @ 04:02 )             24.2       142  |  104  |  154<HH>  ----------------------------<  238<H>  4.7   |  27  |  2.9<H>    09 May 2020 05:30    142    |  104    |  154<HH>  ----------------------------<  238<H>  4.7     |  27     |  2.9<H>  08 May 2020 21:36    144    |  103    |  141<HH>  ----------------------------<  128<H>  4.3     |  26     |  2.8<H>    Ca    7.2<L>      09 May 2020 05:30  Ca    7.3<L>      08 May 2020 21:36  Mg     2.5<H>     09 May 2020 05:30  Mg     2.5<H>     08 May 2020 05:03    TPro  5.0<L>  /  Alb  1.8<L>  /  TBili  0.6    /  DBili  x      /  AST  55<H>  /  ALT  85<H>  /  AlkPhos  176<H>  09 May 2020 05:30  TPro  5.1<L>  /  Alb  1.7<L>  /  TBili  0.7    /  DBili  x      /  AST  77<H>  /  ALT  106<H>  /  AlkPhos  159<H>  08 May 2020 05:03    Ca    7.2<L>      09 May 2020 05:30  Mg     2.5     05-09    TPro  5.0<L>  /  Alb  1.8<L>  /  TBili  0.6  /  DBili  x   /  AST  55<H>  /  ALT  85<H>  /  AlkPhos  176<H>  05-09                                                                                           LIVER FUNCTIONS - ( 09 May 2020 05:30 )  Alb: 1.8 g/dL / Pro: 5.0 g/dL / ALK PHOS: 176 U/L / ALT: 85 U/L / AST: 55 U/L / GGT: x                                                                                               Mode: AC/ CMV (Assist Control/ Continuous Mandatory Ventilation)  RR (machine): 28  TV (machine): 400  FiO2: 100  PEEP: 12  ITime: 1.2  MAP: 22  PIP: 40                                      ABG - ( 09 May 2020 03:44 )  pH, Arterial: 7.18  pH, Blood: x     /  pCO2: 71    /  pO2: 66    / HCO3: 27    / Base Excess: -2.1  /  SaO2: 90    lact 2.5              MEDICATIONS  (STANDING):  ALBUTerol    90 MICROgram(s) HFA Inhaler 2 Puff(s) Inhalation once  aspirin  chewable 81 milliGRAM(s) Oral daily  BACItracin   Ointment 1 Application(s) Topical two times a day  calcium acetate 2001 milliGRAM(s) Oral three times a day with meals  chlorhexidine 0.12% Liquid 15 milliLiter(s) Oral Mucosa every 12 hours  chlorhexidine 4% Liquid 1 Application(s) Topical <User Schedule>  cisatracurium Infusion 4 MICROgram(s)/kG/Min (21.1 mL/Hr) IV Continuous <Continuous>  collagenase Ointment 1 Application(s) Topical every 12 hours  dexMEDEtomidine Infusion 1.5 MICROgram(s)/kG/Hr (33 mL/Hr) IV Continuous <Continuous>  dexMEDEtomidine Infusion 0.2 MICROgram(s)/kG/Hr (4.4 mL/Hr) IV Continuous <Continuous>  dextrose 5%. 1000 milliLiter(s) (100 mL/Hr) IV Continuous <Continuous>  fentaNYL   Infusion. 0.5 MICROgram(s)/kG/Hr (4.4 mL/Hr) IV Continuous <Continuous>  heparin   Injectable 5000 Unit(s) SubCutaneous every 8 hours  insulin regular Infusion 3 Unit(s)/Hr (3 mL/Hr) IV Continuous <Continuous>  lactulose Syrup 20 Gram(s) Oral every 12 hours  methylPREDNISolone sodium succinate Injectable 60 milliGRAM(s) IV Push every 24 hours  midazolam Infusion 0.057 mG/kG/Hr (5 mL/Hr) IV Continuous <Continuous>  norepinephrine Infusion 0.05 MICROgram(s)/kG/Min (4.13 mL/Hr) IV Continuous <Continuous>  pantoprazole   Suspension 40 milliGRAM(s) Oral daily  phenylephrine    Infusion 0.1 MICROgram(s)/kG/Min (1.65 mL/Hr) IV Continuous <Continuous>  senna 2 Tablet(s) Oral at bedtime  sevelamer carbonate Powder 2400 milliGRAM(s) Oral three times a day with meals  sodium bicarbonate 650 milliGRAM(s) Oral every 12 hours  vasopressin Infusion 0.02 Unit(s)/Min (1.2 mL/Hr) IV Continuous <Continuous>    MEDICATIONS  (PRN):  acetaminophen   Tablet .. 650 milliGRAM(s) Oral every 6 hours PRN Temp greater or equal to 38C (100.4F)  ondansetron Injectable 4 milliGRAM(s) IV Push every 8 hours PRN Nausea and/or Vomiting      New X-rays reviewed:      < from: Xray Chest 1 View- PORTABLE-Routine (05.09.20 @ 05:28) >  No significant interval change in bilateral parenchymal airspace opacities. Previously described left pneumothorax not well delineated.    Stable support devices.    < end of copied text >                                                                              ECHO not done    CXR interpreted by me:

## 2020-05-10 NOTE — PROGRESS NOTE ADULT - ASSESSMENT
#Acute hypoxemic respiratory failure with ARDS and COVID 19 SP convalescent plasma  complicated by Possible superimposed bacterial infection   remains intubated Start  Red, Vanc, And Caspo per critical care    #TELLO/ non oliguric. and Post ATN diuresis   worsened , access placed for cvvh    #Sub Q air resolved     # hypertension    #Hyperkalemia going for cvvh    #ANemia no indication for transfusion     # diabetes  CAPILLARY BLOOD GLUCOSE      POCT Blood Glucose.: 132 mg/dL (10 May 2020 17:05)  POCT Blood Glucose.: 139 mg/dL (10 May 2020 15:54)  POCT Blood Glucose.: 137 mg/dL (10 May 2020 14:49)  POCT Blood Glucose.: 164 mg/dL (10 May 2020 12:40)  POCT Blood Glucose.: 195 mg/dL (10 May 2020 11:39)  POCT Blood Glucose.: 258 mg/dL (10 May 2020 10:30)  POCT Blood Glucose.: 198 mg/dL (10 May 2020 09:09)  POCT Blood Glucose.: 114 mg/dL (10 May 2020 08:05)  POCT Blood Glucose.: 106 mg/dL (10 May 2020 07:11)  POCT Blood Glucose.: 111 mg/dL (10 May 2020 05:38)  POCT Blood Glucose.: 137 mg/dL (10 May 2020 01:54)  POCT Blood Glucose.: 148 mg/dL (10 May 2020 00:02)  POCT Blood Glucose.: 154 mg/dL (09 May 2020 23:22)  POCT Blood Glucose.: 149 mg/dL (09 May 2020 22:43)  POCT Blood Glucose.: 185 mg/dL (09 May 2020 21:37)  POCT Blood Glucose.: 192 mg/dL (09 May 2020 19:51)  POCT Blood Glucose.: 162 mg/dL (09 May 2020 18:46)  POCT Blood Glucose.: 140 mg/dL (09 May 2020 17:31)  controlled    #Poor prognosis

## 2020-05-10 NOTE — PROCEDURE NOTE - NSPROCDETAILS_GEN_ALL_CORE
all materials/supplies accounted for at end of procedure/location identified, draped/prepped, sterile technique used, needle inserted/introduced/ultrasound guidance
guidewire recovered/ultrasound guidance/sterile dressing applied/sterile technique, catheter placed/lumen(s) aspirated and flushed

## 2020-05-10 NOTE — PROGRESS NOTE ADULT - SUBJECTIVE AND OBJECTIVE BOX
RHIANNON MAHER  55y, Male    All available historical data reviewed    OVERNIGHT EVENTS:  no fevers  Fio2 80%    ROS:  unable to obtain history secondary to patient's mental status and/or sedation     VITALS:  T(F): 96.1, Max: 99.5 (05-10-20 @ 00:00)  HR: 115  BP: --  RR: 25Vital Signs Last 24 Hrs  T(C): 35.6 (10 May 2020 08:00), Max: 37.5 (10 May 2020 00:00)  T(F): 96.1 (10 May 2020 08:00), Max: 99.5 (10 May 2020 00:00)  HR: 115 (10 May 2020 10:06) (92 - 119)  BP: --  BP(mean): --  RR: 25 (10 May 2020 09:00) (20 - 25)  SpO2: 86% (10 May 2020 10:06) (86% - 97%)    TESTS & MEASUREMENTS:                        7.8    30.70 )-----------( 293      ( 10 May 2020 04:45 )             26.1     05-10    141  |  103  |  155<HH>  ----------------------------<  126<H>  6.0<HH>   |  19  |  3.6<H>    Ca    6.8<L>      10 May 2020 04:45  Mg     2.5     05-09    TPro  5.6<L>  /  Alb  1.9<L>  /  TBili  1.0  /  DBili  x   /  AST  97<H>  /  ALT  84<H>  /  AlkPhos  237<H>  05-10    LIVER FUNCTIONS - ( 10 May 2020 04:45 )  Alb: 1.9 g/dL / Pro: 5.6 g/dL / ALK PHOS: 237 U/L / ALT: 84 U/L / AST: 97 U/L / GGT: x                   RADIOLOGY & ADDITIONAL TESTS:  Personal review of radiological diagnostics performed  Echo and EKG results noted when applicable.     MEDICATIONS:  acetaminophen   Tablet .. 650 milliGRAM(s) Oral every 6 hours PRN  ALBUTerol    90 MICROgram(s) HFA Inhaler 2 Puff(s) Inhalation once  aspirin  chewable 81 milliGRAM(s) Oral daily  BACItracin   Ointment 1 Application(s) Topical two times a day  calcium acetate 2001 milliGRAM(s) Oral three times a day with meals  chlorhexidine 0.12% Liquid 15 milliLiter(s) Oral Mucosa every 12 hours  chlorhexidine 4% Liquid 1 Application(s) Topical <User Schedule>  cisatracurium Infusion 4 MICROgram(s)/kG/Min IV Continuous <Continuous>  collagenase Ointment 1 Application(s) Topical every 12 hours  dexMEDEtomidine Infusion 1.5 MICROgram(s)/kG/Hr IV Continuous <Continuous>  dexMEDEtomidine Infusion 0.2 MICROgram(s)/kG/Hr IV Continuous <Continuous>  fentaNYL   Infusion. 0.5 MICROgram(s)/kG/Hr IV Continuous <Continuous>  heparin  Infusion 1000 Unit(s)/Hr IV Continuous <Continuous>  insulin regular Infusion 3 Unit(s)/Hr IV Continuous <Continuous>  lactulose Syrup 20 Gram(s) Oral every 12 hours  methylPREDNISolone sodium succinate Injectable 60 milliGRAM(s) IV Push every 24 hours  midazolam Infusion 0.057 mG/kG/Hr IV Continuous <Continuous>  norepinephrine Infusion 0.05 MICROgram(s)/kG/Min IV Continuous <Continuous>  ondansetron Injectable 4 milliGRAM(s) IV Push every 8 hours PRN  pantoprazole   Suspension 40 milliGRAM(s) Oral daily  phenylephrine    Infusion 0.1 MICROgram(s)/kG/Min IV Continuous <Continuous>  senna 2 Tablet(s) Oral at bedtime  sevelamer carbonate Powder 2400 milliGRAM(s) Oral three times a day with meals  sodium bicarbonate 650 milliGRAM(s) Oral three times a day  sodium zirconium cyclosilicate 10 Gram(s) Oral two times a day  vasopressin Infusion 0.02 Unit(s)/Min IV Continuous <Continuous>      ANTIBIOTICS:

## 2020-05-10 NOTE — PROGRESS NOTE ADULT - SUBJECTIVE AND OBJECTIVE BOX
Patient is a 55y old  Male who presents with a chief complaint of suspected COVID-19 (10 May 2020 09:28)        Over Night Events:  On MV.  Sedated and paralyzed.  On pressors         ROS:     All ROS are negative except HPI         PHYSICAL EXAM    ICU Vital Signs Last 24 Hrs  T(C): 35.6 (10 May 2020 08:00), Max: 37.5 (10 May 2020 00:00)  T(F): 96.1 (10 May 2020 08:00), Max: 99.5 (10 May 2020 00:00)  HR: 115 (10 May 2020 10:06) (92 - 119)  BP: --  BP(mean): --  ABP: 84/50 (10 May 2020 10:06) (83/49 - 116/60)  ABP(mean): 65 (10 May 2020 10:06) (64 - 82)  RR: 25 (10 May 2020 09:00) (20 - 25)  SpO2: 86% (10 May 2020 10:06) (86% - 97%)      CONSTITUTIONAL:   Ill appearing.  Well nourished.  NAD    ENT:   Airway patent,   Mouth with normal mucosa.   No thrush    EYES:   Pupils equal,   Round and reactive to light.    CARDIAC:   Normal rate,   Regular rhythm.    No edema      Vascular:  Normal systolic impulse  No Carotid bruits    RESPIRATORY:   No wheezing  Bilateral BS  Normal chest expansion  Not tachypneic,  No use of accessory muscles    GASTROINTESTINAL:  Abdomen soft,   Non-tender,   No guarding,   + BS    GENITOURINARY  normal genitalia for sex   edema    MUSCULOSKELETAL:   Range of motion is not limited,  No clubbing, cyanosis    NEUROLOGICAL:   Sedated and paralyzed     SKIN:   Skin normal color for race,   Warm and dry.   No evidence of rash.    PSYCHIATRIC:   No apparent risk to self or others.    HEMATOLOGICAL:  No cervical  lymphadenopathy.  no inguinal lymphadenopathy      05-09-20 @ 07:01  -  05-10-20 @ 07:00  --------------------------------------------------------  IN:    cisatracurium Infusion: 266.3 mL    dexmedetomidine Infusion: 187 mL    dexmedetomidine Infusion: 363 mL    dextrose 5%.: 300 mL    Enteral Tube Flush: 300 mL    Free Water: 600 mL    heparin Infusion: 160 mL    insulin regular Infusion: 58 mL    midazolam Infusion: 53 mL    norepinephrine Infusion: 170.8 mL    phenylephrine   Infusion: 1876 mL    vasopressin Infusion: 45.6 mL    Vital High Protein: 720 mL  Total IN: 5099.7 mL    OUT:    Indwelling Catheter - Urethral: 510 mL    Rectal Tube: 400 mL  Total OUT: 910 mL    Total NET: 4189.7 mL      05-10-20 @ 07:01  -  05-10-20 @ 10:19  --------------------------------------------------------  IN:    cisatracurium Infusion: 47.6 mL    dexmedetomidine Infusion: 44 mL    heparin Infusion: 20 mL    midazolam Infusion: 8 mL    norepinephrine Infusion: 15 mL    phenylephrine   Infusion: 230 mL    vasopressin Infusion: 4.8 mL  Total IN: 369.4 mL    OUT:    Indwelling Catheter - Urethral: 60 mL  Total OUT: 60 mL    Total NET: 309.4 mL          LABS:                            7.8    30.70 )-----------( 293      ( 10 May 2020 04:45 )             26.1                                               05-10    141  |  103  |  155<HH>  ----------------------------<  126<H>  6.0<HH>   |  19  |  3.6<H>    Ca    6.8<L>      10 May 2020 04:45  Mg     2.5     05-09    TPro  5.6<L>  /  Alb  1.9<L>  /  TBili  1.0  /  DBili  x   /  AST  97<H>  /  ALT  84<H>  /  AlkPhos  237<H>  05-10      PTT - ( 10 May 2020 04:45 )  PTT:83.5 sec                                                                                     LIVER FUNCTIONS - ( 10 May 2020 04:45 )  Alb: 1.9 g/dL / Pro: 5.6 g/dL / ALK PHOS: 237 U/L / ALT: 84 U/L / AST: 97 U/L / GGT: x                                                                                               Mode: AC/ CMV (Assist Control/ Continuous Mandatory Ventilation)  RR (machine): 24  TV (machine): 400  FiO2: 100  PEEP: 15  ITime: 1  MAP: 28  PIP: 56                                      ABG - ( 10 May 2020 03:16 )  pH, Arterial: 7.17  pH, Blood: x     /  pCO2: 60    /  pO2: 69    / HCO3: 22    / Base Excess: -6.3  /  SaO2: 93                  MEDICATIONS  (STANDING):  ALBUTerol    90 MICROgram(s) HFA Inhaler 2 Puff(s) Inhalation once  aspirin  chewable 81 milliGRAM(s) Oral daily  BACItracin   Ointment 1 Application(s) Topical two times a day  calcium acetate 2001 milliGRAM(s) Oral three times a day with meals  chlorhexidine 0.12% Liquid 15 milliLiter(s) Oral Mucosa every 12 hours  chlorhexidine 4% Liquid 1 Application(s) Topical <User Schedule>  cisatracurium Infusion 4 MICROgram(s)/kG/Min (21.1 mL/Hr) IV Continuous <Continuous>  collagenase Ointment 1 Application(s) Topical every 12 hours  dexMEDEtomidine Infusion 1.5 MICROgram(s)/kG/Hr (33 mL/Hr) IV Continuous <Continuous>  dexMEDEtomidine Infusion 0.2 MICROgram(s)/kG/Hr (4.4 mL/Hr) IV Continuous <Continuous>  fentaNYL   Infusion. 0.5 MICROgram(s)/kG/Hr (4.4 mL/Hr) IV Continuous <Continuous>  heparin  Infusion 1000 Unit(s)/Hr (10 mL/Hr) IV Continuous <Continuous>  insulin regular Infusion 3 Unit(s)/Hr (3 mL/Hr) IV Continuous <Continuous>  lactulose Syrup 20 Gram(s) Oral every 12 hours  methylPREDNISolone sodium succinate Injectable 60 milliGRAM(s) IV Push every 24 hours  midazolam Infusion 0.057 mG/kG/Hr (5 mL/Hr) IV Continuous <Continuous>  norepinephrine Infusion 0.05 MICROgram(s)/kG/Min (4.13 mL/Hr) IV Continuous <Continuous>  pantoprazole   Suspension 40 milliGRAM(s) Oral daily  phenylephrine    Infusion 0.1 MICROgram(s)/kG/Min (1.65 mL/Hr) IV Continuous <Continuous>  senna 2 Tablet(s) Oral at bedtime  sevelamer carbonate Powder 2400 milliGRAM(s) Oral three times a day with meals  sodium bicarbonate 650 milliGRAM(s) Oral three times a day  sodium zirconium cyclosilicate 10 Gram(s) Oral two times a day  vasopressin Infusion 0.02 Unit(s)/Min (1.2 mL/Hr) IV Continuous <Continuous>    MEDICATIONS  (PRN):  acetaminophen   Tablet .. 650 milliGRAM(s) Oral every 6 hours PRN Temp greater or equal to 38C (100.4F)  ondansetron Injectable 4 milliGRAM(s) IV Push every 8 hours PRN Nausea and/or Vomiting      New X-rays reviewed:                                                                                  ECHO    CXR interpreted by me:  ET OG OK>  Bilateral infiltrates

## 2020-05-10 NOTE — PROGRESS NOTE ADULT - ASSESSMENT
Acute hypoxemic respiratory failure secondary to SARS-COV-2 infection / TELLO/ hyperkalemia/ hypernatremia   # creatinine  trending up  # oligo-anuric   # BUN noted , d/c protein in diet , on steroids   # hyperK noted. start lokelma 10 gm q 12 hr  # now off fluids.  # BP noted, on three pressors.  # corrected Ca, Mg at goal. check repeat ph. on binders.  # respiratory acidosis plus HAGMA plus metabolic alkalosis on delta/delta.  # Hb noted. montior h/h, Tx PRN  # needs RRT. with unstable BP on three pressors, will start on cvvhd with even balance.  # on heparin, last INR on 4/27. repeat PT / PTT / INR. hold heparin. will need udall placement for RRT.  # prognosis poor   # will follow

## 2020-05-10 NOTE — PROGRESS NOTE ADULT - SUBJECTIVE AND OBJECTIVE BOX
Nephrology progress note    Patient is seen and examined, events over the last 24 h noted .    Allergies:  No Known Allergies    Hospital Medications:   MEDICATIONS  (STANDING):  ALBUTerol    90 MICROgram(s) HFA Inhaler 2 Puff(s) Inhalation once  aspirin  chewable 81 milliGRAM(s) Oral daily  BACItracin   Ointment 1 Application(s) Topical two times a day  calcium acetate 2001 milliGRAM(s) Oral three times a day with meals  chlorhexidine 0.12% Liquid 15 milliLiter(s) Oral Mucosa every 12 hours  chlorhexidine 4% Liquid 1 Application(s) Topical <User Schedule>  cisatracurium Infusion 4 MICROgram(s)/kG/Min (21.1 mL/Hr) IV Continuous <Continuous>  collagenase Ointment 1 Application(s) Topical every 12 hours  dexMEDEtomidine Infusion 1.5 MICROgram(s)/kG/Hr (33 mL/Hr) IV Continuous <Continuous>  dexMEDEtomidine Infusion 0.2 MICROgram(s)/kG/Hr (4.4 mL/Hr) IV Continuous <Continuous>  fentaNYL   Infusion. 0.5 MICROgram(s)/kG/Hr (4.4 mL/Hr) IV Continuous <Continuous>  heparin  Infusion 1000 Unit(s)/Hr (10 mL/Hr) IV Continuous <Continuous>  insulin regular Infusion 3 Unit(s)/Hr (3 mL/Hr) IV Continuous <Continuous>  lactulose Syrup 20 Gram(s) Oral every 12 hours  methylPREDNISolone sodium succinate Injectable 60 milliGRAM(s) IV Push every 24 hours  midazolam Infusion 0.057 mG/kG/Hr (5 mL/Hr) IV Continuous <Continuous>  norepinephrine Infusion 0.05 MICROgram(s)/kG/Min (4.13 mL/Hr) IV Continuous <Continuous>  pantoprazole   Suspension 40 milliGRAM(s) Oral daily  phenylephrine    Infusion 0.1 MICROgram(s)/kG/Min (1.65 mL/Hr) IV Continuous <Continuous>  senna 2 Tablet(s) Oral at bedtime  sevelamer carbonate Powder 2400 milliGRAM(s) Oral three times a day with meals  sodium bicarbonate 650 milliGRAM(s) Oral three times a day  sodium zirconium cyclosilicate 10 Gram(s) Oral two times a day  vasopressin Infusion 0.02 Unit(s)/Min (1.2 mL/Hr) IV Continuous <Continuous>        VITALS:  T(F): 96.1 (05-10-20 @ 08:00), Max: 99.5 (05-10-20 @ 00:00)  HR: 117 (05-10-20 @ 09:00)  BP: 86/50  RR: 25 (05-10-20 @ 09:00)  SpO2: 86% (05-10-20 @ 09:00)  Wt(kg): --    05-08 @ 07:01  -  05-09 @ 07:00  --------------------------------------------------------  IN: 7259.6 mL / OUT: 3510 mL / NET: 3749.6 mL    05-09 @ 07:01  -  05-10 @ 07:00  --------------------------------------------------------  IN: 5099.7 mL / OUT: 910 mL / NET: 4189.7 mL    05-10 @ 07:01  -  05-10 @ 09:28  --------------------------------------------------------  IN: 369.4 mL / OUT: 60 mL / NET: 309.4 mL          PHYSICAL EXAM:  Constitutional: intubated on vent  Extremities: + edema  :  + mendoza.       LABS:  05-10    141  |  103  |  155<HH>  ----------------------------<  126<H>  6.0<HH>   |  19  |  3.6<H>    Creatinine Trend: 3.6<--, 2.9<--, 2.8<--, 3.0<--, 3.1<--, 3.9<--    Ca    6.8<L>      10 May 2020 04:45  Mg     2.5     05-09    TPro  5.6<L>  /  Alb  1.9<L>  /  TBili  1.0  /  DBili      /  AST  97<H>  /  ALT  84<H>  /  AlkPhos  237<H>  05-10                          7.8    30.70 )-----------( 293      ( 10 May 2020 04:45 )             26.1     Blood Gas Profile - Arterial (05.10.20 @ 03:16)    pH, Arterial: 7.17: ABG results reported to Dr. Ye in person @ 0448    pCO2, Arterial: 60 mmHg    pO2, Arterial: 69 mmHg    HCO3, Arterial: 22 mmoL/L    Base Excess, Arterial: -6.3 mmoL/L    Oxygen Saturation, Arterial: 93 %    INR: 0.97 ratio (04.27.20 @ 11:40)    Urine Studies:    RADIOLOGY & ADDITIONAL STUDIES: Nephrology progress note    Patient is seen and examined, events over the last 24 h noted .  hypotensive on 3 pressors now     Allergies:  No Known Allergies    Hospital Medications:   MEDICATIONS  (STANDING):  ALBUTerol    90 MICROgram(s) HFA Inhaler 2 Puff(s) Inhalation once  aspirin  chewable 81 milliGRAM(s) Oral daily  BACItracin   Ointment 1 Application(s) Topical two times a day  calcium acetate 2001 milliGRAM(s) Oral three times a day with meals  chlorhexidine 0.12% Liquid 15 milliLiter(s) Oral Mucosa every 12 hours  chlorhexidine 4% Liquid 1 Application(s) Topical <User Schedule>  cisatracurium Infusion 4 MICROgram(s)/kG/Min (21.1 mL/Hr) IV Continuous <Continuous>  collagenase Ointment 1 Application(s) Topical every 12 hours  dexMEDEtomidine Infusion 1.5 MICROgram(s)/kG/Hr (33 mL/Hr) IV Continuous <Continuous>  dexMEDEtomidine Infusion 0.2 MICROgram(s)/kG/Hr (4.4 mL/Hr) IV Continuous <Continuous>  fentaNYL   Infusion. 0.5 MICROgram(s)/kG/Hr (4.4 mL/Hr) IV Continuous <Continuous>  heparin  Infusion 1000 Unit(s)/Hr (10 mL/Hr) IV Continuous <Continuous>  insulin regular Infusion 3 Unit(s)/Hr (3 mL/Hr) IV Continuous <Continuous>  lactulose Syrup 20 Gram(s) Oral every 12 hours  methylPREDNISolone sodium succinate Injectable 60 milliGRAM(s) IV Push every 24 hours  midazolam Infusion 0.057 mG/kG/Hr (5 mL/Hr) IV Continuous <Continuous>  norepinephrine Infusion 0.05 MICROgram(s)/kG/Min (4.13 mL/Hr) IV Continuous <Continuous>  pantoprazole   Suspension 40 milliGRAM(s) Oral daily  phenylephrine    Infusion 0.1 MICROgram(s)/kG/Min (1.65 mL/Hr) IV Continuous <Continuous>  senna 2 Tablet(s) Oral at bedtime  sevelamer carbonate Powder 2400 milliGRAM(s) Oral three times a day with meals  sodium bicarbonate 650 milliGRAM(s) Oral three times a day  sodium zirconium cyclosilicate 10 Gram(s) Oral two times a day  vasopressin Infusion 0.02 Unit(s)/Min (1.2 mL/Hr) IV Continuous <Continuous>        VITALS:  T(F): 96.1 (05-10-20 @ 08:00), Max: 99.5 (05-10-20 @ 00:00)  HR: 117 (05-10-20 @ 09:00)  BP: 86/50  RR: 25 (05-10-20 @ 09:00)  SpO2: 86% (05-10-20 @ 09:00)  Wt(kg): --    05-08 @ 07:01  -  05-09 @ 07:00  --------------------------------------------------------  IN: 7259.6 mL / OUT: 3510 mL / NET: 3749.6 mL    05-09 @ 07:01  -  05-10 @ 07:00  --------------------------------------------------------  IN: 5099.7 mL / OUT: 910 mL / NET: 4189.7 mL    05-10 @ 07:01  -  05-10 @ 09:28  --------------------------------------------------------  IN: 369.4 mL / OUT: 60 mL / NET: 309.4 mL          PHYSICAL EXAM:  Constitutional: intubated on vent  Extremities: + edema  :  + mendoza.       LABS:  05-10    141  |  103  |  155<HH>  ----------------------------<  126<H>  6.0<HH>   |  19  |  3.6<H>    Creatinine Trend: 3.6<--, 2.9<--, 2.8<--, 3.0<--, 3.1<--, 3.9<--    Ca    6.8<L>      10 May 2020 04:45  Mg     2.5     05-09    TPro  5.6<L>  /  Alb  1.9<L>  /  TBili  1.0  /  DBili      /  AST  97<H>  /  ALT  84<H>  /  AlkPhos  237<H>  05-10                          7.8    30.70 )-----------( 293      ( 10 May 2020 04:45 )             26.1     Blood Gas Profile - Arterial (05.10.20 @ 03:16)    pH, Arterial: 7.17: ABG results reported to Dr. Ye in person @ 0448    pCO2, Arterial: 60 mmHg    pO2, Arterial: 69 mmHg    HCO3, Arterial: 22 mmoL/L    Base Excess, Arterial: -6.3 mmoL/L    Oxygen Saturation, Arterial: 93 %    INR: 0.97 ratio (04.27.20 @ 11:40)    Urine Studies:    RADIOLOGY & ADDITIONAL STUDIES:

## 2020-05-10 NOTE — PROGRESS NOTE ADULT - SUBJECTIVE AND OBJECTIVE BOX
RHIANNON MAHER  55y  [facility]  A      Patient is a 55y old  Male who presents with a chief complaint of suspected COVID-19 (10 May 2020 10:18)      INTERVAL HPI/OVERNIGHT EVENTS:  no aute events overnight       REVIEW OF SYSTEMS:ROS neg  FAMILY HISTORY:    T(C): 37.5 (05-10-20 @ 16:00), Max: 37.6 (05-10-20 @ 14:00)  HR: 126 (05-10-20 @ 16:00) (92 - 127)  BP: --  RR: 26 (05-10-20 @ 16:00) (25 - 26)  SpO2: 90% (05-10-20 @ 16:00) (84% - 97%)  Wt(kg): --Vital Signs Last 24 Hrs  T(C): 37.5 (10 May 2020 16:00), Max: 37.6 (10 May 2020 14:00)  T(F): 99.5 (10 May 2020 16:00), Max: 99.6 (10 May 2020 14:00)  HR: 126 (10 May 2020 16:00) (92 - 127)  BP: --  BP(mean): --  RR: 26 (10 May 2020 16:00) (25 - 26)  SpO2: 90% (10 May 2020 16:00) (84% - 97%)    PHYSICAL EXAM:  GEN Lying in no acute distress  HEENT Pupils equal and reactive to light and accommodationSupple Neck  PULMbilateral air entry  CV s1s2GI + bowel sounds nontnender  EXT no cyanosis or edema  INTEG No Lesions  NEURO LEUNG      LABS:                            7.8    30.70 )-----------( 293      ( 10 May 2020 04:45 )             26.1   05-10    142  |  104  |  164<HH>  ----------------------------<  148<H>  5.7<H>   |  22  |  4.1<HH>    Ca    6.4<L>      10 May 2020 16:00  Mg     2.5     05-09    TPro  5.6<L>  /  Alb  1.9<L>  /  TBili  1.0  /  DBili  x   /  AST  97<H>  /  ALT  84<H>  /  AlkPhos  237<H>  05-10            acetaminophen   Tablet .. 650 milliGRAM(s) Oral every 6 hours PRN  ALBUTerol    90 MICROgram(s) HFA Inhaler 2 Puff(s) Inhalation once  aspirin  chewable 81 milliGRAM(s) Oral daily  BACItracin   Ointment 1 Application(s) Topical two times a day  calcium acetate 2001 milliGRAM(s) Oral three times a day with meals  caspofungin IVPB      chlorhexidine 0.12% Liquid 15 milliLiter(s) Oral Mucosa every 12 hours  chlorhexidine 4% Liquid 1 Application(s) Topical <User Schedule>  cisatracurium Infusion 4 MICROgram(s)/kG/Min IV Continuous <Continuous>  collagenase Ointment 1 Application(s) Topical every 12 hours  CRRT Treatment    <Continuous>  dexMEDEtomidine Infusion 1.5 MICROgram(s)/kG/Hr IV Continuous <Continuous>  dexMEDEtomidine Infusion 0.2 MICROgram(s)/kG/Hr IV Continuous <Continuous>  fentaNYL   Infusion. 0.5 MICROgram(s)/kG/Hr IV Continuous <Continuous>  heparin  Infusion 1000 Unit(s)/Hr IV Continuous <Continuous>  insulin regular Infusion 3 Unit(s)/Hr IV Continuous <Continuous>  lactulose Syrup 20 Gram(s) Oral every 12 hours  meropenem  IVPB 500 milliGRAM(s) IV Intermittent every 12 hours  meropenem  IVPB      methylPREDNISolone sodium succinate Injectable 60 milliGRAM(s) IV Push every 24 hours  midazolam Infusion 0.057 mG/kG/Hr IV Continuous <Continuous>  norepinephrine Infusion 0.05 MICROgram(s)/kG/Min IV Continuous <Continuous>  ondansetron Injectable 4 milliGRAM(s) IV Push every 8 hours PRN  pantoprazole   Suspension 40 milliGRAM(s) Oral daily  phenylephrine    Infusion 0.1 MICROgram(s)/kG/Min IV Continuous <Continuous>  PureFlow Dialysate RFP-400 (K 2 / Ca 3) 5000 milliLiter(s) CRRT <Continuous>  senna 2 Tablet(s) Oral at bedtime  sevelamer carbonate Powder 2400 milliGRAM(s) Oral three times a day with meals  sodium bicarbonate 650 milliGRAM(s) Oral three times a day  sodium zirconium cyclosilicate 10 Gram(s) Oral two times a day  vancomycin  IVPB 1250 milliGRAM(s) IV Intermittent every 24 hours  vasopressin Infusion 0.02 Unit(s)/Min IV Continuous <Continuous>      HEALTH ISSUES - PROBLEM Dx:          Case Discussed with House Staff     Spectra [phone]

## 2020-05-10 NOTE — PROGRESS NOTE ADULT - ASSESSMENT
Assessment:    Acute hypoxemic respiratory failure  ARDS  COVID 19  SP convalescent plasma    Possible superimposed bacterial infection SP ABX  Now increasing WBC   TELLO worsening    Sub Q air     PLAN:    CNS:  Sedation and paralysis for now      HEENT: Oral care    PULMONARY:  HOB @ 45 degrees, keep Sats 92-96%.  Vent Changes;  Wean O2       CARDIOVASCULAR:  Avoid volume overload. Wean ricco     GI: GI prophylaxis Protonix, bowel regimen. Feeding: OG feeding.  DC Free H2O     RENAL:  F/u  lytes. Continue bicarb oral. accurate I/O, renal F/UP     INFECTIOUS DISEASE:  FU Repeat cultures, procal, Fungitel.  Start Red, Vanc, And Caspo for now      HEMATOLOGICAL:  DVT therapy for now.  FU PTT>  FU repeat LE Duplex.      ENDOCRINE:  Follow up FS.  Insulin protocol if needed. DAIANA Solumedrol     CODE STATUS: FULL CODE    DISPOSITION: Patient require continued monitoring in MICU    Poor prognosis

## 2020-05-11 NOTE — PROGRESS NOTE ADULT - ASSESSMENT
Acute hypoxemic respiratory failure secondary to SARS-COV-2 infection / TELLO/ hyperkalemia/ hypernatremia     # creatinine  stable   # oligo-anuric   # continue CVVHD / keep in even balance for now   # BUN noted , d/c protein in diet , on steroids   # hyperK noted. better on lokelma   # now off fluids.  # BP noted, on three pressors.  # corrected Ca, Mg at goal. check repeat ph. on binders.  # respiratory acidosis plus HAGMA plus metabolic alkalosis on delta/delta.  # Hb noted. montior h/h, Tx PRN  # on heparin, last INR on 4/27. repeat PT / PTT / INR. hold heparin. will need udall placement for RRT.  # prognosis poor / palliative called   # will follow

## 2020-05-11 NOTE — PROGRESS NOTE ADULT - SUBJECTIVE AND OBJECTIVE BOX
RHIANNON MAHER  55y, Male    All available historical data reviewed    OVERNIGHT EVENTS:  fi2 100%  on CVH  non responsive to all stimuli    ROS:  unable to obtain history secondary to patient's mental status and/or sedation     VITALS:  T(F): 97.1, Max: 99.5 (05-10-20 @ 16:00)  HR: 113  BP: --  RR: 26Vital Signs Last 24 Hrs  T(C): 36.2 (11 May 2020 12:00), Max: 37.5 (10 May 2020 16:00)  T(F): 97.1 (11 May 2020 12:00), Max: 99.5 (10 May 2020 16:00)  HR: 113 (11 May 2020 14:00) (109 - 126)  BP: --  BP(mean): --  RR: 26 (11 May 2020 07:00) (26 - 26)  SpO2: 95% (11 May 2020 14:00) (83% - 98%)    TESTS & MEASUREMENTS:                        7.1    25.87 )-----------( 276      ( 11 May 2020 14:49 )             23.0         138  |  99  |  115<HH>  ----------------------------<  144<H>  4.9   |  19  |  2.9<H>    Ca    6.8<L>      11 May 2020 04:20  Mg     2.4         TPro  5.3<L>  /  Alb  1.9<L>  /  TBili  1.4<H>  /  DBili  x   /  AST  186<H>  /  ALT  110<H>  /  AlkPhos  305<H>      LIVER FUNCTIONS - ( 11 May 2020 04:20 )  Alb: 1.9 g/dL / Pro: 5.3 g/dL / ALK PHOS: 305 U/L / ALT: 110 U/L / AST: 186 U/L / GGT: x             Urinalysis Basic - ( 11 May 2020 06:50 )    Color: Yellow / Appearance: Turbid / S.018 / pH: x  Gluc: x / Ketone: Negative  / Bili: Negative / Urobili: <2 mg/dL   Blood: x / Protein: 30 mg/dL / Nitrite: Negative   Leuk Esterase: Negative / RBC: 46 /HPF / WBC 24 /HPF   Sq Epi: x / Non Sq Epi: 6 /HPF / Bacteria: Moderate          RADIOLOGY & ADDITIONAL TESTS:  Personal review of radiological diagnostics performed  Echo and EKG results noted when applicable.     MEDICATIONS:  acetaminophen   Tablet .. 650 milliGRAM(s) Oral every 6 hours PRN  ALBUTerol    90 MICROgram(s) HFA Inhaler 2 Puff(s) Inhalation once  aspirin  chewable 81 milliGRAM(s) Oral daily  BACItracin   Ointment 1 Application(s) Topical two times a day  calcium acetate 2001 milliGRAM(s) Oral three times a day with meals  caspofungin IVPB      caspofungin IVPB 50 milliGRAM(s) IV Intermittent every 24 hours  chlorhexidine 0.12% Liquid 15 milliLiter(s) Oral Mucosa every 12 hours  chlorhexidine 4% Liquid 1 Application(s) Topical <User Schedule>  cisatracurium Infusion 4 MICROgram(s)/kG/Min IV Continuous <Continuous>  collagenase Ointment 1 Application(s) Topical every 12 hours  CRRT Treatment    <Continuous>  CRRT Treatment    <Continuous>  dexMEDEtomidine Infusion 1.5 MICROgram(s)/kG/Hr IV Continuous <Continuous>  dextrose 5% 1000 milliLiter(s) IV Continuous <Continuous>  fentaNYL   Infusion. 0.5 MICROgram(s)/kG/Hr IV Continuous <Continuous>  heparin  Infusion 1000 Unit(s)/Hr IV Continuous <Continuous>  insulin regular Infusion 3 Unit(s)/Hr IV Continuous <Continuous>  lactulose Syrup 20 Gram(s) Oral every 12 hours  meropenem  IVPB 500 milliGRAM(s) IV Intermittent every 12 hours  meropenem  IVPB      midazolam Infusion 0.057 mG/kG/Hr IV Continuous <Continuous>  norepinephrine Infusion 0.05 MICROgram(s)/kG/Min IV Continuous <Continuous>  ondansetron Injectable 4 milliGRAM(s) IV Push every 8 hours PRN  pantoprazole   Suspension 40 milliGRAM(s) Oral daily  phenylephrine    Infusion 0.1 MICROgram(s)/kG/Min IV Continuous <Continuous>  PureFlow Dialysate RFP-400 (K 2 / Ca 3) 5000 milliLiter(s) CRRT <Continuous>  PureFlow Dialysate RFP-400 (K 2 / Ca 3) 5000 milliLiter(s) CRRT <Continuous>  senna 2 Tablet(s) Oral at bedtime  sevelamer carbonate Powder 2400 milliGRAM(s) Oral three times a day with meals  sodium zirconium cyclosilicate 10 Gram(s) Oral two times a day  vancomycin  IVPB 1250 milliGRAM(s) IV Intermittent every 24 hours  vasopressin Infusion 0.02 Unit(s)/Min IV Continuous <Continuous>      ANTIBIOTICS:  caspofungin IVPB      caspofungin IVPB 50 milliGRAM(s) IV Intermittent every 24 hours  meropenem  IVPB 500 milliGRAM(s) IV Intermittent every 12 hours  meropenem  IVPB      vancomycin  IVPB 1250 milliGRAM(s) IV Intermittent every 24 hours

## 2020-05-11 NOTE — CONSULT NOTE ADULT - SUBJECTIVE AND OBJECTIVE BOX
REQUESTED OF: DR Vera    Chart reviewed, Hospital Day 28; vent day 28    RHIANNON MAHER 55yMale  HPI:  55 year old male patient with hypertension, diabetes presenting for dyspnea.   The patient states that he has experienced non productive cough as well as low grade fever (between 90 and 100) for the past week associated with loss of appetite. These symptoms stopped last Friday. Since yesterday he has been experiencing dyspnea mostly on exertion that has been worsening which prompted him to call EMS today. His son works at a rehab center, has been experiencing the same symptoms however has been denied COVID-19 testing   In the ED, SpO2 was 78 % on room air and the patient was placed on 6 L NC with improvement of his SpO2 to 90 - 92 % and subjective improvement endorsed by the patient as well. Otherwise, tachycardic to 104, hemodynamically stable and afebrile. Chest  x ray was done and showed bilateral infiltrates (official report pending), SARS-CoV-2 PCR sent and patient was admitted for suspected COVID-19 (13 Apr 2020 15:47)        PAST MEDICAL & SURGICAL HISTORY:  Diabetes  Hypertension  No significant past surgical history      Subjective and Objective:  Today,  Discussed with     Focused Palliative Care Evaluation:                   Symptoms:                                      Pain                                     Dyspnea                                     N/V                                     Appetite                                     Anxiety                                     Other _____________________                     Support Devices:              PHYSICAL EXAM: deffered    T(C): 36.2, Max: 37.5 (16:00)  HR: 113 (109 - 126)  BP: -- rev'd  (26 - 26)  SpO2: 95% (83% - 98%)      LABS/STUDIES:  05-11    138  |  99  |  115<HH>  ----------------------------<  144<H>  4.9   |  19  |  2.9<H>    Ca    6.8<L>      11 May 2020 04:20  Mg     2.4     05-11    TPro  5.3<L>  /  Alb  1.9<L>  /  TBili  1.4<H>  /  DBili  x   /  AST  186<H>  /  ALT  110<H>  /  AlkPhos  305<H>  05-11                            7.3    26.94 )-----------( 306      ( 11 May 2020 04:20 )             24.8     COVID-19 Related Labs (last 5 days):  Activated Partial Thromboplastin Time: 75.3 sec (05-11-20 @ 11:00)  Activated Partial Thromboplastin Time: 75.7 sec (05-11-20 @ 04:20)  Auto Neutrophil #: 23.20 K/uL (05-11-20 @ 04:20)  Auto Lymphocyte #: 1.19 K/uL (05-11-20 @ 04:20)  Activated Partial Thromboplastin Time: 49.1 sec (05-10-20 @ 21:30)  Activated Partial Thromboplastin Time: 36.1 sec (05-10-20 @ 14:25)  Prothrombin Time, Plasma: 12.50 sec (05-10-20 @ 14:25)  Auto Neutrophil #: 25.28 K/uL (05-10-20 @ 04:45)  Auto Lymphocyte #: 2.17 K/uL (05-10-20 @ 04:45)  Activated Partial Thromboplastin Time: 83.5 sec (05-10-20 @ 04:45)  Activated Partial Thromboplastin Time: 92.1 sec (05-09-20 @ 23:25)  Activated Partial Thromboplastin Time: 78.2 sec (05-09-20 @ 20:15)      MEDICATIONS  (STANDING):  ALBUTerol    90 MICROgram(s) HFA Inhaler 2 Puff(s) Inhalation once  aspirin  chewable 81 milliGRAM(s) Oral daily  BACItracin   Ointment 1 Application(s) Topical two times a day  calcium acetate 2001 milliGRAM(s) Oral three times a day with meals  caspofungin IVPB      caspofungin IVPB 50 milliGRAM(s) IV Intermittent every 24 hours  chlorhexidine 0.12% Liquid 15 milliLiter(s) Oral Mucosa every 12 hours  chlorhexidine 4% Liquid 1 Application(s) Topical <User Schedule>  cisatracurium Infusion 4 MICROgram(s)/kG/Min (21.1 mL/Hr) IV Continuous <Continuous>  collagenase Ointment 1 Application(s) Topical every 12 hours  CRRT Treatment    <Continuous>  CRRT Treatment    <Continuous>  dexMEDEtomidine Infusion 1.5 MICROgram(s)/kG/Hr (33 mL/Hr) IV Continuous <Continuous>  dextrose 5% 1000 milliLiter(s) (100 mL/Hr) IV Continuous <Continuous>  fentaNYL   Infusion. 0.5 MICROgram(s)/kG/Hr (4.4 mL/Hr) IV Continuous <Continuous>  heparin  Infusion 1000 Unit(s)/Hr (10 mL/Hr) IV Continuous <Continuous>  insulin regular Infusion 3 Unit(s)/Hr (3 mL/Hr) IV Continuous <Continuous>  lactulose Syrup 20 Gram(s) Oral every 12 hours  meropenem  IVPB 500 milliGRAM(s) IV Intermittent every 12 hours  meropenem  IVPB      midazolam Infusion 0.057 mG/kG/Hr (5 mL/Hr) IV Continuous <Continuous>  norepinephrine Infusion 0.05 MICROgram(s)/kG/Min (4.13 mL/Hr) IV Continuous <Continuous>  pantoprazole   Suspension 40 milliGRAM(s) Oral daily  phenylephrine    Infusion 0.1 MICROgram(s)/kG/Min (1.65 mL/Hr) IV Continuous <Continuous>  PureFlow Dialysate RFP-400 (K 2 / Ca 3) 5000 milliLiter(s) (2000 mL/Hr) CRRT <Continuous>  PureFlow Dialysate RFP-400 (K 2 / Ca 3) 5000 milliLiter(s) (2000 mL/Hr) CRRT <Continuous>  senna 2 Tablet(s) Oral at bedtime  sevelamer carbonate Powder 2400 milliGRAM(s) Oral three times a day with meals  sodium zirconium cyclosilicate 10 Gram(s) Oral two times a day  vancomycin  IVPB 1250 milliGRAM(s) IV Intermittent every 24 hours  vasopressin Infusion 0.02 Unit(s)/Min (1.2 mL/Hr) IV Continuous <Continuous>    MEDICATIONS  (PRN):  acetaminophen   Tablet .. 650 milliGRAM(s) Oral every 6 hours PRN Temp greater or equal to 38C (100.4F)  ondansetron Injectable 4 milliGRAM(s) IV Push every 8 hours PRN Nausea and/or Vomiting      PPS  Level    0%      Note PPS = Palliative Performance Scale; (c)2001, [employer] [facility] [employer]       Range from 100% meaning Full ambulation/self-care/intake/Level of Consciousness                                                                              to        10% meaning Bedbound/Unable to do any activity/extensive disease /Total Care/ No PO intake/ LOC=Full/drowsy/+/-confusion        (0% = death)

## 2020-05-11 NOTE — PROGRESS NOTE ADULT - SUBJECTIVE AND OBJECTIVE BOX
SUBJECTIVE:    Patient is a 55y old Male who presents with a chief complaint of suspected COVID-19 (10 May 2020 17:14)    Currently admitted to medicine with the primary diagnosis of Suspected 2019 novel coronavirus infection     Today is hospital day 28d. Pt remains critically ill and on MV.    PAST MEDICAL & SURGICAL HISTORY  Diabetes  Hypertension  No significant past surgical history    SOCIAL HISTORY:  Negative for smoking/alcohol/drug use.     ALLERGIES:  No Known Allergies    MEDICATIONS:  STANDING MEDICATIONS  ALBUTerol    90 MICROgram(s) HFA Inhaler 2 Puff(s) Inhalation once  aspirin  chewable 81 milliGRAM(s) Oral daily  BACItracin   Ointment 1 Application(s) Topical two times a day  calcium acetate 2001 milliGRAM(s) Oral three times a day with meals  caspofungin IVPB      caspofungin IVPB 50 milliGRAM(s) IV Intermittent every 24 hours  chlorhexidine 0.12% Liquid 15 milliLiter(s) Oral Mucosa every 12 hours  chlorhexidine 4% Liquid 1 Application(s) Topical <User Schedule>  cisatracurium Infusion 4 MICROgram(s)/kG/Min IV Continuous <Continuous>  collagenase Ointment 1 Application(s) Topical every 12 hours  CRRT Treatment    <Continuous>  dexMEDEtomidine Infusion 1.5 MICROgram(s)/kG/Hr IV Continuous <Continuous>  dexMEDEtomidine Infusion 0.2 MICROgram(s)/kG/Hr IV Continuous <Continuous>  fentaNYL   Infusion. 0.5 MICROgram(s)/kG/Hr IV Continuous <Continuous>  heparin  Infusion 1000 Unit(s)/Hr IV Continuous <Continuous>  insulin regular Infusion 3 Unit(s)/Hr IV Continuous <Continuous>  lactulose Syrup 20 Gram(s) Oral every 12 hours  meropenem  IVPB 500 milliGRAM(s) IV Intermittent every 12 hours  meropenem  IVPB      methylPREDNISolone sodium succinate Injectable 60 milliGRAM(s) IV Push every 24 hours  midazolam Infusion 0.057 mG/kG/Hr IV Continuous <Continuous>  norepinephrine Infusion 0.05 MICROgram(s)/kG/Min IV Continuous <Continuous>  pantoprazole   Suspension 40 milliGRAM(s) Oral daily  phenylephrine    Infusion 0.1 MICROgram(s)/kG/Min IV Continuous <Continuous>  PureFlow Dialysate RFP-400 (K 2 / Ca 3) 5000 milliLiter(s) CRRT <Continuous>  senna 2 Tablet(s) Oral at bedtime  sevelamer carbonate Powder 2400 milliGRAM(s) Oral three times a day with meals  sodium bicarbonate 650 milliGRAM(s) Oral three times a day  sodium zirconium cyclosilicate 10 Gram(s) Oral two times a day  vancomycin  IVPB 1250 milliGRAM(s) IV Intermittent every 24 hours  vasopressin Infusion 0.02 Unit(s)/Min IV Continuous <Continuous>    PRN MEDICATIONS  acetaminophen   Tablet .. 650 milliGRAM(s) Oral every 6 hours PRN  ondansetron Injectable 4 milliGRAM(s) IV Push every 8 hours PRN    VITALS:   T(F): 96.1  HR: 117  BP: --  RR: 26  SpO2: 83%    PHYSICAL EXAM:  GEN: No acute distress  LUNGS: Clear to auscultation bilaterally   HEART: S1/S2 present.   ABD: Soft, non-tender, non-distended. Bowel sounds present      LABS:                        7.3    26.94 )-----------( 306      ( 11 May 2020 04:20 )             24.8     05-11    138  |  99  |  115<HH>  ----------------------------<  144<H>  4.9   |  19  |  2.9<H>    Ca    6.8<L>      11 May 2020 04:20  Mg     2.4         TPro  5.3<L>  /  Alb  1.9<L>  /  TBili  1.4<H>  /  DBili  x   /  AST  186<H>  /  ALT  110<H>  /  AlkPhos  305<H>  05-11    PT/INR - ( 10 May 2020 14:25 )   PT: 12.50 sec;   INR: 1.09 ratio         PTT - ( 11 May 2020 04:20 )  PTT:75.7 sec  Urinalysis Basic - ( 11 May 2020 06:50 )    Color: Yellow / Appearance: Turbid / S.018 / pH: x  Gluc: x / Ketone: Negative  / Bili: Negative / Urobili: <2 mg/dL   Blood: x / Protein: 30 mg/dL / Nitrite: Negative   Leuk Esterase: Negative / RBC: 46 /HPF / WBC 24 /HPF   Sq Epi: x / Non Sq Epi: 6 /HPF / Bacteria: Moderate      ABG - ( 11 May 2020 04:54 )  pH, Arterial: 7.14  pH, Blood: x     /  pCO2: 65    /  pO2: 53    / HCO3: 22    / Base Excess: -6.8  /  SaO2: 83                            RADIOLOGY:

## 2020-05-11 NOTE — CHART NOTE - NSCHARTNOTEFT_GEN_A_CORE
Spoke with daughter Marisol on the number provided , discussed in great length regarding over all poor prognosis and GOC. Daughter verbalized understanding of the condition, diagnosis and prognosis. She requested if she could visit hospital and or video call. Spoke with RN manager, updated regarding family requests. Per daughter she will visit today in noon. Palliative care consult ordered.  Pt still remains critically ill , on Mechanical ventilation with Multi Organ failure and on pressor support.

## 2020-05-11 NOTE — PROGRESS NOTE ADULT - SUBJECTIVE AND OBJECTIVE BOX
Nephrology progress note    THIS IS AN INCOMPLETE NOTE . FULL NOTE TO FOLLOW SHORTLY    Patient is seen and examined, events over the last 24 h noted .    Allergies:  No Known Allergies    Hospital Medications:   MEDICATIONS  (STANDING):  ALBUTerol    90 MICROgram(s) HFA Inhaler 2 Puff(s) Inhalation once  aspirin  chewable 81 milliGRAM(s) Oral daily  BACItracin   Ointment 1 Application(s) Topical two times a day  calcium acetate 2001 milliGRAM(s) Oral three times a day with meals  caspofungin IVPB      caspofungin IVPB 50 milliGRAM(s) IV Intermittent every 24 hours  chlorhexidine 0.12% Liquid 15 milliLiter(s) Oral Mucosa every 12 hours  chlorhexidine 4% Liquid 1 Application(s) Topical <User Schedule>  cisatracurium Infusion 4 MICROgram(s)/kG/Min (21.1 mL/Hr) IV Continuous <Continuous>  collagenase Ointment 1 Application(s) Topical every 12 hours  CRRT Treatment    <Continuous>  dexMEDEtomidine Infusion 1.5 MICROgram(s)/kG/Hr (33 mL/Hr) IV Continuous <Continuous>  dextrose 5% 1000 milliLiter(s) (100 mL/Hr) IV Continuous <Continuous>  fentaNYL   Infusion. 0.5 MICROgram(s)/kG/Hr (4.4 mL/Hr) IV Continuous <Continuous>  heparin  Infusion 1000 Unit(s)/Hr (10 mL/Hr) IV Continuous <Continuous>  insulin regular Infusion 3 Unit(s)/Hr (3 mL/Hr) IV Continuous <Continuous>  lactulose Syrup 20 Gram(s) Oral every 12 hours  meropenem  IVPB 500 milliGRAM(s) IV Intermittent every 12 hours  meropenem  IVPB      midazolam Infusion 0.057 mG/kG/Hr (5 mL/Hr) IV Continuous <Continuous>  norepinephrine Infusion 0.05 MICROgram(s)/kG/Min (4.13 mL/Hr) IV Continuous <Continuous>  pantoprazole   Suspension 40 milliGRAM(s) Oral daily  phenylephrine    Infusion 0.1 MICROgram(s)/kG/Min (1.65 mL/Hr) IV Continuous <Continuous>  PureFlow Dialysate RFP-400 (K 2 / Ca 3) 5000 milliLiter(s) (2000 mL/Hr) CRRT <Continuous>  senna 2 Tablet(s) Oral at bedtime  sevelamer carbonate Powder 2400 milliGRAM(s) Oral three times a day with meals  sodium bicarbonate 650 milliGRAM(s) Oral three times a day  sodium zirconium cyclosilicate 10 Gram(s) Oral two times a day  vancomycin  IVPB 1250 milliGRAM(s) IV Intermittent every 24 hours  vasopressin Infusion 0.02 Unit(s)/Min (1.2 mL/Hr) IV Continuous <Continuous>        VITALS:  T(F): 96.1 (20 @ 08:00), Max: 99.6 (05-10-20 @ 14:00)  HR: 117 (20 @ 08:00)  BP: --  RR: 26 (20 @ 07:00)  SpO2: 83% (20 @ 08:00)  Wt(kg): --     @ 07:01  -  05-10 @ 07:00  --------------------------------------------------------  IN: 5817.5 mL / OUT: 1015 mL / NET: 4802.5 mL    05-10 @ 07:01  -   @ 07:00  --------------------------------------------------------  IN: 8487.4 mL / OUT: 4007 mL / NET: 4480.4 mL     07:01  -   @ 09:08  --------------------------------------------------------  IN: 203.4 mL / OUT: 198 mL / NET: 5.4 mL          PHYSICAL EXAM:  Constitutional: NAD  HEENT: anicteric sclera, oropharynx clear, MMM  Neck: No JVD  Respiratory: CTAB, no wheezes, rales or rhonchi  Cardiovascular: S1, S2, RRR  Gastrointestinal: BS+, soft, NT/ND  Extremities: No cyanosis or clubbing. No peripheral edema  :  No mendoza.   Skin: No rashes    LABS:      138  |  99  |  115<HH>  ----------------------------<  144<H>  4.9   |  19  |  2.9<H>    Ca    6.8<L>      11 May 2020 04:20  Mg     2.4         TPro  5.3<L>  /  Alb  1.9<L>  /  TBili  1.4<H>  /  DBili      /  AST  186<H>  /  ALT  110<H>  /  AlkPhos  305<H>                            7.3    26.94 )-----------( 306      ( 11 May 2020 04:20 )             24.8       Urine Studies:  Urinalysis Basic - ( 11 May 2020 06:50 )    Color: Yellow / Appearance: Turbid / S.018 / pH:   Gluc:  / Ketone: Negative  / Bili: Negative / Urobili: <2 mg/dL   Blood:  / Protein: 30 mg/dL / Nitrite: Negative   Leuk Esterase: Negative / RBC: 46 /HPF / WBC 24 /HPF   Sq Epi:  / Non Sq Epi: 6 /HPF / Bacteria: Moderate        RADIOLOGY & ADDITIONAL STUDIES: Nephrology progress note  Patient is seen and examined, events over the last 24 h noted .  intubated on MV  on CVVHD     Allergies:  No Known Allergies    Hospital Medications:   MEDICATIONS  (STANDING):  ALBUTerol    90 MICROgram(s) HFA Inhaler 2 Puff(s) Inhalation once  aspirin  chewable 81 milliGRAM(s) Oral daily  BACItracin   Ointment 1 Application(s) Topical two times a day  calcium acetate 2001 milliGRAM(s) Oral three times a day with meals   caspofungin IVPB 50 milliGRAM(s) IV Intermittent every 24 hours  cisatracurium Infusion 4 MICROgram(s)/kG/Min (21.1 mL/Hr) IV Continuous <Continuous>  collagenase Ointment 1 Application(s) Topical every 12 hours  CRRT Treatment    <Continuous>  dexMEDEtomidine Infusion 1.5 MICROgram(s)/kG/Hr (33 mL/Hr) IV Continuous <Continuous>  dextrose 5% 1000 milliLiter(s) (100 mL/Hr) IV Continuous <Continuous>  fentaNYL   Infusion. 0.5 MICROgram(s)/kG/Hr (4.4 mL/Hr) IV Continuous <Continuous>  heparin  Infusion 1000 Unit(s)/Hr (10 mL/Hr) IV Continuous <Continuous>  insulin regular Infusion 3 Unit(s)/Hr (3 mL/Hr) IV Continuous <Continuous>  lactulose Syrup 20 Gram(s) Oral every 12 hours  meropenem  IVPB 500 milliGRAM(s) IV Intermittent every 12 hours  midazolam Infusion 0.057 mG/kG/Hr (5 mL/Hr) IV Continuous <Continuous>  norepinephrine Infusion 0.05 MICROgram(s)/kG/Min (4.13 mL/Hr) IV Continuous <Continuous>  pantoprazole   Suspension 40 milliGRAM(s) Oral daily  phenylephrine    Infusion 0.1 MICROgram(s)/kG/Min (1.65 mL/Hr) IV Continuous <Continuous>  PureFlow Dialysate RFP-400 (K 2 / Ca 3) 5000 milliLiter(s) (2000 mL/Hr) CRRT <Continuous>  senna 2 Tablet(s) Oral at bedtime  sevelamer carbonate Powder 2400 milliGRAM(s) Oral three times a day with meals  sodium bicarbonate 650 milliGRAM(s) Oral three times a day  sodium zirconium cyclosilicate 10 Gram(s) Oral two times a day  vancomycin  IVPB 1250 milliGRAM(s) IV Intermittent every 24 hours  vasopressin Infusion 0.02 Unit(s)/Min (1.2 mL/Hr) IV Continuous <Continuous>        VITALS:  T(F): 96.1 (20 @ 08:00), Max: 99.6 (05-10-20 @ 14:00)  HR: 117 (20 @ 08:00)  BP: 95/45  RR: 26 (20 @ 07:00)  SpO2: 83% (20 @ 08:00)  Wt(kg): --     @ :  -  05-10 @ 07:00  --------------------------------------------------------  IN: 5817.5 mL / OUT: 1015 mL / NET: 4802.5 mL    05-10 @ :  -   @ 07:00  --------------------------------------------------------  IN: 8487.4 mL / OUT: 4007 mL / NET: 4480.4 mL     @ 07:01  -   @ 09:08  --------------------------------------------------------  IN: 203.4 mL / OUT: 198 mL / NET: 5.4 mL          PHYSICAL EXAM:  Constitutional: intubated on MV   HEENT: anicteric sclera, oropharynx clear, MMM  Neck: No JVD  Respiratory: CTAB, no wheezes, rales or rhonchi  Cardiovascular: S1, S2, RRR  Gastrointestinal: BS+, soft, NT/ND  Extremities: No cyanosis or clubbing.plus one evon a  :  No mendoza.   Skin: No rashes    LABS:      138  |  99  |  115<HH>  ----------------------------<  144<H>  4.9   |  19  |  2.9<H>    Ca    6.8<L>      11 May 2020 04:20  Mg     2.4         TPro  5.3<L>  /  Alb  1.9<L>  /  TBili  1.4<H>  /  DBili      /  AST  186<H>  /  ALT  110<H>  /  AlkPhos  305<H>                            7.3    26.94 )-----------( 306      ( 11 May 2020 04:20 )             24.8       Urine Studies:  Urinalysis Basic - ( 11 May 2020 06:50 )    Color: Yellow / Appearance: Turbid / S.018 / pH:   Gluc:  / Ketone: Negative  / Bili: Negative / Urobili: <2 mg/dL   Blood:  / Protein: 30 mg/dL / Nitrite: Negative   Leuk Esterase: Negative / RBC: 46 /HPF / WBC 24 /HPF   Sq Epi:  / Non Sq Epi: 6 /HPF / Bacteria: Moderate        RADIOLOGY & ADDITIONAL STUDIES:

## 2020-05-11 NOTE — PROGRESS NOTE ADULT - ASSESSMENT
Acute hypoxemic respiratory failure  ARDS  COVID 19  SP convalescent plasma    Possible superimposed bacterial infection SP ABX  Now increasing WBC   TELLO worsening    Sub Q air     PLAN:    CNS:  Sedation and paralysis for now      HEENT: Oral care    PULMONARY:  HOB @ 45 degrees, keep Sats 92-96%.  Vent Changes;  Wean O2   , monitor driving pressure     CARDIOVASCULAR:  Avoid volume overload. Wean ricco     GI: GI prophylaxis Protonix, bowel regimen. Feeding: OG feeding.  DC Free H2O     RENAL:  F/u  lytes. Continue bicarb oral. accurate I/O, renal F/UP , CVVH    INFECTIOUS DISEASE:  FU Repeat cultures, procal, Fungitel.  Start Red, Vanc, And Caspo for now      HEMATOLOGICAL:  DVT therapy for now.  FU PTT>  FU repeat LE Duplex -ve    ENDOCRINE:  Follow up FS.  Insulin protocol if needed. DC Solumedrol     CODE STATUS: FULL CODE    DISPOSITION: Patient require continued monitoring in MICU    Poor prognosis / Palliative eval

## 2020-05-11 NOTE — PROGRESS NOTE ADULT - SUBJECTIVE AND OBJECTIVE BOX
Patient is a 55y old  Male who presents with a chief complaint of suspected COVID-19 (11 May 2020 08:38)        Over Night Events:  On MV.  Sedated and paralyzed.  Vaso, levophed and Ed.          ROS:     All ROS are negative except HPI         PHYSICAL EXAM    ICU Vital Signs Last 24 Hrs  T(C): 35.6 (11 May 2020 08:00), Max: 37.6 (10 May 2020 14:00)  T(F): 96.1 (11 May 2020 08:00), Max: 99.6 (10 May 2020 14:00)  HR: 117 (11 May 2020 08:00) (93 - 127)  BP: --  BP(mean): --  ABP: 95/54 (11 May 2020 08:00) (83/49 - 121/63)  ABP(mean): 71 (11 May 2020 08:00) (64 - 85)  RR: 26 (11 May 2020 07:00) (25 - 26)  SpO2: 83% (11 May 2020 08:00) (83% - 93%)      CONSTITUTIONAL:   Ill appearing.  Well nourished.  NAD    ENT:   Airway patent,   Mouth with normal mucosa.   No thrush    EYES:   Pupils equal,   Round and reactive to light.    CARDIAC:   Tachycardic  Regular rhythm.    No edema      Vascular:  Normal systolic impulse  No Carotid bruits    RESPIRATORY:   No wheezing  Bilateral BS  Normal chest expansion  Not tachypneic,  No use of accessory muscles    GASTROINTESTINAL:  Abdomen soft,   Non-tender,   No guarding,   + BS    GENITOURINARY  normal genitalia for sex  no edema    MUSCULOSKELETAL:   Range of motion is not limited,  No clubbing, cyanosis    NEUROLOGICAL:   Sedated and paralyzed.     SKIN:   Skin normal color for race,   Warm and dry and intact.   No evidence of rash.    PSYCHIATRIC:   No apparent risk to self or others.    HEMATOLOGICAL:  No cervical  lymphadenopathy.  no inguinal lymphadenopathy      05-10-20 @ 07:01  -  20 @ 07:00  --------------------------------------------------------  IN:    cisatracurium Infusion: 600 mL    dexmedetomidine Infusion: 382.8 mL    Enteral Tube Flush: 60 mL    Free Water: 1800 mL    heparin Infusion: 140 mL    insulin regular Infusion: 68 mL    IV PiggyBack: 300 mL    midazolam Infusion: 76 mL    Nepro with Carb Steady: 970 mL    norepinephrine Infusion: 380 mL    phenylephrine   Infusion: 3653 mL    vasopressin Infusion: 57.6 mL  Total IN: 8487.4 mL    OUT:    Indwelling Catheter - Urethral: 361 mL    Other: 2546 mL    Rectal Tube: 1100 mL  Total OUT: 4007 mL    Total NET: 4480.4 mL      20 @ 07:01  -  20 @ 08:48  --------------------------------------------------------  IN:    cisatracurium Infusion: 23.8 mL    dexmedetomidine Infusion: 2.2 mL    heparin Infusion: 10 mL    insulin regular Infusion: 3 mL    midazolam Infusion: 2 mL    norepinephrine Infusion: 20 mL    phenylephrine   Infusion: 140 mL    vasopressin Infusion: 2.4 mL  Total IN: 203.4 mL    OUT:    Other: 198 mL  Total OUT: 198 mL    Total NET: 5.4 mL          LABS:                            7.3    26.94 )-----------( 306      ( 11 May 2020 04:20 )             24.8                                               05    138  |  99  |  115<HH>  ----------------------------<  144<H>  4.9   |  19  |  2.9<H>    Ca    6.8<L>      11 May 2020 04:20  Mg     2.4         TPro  5.3<L>  /  Alb  1.9<L>  /  TBili  1.4<H>  /  DBili  x   /  AST  186<H>  /  ALT  110<H>  /  AlkPhos  305<H>        PT/INR - ( 10 May 2020 14:25 )   PT: 12.50 sec;   INR: 1.09 ratio         PTT - ( 11 May 2020 04:20 )  PTT:75.7 sec                                       Urinalysis Basic - ( 11 May 2020 06:50 )    Color: Yellow / Appearance: Turbid / S.018 / pH: x  Gluc: x / Ketone: Negative  / Bili: Negative / Urobili: <2 mg/dL   Blood: x / Protein: 30 mg/dL / Nitrite: Negative   Leuk Esterase: Negative / RBC: 46 /HPF / WBC 24 /HPF   Sq Epi: x / Non Sq Epi: 6 /HPF / Bacteria: Moderate                                                  LIVER FUNCTIONS - ( 11 May 2020 04:20 )  Alb: 1.9 g/dL / Pro: 5.3 g/dL / ALK PHOS: 305 U/L / ALT: 110 U/L / AST: 186 U/L / GGT: x                                                                                               Mode: AC/ CMV (Assist Control/ Continuous Mandatory Ventilation)  RR (machine): 26  TV (machine): 400  FiO2: 100  PEEP: 18  ITime: 1  MAP: 38  PIP: 81                                      ABG - ( 11 May 2020 04:54 )  pH, Arterial: 7.14  pH, Blood: x     /  pCO2: 65    /  pO2: 53    / HCO3: 22    / Base Excess: -6.8  /  SaO2: 83    Lac 3.8              MEDICATIONS  (STANDING):  ALBUTerol    90 MICROgram(s) HFA Inhaler 2 Puff(s) Inhalation once  aspirin  chewable 81 milliGRAM(s) Oral daily  BACItracin   Ointment 1 Application(s) Topical two times a day  calcium acetate 2001 milliGRAM(s) Oral three times a day with meals  caspofungin IVPB      caspofungin IVPB 50 milliGRAM(s) IV Intermittent every 24 hours  chlorhexidine 0.12% Liquid 15 milliLiter(s) Oral Mucosa every 12 hours  chlorhexidine 4% Liquid 1 Application(s) Topical <User Schedule>  cisatracurium Infusion 4 MICROgram(s)/kG/Min (21.1 mL/Hr) IV Continuous <Continuous>  collagenase Ointment 1 Application(s) Topical every 12 hours  CRRT Treatment    <Continuous>  dexMEDEtomidine Infusion 1.5 MICROgram(s)/kG/Hr (33 mL/Hr) IV Continuous <Continuous>  dexMEDEtomidine Infusion 0.2 MICROgram(s)/kG/Hr (4.4 mL/Hr) IV Continuous <Continuous>  fentaNYL   Infusion. 0.5 MICROgram(s)/kG/Hr (4.4 mL/Hr) IV Continuous <Continuous>  heparin  Infusion 1000 Unit(s)/Hr (10 mL/Hr) IV Continuous <Continuous>  insulin regular Infusion 3 Unit(s)/Hr (3 mL/Hr) IV Continuous <Continuous>  lactulose Syrup 20 Gram(s) Oral every 12 hours  meropenem  IVPB 500 milliGRAM(s) IV Intermittent every 12 hours  meropenem  IVPB      methylPREDNISolone sodium succinate Injectable 60 milliGRAM(s) IV Push every 24 hours  midazolam Infusion 0.057 mG/kG/Hr (5 mL/Hr) IV Continuous <Continuous>  norepinephrine Infusion 0.05 MICROgram(s)/kG/Min (4.13 mL/Hr) IV Continuous <Continuous>  pantoprazole   Suspension 40 milliGRAM(s) Oral daily  phenylephrine    Infusion 0.1 MICROgram(s)/kG/Min (1.65 mL/Hr) IV Continuous <Continuous>  PureFlow Dialysate RFP-400 (K 2 / Ca 3) 5000 milliLiter(s) (2000 mL/Hr) CRRT <Continuous>  senna 2 Tablet(s) Oral at bedtime  sevelamer carbonate Powder 2400 milliGRAM(s) Oral three times a day with meals  sodium bicarbonate 650 milliGRAM(s) Oral three times a day  sodium zirconium cyclosilicate 10 Gram(s) Oral two times a day  vancomycin  IVPB 1250 milliGRAM(s) IV Intermittent every 24 hours  vasopressin Infusion 0.02 Unit(s)/Min (1.2 mL/Hr) IV Continuous <Continuous>    MEDICATIONS  (PRN):  acetaminophen   Tablet .. 650 milliGRAM(s) Oral every 6 hours PRN Temp greater or equal to 38C (100.4F)  ondansetron Injectable 4 milliGRAM(s) IV Push every 8 hours PRN Nausea and/or Vomiting      New X-rays reviewed:                                                                                  ECHO    CXR interpreted by me:  ET OG OK>  Bilateral infiltrates

## 2020-05-11 NOTE — CONSULT NOTE ADULT - ASSESSMENT
55 year old being evaluated for goals of care in setting of COVID + infection with continued clinical decline. Labs, imaging and Critical Care input reviewed.    Prognosis: poor    Current Code Status: FUll    Symptoms to manage: none    Spoke to patient’s  savita Damon.    Palliative Care services introduced. Clinical condition and prognosis reviewed. She has a good understanding that patient will not survive and that removal of vent/life prolonging care will allow a natural death. Explained the futility of CPR. Educated about having a family meeting with a  for her mom may be helpful. Family member(s) verbalized understanding of this and agreed for a family meeting    All questions answered in detail    Decision made: continue current management    Decided Code Status: remain full code for now    over 15minutes spent discussing advanced care planning    Discussed with Primary Team    Recommendations:  family meeting tomorrow 5/12 with Jamieonese  at 9am  continue current medical care and full code  We will follow    d/w with Dr. Bess    [phone]

## 2020-05-11 NOTE — PROGRESS NOTE ADULT - ASSESSMENT
Assessment:    Acute hypoxemic respiratory failure  ARDS  COVID 19  SP convalescent plasma    Possible superimposed bacterial infection SP ABX  Now increasing WBC   TELLO worsening    Sub Q air     PLAN:    CNS:  Sedation and paralysis for now      HEENT: Oral care    PULMONARY:  HOB @ 45 degrees, keep Sats 92-96%.  Vent Changes; RR 20.  PEEP 12.  PEAK FLOW 7-0.      CARDIOVASCULAR:  Avoid volume overload. Wean ricco.  NaGHCO3 150 cc per hour.  I=O     GI: GI prophylaxis Protonix, bowel regimen. Feeding: OG feeding.      RENAL:  F/u  lytes. Continue bicarb oral. accurate I/O, renal F/UP     INFECTIOUS DISEASE:  FU Repeat cultures, procal, Fungitel.  Start Red, Vanc, And Caspo for now. FU VAnc levels.  ABX to GFR    HEMATOLOGICAL:  DVT therapy for now.  FU PTT>        ENDOCRINE:  Follow up FS.  Insulin protocol if needed. DC Solumedrol     CODE STATUS: FULL CODE    DISPOSITION: Patient require continued monitoring in MICU    Poor prognosis Assessment:    Acute hypoxemic respiratory failure  ARDS  COVID 19  SP convalescent plasma    Possible superimposed bacterial infection SP ABX  Now increasing WBC   TELLO worsening    Sub Q air     PLAN:    CNS:  Sedation and paralysis for now      HEENT: Oral care    PULMONARY:  HOB @ 45 degrees, keep Sats 92-96%.  Vent Changes; RR 20.  PEEP 12.  PEAK FLOW 7-0.      CARDIOVASCULAR:  Avoid volume overload. Wean ricco.  NaGHCO3 150 cc per hour.  I=O     GI: GI prophylaxis Protonix, bowel regimen. Feeding: OG feeding.      RENAL:  F/u  lytes. Continue bicarb oral. accurate I/O, renal F/UP     INFECTIOUS DISEASE:  FU Repeat cultures, procal, Fungitel.  Start Red, Vanc, And Caspo for now. FU VAnc levels.  ABX to GFR.  DC lines     HEMATOLOGICAL:  DVT therapy for now.  FU PTT>        ENDOCRINE:  Follow up FS.  Insulin protocol if needed. DC Solumedrol     CODE STATUS: FULL CODE    DISPOSITION: Patient require continued monitoring in MICU    Poor prognosis

## 2020-05-11 NOTE — CONSULT NOTE ADULT - REASON FOR ADMISSION
suspected COVID-19

## 2020-05-11 NOTE — PROGRESS NOTE ADULT - ASSESSMENT
· Assessment		  · Assessment		  55 year old male patient with hypertension, diabetes presenting for dyspnea.     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 53 > 3.53 > 14.4 >6.08  -Sputa 5/1 NGTD  -BCx 4/21,25, 5/1 NG  -nares ORSA NG  -fungitell 61 (normal) off caspo  S/p Anakinra 4/14-17  -S/p convalescent plasma 4/29    RECOMMENDATIONS;   Presently off meropenem 750 mg iv q24h

## 2020-05-12 NOTE — PROGRESS NOTE ADULT - ASSESSMENT
55 year old male patient with hypertension, diabetes presenting for dyspnea.     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 53 > 3.53 > 14.4 >6.08  -ferritin 1375  -Ddimers 7612  -Sputa 5/1 NGTD  -BCx 4/21,25, 5/1 NG  -nares ORSA NG  -diarrhea > CD pending. On po Vancomycin  -fungitell 61 (normal) off caspo  S/p Anakinra 4/14-17  -S/p convalescent plasma 4/29    RECOMMENDATIONS;   Presently off meropenem 750 mg iv q24h  po Vancomycin 125 mg q6h ( hold if CD NG )

## 2020-05-12 NOTE — PROGRESS NOTE ADULT - ASSESSMENT
55yMale being reevaluated for      over 15 minutes spent discussing Advance Care Planning with family via . See GOC note. Explained the futility of CPR and recommended DNR; explained that he will not survive and that the current treatments are prolonging his dying process; explained withdrawal of ventilator and changing curative treatement to CMO. Wife understandable upset and crying - asked for videoconference and time to decide. Family agreed to DNR        Recommendations:  continue current medical care  initiate DNR  videoconference arranged with primary staff.     D/W Dr. Crawley     Please Call [phone] PRN

## 2020-05-12 NOTE — PROGRESS NOTE ADULT - SUBJECTIVE AND OBJECTIVE BOX
55yMale with diagnosis: SUSPECTED 2019NOVEL CORONAVIRUS INFECTION,HYPOXIA     no significant interim events;   hospital day/vent day 29  still requiring high vent settings (100%, peep 12) and two pressors with sedation and paralysis      PHYSICAL EXAM deferred    T(C): , Max: 35.5 (00:00)  T(F): 94.5  HR: 97 (94 - 116)  BP: 106/50 (106/50 - 106/50)  RR: 28 (24 - 28)  SpO2: 94% (89% - 96%)    LABS:                          6.7    25.67 )-----------( 211      ( 12 May 2020 04:30 )             21.3                                                                                      05-12    135  |  95<L>  |  65<HH>  ----------------------------<  172<H>  3.0<L>   |  28  |  1.7<H>    Ca    7.0<L>      12 May 2020 13:32  Phos  3.3     05-12  Mg     2.4     05-11    TPro  4.6<L>  /  Alb  1.8<L>  /  TBili  1.9<H>  /  DBili  x   /  AST  156<H>  /  ALT  120<H>  /  AlkPhos  290<H>  05-12                                                   MEDICATIONS  (STANDING):  ALBUTerol    90 MICROgram(s) HFA Inhaler 2 Puff(s) Inhalation once  aspirin  chewable 81 milliGRAM(s) Oral daily  BACItracin   Ointment 1 Application(s) Topical two times a day  chlorhexidine 0.12% Liquid 15 milliLiter(s) Oral Mucosa every 12 hours  chlorhexidine 4% Liquid 1 Application(s) Topical <User Schedule>  cisatracurium Infusion 4 MICROgram(s)/kG/Min (21.1 mL/Hr) IV Continuous <Continuous>  collagenase Ointment 1 Application(s) Topical every 12 hours  CRRT Treatment    <Continuous>  dexMEDEtomidine Infusion 1.5 MICROgram(s)/kG/Hr (33 mL/Hr) IV Continuous <Continuous>  dextrose 5% 1000 milliLiter(s) (200 mL/Hr) IV Continuous <Continuous>  fentaNYL   Infusion. 0.5 MICROgram(s)/kG/Hr (4.4 mL/Hr) IV Continuous <Continuous>  heparin  Infusion 1000 Unit(s)/Hr (10 mL/Hr) IV Continuous <Continuous>  insulin regular Infusion 3 Unit(s)/Hr (3 mL/Hr) IV Continuous <Continuous>  meropenem  IVPB 500 milliGRAM(s) IV Intermittent every 8 hours  midazolam Infusion 0.057 mG/kG/Hr (5 mL/Hr) IV Continuous <Continuous>  norepinephrine Infusion 0.05 MICROgram(s)/kG/Min (4.13 mL/Hr) IV Continuous <Continuous>  pantoprazole   Suspension 40 milliGRAM(s) Oral daily  phenylephrine    Infusion 0.1 MICROgram(s)/kG/Min (1.65 mL/Hr) IV Continuous <Continuous>  PureFlow Dialysate RFP-400 (K 2 / Ca 3) 5000 milliLiter(s) (2000 mL/Hr) CRRT <Continuous>  vancomycin    Solution 125 milliGRAM(s) Oral every 6 hours  vasopressin Infusion 0.02 Unit(s)/Min (1.2 mL/Hr) IV Continuous <Continuous>    MEDICATIONS  (PRN):  acetaminophen   Tablet .. 650 milliGRAM(s) Oral every 6 hours PRN Temp greater or equal to 38C (100.4F)  ondansetron Injectable 4 milliGRAM(s) IV Push every 8 hours PRN Nausea and/or Vomiting

## 2020-05-12 NOTE — PROGRESS NOTE ADULT - ASSESSMENT
Acute hypoxemic respiratory failure secondary to SARS-COV-2 infection / TELLO/ hyperkalemia/ hypernatremia     # anuric   # continue CVVHD / keep in even balance for now   # d/c bicarbonate drip   # on 2  pressors.  # d/c lokelma   # d/c phoslo and renagel  check ph daily, patient on CVVHD   # last vanco level, 38 , hold vanco, repeat level, change violetta to q 8   # palliative care appreciated  # will follow

## 2020-05-12 NOTE — PROGRESS NOTE ADULT - SUBJECTIVE AND OBJECTIVE BOX
24 h events noted  intubated/ventilated  on 2 pressors  on cvvhd        PAST HISTORY  --------------------------------------------------------------------------------  No significant changes to PMH, PSH, FHx, SHx, unless otherwise noted    ALLERGIES & MEDICATIONS  --------------------------------------------------------------------------------  Allergies    No Known Allergies    Intolerances      Standing Inpatient Medications  ALBUTerol    90 MICROgram(s) HFA Inhaler 2 Puff(s) Inhalation once  aspirin  chewable 81 milliGRAM(s) Oral daily  BACItracin   Ointment 1 Application(s) Topical two times a day  calcium acetate 2001 milliGRAM(s) Oral three times a day with meals  caspofungin IVPB      caspofungin IVPB 50 milliGRAM(s) IV Intermittent every 24 hours  chlorhexidine 0.12% Liquid 15 milliLiter(s) Oral Mucosa every 12 hours  chlorhexidine 4% Liquid 1 Application(s) Topical <User Schedule>  cisatracurium Infusion 4 MICROgram(s)/kG/Min IV Continuous <Continuous>  collagenase Ointment 1 Application(s) Topical every 12 hours  CRRT Treatment    <Continuous>  dexMEDEtomidine Infusion 1.5 MICROgram(s)/kG/Hr IV Continuous <Continuous>  dextrose 5% 1000 milliLiter(s) IV Continuous <Continuous>  fentaNYL   Infusion. 0.5 MICROgram(s)/kG/Hr IV Continuous <Continuous>  heparin  Infusion 1000 Unit(s)/Hr IV Continuous <Continuous>  insulin regular Infusion 3 Unit(s)/Hr IV Continuous <Continuous>  lactulose Syrup 20 Gram(s) Oral every 12 hours  meropenem  IVPB 500 milliGRAM(s) IV Intermittent every 12 hours  meropenem  IVPB      midazolam Infusion 0.057 mG/kG/Hr IV Continuous <Continuous>  norepinephrine Infusion 0.05 MICROgram(s)/kG/Min IV Continuous <Continuous>  pantoprazole   Suspension 40 milliGRAM(s) Oral daily  phenylephrine    Infusion 0.1 MICROgram(s)/kG/Min IV Continuous <Continuous>  PureFlow Dialysate RFP-400 (K 2 / Ca 3) 5000 milliLiter(s) CRRT <Continuous>  senna 2 Tablet(s) Oral at bedtime  sevelamer carbonate Powder 2400 milliGRAM(s) Oral three times a day with meals  sodium zirconium cyclosilicate 10 Gram(s) Oral two times a day  vancomycin  IVPB 1250 milliGRAM(s) IV Intermittent every 24 hours  vasopressin Infusion 0.02 Unit(s)/Min IV Continuous <Continuous>    PRN Inpatient Medications  acetaminophen   Tablet .. 650 milliGRAM(s) Oral every 6 hours PRN  ondansetron Injectable 4 milliGRAM(s) IV Push every 8 hours PRN        VITALS/PHYSICAL EXAM  --------------------------------------------------------------------------------  T(C): 34.6 (05-12-20 @ 08:00), Max: 36.2 (05-11-20 @ 12:00)  HR: 97 (05-12-20 @ 08:00) (96 - 122)  BP: 106/50 (05-12-20 @ 08:00) (106/50 - 106/50)  RR: 28 (05-12-20 @ 07:00) (24 - 28)  SpO2: 89% (05-12-20 @ 07:46) (89% - 98%)  Wt(kg): --        05-11-20 @ 07:01  -  05-12-20 @ 07:00  --------------------------------------------------------  IN: 6999.7 mL / OUT: 8615 mL / NET: -1615.3 mL      Physical Exam:  	Gen: intubated/ventilated   	Vascular access: udall     LABS/STUDIES  --------------------------------------------------------------------------------              6.7    25.67 >-----------<  211      [05-12-20 @ 04:30]              21.3     136  |  97  |  71  ----------------------------<  170      [05-12-20 @ 04:30]  3.6   |  26  |  1.9        Ca     6.7     [05-12-20 @ 04:30]      Mg     2.4     [05-11-20 @ 04:20]    TPro  4.6  /  Alb  1.8  /  TBili  1.9  /  DBili  x   /  AST  156  /  ALT  120  /  AlkPhos  290  [05-12-20 @ 04:30]    PT/INR: PT 14.20, INR 1.23       [05-12-20 @ 04:30]  PTT: 79.7       [05-12-20 @ 04:30]      Creatinine Trend:  SCr 1.9 [05-12 @ 04:30]  SCr 2.0 [05-11 @ 23:41]  SCr 2.5 [05-11 @ 15:39]  SCr 2.9 [05-11 @ 04:20]  SCr 4.1 [05-10 @ 16:00]    Urinalysis - [05-11-20 @ 06:50]      Color Yellow / Appearance Turbid / SG 1.018 / pH 5.5      Gluc Negative / Ketone Negative  / Bili Negative / Urobili <2 mg/dL       Blood Moderate / Protein 30 mg/dL / Leuk Est Negative / Nitrite Negative      RBC 46 / WBC 24 / Hyaline 16 / Gran  / Sq Epi  / Non Sq Epi 6 / Bacteria Moderate      Ferritin 1375      [04-29-20 @ 16:00]  Lipid: chol --, , HDL --, LDL --      [05-01-20 @ 10:30] 24 h events noted  intubated/ventilated  on 2 pressors  on cvvhd        PAST HISTORY  --------------------------------------------------------------------------------  No significant changes to PMH, PSH, FHx, SHx, unless otherwise noted    ALLERGIES & MEDICATIONS  --------------------------------------------------------------------------------  Allergies    No Known Allergies    Intolerances      Standing Inpatient Medications  ALBUTerol    90 MICROgram(s) HFA Inhaler 2 Puff(s) Inhalation once  aspirin  chewable 81 milliGRAM(s) Oral daily  BACItracin   Ointment 1 Application(s) Topical two times a day  calcium acetate 2001 milliGRAM(s) Oral three times a day with meals  caspofungin IVPB      caspofungin IVPB 50 milliGRAM(s) IV Intermittent every 24 hours  chlorhexidine 0.12% Liquid 15 milliLiter(s) Oral Mucosa every 12 hours  chlorhexidine 4% Liquid 1 Application(s) Topical <User Schedule>  cisatracurium Infusion 4 MICROgram(s)/kG/Min IV Continuous <Continuous>  collagenase Ointment 1 Application(s) Topical every 12 hours  CRRT Treatment    <Continuous>  dexMEDEtomidine Infusion 1.5 MICROgram(s)/kG/Hr IV Continuous <Continuous>  dextrose 5% 1000 milliLiter(s) IV Continuous <Continuous>  fentaNYL   Infusion. 0.5 MICROgram(s)/kG/Hr IV Continuous <Continuous>  heparin  Infusion 1000 Unit(s)/Hr IV Continuous <Continuous>  insulin regular Infusion 3 Unit(s)/Hr IV Continuous <Continuous>  lactulose Syrup 20 Gram(s) Oral every 12 hours  meropenem  IVPB 500 milliGRAM(s) IV Intermittent every 12 hours  meropenem  IVPB      midazolam Infusion 0.057 mG/kG/Hr IV Continuous <Continuous>  norepinephrine Infusion 0.05 MICROgram(s)/kG/Min IV Continuous <Continuous>  pantoprazole   Suspension 40 milliGRAM(s) Oral daily  phenylephrine    Infusion 0.1 MICROgram(s)/kG/Min IV Continuous <Continuous>  PureFlow Dialysate RFP-400 (K 2 / Ca 3) 5000 milliLiter(s) CRRT <Continuous>  senna 2 Tablet(s) Oral at bedtime  sevelamer carbonate Powder 2400 milliGRAM(s) Oral three times a day with meals  sodium zirconium cyclosilicate 10 Gram(s) Oral two times a day  vancomycin  IVPB 1250 milliGRAM(s) IV Intermittent every 24 hours  vasopressin Infusion 0.02 Unit(s)/Min IV Continuous <Continuous>    PRN Inpatient Medications  acetaminophen   Tablet .. 650 milliGRAM(s) Oral every 6 hours PRN  ondansetron Injectable 4 milliGRAM(s) IV Push every 8 hours PRN        VITALS/PHYSICAL EXAM  --------------------------------------------------------------------------------  T(C): 34.6 (05-12-20 @ 08:00), Max: 36.2 (05-11-20 @ 12:00)  HR: 97 (05-12-20 @ 08:00) (96 - 122)  BP: 106/50 (05-12-20 @ 08:00) (106/50 - 106/50)  RR: 28 (05-12-20 @ 07:00) (24 - 28)  SpO2: 89% (05-12-20 @ 07:46) (89% - 98%)  Wt(kg): --        05-11-20 @ 07:01  -  05-12-20 @ 07:00  --------------------------------------------------------  IN: 6999.7 mL / OUT: 8615 mL / NET: -1615.3 mL      Physical Exam:  	Gen: intubated/ventilated   	Vascular access: udall     LABS/STUDIES  --------------------------------------------------------------------------------              6.7    25.67 >-----------<  211      [05-12-20 @ 04:30]              21.3     136  |  97  |  71  ----------------------------<  170      [05-12-20 @ 04:30]  3.6   |  26  |  1.9        Ca     6.7     [05-12-20 @ 04:30]      Mg     2.4     [05-11-20 @ 04:20]    TPro  4.6  /  Alb  1.8  /  TBili  1.9  /  DBili  x   /  AST  156  /  ALT  120  /  AlkPhos  290  [05-12-20 @ 04:30]    PT/INR: PT 14.20, INR 1.23       [05-12-20 @ 04:30]  PTT: 79.7       [05-12-20 @ 04:30]    Vancomycin Level, Random: 38.3: The "VANCOMYCIN, RANDOM" test should only be ordered for patients with  severe renal dysfunction or on dialysis. Therapeutic ranges have not been  established and results must be interpreted in the context of patient's  clinical condition.  NOTE: Therapeutic range for Trough Vancomycin is 10-20 mcg/mL. ug/mL (05.03.20 @ 06:00)      Creatinine Trend:  SCr 1.9 [05-12 @ 04:30]  SCr 2.0 [05-11 @ 23:41]  SCr 2.5 [05-11 @ 15:39]  SCr 2.9 [05-11 @ 04:20]  SCr 4.1 [05-10 @ 16:00]    Urinalysis - [05-11-20 @ 06:50]      Color Yellow / Appearance Turbid / SG 1.018 / pH 5.5      Gluc Negative / Ketone Negative  / Bili Negative / Urobili <2 mg/dL       Blood Moderate / Protein 30 mg/dL / Leuk Est Negative / Nitrite Negative      RBC 46 / WBC 24 / Hyaline 16 / Gran  / Sq Epi  / Non Sq Epi 6 / Bacteria Moderate      Ferritin 1375      [04-29-20 @ 16:00]  Lipid: chol --, , HDL --, LDL --      [05-01-20 @ 10:30]

## 2020-05-12 NOTE — PROGRESS NOTE ADULT - SUBJECTIVE AND OBJECTIVE BOX
Patient is a 55y old  Male who presents with a chief complaint of suspected COVID-19 (12 May 2020 08:00)        Over Night Events:  On MV.  On pressors Vaso and Levophed.  Diarrhea        ROS:     All ROS are negative except HPI         PHYSICAL EXAM    ICU Vital Signs Last 24 Hrs  T(C): 34.6 (12 May 2020 08:00), Max: 36.2 (11 May 2020 12:00)  T(F): 94.2 (12 May 2020 08:00), Max: 97.1 (11 May 2020 12:00)  HR: 97 (12 May 2020 08:00) (96 - 121)  BP: 106/50 (12 May 2020 08:00) (106/50 - 106/50)  BP(mean): --  ABP: 119/56 (12 May 2020 06:45) (91/44 - 152/63)  ABP(mean): 78 (12 May 2020 06:45) (61 - 88)  RR: 28 (12 May 2020 07:00) (24 - 28)  SpO2: 89% (12 May 2020 07:46) (89% - 98%)      CONSTITUTIONAL:   Ill appearing.  Well nourished.  NAD    ENT:   Airway patent,   Mouth with normal mucosa.   No thrush    EYES:   Pupils equal,   Round and reactive to light.    CARDIAC:   Tachycardic   Regular rhythm.     edema      Vascular:  Normal systolic impulse  No Carotid bruits    RESPIRATORY:   No wheezing  Bilateral BS  Normal chest expansion  Not tachypneic,  No use of accessory muscles    GASTROINTESTINAL:  Abdomen soft,   Non-tender,   No guarding,   + BS    GENITOURINARY  normal genitalia for sex  edema    MUSCULOSKELETAL:   Range of motion is not limited,  No clubbing, cyanosis    NEUROLOGICAL:   Sedated and paralyzed   SKIN:   Skin normal color for race,   Warm and dry and intact.   No evidence of rash.    PSYCHIATRIC:   No apparent risk to self or others.    HEMATOLOGICAL:  No cervical  lymphadenopathy.  no inguinal lymphadenopathy      20 @ 07:01  -  20 @ 07:00  --------------------------------------------------------  IN:    cisatracurium Infusion: 494.6 mL    dexmedetomidine Infusion: 4.4 mL    dextrose 5%: 2200 mL    Enteral Tube Flush: 300 mL    fentaNYL Infusion.: 118.6 mL    heparin Infusion: 20 mL    heparin Infusion: 90 mL    heparin Infusion: 120 mL    insulin regular Infusion: 91 mL    IV PiggyBack: 550 mL    midazolam Infusion: 48 mL    Nepro with Carb Steady: 240 mL    norepinephrine Infusion: 432 mL    Other: 418 mL    Peptamen A.F.: 490 mL    phenylephrine   Infusion: 1325.5 mL    vasopressin Infusion: 57.6 mL  Total IN: 6999.7 mL    OUT:    Indwelling Catheter - Urethral: 85 mL    Other: 7430 mL    Rectal Tube: 1100 mL  Total OUT: 8615 mL    Total NET: -1615.3 mL          LABS:                            6.7    25.67 )-----------( 211      ( 12 May 2020 04:30 )             21.3                                               05-12    136  |  97<L>  |  71<HH>  ----------------------------<  170<H>  3.6   |  26  |  1.9<H>    Ca    6.7<L>      12 May 2020 04:30  Mg     2.4     05-    TPro  4.6<L>  /  Alb  1.8<L>  /  TBili  1.9<H>  /  DBili  x   /  AST  156<H>  /  ALT  120<H>  /  AlkPhos  290<H>  05-12      PT/INR - ( 12 May 2020 04:30 )   PT: 14.20 sec;   INR: 1.23 ratio         PTT - ( 12 May 2020 04:30 )  PTT:79.7 sec                                       Urinalysis Basic - ( 11 May 2020 06:50 )    Color: Yellow / Appearance: Turbid / S.018 / pH: x  Gluc: x / Ketone: Negative  / Bili: Negative / Urobili: <2 mg/dL   Blood: x / Protein: 30 mg/dL / Nitrite: Negative   Leuk Esterase: Negative / RBC: 46 /HPF / WBC 24 /HPF   Sq Epi: x / Non Sq Epi: 6 /HPF / Bacteria: Moderate                                                  LIVER FUNCTIONS - ( 12 May 2020 04:30 )  Alb: 1.8 g/dL / Pro: 4.6 g/dL / ALK PHOS: 290 U/L / ALT: 120 U/L / AST: 156 U/L / GGT: x                                                                                               Mode: AC/ CMV (Assist Control/ Continuous Mandatory Ventilation)  RR (machine): 28  TV (machine): 400  FiO2: 100  PEEP: 12  ITime: 1  MAP: 26  PIP: 62                                      ABG - ( 12 May 2020 02:43 )  pH, Arterial: 7.18  pH, Blood: x     /  pCO2: 73    /  pO2: 52    / HCO3: 27    / Base Excess: -2.7  /  SaO2: 84                  MEDICATIONS  (STANDING):  ALBUTerol    90 MICROgram(s) HFA Inhaler 2 Puff(s) Inhalation once  aspirin  chewable 81 milliGRAM(s) Oral daily  BACItracin   Ointment 1 Application(s) Topical two times a day  caspofungin IVPB      caspofungin IVPB 50 milliGRAM(s) IV Intermittent every 24 hours  chlorhexidine 0.12% Liquid 15 milliLiter(s) Oral Mucosa every 12 hours  chlorhexidine 4% Liquid 1 Application(s) Topical <User Schedule>  cisatracurium Infusion 4 MICROgram(s)/kG/Min (21.1 mL/Hr) IV Continuous <Continuous>  collagenase Ointment 1 Application(s) Topical every 12 hours  CRRT Treatment    <Continuous>  dexMEDEtomidine Infusion 1.5 MICROgram(s)/kG/Hr (33 mL/Hr) IV Continuous <Continuous>  dextrose 5% 1000 milliLiter(s) (100 mL/Hr) IV Continuous <Continuous>  fentaNYL   Infusion. 0.5 MICROgram(s)/kG/Hr (4.4 mL/Hr) IV Continuous <Continuous>  heparin  Infusion 1000 Unit(s)/Hr (10 mL/Hr) IV Continuous <Continuous>  insulin regular Infusion 3 Unit(s)/Hr (3 mL/Hr) IV Continuous <Continuous>  meropenem  IVPB 500 milliGRAM(s) IV Intermittent every 8 hours  midazolam Infusion 0.057 mG/kG/Hr (5 mL/Hr) IV Continuous <Continuous>  norepinephrine Infusion 0.05 MICROgram(s)/kG/Min (4.13 mL/Hr) IV Continuous <Continuous>  pantoprazole   Suspension 40 milliGRAM(s) Oral daily  phenylephrine    Infusion 0.1 MICROgram(s)/kG/Min (1.65 mL/Hr) IV Continuous <Continuous>  PureFlow Dialysate RFP-400 (K 2 / Ca 3) 5000 milliLiter(s) (2000 mL/Hr) CRRT <Continuous>  vancomycin  IVPB 1250 milliGRAM(s) IV Intermittent every 24 hours  vasopressin Infusion 0.02 Unit(s)/Min (1.2 mL/Hr) IV Continuous <Continuous>    MEDICATIONS  (PRN):  acetaminophen   Tablet .. 650 milliGRAM(s) Oral every 6 hours PRN Temp greater or equal to 38C (100.4F)  ondansetron Injectable 4 milliGRAM(s) IV Push every 8 hours PRN Nausea and/or Vomiting      New X-rays reviewed:                                                                                  ECHO    CXR interpreted by me:  UnaBLE TO SEE

## 2020-05-12 NOTE — PROGRESS NOTE ADULT - SUBJECTIVE AND OBJECTIVE BOX
RHIANNON MAHER  55y, Male    All available historical data reviewed    OVERNIGHT EVENTS:  no fevers  fio2 90%  has diarrhea  clinically non responsive  no change in significant sq emphysema    ROS:  unable to obtain history secondary to patient's mental status and/or sedation     VITALS:  T(F): 94.5, Max: 95.9 (20 @ 00:00)  HR: 97  BP: 106/50  RR: 28Vital Signs Last 24 Hrs  T(C): 34.7 (12 May 2020 12:00), Max: 35.5 (12 May 2020 00:00)  T(F): 94.5 (12 May 2020 12:00), Max: 95.9 (12 May 2020 00:00)  HR: 97 (12 May 2020 13:00) (94 - 116)  BP: 106/50 (12 May 2020 08:00) (106/50 - 106/50)  BP(mean): --  RR: 28 (12 May 2020 07:00) (24 - 28)  SpO2: 94% (12 May 2020 13:00) (89% - 96%)    TESTS & MEASUREMENTS:                        6.7    25.67 )-----------( 211      ( 12 May 2020 04:30 )             21.3     05-12    136  |  97<L>  |  71<HH>  ----------------------------<  170<H>  3.6   |  26  |  1.9<H>    Ca    6.7<L>      12 May 2020 04:30  Mg     2.4         TPro  4.6<L>  /  Alb  1.8<L>  /  TBili  1.9<H>  /  DBili  x   /  AST  156<H>  /  ALT  120<H>  /  AlkPhos  290<H>      LIVER FUNCTIONS - ( 12 May 2020 04:30 )  Alb: 1.8 g/dL / Pro: 4.6 g/dL / ALK PHOS: 290 U/L / ALT: 120 U/L / AST: 156 U/L / GGT: x             Culture - Urine (collected 20 @ 06:50)  Source: .Urine Catheterized  Final Report (20 @ 11:48):    No growth    Culture - Blood (collected 20 @ 06:47)  Source: .Blood None  Preliminary Report (20 @ 12:01):    No growth to date.      Urinalysis Basic - ( 11 May 2020 06:50 )    Color: Yellow / Appearance: Turbid / S.018 / pH: x  Gluc: x / Ketone: Negative  / Bili: Negative / Urobili: <2 mg/dL   Blood: x / Protein: 30 mg/dL / Nitrite: Negative   Leuk Esterase: Negative / RBC: 46 /HPF / WBC 24 /HPF   Sq Epi: x / Non Sq Epi: 6 /HPF / Bacteria: Moderate          RADIOLOGY & ADDITIONAL TESTS:  Personal review of radiological diagnostics performed  Echo and EKG results noted when applicable.     MEDICATIONS:  acetaminophen   Tablet .. 650 milliGRAM(s) Oral every 6 hours PRN  ALBUTerol    90 MICROgram(s) HFA Inhaler 2 Puff(s) Inhalation once  aspirin  chewable 81 milliGRAM(s) Oral daily  BACItracin   Ointment 1 Application(s) Topical two times a day  chlorhexidine 0.12% Liquid 15 milliLiter(s) Oral Mucosa every 12 hours  chlorhexidine 4% Liquid 1 Application(s) Topical <User Schedule>  cisatracurium Infusion 4 MICROgram(s)/kG/Min IV Continuous <Continuous>  collagenase Ointment 1 Application(s) Topical every 12 hours  CRRT Treatment    <Continuous>  dexMEDEtomidine Infusion 1.5 MICROgram(s)/kG/Hr IV Continuous <Continuous>  dextrose 5% 1000 milliLiter(s) IV Continuous <Continuous>  fentaNYL   Infusion. 0.5 MICROgram(s)/kG/Hr IV Continuous <Continuous>  heparin  Infusion 1000 Unit(s)/Hr IV Continuous <Continuous>  insulin regular Infusion 3 Unit(s)/Hr IV Continuous <Continuous>  meropenem  IVPB 500 milliGRAM(s) IV Intermittent every 8 hours  midazolam Infusion 0.057 mG/kG/Hr IV Continuous <Continuous>  norepinephrine Infusion 0.05 MICROgram(s)/kG/Min IV Continuous <Continuous>  ondansetron Injectable 4 milliGRAM(s) IV Push every 8 hours PRN  pantoprazole   Suspension 40 milliGRAM(s) Oral daily  phenylephrine    Infusion 0.1 MICROgram(s)/kG/Min IV Continuous <Continuous>  PureFlow Dialysate RFP-400 (K 2 / Ca 3) 5000 milliLiter(s) CRRT <Continuous>  vancomycin    Solution 125 milliGRAM(s) Oral every 6 hours  vasopressin Infusion 0.02 Unit(s)/Min IV Continuous <Continuous>      ANTIBIOTICS:  meropenem  IVPB 500 milliGRAM(s) IV Intermittent every 8 hours  vancomycin    Solution 125 milliGRAM(s) Oral every 6 hours

## 2020-05-12 NOTE — PROGRESS NOTE ADULT - SUBJECTIVE AND OBJECTIVE BOX
SUBJECTIVE:    Patient is a 55y old Male who presents with a chief complaint of suspected COVID-19 (12 May 2020 09:10)    Currently admitted to medicine with the primary diagnosis of Suspected 2019 novel coronavirus infection     Today is hospital day 29d. Pt remains critically ill , on MV and pressor support  PAST MEDICAL & SURGICAL HISTORY  Diabetes  Hypertension  No significant past surgical history    SOCIAL HISTORY:  Negative for smoking/alcohol/drug use.     ALLERGIES:  No Known Allergies    MEDICATIONS:  STANDING MEDICATIONS  ALBUTerol    90 MICROgram(s) HFA Inhaler 2 Puff(s) Inhalation once  aspirin  chewable 81 milliGRAM(s) Oral daily  BACItracin   Ointment 1 Application(s) Topical two times a day  chlorhexidine 0.12% Liquid 15 milliLiter(s) Oral Mucosa every 12 hours  chlorhexidine 4% Liquid 1 Application(s) Topical <User Schedule>  cisatracurium Infusion 4 MICROgram(s)/kG/Min IV Continuous <Continuous>  collagenase Ointment 1 Application(s) Topical every 12 hours  CRRT Treatment    <Continuous>  dexMEDEtomidine Infusion 1.5 MICROgram(s)/kG/Hr IV Continuous <Continuous>  dextrose 5% 1000 milliLiter(s) IV Continuous <Continuous>  fentaNYL   Infusion. 0.5 MICROgram(s)/kG/Hr IV Continuous <Continuous>  heparin  Infusion 1000 Unit(s)/Hr IV Continuous <Continuous>  insulin regular Infusion 3 Unit(s)/Hr IV Continuous <Continuous>  meropenem  IVPB 500 milliGRAM(s) IV Intermittent every 8 hours  midazolam Infusion 0.057 mG/kG/Hr IV Continuous <Continuous>  norepinephrine Infusion 0.05 MICROgram(s)/kG/Min IV Continuous <Continuous>  pantoprazole   Suspension 40 milliGRAM(s) Oral daily  phenylephrine    Infusion 0.1 MICROgram(s)/kG/Min IV Continuous <Continuous>  PureFlow Dialysate RFP-400 (K 2 / Ca 3) 5000 milliLiter(s) CRRT <Continuous>  vancomycin    Solution 125 milliGRAM(s) Oral every 6 hours  vasopressin Infusion 0.02 Unit(s)/Min IV Continuous <Continuous>    PRN MEDICATIONS  acetaminophen   Tablet .. 650 milliGRAM(s) Oral every 6 hours PRN  ondansetron Injectable 4 milliGRAM(s) IV Push every 8 hours PRN    VITALS:   T(F): 94.2  HR: 98  BP: 106/50  RR: 28  SpO2: 91%    PHYSICAL EXAM:  GEN: No acute distress  LUNGS: Clear to auscultation bilaterally   HEART: S1/S2 present  ABD: Soft, non-tender, non-distended. Bowel sounds present        LABS:                        6.7    25.67 )-----------( 211      ( 12 May 2020 04:30 )             21.3     05-12    136  |  97<L>  |  71<HH>  ----------------------------<  170<H>  3.6   |  26  |  1.9<H>    Ca    6.7<L>      12 May 2020 04:30  Mg     2.4         TPro  4.6<L>  /  Alb  1.8<L>  /  TBili  1.9<H>  /  DBili  x   /  AST  156<H>  /  ALT  120<H>  /  AlkPhos  290<H>  0512    PT/INR - ( 12 May 2020 04:30 )   PT: 14.20 sec;   INR: 1.23 ratio         PTT - ( 12 May 2020 04:30 )  PTT:79.7 sec  Urinalysis Basic - ( 11 May 2020 06:50 )    Color: Yellow / Appearance: Turbid / S.018 / pH: x  Gluc: x / Ketone: Negative  / Bili: Negative / Urobili: <2 mg/dL   Blood: x / Protein: 30 mg/dL / Nitrite: Negative   Leuk Esterase: Negative / RBC: 46 /HPF / WBC 24 /HPF   Sq Epi: x / Non Sq Epi: 6 /HPF / Bacteria: Moderate      ABG - ( 12 May 2020 02:43 )  pH, Arterial: 7.18  pH, Blood: x     /  pCO2: 73    /  pO2: 52    / HCO3: 27    / Base Excess: -2.7  /  SaO2: 84                      Culture - Urine (collected 11 May 2020 06:50)  Source: .Urine Catheterized  Final Report (12 May 2020 11:48):    No growth    Culture - Blood (collected 11 May 2020 06:47)  Source: .Blood None  Preliminary Report (12 May 2020 12:01):    No growth to date.            RADIOLOGY:

## 2020-05-12 NOTE — PROGRESS NOTE ADULT - ASSESSMENT
Assessment:    Acute hypoxemic respiratory failure  ARDS  COVID 19  SP convalescent plasma    Possible superimposed bacterial infection SP ABX  Now increasing WBC   TELLO worsening on CVVD  Sub Q air     PLAN:    CNS:  Sedation and paralysis for now awaiting palliative care meeting     HEENT: Oral care    PULMONARY:  HOB @ 45 degrees, keep Sats 92-96%.  Vent Changes no changes     CARDIOVASCULAR:  Avoid volume overload. Wean ricco.  NaHCO3 200 cc per hour.  I=O     GI: GI prophylaxis Protonix,  Hold feeding.  Hold bowel regimen     RENAL:  F/u  lytes. . accurate I/O, renal F/UP     INFECTIOUS DISEASE:  FU Repeat cultures, Vanc oral.  DC Caspo.  Changes TLC     HEMATOLOGICAL:  DVT therapy for now.  FU PTT>        ENDOCRINE:  Follow up FS.  Insulin protocol if needed.     CODE STATUS: FULL CODE    DISPOSITION: Patient require continued monitoring in MICU    Prognosis extremely p[oor

## 2020-05-12 NOTE — PROGRESS NOTE ADULT - ASSESSMENT
Acute hypoxemic respiratory failure  ARDS  COVID 19  SP convalescent plasma    Possible superimposed bacterial infection SP ABX  Now increasing WBC   TELLO worsening    Sub Q air     PLAN:    CNS:  Sedation and paralysis for now      HEENT: Oral care    PULMONARY:  HOB @ 45 degrees, keep Sats 92-96%.  Vent Changes;  Wean O2   , monitor driving pressure     CARDIOVASCULAR:  Avoid volume overload. Wean ricco , on two pressors    GI: GI prophylaxis Protonix, bowel regimen. Feeding: OG feeding.  DC Free H2O     RENAL:  F/u  lytes. bicarb drip @ 200 , f/u phos , d/c binders , K on lower side so d/c lokelema . accurate I/O, renal F/UP , CVVH    INFECTIOUS DISEASE:  FU Repeat cultures, procal elevated , diarrhea overnight , oral vanco started , d/c caspo    HEMATOLOGICAL:  DVT therapy for now.  FU PTT>  FU repeat LE Duplex -ve    ENDOCRINE:  Follow up FS.  Insulin protocol if needed. DC Solumedrol     CODE STATUS: FULL CODE    DISPOSITION: Patient require continued monitoring in MICU    Poor prognosis / Palliative eval  / Pt DNR for now

## 2020-05-12 NOTE — PROGRESS NOTE ADULT - ATTENDING COMMENTS
General Thoracic Surgery Attestation    I have seen and examined the patient.  Where appropriate I have updated, edited, or corrected the resident's or PA's note with regard to findings, values, and plan.    second blowhole placed yesterday.  patient has much less subQ emphysema today.  continue daily dressing changes with non occlusive dressing.
General Thoracic Surgery Attestation    I have seen and examined the patient.  Where appropriate I have updated, edited, or corrected the resident's or PA's note with regard to findings, values, and plan.    mediastinal air less impressive on imaging and exam.  continue daily xrays.
General Thoracic Surgery Attestation    I have seen and examined the patient.  Where appropriate I have updated, edited, or corrected the resident's or PA's note with regard to findings, values, and plan.    patient is much more decompressed today.  daily dressing changes to blowhole.  daily cxr.
General Thoracic Surgery Attestation    I have seen and examined the patient.  Where appropriate I have updated, edited, or corrected the resident's or PA's note with regard to findings, values, and plan.    patient with progressive clinical subQ emphysema.  left anterior chest blowhole placed.  packing will be changed by green surgery team, reinforce skin level gauze if needed.  NON occlusive dressing only please.
General Thoracic Surgery Attestation    I have seen and examined the patient.  Where appropriate I have updated, edited, or corrected the resident's or PA's note with regard to findings, values, and plan.    recalled for increasing subQ air.  preexisting blow hole revised.  new blow hole on right anterior chest wall created.  continue non occlusive dressing, changed daily or if saturated.
patient seen and examined , agree with pgy 2 assesment and plan except as indicated above,   #hypovolemic Hypernatremia likely secondary to polyuric phase of  suspected atn secondary to mila secondary to covid-19  on lactated ringers and free water   Progress Note Handoff    Pending:  sodium improvement , on ventilator currently , will maintain current level of care     Family discussion: family aware of plan of care    Disposition: TBD
patient seen and examined , agree with pgy 3 assesment and plan except as indicated above,   #TELLO on CVVH  #Severe sepsis requirign pressure support  #poor prognosis
patient seen and examined , agree with pgy 3 assesment and plan except as indicated above,   #TELLO on CVVH  #Severe sepsis requiring pressure support  #poor prognosis  - family is agreeing to dnr , form signed   -palliative following
General Thoracic Surgery Attestation    I have seen and examined the patient.  Where appropriate I have updated, edited, or corrected the resident's or PA's note with regard to findings, values, and plan.    subQ air less.  dressing ordered entered.  thoracic will sign off at this time.  please recall for any increased subQ air and/or imaging changes.
I was Physically Present for the key portions of the evaluation   I agree with the above History  , Physical examination Assessment and plan   I have Reviewed , Modified or appended where appropriate.  Please check A and P as above   1- TELLO/ ATN/ respiratory failure on MV   ON 3 PRESSORS NOW  WILL attempts RRT / cvvhd keep in even balance  will start lokelma  on phoslo now repeat IP  prognosis poor
Seen and examined with the pulmonary fellow at the bed side.  Impression and plan as outlined above.
General Thoracic Surgery Attestation    I have seen and examined the patient.  Where appropriate I have updated, edited, or corrected the resident's or PA's note with regard to findings, values, and plan.    continue non occlusive dressings to blow holes, changed daily or sooner if saturated.  thoracic will sign off.  wound orders in.  daily cxrs or sooner if clinically indicated.
I was Physically Present for the key portions of the evaluation   I agree with the above History  , Physical examination Assessment and plan   I have Reviewed , Modified or appended where appropriate.  Please check A and P as above   1- TELLO/ ATN/ Respiratory failure / hyperph/ covid positive  creat stable  non oliguric  no need for urgent RRT   Ok to give lasix IV / follow UO   start binders as above    will follow
Seen and examined with the pulmonary fellow at the bed side.  Impression and plan as outlined above.

## 2020-05-13 NOTE — PROGRESS NOTE ADULT - ASSESSMENT
· Assessment		  55 year old male patient with hypertension, diabetes presenting for dyspnea.     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 53 > 3.53 > 14.4 >6.08  -ferritin 1375  -Ddimers 7612  -Sputa 5/1 NGTD  -BCx 4/21,25, 5/1 NG  -nares ORSA NG  -diarrhea > CD pending. On po Vancomycin  -fungitell 61 (normal) off caspo  S/p Anakinra 4/14-17  -S/p convalescent plasma 4/29    RECOMMENDATIONS;   Presently off meropenem 750 mg iv q24h  po Vancomycin 125 mg q6h ( hold if CD NG )

## 2020-05-13 NOTE — PROGRESS NOTE ADULT - SUBJECTIVE AND OBJECTIVE BOX
Nephrology progress note    THIS IS AN INCOMPLETE NOTE . FULL NOTE TO FOLLOW SHORTLY    Patient is seen and examined, events over the last 24 h noted .    Allergies:  No Known Allergies    Hospital Medications:   MEDICATIONS  (STANDING):  ALBUTerol    90 MICROgram(s) HFA Inhaler 2 Puff(s) Inhalation once  aspirin  chewable 81 milliGRAM(s) Oral daily  BACItracin   Ointment 1 Application(s) Topical two times a day  chlorhexidine 0.12% Liquid 15 milliLiter(s) Oral Mucosa every 12 hours  chlorhexidine 4% Liquid 1 Application(s) Topical <User Schedule>  cisatracurium Infusion 4 MICROgram(s)/kG/Min (21.1 mL/Hr) IV Continuous <Continuous>  collagenase Ointment 1 Application(s) Topical every 12 hours  dexMEDEtomidine Infusion 1.5 MICROgram(s)/kG/Hr (33 mL/Hr) IV Continuous <Continuous>  dextrose 5% 1000 milliLiter(s) (200 mL/Hr) IV Continuous <Continuous>  heparin  Infusion 1000 Unit(s)/Hr (10 mL/Hr) IV Continuous <Continuous>  insulin regular Infusion 3 Unit(s)/Hr (3 mL/Hr) IV Continuous <Continuous>  meropenem  IVPB 500 milliGRAM(s) IV Intermittent every 8 hours  midazolam Infusion 0.057 mG/kG/Hr (5 mL/Hr) IV Continuous <Continuous>  norepinephrine Infusion 0.05 MICROgram(s)/kG/Min (4.13 mL/Hr) IV Continuous <Continuous>  pantoprazole   Suspension 40 milliGRAM(s) Oral daily  phenylephrine    Infusion 0.1 MICROgram(s)/kG/Min (1.65 mL/Hr) IV Continuous <Continuous>  potassium chloride   Powder 40 milliEquivalent(s) Oral once  vancomycin    Solution 125 milliGRAM(s) Oral every 6 hours  vasopressin Infusion 0.02 Unit(s)/Min (1.2 mL/Hr) IV Continuous <Continuous>        VITALS:  T(F): 99 (20 @ 09:00), Max: 99.3 (20 @ 08:00)  HR: 127 (20 @ 09:00)  BP: --  RR: 28 (20 @ 09:00)  SpO2: 82% (20 @ 09:00)  Wt(kg): --     @ 07: @ 07:00  --------------------------------------------------------  IN: 6999.7 mL / OUT: 8615 mL / NET: -1615.3 mL     @ 07: @ 07:00  --------------------------------------------------------  IN: 5344.7 mL / OUT: 5885 mL / NET: -540.3 mL     @ 07: @ 09:28  --------------------------------------------------------  IN: 383.1 mL / OUT: 368 mL / NET: 15.1 mL          PHYSICAL EXAM:  Constitutional: NAD  HEENT: anicteric sclera, oropharynx clear, MMM  Neck: No JVD  Respiratory: CTAB, no wheezes, rales or rhonchi  Cardiovascular: S1, S2, RRR  Gastrointestinal: BS+, soft, NT/ND  Extremities: No cyanosis or clubbing. No peripheral edema  :  No mendoza.   Skin: No rashes    LABS:      138  |  97<L>  |  48<H>  ----------------------------<  112<H>  3.1<L>   |  30  |  1.6<H>    Ca    6.8<L>      13 May 2020 04:00  Phos  2.6         TPro  4.6<L>  /  Alb  1.8<L>  /  TBili  1.9<H>  /  DBili      /  AST  156<H>  /  ALT  120<H>  /  AlkPhos  290<H>                            6.1    31.02 )-----------( 182      ( 13 May 2020 04:00 )             19.4       Urine Studies:  Urinalysis Basic - ( 11 May 2020 06:50 )    Color: Yellow / Appearance: Turbid / S.018 / pH:   Gluc:  / Ketone: Negative  / Bili: Negative / Urobili: <2 mg/dL   Blood:  / Protein: 30 mg/dL / Nitrite: Negative   Leuk Esterase: Negative / RBC: 46 /HPF / WBC 24 /HPF   Sq Epi:  / Non Sq Epi: 6 /HPF / Bacteria: Moderate        RADIOLOGY & ADDITIONAL STUDIES: Nephrology progress note  Patient is seen and examined, events over the last 24 h noted .  Intubated on MV     Allergies:  No Known Allergies    Hospital Medications:   MEDICATIONS  (STANDING):  ALBUTerol    90 MICROgram(s) HFA Inhaler 2 Puff(s) Inhalation once  aspirin  chewable 81 milliGRAM(s) Oral daily  BACItracin   Ointment 1 Application(s) Topical two times a day  cisatracurium Infusion 4 MICROgram(s)/kG/Min (21.1 mL/Hr) IV Continuous <Continuous>  collagenase Ointment 1 Application(s) Topical every 12 hours  dexMEDEtomidine Infusion 1.5 MICROgram(s)/kG/Hr (33 mL/Hr) IV Continuous <Continuous>  dextrose 5% 1000 milliLiter(s) (200 mL/Hr) IV Continuous <Continuous>  heparin  Infusion 1000 Unit(s)/Hr (10 mL/Hr) IV Continuous <Continuous>  insulin regular Infusion 3 Unit(s)/Hr (3 mL/Hr) IV Continuous <Continuous>  meropenem  IVPB 500 milliGRAM(s) IV Intermittent every 8 hours  midazolam Infusion 0.057 mG/kG/Hr (5 mL/Hr) IV Continuous <Continuous>  norepinephrine Infusion 0.05 MICROgram(s)/kG/Min (4.13 mL/Hr) IV Continuous <Continuous>  pantoprazole   Suspension 40 milliGRAM(s) Oral daily  phenylephrine    Infusion 0.1 MICROgram(s)/kG/Min (1.65 mL/Hr) IV Continuous <Continuous>  potassium chloride   Powder 40 milliEquivalent(s) Oral once  vancomycin    Solution 125 milliGRAM(s) Oral every 6 hours  vasopressin Infusion 0.02 Unit(s)/Min (1.2 mL/Hr) IV Continuous <Continuous>        VITALS:  T(F): 99 (20 @ 09:00), Max: 99.3 (20 @ 08:00)  HR: 127 (20 @ 09:00)  BP: 96/43  RR: 28 (20 @ 09:00)  SpO2: 82% (20 @ 09:00)  Wt(kg): --     @ 07:01  -   @ 07:00  --------------------------------------------------------  IN: 6999.7 mL / OUT: 8615 mL / NET: -1615.3 mL     @ 07: @ 07:00  --------------------------------------------------------  IN: 5344.7 mL / OUT: 5885 mL / NET: -540.3 mL     @ 07: @ 09:28  --------------------------------------------------------  IN: 383.1 mL / OUT: 368 mL / NET: 15.1 mL          PHYSICAL EXAM:  Constitutional: NAD  HEENT: anicteric sclera, oropharynx clear, MMM  Neck: No JVD  Respiratory: CTAB, no wheezes, rales or rhonchi  Cardiovascular: S1, S2, RRR  Gastrointestinal: BS+, soft, NT/ND  Extremities: No cyanosis or clubbing. No peripheral edema  :  No mendoza.   Skin: No rashes    LABS:      138  |  97<L>  |  48<H>  ----------------------------<  112<H>  3.1<L>   |  30  |  1.6<H>    Ca    6.8<L>      13 May 2020 04:00  Phos  2.6         TPro  4.6<L>  /  Alb  1.8<L>  /  TBili  1.9<H>  /  DBili      /  AST  156<H>  /  ALT  120<H>  /  AlkPhos  290<H>                            6.1    31.02 )-----------( 182      ( 13 May 2020 04:00 )             19.4       Urine Studies:  Urinalysis Basic - ( 11 May 2020 06:50 )    Color: Yellow / Appearance: Turbid / S.018 / pH:   Gluc:  / Ketone: Negative  / Bili: Negative / Urobili: <2 mg/dL   Blood:  / Protein: 30 mg/dL / Nitrite: Negative   Leuk Esterase: Negative / RBC: 46 /HPF / WBC 24 /HPF   Sq Epi:  / Non Sq Epi: 6 /HPF / Bacteria: Moderate        RADIOLOGY & ADDITIONAL STUDIES:

## 2020-05-13 NOTE — PROGRESS NOTE ADULT - SUBJECTIVE AND OBJECTIVE BOX
Patient is a 55y old  Male who presents with a chief complaint of suspected COVID-19 (12 May 2020 14:46)        Over Night Events:  On MV.  On Vaso and Levophed.  Diarrhea improved.  Sedated and paralyzed         ROS:     All ROS are negative except HPI         PHYSICAL EXAM    ICU Vital Signs Last 24 Hrs  T(C): 37.2 (13 May 2020 09:00), Max: 37.4 (13 May 2020 08:00)  T(F): 99 (13 May 2020 09:00), Max: 99.3 (13 May 2020 08:00)  HR: 127 (13 May 2020 09:00) (93 - 127)  BP: --  BP(mean): --  ABP: 102/47 (13 May 2020 09:00) (97/45 - 126/56)  ABP(mean): 65 (13 May 2020 09:00) (60 - 76)  RR: 28 (13 May 2020 09:00) (26 - 287)  SpO2: 82% (13 May 2020 09:00) (82% - 97%)      CONSTITUTIONAL:   Ill appearing.  Well nourished.  NAD    ENT:   Airway patent,   Mouth with normal mucosa.   No thrush    EYES:   Pupils equal,   Round and reactive to light.    CARDIAC:   Tachycardic   Regular rhythm.    No edema      Vascular:  Normal systolic impulse  No Carotid bruits    RESPIRATORY:   No wheezing  Bilateral BS  Normal chest expansion  Not tachypneic,  No use of accessory muscles    GASTROINTESTINAL:  Abdomen soft,   Non-tender,   No guarding,   + BS    GENITOURINARY  normal genitalia for sex   edema    MUSCULOSKELETAL:   Range of motion is not limited,  No clubbing, cyanosis    NEUROLOGICAL:   Sedated and paralyzed     SKIN:   Skin normal color for race,   Warm and dry    PSYCHIATRIC:   No apparent risk to self or others.    HEMATOLOGICAL:  No cervical  lymphadenopathy.  no inguinal lymphadenopathy      05-12-20 @ 07:01  -  05-13-20 @ 07:00  --------------------------------------------------------  IN:    cisatracurium Infusion: 259.6 mL    dextrose 5%: 4400 mL    heparin Infusion: 240 mL    insulin regular Infusion: 71 mL    IV PiggyBack: 50 mL    midazolam Infusion: 85 mL    norepinephrine Infusion: 181.5 mL    vasopressin Infusion: 57.6 mL  Total IN: 5344.7 mL    OUT:    Indwelling Catheter - Urethral: 60 mL    Other: 5125 mL    Rectal Tube: 700 mL  Total OUT: 5885 mL    Total NET: -540.3 mL      05-13-20 @ 07:01  -  05-13-20 @ 09:26  --------------------------------------------------------  IN:    cisatracurium Infusion: 21.2 mL    heparin Infusion: 18 mL    midazolam Infusion: 10 mL    norepinephrine Infusion: 37.1 mL    Packed Red Blood Cells: 292 mL    vasopressin Infusion: 4.8 mL  Total IN: 383.1 mL    OUT:    Indwelling Catheter - Urethral: 5 mL    Other: 363 mL  Total OUT: 368 mL    Total NET: 15.1 mL          LABS:                            6.1    31.02 )-----------( 182      ( 13 May 2020 04:00 )             19.4                                               05-13    138  |  97<L>  |  48<H>  ----------------------------<  112<H>  3.1<L>   |  30  |  1.6<H>    Ca    6.8<L>      13 May 2020 04:00  Phos  2.6     05-13    TPro  4.6<L>  /  Alb  1.8<L>  /  TBili  1.9<H>  /  DBili  x   /  AST  156<H>  /  ALT  120<H>  /  AlkPhos  290<H>  05-12      PT/INR - ( 12 May 2020 04:30 )   PT: 14.20 sec;   INR: 1.23 ratio         PTT - ( 13 May 2020 04:00 )  PTT:83.9 sec                                                                                     LIVER FUNCTIONS - ( 12 May 2020 04:30 )  Alb: 1.8 g/dL / Pro: 4.6 g/dL / ALK PHOS: 290 U/L / ALT: 120 U/L / AST: 156 U/L / GGT: x                                                  Culture - Urine (collected 11 May 2020 06:50)  Source: .Urine Catheterized  Final Report (12 May 2020 11:48):    No growth    Culture - Blood (collected 11 May 2020 06:47)  Source: .Blood None  Gram Stain (12 May 2020 22:45):    Growth in anaerobic bottle: Gram Positive Cocci in Clusters  Preliminary Report (12 May 2020 22:45):    Growth in anaerobic bottle: Gram Positive Cocci in Clusters    "Due to technical problems, Proteus sp. will Not be reported as part of    the BCID panel until further notice"    ***Blood Panel PCR results on this specimen are available    approximately 3 hours after the Gram stain result.***    Gram stain, PCR, and/or culture results may not always    correspond due to difference in methodologies.    ************************************************************    This PCR assay was performed using [employer]    The following targets are tested for: Enterococcus,    vancomycin resistant enterococci, Listeria monocytogenes,    coagulase negative staphylococci, S. aureus,    methicillin resistant S. aureus, Streptococcus agalactiae    (Group B), S. pneumoniae, S. pyogenes (Group A),    Acinetobacter baumannii, Enterobacter cloacae, E. coli,    Klebsiella oxytoca, K. pneumoniae, Proteus sp.,    Serratia marcescens, Haemophilus influenzae,    Neisseria meningitidis, Pseudomonas aeruginosa, Candida    albicans, C. glabrata, C krusei, C parapsilosis,    C. tropicalis and the KPC resistance gene.  Organism: Blood Culture PCR (13 May 2020 00:19)  Organism: Blood Culture PCR (13 May 2020 00:19)                                                                                       ABG - ( 13 May 2020 02:37 )  pH, Arterial: 7.36  pH, Blood: x     /  pCO2: 60    /  pO2: 55    / HCO3: 34    / Base Excess: 7.5   /  SaO2: 89                  MEDICATIONS  (STANDING):  ALBUTerol    90 MICROgram(s) HFA Inhaler 2 Puff(s) Inhalation once  aspirin  chewable 81 milliGRAM(s) Oral daily  BACItracin   Ointment 1 Application(s) Topical two times a day  chlorhexidine 0.12% Liquid 15 milliLiter(s) Oral Mucosa every 12 hours  chlorhexidine 4% Liquid 1 Application(s) Topical <User Schedule>  cisatracurium Infusion 4 MICROgram(s)/kG/Min (21.1 mL/Hr) IV Continuous <Continuous>  collagenase Ointment 1 Application(s) Topical every 12 hours  dexMEDEtomidine Infusion 1.5 MICROgram(s)/kG/Hr (33 mL/Hr) IV Continuous <Continuous>  dextrose 5% 1000 milliLiter(s) (200 mL/Hr) IV Continuous <Continuous>  heparin  Infusion 1000 Unit(s)/Hr (10 mL/Hr) IV Continuous <Continuous>  insulin regular Infusion 3 Unit(s)/Hr (3 mL/Hr) IV Continuous <Continuous>  linezolid  IVPB 600 milliGRAM(s) IV Intermittent every 12 hours  meropenem  IVPB 500 milliGRAM(s) IV Intermittent every 8 hours  midazolam Infusion 0.057 mG/kG/Hr (5 mL/Hr) IV Continuous <Continuous>  norepinephrine Infusion 0.05 MICROgram(s)/kG/Min (4.13 mL/Hr) IV Continuous <Continuous>  pantoprazole   Suspension 40 milliGRAM(s) Oral daily  phenylephrine    Infusion 0.1 MICROgram(s)/kG/Min (1.65 mL/Hr) IV Continuous <Continuous>  potassium chloride   Powder 40 milliEquivalent(s) Oral once  vancomycin    Solution 125 milliGRAM(s) Oral every 6 hours  vasopressin Infusion 0.02 Unit(s)/Min (1.2 mL/Hr) IV Continuous <Continuous>    MEDICATIONS  (PRN):  acetaminophen   Tablet .. 650 milliGRAM(s) Oral every 6 hours PRN Temp greater or equal to 38C (100.4F)  ondansetron Injectable 4 milliGRAM(s) IV Push every 8 hours PRN Nausea and/or Vomiting      New X-rays reviewed:                                                                                  ECHO    CXR interpreted by me:  ET OG OK>  Bilateral infiltrates

## 2020-05-13 NOTE — PROGRESS NOTE ADULT - ASSESSMENT
55yMale being evaluated for goals of care in setting of severe COVID infection, MOF.   Hospital day 30    Pt remains on dual pressors, CVVH, high vent support.  DNR status initiated yesterday.  Repeat family meeting held with pt's wife, daughter and son via Cantonese # [phone]  Clinical condition reviewed, overall grave prognosis discussed. In v/o high likelihood of mortality, family advised to consider vent withdrawal/comfort care so as not to prolong pt's inevitable demise. Family verbalize their understanding. Advised that further escalation of care would not change pt's outcome.  Family wish to continue current level of care for now. Plan to rediscuss tomorrow.   All questions answered in detail     20 minutes spent discussing advanced care planning    Recommendations:  ongoing ICU mngmnt  DNR status        We will follow  [phone]

## 2020-05-13 NOTE — PROGRESS NOTE ADULT - SUBJECTIVE AND OBJECTIVE BOX
55yMale with diagnosis: SUSPECTED 2019NOVEL CORONAVIRUS INFECTION,HYPOXIA      Case d/w ICU team, pt with continued clinical decline.      PHYSICAL EXAM  DEFERRED     T(C): , Max: 37.4 (08:00)  T(F): 98.4  HR: 127 (93 - 127)  BP: --  RR: 28 (26 - 287)  SpO2: 85% (82% - 97%)              LABS:                          6.1    31.02 )-----------( 182      ( 13 May 2020 04:00 )             19.4                                                                                      05-13    138  |  97<L>  |  48<H>  ----------------------------<  112<H>  3.1<L>   |  30  |  1.6<H>    Ca    6.8<L>      13 May 2020 04:00  Phos  2.6     05-13    TPro  4.6<L>  /  Alb  1.8<L>  /  TBili  1.9<H>  /  DBili  x   /  AST  156<H>  /  ALT  120<H>  /  AlkPhos  290<H>  05-12                                                      MEDICATIONS  (STANDING):  ALBUTerol    90 MICROgram(s) HFA Inhaler 2 Puff(s) Inhalation once  aspirin  chewable 81 milliGRAM(s) Oral daily  BACItracin   Ointment 1 Application(s) Topical two times a day  chlorhexidine 0.12% Liquid 15 milliLiter(s) Oral Mucosa every 12 hours  chlorhexidine 4% Liquid 1 Application(s) Topical <User Schedule>  cisatracurium Infusion 4 MICROgram(s)/kG/Min (21.1 mL/Hr) IV Continuous <Continuous>  collagenase Ointment 1 Application(s) Topical every 12 hours  CRRT Treatment    <Continuous>  dexMEDEtomidine Infusion 1.5 MICROgram(s)/kG/Hr (33 mL/Hr) IV Continuous <Continuous>  dextrose 5% 1000 milliLiter(s) (200 mL/Hr) IV Continuous <Continuous>  insulin regular Infusion 3 Unit(s)/Hr (3 mL/Hr) IV Continuous <Continuous>  meropenem  IVPB 500 milliGRAM(s) IV Intermittent every 8 hours  midazolam Infusion 0.057 mG/kG/Hr (5 mL/Hr) IV Continuous <Continuous>  norepinephrine Infusion 0.05 MICROgram(s)/kG/Min (4.13 mL/Hr) IV Continuous <Continuous>  pantoprazole   Suspension 40 milliGRAM(s) Oral daily  phenylephrine    Infusion 0.1 MICROgram(s)/kG/Min (1.65 mL/Hr) IV Continuous <Continuous>  PureFlow Dialysate RFP-400 (K 2 / Ca 3) 5000 milliLiter(s) (2000 mL/Hr) CRRT <Continuous>  vancomycin    Solution 125 milliGRAM(s) Oral every 6 hours  vancomycin  IVPB      vancomycin  IVPB 1000 milliGRAM(s) IV Intermittent every 12 hours  vasopressin Infusion 0.02 Unit(s)/Min (1.2 mL/Hr) IV Continuous <Continuous>    MEDICATIONS  (PRN):  acetaminophen   Tablet .. 650 milliGRAM(s) Oral every 6 hours PRN Temp greater or equal to 38C (100.4F)  ondansetron Injectable 4 milliGRAM(s) IV Push every 8 hours PRN Nausea and/or Vomiting

## 2020-05-13 NOTE — PROGRESS NOTE ADULT - ASSESSMENT
Assessment:    Acute hypoxemic respiratory failure  ARDS  COVID 19  SP convalescent plasma    Possible superimposed bacterial infection SP ABX  Now increasing WBC   TELLO worsening on CVVD  Sub Q air     PLAN:    CNS:  Sedation and paralysis for now awaiting palliative care meeting     HEENT: Oral care    PULMONARY:  HOB @ 45 degrees, keep Sats 92-96%.  Vent Changes no changes     CARDIOVASCULAR:  Avoid volume overload. Wean levophed.  NaHCO3 200 cc per hour.  I=O     GI: GI prophylaxis Protonix,  Hold feeding.  Hold bowel regimen     RENAL:  F/u  lytes. . accurate I/O, renal F/UP     INFECTIOUS DISEASE:  FU Repeat cultures, Vanc oral.  Changes TLC     HEMATOLOGICAL:  DVT prophylaxis (drop in Hg SP one Unit PRBC)        ENDOCRINE:  Follow up FS.  Insulin protocol if needed.     CODE STATUS: FULL CODE    DISPOSITION: Patient require continued monitoring in MICU    Prognosis extremely poor

## 2020-05-13 NOTE — PHARMACOTHERAPY INTERVENTION NOTE - COMMENTS
PK caluclations: peak trough 18.48                         peak bolus: 31.25
Prescriber was contacted with recommendation to change Renvela tabs to powder due to pt is intubated and has OG tube
GFR 13 .ID recommends 750 mg q24h. Will evaluate

## 2020-05-13 NOTE — PROGRESS NOTE ADULT - ASSESSMENT
Acute hypoxemic respiratory failure secondary to SARS-COV-2 infection / TELLO/ hyperkalemia/ hypernatremia     # anuric   # continue CVVHD / keep in even balance for now   # d/c bicarbonate drip Dc lokelma   # on 2  pressors.  # d/c phoslo and renagel  check ph daily, patient on CVVHD   # last vanco level, 38 , off vanco on merrem   # palliative care appreciated  # will follow

## 2020-05-13 NOTE — PROGRESS NOTE ADULT - SUBJECTIVE AND OBJECTIVE BOX
SUBJECTIVE:    Patient is a 55y old Male who presents with a chief complaint of suspected COVID-19 (13 May 2020 09:27)    Currently admitted to medicine with the primary diagnosis of Suspected 2019 novel coronavirus infection     Today is hospital day 30d. Critically ill and on MV with skin changes     PAST MEDICAL & SURGICAL HISTORY  Diabetes  Hypertension  No significant past surgical history    SOCIAL HISTORY:  Negative for smoking/alcohol/drug use.     ALLERGIES:  No Known Allergies    MEDICATIONS:  STANDING MEDICATIONS  ALBUTerol    90 MICROgram(s) HFA Inhaler 2 Puff(s) Inhalation once  aspirin  chewable 81 milliGRAM(s) Oral daily  BACItracin   Ointment 1 Application(s) Topical two times a day  chlorhexidine 0.12% Liquid 15 milliLiter(s) Oral Mucosa every 12 hours  chlorhexidine 4% Liquid 1 Application(s) Topical <User Schedule>  cisatracurium Infusion 4 MICROgram(s)/kG/Min IV Continuous <Continuous>  collagenase Ointment 1 Application(s) Topical every 12 hours  dexMEDEtomidine Infusion 1.5 MICROgram(s)/kG/Hr IV Continuous <Continuous>  dextrose 5% 1000 milliLiter(s) IV Continuous <Continuous>  insulin regular Infusion 3 Unit(s)/Hr IV Continuous <Continuous>  meropenem  IVPB 500 milliGRAM(s) IV Intermittent every 8 hours  midazolam Infusion 0.057 mG/kG/Hr IV Continuous <Continuous>  norepinephrine Infusion 0.05 MICROgram(s)/kG/Min IV Continuous <Continuous>  pantoprazole   Suspension 40 milliGRAM(s) Oral daily  phenylephrine    Infusion 0.1 MICROgram(s)/kG/Min IV Continuous <Continuous>  vancomycin    Solution 125 milliGRAM(s) Oral every 6 hours  vancomycin  IVPB 1000 milliGRAM(s) IV Intermittent once  vancomycin  IVPB      vancomycin  IVPB 1000 milliGRAM(s) IV Intermittent every 12 hours  vasopressin Infusion 0.02 Unit(s)/Min IV Continuous <Continuous>    PRN MEDICATIONS  acetaminophen   Tablet .. 650 milliGRAM(s) Oral every 6 hours PRN  ondansetron Injectable 4 milliGRAM(s) IV Push every 8 hours PRN    VITALS:   T(F): 99  HR: 127  BP: --  RR: 28  SpO2: 82%    PHYSICAL EXAM:  GEN: No acute distress  LUNGS: Clear to auscultation bilaterally   HEART: S1/S2 present.     ABD: Soft, non-tender, non-distended. Bowel sounds present         LABS:                        6.1    31.02 )-----------( 182      ( 13 May 2020 04:00 )             19.4     05-13    138  |  97<L>  |  48<H>  ----------------------------<  112<H>  3.1<L>   |  30  |  1.6<H>    Ca    6.8<L>      13 May 2020 04:00  Phos  2.6     05-13    TPro  4.6<L>  /  Alb  1.8<L>  /  TBili  1.9<H>  /  DBili  x   /  AST  156<H>  /  ALT  120<H>  /  AlkPhos  290<H>  05-12    PT/INR - ( 12 May 2020 04:30 )   PT: 14.20 sec;   INR: 1.23 ratio         PTT - ( 13 May 2020 04:00 )  PTT:83.9 sec    ABG - ( 13 May 2020 02:37 )  pH, Arterial: 7.36  pH, Blood: x     /  pCO2: 60    /  pO2: 55    / HCO3: 34    / Base Excess: 7.5   /  SaO2: 89                      Culture - Urine (collected 11 May 2020 06:50)  Source: .Urine Catheterized  Final Report (12 May 2020 11:48):    No growth    Culture - Blood (collected 11 May 2020 06:47)  Source: .Blood None  Gram Stain (12 May 2020 22:45):    Growth in anaerobic bottle: Gram Positive Cocci in Clusters  Preliminary Report (12 May 2020 22:45):    Growth in anaerobic bottle: Gram Positive Cocci in Clusters    "Due to technical problems, Proteus sp. will Not be reported as part of    the BCID panel until further notice"    ***Blood Panel PCR results on this specimen are available    approximately 3 hours after the Gram stain result.***    Gram stain, PCR, and/or culture results may not always    correspond due to difference in methodologies.    ************************************************************    This PCR assay was performed using [employer].    The following targets are tested for: Enterococcus,    vancomycin resistant enterococci, Listeria monocytogenes,    coagulase negative staphylococci, S. aureus,    methicillin resistant S. aureus, Streptococcus agalactiae    (Group B), S. pneumoniae, S. pyogenes (Group A),    Acinetobacter baumannii, Enterobacter cloacae, E. coli,    Klebsiella oxytoca, K. pneumoniae, Proteus sp.,    Serratia marcescens, Haemophilus influenzae,    Neisseria meningitidis, Pseudomonas aeruginosa, Candida    albicans, C. glabrata, C krusei, C parapsilosis,    C. tropicalis and the KPC resistance gene.  Organism: Blood Culture PCR (13 May 2020 00:19)  Organism: Blood Culture PCR (13 May 2020 00:19)            RADIOLOGY:

## 2020-05-13 NOTE — PROGRESS NOTE ADULT - ASSESSMENT
Acute hypoxemic respiratory failure  ARDS  COVID 19  Gram +ve bacteremia  SP convalescent plasma    Possible superimposed bacterial infection SP ABX  Now increasing WBC   TELLO worsening    Sub Q air     PLAN:    CNS:  Sedation and paralysis for now      HEENT: Oral care    PULMONARY:  HOB @ 45 degrees, keep Sats 92-96%.  Vent Changes;  Wean O2   , monitor driving pressure     CARDIOVASCULAR:  Avoid volume overload. Wean ricco , on two pressors    GI: GI prophylaxis Protonix, bowel regimen. Feeding: OG feeding on hold due to diarrhea.  DC Free H2O     RENAL:  F/u  lytes. bicarb drip @ 200 , f/u phos , d/c binders , K on lower side so d/c lokelema . accurate I/O, renal F/UP , CVVH    INFECTIOUS DISEASE:  FU Repeat cultures, procal elevated , diarrhea overnight , oral vanco started , gram +ve bacteremia , IV vanco started    HEMATOLOGICAL:  DVT therapy for now. Dropped Hb , hold heparin for now    ENDOCRINE:  Follow up FS.  Insulin protocol if needed. DC Solumedrol     CODE STATUS: FULL CODE    DISPOSITION: Patient require continued monitoring in MICU    Poor prognosis / Palliative eval  / Pt DNR for now / possible palliative extubation

## 2020-05-13 NOTE — PROGRESS NOTE ADULT - SUBJECTIVE AND OBJECTIVE BOX
RHIANNON MAHER  55y, Male    All available historical data reviewed    OVERNIGHT EVENTS:  no fevers  fio2 100 %  On CVVH  non responsive    ROS:  unable to obtain history secondary to patient's mental status and/or sedation     VITALS:  T(F): 98.4, Max: 99.3 (05-13-20 @ 08:00)  HR: 127  BP: --  RR: 28Vital Signs Last 24 Hrs  T(C): 36.9 (13 May 2020 11:00), Max: 37.4 (13 May 2020 08:00)  T(F): 98.4 (13 May 2020 11:00), Max: 99.3 (13 May 2020 08:00)  HR: 127 (13 May 2020 11:00) (93 - 127)  BP: --  BP(mean): --  RR: 28 (13 May 2020 11:00) (26 - 287)  SpO2: 85% (13 May 2020 11:00) (82% - 97%)    TESTS & MEASUREMENTS:                        6.1    31.02 )-----------( 182      ( 13 May 2020 04:00 )             19.4     05-13    138  |  97<L>  |  48<H>  ----------------------------<  112<H>  3.1<L>   |  30  |  1.6<H>    Ca    6.8<L>      13 May 2020 04:00  Phos  2.6     05-13    TPro  4.6<L>  /  Alb  1.8<L>  /  TBili  1.9<H>  /  DBili  x   /  AST  156<H>  /  ALT  120<H>  /  AlkPhos  290<H>  05-12    LIVER FUNCTIONS - ( 12 May 2020 04:30 )  Alb: 1.8 g/dL / Pro: 4.6 g/dL / ALK PHOS: 290 U/L / ALT: 120 U/L / AST: 156 U/L / GGT: x             Culture - Urine (collected 05-11-20 @ 06:50)  Source: .Urine Catheterized  Final Report (05-12-20 @ 11:48):    No growth    Culture - Blood (collected 05-11-20 @ 06:47)  Source: .Blood None  Gram Stain (05-12-20 @ 22:45):    Growth in anaerobic bottle: Gram Positive Cocci in Clusters  Preliminary Report (05-12-20 @ 22:45):    Growth in anaerobic bottle: Gram Positive Cocci in Clusters    "Due to technical problems, Proteus sp. will Not be reported as part of    the BCID panel until further notice"    ***Blood Panel PCR results on this specimen are available    approximately 3 hours after the Gram stain result.***    Gram stain, PCR, and/or culture results may not always    correspond due to difference in methodologies.    ************************************************************    This PCR assay was performed using [employer].    The following targets are tested for: Enterococcus,    vancomycin resistant enterococci, Listeria monocytogenes,    coagulase negative staphylococci, S. aureus,    methicillin resistant S. aureus, Streptococcus agalactiae    (Group B), S. pneumoniae, S. pyogenes (Group A),    Acinetobacter baumannii, Enterobacter cloacae, E. coli,    Klebsiella oxytoca, K. pneumoniae, Proteus sp.,    Serratia marcescens, Haemophilus influenzae,    Neisseria meningitidis, Pseudomonas aeruginosa, Candida    albicans, C. glabrata, C krusei, C parapsilosis,    C. tropicalis and the KPC resistance gene.  Organism: Blood Culture PCR (05-13-20 @ 00:19)  Organism: Blood Culture PCR (05-13-20 @ 00:19)      -  Coagulase negative Staphylococcus: Detec      Method Type: PCR            RADIOLOGY & ADDITIONAL TESTS:  Personal review of radiological diagnostics performed  Echo and EKG results noted when applicable.     MEDICATIONS:  acetaminophen   Tablet .. 650 milliGRAM(s) Oral every 6 hours PRN  ALBUTerol    90 MICROgram(s) HFA Inhaler 2 Puff(s) Inhalation once  aspirin  chewable 81 milliGRAM(s) Oral daily  BACItracin   Ointment 1 Application(s) Topical two times a day  chlorhexidine 0.12% Liquid 15 milliLiter(s) Oral Mucosa every 12 hours  chlorhexidine 4% Liquid 1 Application(s) Topical <User Schedule>  cisatracurium Infusion 4 MICROgram(s)/kG/Min IV Continuous <Continuous>  collagenase Ointment 1 Application(s) Topical every 12 hours  dexMEDEtomidine Infusion 1.5 MICROgram(s)/kG/Hr IV Continuous <Continuous>  dextrose 5% 1000 milliLiter(s) IV Continuous <Continuous>  insulin regular Infusion 3 Unit(s)/Hr IV Continuous <Continuous>  meropenem  IVPB 500 milliGRAM(s) IV Intermittent every 8 hours  midazolam Infusion 0.057 mG/kG/Hr IV Continuous <Continuous>  norepinephrine Infusion 0.05 MICROgram(s)/kG/Min IV Continuous <Continuous>  ondansetron Injectable 4 milliGRAM(s) IV Push every 8 hours PRN  pantoprazole   Suspension 40 milliGRAM(s) Oral daily  phenylephrine    Infusion 0.1 MICROgram(s)/kG/Min IV Continuous <Continuous>  vancomycin    Solution 125 milliGRAM(s) Oral every 6 hours  vancomycin  IVPB      vancomycin  IVPB 1000 milliGRAM(s) IV Intermittent every 12 hours  vasopressin Infusion 0.02 Unit(s)/Min IV Continuous <Continuous>      ANTIBIOTICS:  meropenem  IVPB 500 milliGRAM(s) IV Intermittent every 8 hours  vancomycin    Solution 125 milliGRAM(s) Oral every 6 hours  vancomycin  IVPB      vancomycin  IVPB 1000 milliGRAM(s) IV Intermittent every 12 hours

## 2020-05-14 NOTE — PROGRESS NOTE ADULT - SUBJECTIVE AND OBJECTIVE BOX
Patient is a 55y old  Male who presents with a chief complaint of suspected COVID-19 (14 May 2020 09:01)        Over Night Events:  on MV.  Sedated and paralyzed.  On Vaso and Levophed         ROS:     All ROS are negative except HPI         PHYSICAL EXAM    ICU Vital Signs Last 24 Hrs  T(C): 34.6 (14 May 2020 08:00), Max: 37.7 (13 May 2020 12:00)  T(F): 94.2 (14 May 2020 08:00), Max: 99.9 (13 May 2020 12:00)  HR: 78 (14 May 2020 09:00) (76 - 127)  BP: 136/63 (14 May 2020 08:09) (136/63 - 136/63)  BP(mean): --  ABP: 138/65 (14 May 2020 09:00) (97/47 - 151/60)  ABP(mean): 89 (14 May 2020 09:00) (63 - 93)    RR: 28 (14 May 2020 08:09) (28 - 82)  SpO2: 95% (14 May 2020 09:00) (83% - 98%)      CONSTITUTIONAL:   Ill appearing.  Well nourished.  NAD    ENT:   Airway patent,   Mouth with normal mucosa.   No thrush    EYES:   Pupils equal,   Round and reactive to light.    CARDIAC:   Normal rate,   Regular rhythm.     edema      Vascular:  Normal systolic impulse  No Carotid bruits    RESPIRATORY:   No wheezing  Bilateral BS  Normal chest expansion  Not tachypneic,  No use of accessory muscles    GASTROINTESTINAL:  Abdomen soft,   Non-tender,   No guarding,   + BS    GENITOURINARY  normal genitalia for sex   edema    MUSCULOSKELETAL:   Range of motion is not limited,  No clubbing, cyanosis    NEUROLOGICAL:   Sedated and paralyzed     SKIN:   Skin normal color for race,   Warm   No evidence of rash.    PSYCHIATRIC:    No apparent risk to self or others.    HEMATOLOGICAL:  No cervical  lymphadenopathy.  no inguinal lymphadenopathy      05-13-20 @ 07:01  -  05-14-20 @ 07:00  --------------------------------------------------------  IN:    cisatracurium Infusion: 254.4 mL    dextrose 5%: 4400 mL    Enteral Tube Flush: 15 mL    Free Water: 60 mL    heparin Infusion: 18 mL    insulin regular Infusion: 52 mL    IV PiggyBack: 900 mL    midazolam Infusion: 20 mL    midazolam Infusion: 100 mL    norepinephrine Infusion: 525.9 mL    Packed Red Blood Cells: 292 mL    vasopressin Infusion: 57.6 mL  Total IN: 6694.9 mL    OUT:    Indwelling Catheter - Urethral: 65 mL    Other: 5580 mL    Rectal Tube: 200 mL  Total OUT: 5845 mL    Total NET: 849.9 mL      05-14-20 @ 07:01  -  05-14-20 @ 09:28  --------------------------------------------------------  IN:    cisatracurium Infusion: 23.2 mL    dextrose 5%: 400 mL    insulin regular Infusion: 10 mL    IV PiggyBack: 300 mL    midazolam Infusion: 10 mL    norepinephrine Infusion: 16 mL    vasopressin Infusion: 4.8 mL  Total IN: 764 mL    OUT:    Indwelling Catheter - Urethral: 60 mL    Other: 721 mL  Total OUT: 781 mL    Total NET: -17 mL          LABS:                            7.3    25.64 )-----------( 120      ( 14 May 2020 04:00 )             23.5                                               05-14    138  |  95<L>  |  35<H>  ----------------------------<  184<H>  2.9<L>   |  31  |  1.4    Ca    6.7<L>      14 May 2020 04:00  Phos  3.0     05-14    TPro  4.1<L>  /  Alb  1.5<L>  /  TBili  2.4<H>  /  DBili  x   /  AST  143<H>  /  ALT  118<H>  /  AlkPhos  291<H>  05-14      PTT - ( 14 May 2020 04:00 )  PTT:64.7 sec                                                                                     LIVER FUNCTIONS - ( 14 May 2020 04:00 )  Alb: 1.5 g/dL / Pro: 4.1 g/dL / ALK PHOS: 291 U/L / ALT: 118 U/L / AST: 143 U/L / GGT: x                                                                                                                                   ABG - ( 14 May 2020 02:26 )  pH, Arterial: 7.36  pH, Blood: x     /  pCO2: 63    /  pO2: 48    / HCO3: 35    / Base Excess: 8.8   /  SaO2: 85                  MEDICATIONS  (STANDING):  ALBUTerol    90 MICROgram(s) HFA Inhaler 2 Puff(s) Inhalation once  aspirin  chewable 81 milliGRAM(s) Oral daily  BACItracin   Ointment 1 Application(s) Topical two times a day  chlorhexidine 0.12% Liquid 15 milliLiter(s) Oral Mucosa every 12 hours  chlorhexidine 4% Liquid 1 Application(s) Topical <User Schedule>  cisatracurium Infusion 4 MICROgram(s)/kG/Min (21.1 mL/Hr) IV Continuous <Continuous>  collagenase Ointment 1 Application(s) Topical every 12 hours  CRRT Treatment    <Continuous>  dexMEDEtomidine Infusion 1.5 MICROgram(s)/kG/Hr (33 mL/Hr) IV Continuous <Continuous>  dextrose 5% 1000 milliLiter(s) (200 mL/Hr) IV Continuous <Continuous>  heparin   Injectable 5000 Unit(s) SubCutaneous every 8 hours  insulin regular Infusion 3 Unit(s)/Hr (3 mL/Hr) IV Continuous <Continuous>  meropenem  IVPB 500 milliGRAM(s) IV Intermittent every 8 hours  midazolam Infusion 0.02 mG/kG/Hr (1.76 mL/Hr) IV Continuous <Continuous>  norepinephrine Infusion 0.05 MICROgram(s)/kG/Min (4.13 mL/Hr) IV Continuous <Continuous>  pantoprazole   Suspension 40 milliGRAM(s) Oral daily  phenylephrine    Infusion 0.1 MICROgram(s)/kG/Min (1.65 mL/Hr) IV Continuous <Continuous>  potassium chloride  20 mEq/100 mL IVPB 20 milliEquivalent(s) IV Intermittent every 2 hours  PureFlow Dialysate RFP-400 (K 2 / Ca 3) 5000 milliLiter(s) (2000 mL/Hr) CRRT <Continuous>  vancomycin    Solution 125 milliGRAM(s) Oral every 6 hours  vancomycin  IVPB      vancomycin  IVPB 1000 milliGRAM(s) IV Intermittent every 12 hours  vasopressin Infusion 0.02 Unit(s)/Min (1.2 mL/Hr) IV Continuous <Continuous>    MEDICATIONS  (PRN):  acetaminophen   Tablet .. 650 milliGRAM(s) Oral every 6 hours PRN Temp greater or equal to 38C (100.4F)  ondansetron Injectable 4 milliGRAM(s) IV Push every 8 hours PRN Nausea and/or Vomiting      New X-rays reviewed:                                                                                  ECHO    CXR interpreted by me:  ET High.  OG OK>  Bilateral infiltrates

## 2020-05-14 NOTE — PROGRESS NOTE ADULT - SUBJECTIVE AND OBJECTIVE BOX
SUBJECTIVE:    Patient is a 55y old Male who presents with a chief complaint of suspected COVID-19 (13 May 2020 13:34)    Currently admitted to medicine with the primary diagnosis of Suspected 2019 novel coronavirus infection     Today is hospital day 31d. Pt on MV and critically ill    PAST MEDICAL & SURGICAL HISTORY  Diabetes  Hypertension  No significant past surgical history    SOCIAL HISTORY:  Negative for smoking/alcohol/drug use.     ALLERGIES:  No Known Allergies    MEDICATIONS:  STANDING MEDICATIONS  ALBUTerol    90 MICROgram(s) HFA Inhaler 2 Puff(s) Inhalation once  aspirin  chewable 81 milliGRAM(s) Oral daily  BACItracin   Ointment 1 Application(s) Topical two times a day  chlorhexidine 0.12% Liquid 15 milliLiter(s) Oral Mucosa every 12 hours  chlorhexidine 4% Liquid 1 Application(s) Topical <User Schedule>  cisatracurium Infusion 4 MICROgram(s)/kG/Min IV Continuous <Continuous>  collagenase Ointment 1 Application(s) Topical every 12 hours  CRRT Treatment    <Continuous>  dexMEDEtomidine Infusion 1.5 MICROgram(s)/kG/Hr IV Continuous <Continuous>  dextrose 5% 1000 milliLiter(s) IV Continuous <Continuous>  heparin   Injectable 5000 Unit(s) SubCutaneous every 8 hours  insulin regular Infusion 3 Unit(s)/Hr IV Continuous <Continuous>  meropenem  IVPB 500 milliGRAM(s) IV Intermittent every 8 hours  midazolam Infusion 0.02 mG/kG/Hr IV Continuous <Continuous>  norepinephrine Infusion 0.05 MICROgram(s)/kG/Min IV Continuous <Continuous>  pantoprazole   Suspension 40 milliGRAM(s) Oral daily  phenylephrine    Infusion 0.1 MICROgram(s)/kG/Min IV Continuous <Continuous>  potassium chloride  20 mEq/100 mL IVPB 20 milliEquivalent(s) IV Intermittent every 2 hours  PureFlow Dialysate RFP-400 (K 2 / Ca 3) 5000 milliLiter(s) CRRT <Continuous>  vancomycin    Solution 125 milliGRAM(s) Oral every 6 hours  vancomycin  IVPB      vancomycin  IVPB 1000 milliGRAM(s) IV Intermittent every 12 hours  vasopressin Infusion 0.02 Unit(s)/Min IV Continuous <Continuous>    PRN MEDICATIONS  acetaminophen   Tablet .. 650 milliGRAM(s) Oral every 6 hours PRN  ondansetron Injectable 4 milliGRAM(s) IV Push every 8 hours PRN    VITALS:   T(F): 97.1  HR: 78  BP: 136/63  RR: 28  SpO2: 96%    PHYSICAL EXAM:  GEN: No acute distress  LUNGS: Clear to auscultation bilaterally   HEART: S1/S2 present.   ABD: Soft, non-tender, non-distended. Bowel sounds present         LABS:                        7.3    25.64 )-----------( 120      ( 14 May 2020 04:00 )             23.5     05-14    138  |  95<L>  |  35<H>  ----------------------------<  184<H>  2.9<L>   |  31  |  1.4    Ca    6.7<L>      14 May 2020 04:00  Phos  3.0     05-14    TPro  4.1<L>  /  Alb  1.5<L>  /  TBili  2.4<H>  /  DBili  x   /  AST  143<H>  /  ALT  118<H>  /  AlkPhos  291<H>  05-14    PTT - ( 14 May 2020 04:00 )  PTT:64.7 sec    ABG - ( 14 May 2020 02:26 )  pH, Arterial: 7.36  pH, Blood: x     /  pCO2: 63    /  pO2: 48    / HCO3: 35    / Base Excess: 8.8   /  SaO2: 85                            RADIOLOGY:

## 2020-05-14 NOTE — DISCHARGE NOTE FOR THE EXPIRED PATIENT - HOSPITAL COURSE
patient was admitted for COVID 19 Respiratory failure complicated with Multiorgan failure, was intubated for respiratory failure, family decided to change the goal of care to ( Comfort care only ), terminal wean of mechanical ventilation and pressors performed @ 3:16, patient lost pulse @ 3: 20.

## 2020-05-14 NOTE — PROGRESS NOTE ADULT - SUBJECTIVE AND OBJECTIVE BOX
24 h events noted  intubated/ventilated  on 2 pressors         PAST HISTORY  --------------------------------------------------------------------------------  No significant changes to PMH, PSH, FHx, SHx, unless otherwise noted    ALLERGIES & MEDICATIONS  --------------------------------------------------------------------------------  Allergies    No Known Allergies    Intolerances      Standing Inpatient Medications  ALBUTerol    90 MICROgram(s) HFA Inhaler 2 Puff(s) Inhalation once  aspirin  chewable 81 milliGRAM(s) Oral daily  BACItracin   Ointment 1 Application(s) Topical two times a day  chlorhexidine 0.12% Liquid 15 milliLiter(s) Oral Mucosa every 12 hours  chlorhexidine 4% Liquid 1 Application(s) Topical <User Schedule>  cisatracurium Infusion 4 MICROgram(s)/kG/Min IV Continuous <Continuous>  collagenase Ointment 1 Application(s) Topical every 12 hours  CRRT Treatment    <Continuous>  dexMEDEtomidine Infusion 1.5 MICROgram(s)/kG/Hr IV Continuous <Continuous>  dextrose 5% 1000 milliLiter(s) IV Continuous <Continuous>  heparin   Injectable 5000 Unit(s) SubCutaneous every 8 hours  insulin regular Infusion 3 Unit(s)/Hr IV Continuous <Continuous>  meropenem  IVPB 500 milliGRAM(s) IV Intermittent every 8 hours  midazolam Infusion 0.02 mG/kG/Hr IV Continuous <Continuous>  norepinephrine Infusion 0.05 MICROgram(s)/kG/Min IV Continuous <Continuous>  pantoprazole   Suspension 40 milliGRAM(s) Oral daily  phenylephrine    Infusion 0.1 MICROgram(s)/kG/Min IV Continuous <Continuous>  potassium chloride  20 mEq/100 mL IVPB 20 milliEquivalent(s) IV Intermittent every 2 hours  PureFlow Dialysate RFP-400 (K 2 / Ca 3) 5000 milliLiter(s) CRRT <Continuous>  vancomycin    Solution 125 milliGRAM(s) Oral every 6 hours  vancomycin  IVPB      vancomycin  IVPB 1000 milliGRAM(s) IV Intermittent every 12 hours  vasopressin Infusion 0.02 Unit(s)/Min IV Continuous <Continuous>    PRN Inpatient Medications  acetaminophen   Tablet .. 650 milliGRAM(s) Oral every 6 hours PRN  ondansetron Injectable 4 milliGRAM(s) IV Push every 8 hours PRN          VITALS/PHYSICAL EXAM  --------------------------------------------------------------------------------  T(C): 34.6 (05-14-20 @ 08:00), Max: 37.7 (05-13-20 @ 12:00)  HR: 78 (05-14-20 @ 08:09) (78 - 127)  BP: 136/63 (05-14-20 @ 08:09) (136/63 - 136/63)  RR: 28 (05-14-20 @ 08:09) (28 - 82)  SpO2: 96% (05-14-20 @ 08:09) (83% - 97%)  Wt(kg): --        05-13-20 @ 07:01  -  05-14-20 @ 07:00  --------------------------------------------------------  IN: 6694.9 mL / OUT: 5845 mL / NET: 849.9 mL    05-14-20 @ 07:01  -  05-14-20 @ 09:01  --------------------------------------------------------  IN: 764 mL / OUT: 60 mL / NET: 704 mL      Physical Exam:  	Gen: intubated/ventilated   	Vascular access: udall     LABS/STUDIES  --------------------------------------------------------------------------------              7.3    25.64 >-----------<  120      [05-14-20 @ 04:00]              23.5     138  |  95  |  35  ----------------------------<  184      [05-14-20 @ 04:00]  2.9   |  31  |  1.4        Ca     6.7     [05-14-20 @ 04:00]      Phos  3.0     [05-14-20 @ 04:00]    TPro  4.1  /  Alb  1.5  /  TBili  2.4  /  DBili  x   /  AST  143  /  ALT  118  /  AlkPhos  291  [05-14-20 @ 04:00]      PTT: 64.7       [05-14-20 @ 04:00]      Creatinine Trend:  SCr 1.4 [05-14 @ 04:00]  SCr 1.6 [05-13 @ 04:00]  SCr 1.7 [05-12 @ 13:32]  SCr 1.9 [05-12 @ 04:30]  SCr 2.0 [05-11 @ 23:41]    Urinalysis - [05-11-20 @ 06:50]      Color Yellow / Appearance Turbid / SG 1.018 / pH 5.5      Gluc Negative / Ketone Negative  / Bili Negative / Urobili <2 mg/dL       Blood Moderate / Protein 30 mg/dL / Leuk Est Negative / Nitrite Negative      RBC 46 / WBC 24 / Hyaline 16 / Gran  / Sq Epi  / Non Sq Epi 6 / Bacteria Moderate      Ferritin 1375      [04-29-20 @ 16:00]  Lipid: chol --, , HDL --, LDL --      [05-01-20 @ 10:30]

## 2020-05-14 NOTE — GOALS OF CARE CONVERSATION - ADVANCED CARE PLANNING - TREATMENT GUIDELINES
No blood draws/No IV fluids/DNR Order/No antibiotics/Comfort measures only/Do not re-hospitalize/No artificial nutrition

## 2020-05-14 NOTE — PROGRESS NOTE ADULT - REASON FOR ADMISSION
suspected COVID-19

## 2020-05-14 NOTE — PROGRESS NOTE ADULT - NSREFPHYEXINPTDOCREFER_GEN_ALL_CORE
Attending
todays
4/14
4/14
4/15
4/17
4/18
4/19
4/20
4/22
4/23
4/24
4/27
4/28
4/28
5/1
5/10
5/11
5/12
5/13
5/6
Critical Care Attending
4/24/20
4/30
5/4/20
5/8
5/9
5/7

## 2020-05-14 NOTE — GOALS OF CARE CONVERSATION - ADVANCED CARE PLANNING - CONVERSATION/DISCUSSION
Prognosis/Diagnosis
Diagnosis/MOLST Discussed/Treatment Options/Prognosis
Prognosis/Diagnosis/MOLST Discussed/Treatment Options

## 2020-05-14 NOTE — PROGRESS NOTE ADULT - ASSESSMENT
55yMale being evaluated for goals of care in setting of severe COVID infection, MOF.   Hospital day 31    Pt remains on dual pressors, CVVH, high vent support.  Spoke to family today (wife, son, daughter). Clinical condition understood. Family do not wish to prolong pt's suffering any longer.  Plan for vent withdrawal today.  Family aware of likely rapid progression to resp failure, hypotension, cardiac arrest and death following withdrawal.   Wife requesting to be on facetime with pt following vent withdrawal to speak to him as he transitions through dying process.   All questions answered in detail.  Support provided    20 minutes spent discussing advanced care planning       Recommendations:  dc tube feeds and CVVH  morphine 10mg IV, ativan 1mg IV, robinul 0.4mg IV all prior to withdrawal  dc pressors  morphine 10mg IV q15min PRN resp distress  ativan 1mg IV q15min PRN agitation/restlessness   DNR/DNI/CMO, no further blood draws, no pulse ox checking, no facemasks, no fingersticks, no artificial hydration/nutrition      Death will be imminent.  We will follow  [phone]

## 2020-05-14 NOTE — PROGRESS NOTE ADULT - ASSESSMENT
Assessment:    Acute hypoxemic respiratory failure  ARDS  COVID 19  SP convalescent plasma    Possible superimposed bacterial infection SP ABX  Now increasing WBC   TELLO worsening on CVVD  Sub Q air     PLAN:    CNS:  Sedation.  DC paralysis     HEENT: Oral care    PULMONARY:  HOB @ 45 degrees, keep Sats 92-96%.  Vent Changes no changes Advacne ET 2 cms    CARDIOVASCULAR:  Avoid volume overload. Wean levophed.  NaHCO3 150 cc per hour.  I=O     GI: GI prophylaxis Protonix,  Start feeding.  Hold bowel regimen     RENAL:  F/u  lytes. . accurate I/O, renal F/UP     INFECTIOUS DISEASE:  FU Repeat cultures, Vanc oral. FU Cdiff     HEMATOLOGICAL:  DVT prophylaxis    ENDOCRINE:  Follow up FS.  Insulin protocol if needed.     CODE STATUS: FULL CODE    DISPOSITION: Patient require continued monitoring in MICU    Prognosis extremely poor

## 2020-05-14 NOTE — PROGRESS NOTE ADULT - NSREFPHYEXREFTO_GEN_ALL_CORE
ED Physical Exam
Inpatient Physical Exam

## 2020-05-14 NOTE — GOALS OF CARE CONVERSATION - ADVANCED CARE PLANNING - CONVERSATION DETAILS
Goals of care discussed in light of patient's comorbidities and presenting symptoms, patient wishes to be full code, agreeable to intubation and mechanical ventilation should respiratory status decompensate.
Palliative care team contacted patient's family utilizing Abner Quiñones ID# [id number] service . Reviewed current diagnosis, prognosis, and treatment options. Reviewed the severity of the patient's current conditions and the prolonged hospitalization.   Presented care options. Family verbalized understanding; and are having difficulty making a decision. Family is opting for a DNR at this time and requesting additional time for any further decision making.     Plan is to continue ongoing medical management. DNR.    Palliative care team will continue to closely monitor.
Palliative care team contacted patients family to continue discussing goals of care. Reviewed patients current prognosis and past conversations. Family verbalized clear understanding. At this time family does not wish to prolong patients suffering and are opting for an elective palliative extubation. No further mechanical ventilation, intubation. No further blood work or finger sticks. No artificial nutrition of IV hydration. No pulse oximetry. Complete comfort care. DNR/DNI.    Palliative care team will continue to closely monitor.

## 2020-05-14 NOTE — PROGRESS NOTE ADULT - ASSESSMENT
Acute hypoxemic respiratory failure  ARDS  COVID 19  Gram +ve bacteremia  SP convalescent plasma    Possible superimposed bacterial infection SP ABX  Now increasing WBC   TELLO worsening    Sub Q air     PLAN:    CNS:  Sedation and paralysis for now      HEENT: Oral care    PULMONARY:  HOB @ 45 degrees, keep Sats 92-96%.  Vent Changes;  Wean O2   , monitor driving pressure     CARDIOVASCULAR:  Avoid volume overload.  on two pressors    GI: GI prophylaxis Protonix, bowel regimen. Feeding: OG feeding on hold due to diarrhea which has improved .  DC Free H2O     RENAL:  F/u  lytes. bicarb drip @ 200 , f/u phos , d/c binders , K on lower side replacement ordered ,  so d/c lokelema . accurate I/O, renal F/UP , CVVH for even    INFECTIOUS DISEASE:  FU Repeat cultures, procal elevated , diarrhea improved , oral vanco started , gram +ve bacteremia , IV vanco for now    HEMATOLOGICAL:  DVT therapy for now. Dropped Hb , hold heparin for now    ENDOCRINE:  Follow up FS.  Insulin protocol if needed. DC Solumedrol     CODE STATUS: FULL CODE    DISPOSITION: Patient require continued monitoring in MICU    Poor prognosis / Palliative eval  / Pt DNR for now / possible palliative extubation

## 2020-05-14 NOTE — PROGRESS NOTE ADULT - SUBJECTIVE AND OBJECTIVE BOX
RHIANNON MAHER  55y, Male    All available historical data reviewed    OVERNIGHT EVENTS:  no fevers  fio2 80%  On CVVH  on levo/vaso  non responsive    ROS:  unable to obtain history secondary to patient's mental status and/or sedation     VITALS:  T(F): 94.2, Max: 99.9 (05-13-20 @ 12:00)  HR: 78  BP: 136/63  RR: 28Vital Signs Last 24 Hrs  T(C): 34.6 (14 May 2020 08:00), Max: 37.7 (13 May 2020 12:00)  T(F): 94.2 (14 May 2020 08:00), Max: 99.9 (13 May 2020 12:00)  HR: 78 (14 May 2020 09:00) (76 - 127)  BP: 136/63 (14 May 2020 08:09) (136/63 - 136/63)  BP(mean): --  RR: 28 (14 May 2020 08:09) (28 - 82)  SpO2: 95% (14 May 2020 09:00) (84% - 98%)    TESTS & MEASUREMENTS:                        7.3    25.64 )-----------( 120      ( 14 May 2020 04:00 )             23.5     05-14    138  |  95<L>  |  35<H>  ----------------------------<  184<H>  2.9<L>   |  31  |  1.4    Ca    6.7<L>      14 May 2020 04:00  Phos  3.0     05-14    TPro  4.1<L>  /  Alb  1.5<L>  /  TBili  2.4<H>  /  DBili  x   /  AST  143<H>  /  ALT  118<H>  /  AlkPhos  291<H>  05-14    LIVER FUNCTIONS - ( 14 May 2020 04:00 )  Alb: 1.5 g/dL / Pro: 4.1 g/dL / ALK PHOS: 291 U/L / ALT: 118 U/L / AST: 143 U/L / GGT: x             Culture - Urine (collected 05-11-20 @ 06:50)  Source: .Urine Catheterized  Final Report (05-12-20 @ 11:48):    No growth    Culture - Blood (collected 05-11-20 @ 06:47)  Source: .Blood None  Gram Stain (05-14-20 @ 06:00):    Growth in anaerobic bottle: Gram Positive Cocci in Clusters    Growth in aerobic bottle: Gram Positive Cocci in Clusters  Final Report (05-14-20 @ 06:00):    Growth in anaerobic bottle: Staphylococcus epidermidis    Growth in aerobic bottle: Gram Positive Cocci in Clusters    Coag Negative Staphylococcus    Single set isolate, possible contaminant. Contact    Microbiology if susceptibility testing clinically    indicated.    "Due to technical problems, Proteus sp. will Not be reported as part of    the BCID panel until further notice"    ***Blood Panel PCR results on this specimen are available    approximately 3 hours after the Gram stain result.***    Gram stain, PCR, and/or culture results may not always    correspond due to difference in methodologies.    ************************************************************    This PCR assay was performed using [employer].    The following targets are tested for: Enterococcus,    vancomycin resistant enterococci, Listeria monocytogenes,    coagulase negative staphylococci, S. aureus,    methicillin resistant S. aureus, Streptococcus agalactiae    (Group B), S. pneumoniae, S. pyogenes (Group A),    Acinetobacter baumannii, Enterobacter cloacae, E. coli,    Klebsiella oxytoca, K. pneumoniae, Proteus sp.,    Serratia marcescens, Haemophilus influenzae,    Neisseria meningitidis, Pseudomonas aeruginosa, Candida    albicans, C. glabrata, C krusei, C parapsilosis,    C. tropicalis and theKPC resistance gene.  Organism: Blood Culture PCR  Staphylococcus epidermidis (05-14-20 @ 06:00)  Organism: Staphylococcus epidermidis (05-14-20 @ 06:00)      Method Type: ASAD  Organism: Blood Culture PCR (05-14-20 @ 06:00)      -  Coagulase negative Staphylococcus: Detec      Method Type: PCR            RADIOLOGY & ADDITIONAL TESTS:  Personal review of radiological diagnostics performed  Echo and EKG results noted when applicable.     MEDICATIONS:  acetaminophen   Tablet .. 650 milliGRAM(s) Oral every 6 hours PRN  ALBUTerol    90 MICROgram(s) HFA Inhaler 2 Puff(s) Inhalation once  aspirin  chewable 81 milliGRAM(s) Oral daily  BACItracin   Ointment 1 Application(s) Topical two times a day  chlorhexidine 0.12% Liquid 15 milliLiter(s) Oral Mucosa every 12 hours  chlorhexidine 4% Liquid 1 Application(s) Topical <User Schedule>  collagenase Ointment 1 Application(s) Topical every 12 hours  CRRT Treatment    <Continuous>  dexMEDEtomidine Infusion 1.5 MICROgram(s)/kG/Hr IV Continuous <Continuous>  dextrose 5% 1000 milliLiter(s) IV Continuous <Continuous>  heparin   Injectable 5000 Unit(s) SubCutaneous every 8 hours  insulin regular Infusion 3 Unit(s)/Hr IV Continuous <Continuous>  meropenem  IVPB 500 milliGRAM(s) IV Intermittent every 8 hours  midazolam Infusion 0.02 mG/kG/Hr IV Continuous <Continuous>  norepinephrine Infusion 0.05 MICROgram(s)/kG/Min IV Continuous <Continuous>  ondansetron Injectable 4 milliGRAM(s) IV Push every 8 hours PRN  pantoprazole   Suspension 40 milliGRAM(s) Oral daily  phenylephrine    Infusion 0.1 MICROgram(s)/kG/Min IV Continuous <Continuous>  potassium chloride  20 mEq/100 mL IVPB 20 milliEquivalent(s) IV Intermittent every 2 hours  PureFlow Dialysate RFP-400 (K 2 / Ca 3) 5000 milliLiter(s) CRRT <Continuous>  vancomycin    Solution 125 milliGRAM(s) Oral every 6 hours  vancomycin  IVPB      vancomycin  IVPB 1000 milliGRAM(s) IV Intermittent every 12 hours  vasopressin Infusion 0.02 Unit(s)/Min IV Continuous <Continuous>      ANTIBIOTICS:  meropenem  IVPB 500 milliGRAM(s) IV Intermittent every 8 hours  vancomycin    Solution 125 milliGRAM(s) Oral every 6 hours  vancomycin  IVPB      vancomycin  IVPB 1000 milliGRAM(s) IV Intermittent every 12 hours

## 2020-05-14 NOTE — PROGRESS NOTE ADULT - SUBJECTIVE AND OBJECTIVE BOX
55yMale with diagnosis: SUSPECTED 2019NOVEL CORONAVIRUS INFECTION,HYPOXIA      Case d/w ICU team, no significant improvement in status.       PHYSICAL EXAM  DEFERRED     T(C): , Max: 36.3 (17:00)  T(F): 94  HR: 90 (76 - 107)  BP: 136/63 (136/63 - 136/63)  RR: 28 (28 - 28)  SpO2: 97% (89% - 99%)              LABS:                          7.3    25.64 )-----------( 120      ( 14 May 2020 04:00 )             23.5                                                                                      05-14    138  |  95<L>  |  35<H>  ----------------------------<  184<H>  2.9<L>   |  31  |  1.4    Ca    6.7<L>      14 May 2020 04:00  Phos  3.0     05-14    TPro  4.1<L>  /  Alb  1.5<L>  /  TBili  2.4<H>  /  DBili  x   /  AST  143<H>  /  ALT  118<H>  /  AlkPhos  291<H>  05-14                                                      MEDICATIONS  (STANDING):  aspirin  chewable 81 milliGRAM(s) Oral daily  BACItracin   Ointment 1 Application(s) Topical two times a day  chlorhexidine 0.12% Liquid 15 milliLiter(s) Oral Mucosa every 12 hours  chlorhexidine 4% Liquid 1 Application(s) Topical <User Schedule>  cisatracurium Infusion 3.996 MICROgram(s)/kG/Min (21.1 mL/Hr) IV Continuous <Continuous>  collagenase Ointment 1 Application(s) Topical every 12 hours  CRRT Treatment    <Continuous>  dexMEDEtomidine Infusion 1.5 MICROgram(s)/kG/Hr (33 mL/Hr) IV Continuous <Continuous>  dextrose 5% 1000 milliLiter(s) (200 mL/Hr) IV Continuous <Continuous>  heparin   Injectable 5000 Unit(s) SubCutaneous every 8 hours  insulin regular Infusion 3 Unit(s)/Hr (3 mL/Hr) IV Continuous <Continuous>  meropenem  IVPB 500 milliGRAM(s) IV Intermittent every 8 hours  meropenem  IVPB 1000 milliGRAM(s) IV Intermittent every 24 hours  midazolam Infusion 0.02 mG/kG/Hr (1.76 mL/Hr) IV Continuous <Continuous>  norepinephrine Infusion 0.05 MICROgram(s)/kG/Min (4.13 mL/Hr) IV Continuous <Continuous>  pantoprazole   Suspension 40 milliGRAM(s) Oral daily  phenylephrine    Infusion 0.1 MICROgram(s)/kG/Min (1.65 mL/Hr) IV Continuous <Continuous>  PureFlow Dialysate RFP-400 (K 2 / Ca 3) 5000 milliLiter(s) (2000 mL/Hr) CRRT <Continuous>  vancomycin    Solution 125 milliGRAM(s) Oral every 6 hours  vancomycin  IVPB      vancomycin  IVPB 1000 milliGRAM(s) IV Intermittent every 12 hours  vasopressin Infusion 0.02 Unit(s)/Min (1.2 mL/Hr) IV Continuous <Continuous>    MEDICATIONS  (PRN):  acetaminophen   Tablet .. 650 milliGRAM(s) Oral every 6 hours PRN Temp greater or equal to 38C (100.4F)  ondansetron Injectable 4 milliGRAM(s) IV Push every 8 hours PRN Nausea and/or Vomiting

## 2020-05-14 NOTE — GOALS OF CARE CONVERSATION - ADVANCED CARE PLANNING - TREATMENT GUIDELINE COMMENT
At this time family does not wish to prolong patients suffering and are opting for an elective palliative extubation. No further mechanical ventilation, intubation. No further blood work or finger sticks. No artificial nutrition of IV hydration. No pulse oximetry. Complete comfort care. DNR/DNI.

## 2020-05-14 NOTE — PROGRESS NOTE ADULT - ASSESSMENT
· Assessment		  55 year old male patient with hypertension, diabetes presenting for dyspnea.     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 53 > 3.53 > 14.4 >6.08  -ferritin 1375  -Ddimers 7612  -Sputa 5/1 NGTD  -BCx 4/21,25, 5/1 NG  -BCx 5/11 CNS ( doubt a true pathogen )  -nares ORSA NG  -diarrhea > CD pending. On po Vancomycin  -fungitell 61 (normal) off caspo  S/p Anakinra 4/14-17  -S/p convalescent plasma 4/29    RECOMMENDATIONS;   given the leucocytosis and the high probability of translocation would recommend continuing on Meropenem 1 gm iv q24h  po Vancomycin 125 mg q6h ( hold if CD NG )

## 2020-05-14 NOTE — PROGRESS NOTE ADULT - ASSESSMENT
Acute hypoxemic respiratory failure secondary to SARS-COV-2 infection / TELLO/ hyperkalemia/ hypernatremia     #  remains anuric   # continue CVVHD / keep in even balance for now   # replete k   #  remains on 2  pressors.  # off binders   # last vanco level 12  # palliative care appreciated  # overall prognosis poor   # will follow

## 2020-05-15 LAB
CULTURE RESULTS: SIGNIFICANT CHANGE UP
METHOD TYPE: SIGNIFICANT CHANGE UP
ORGANISM # SPEC MICROSCOPIC CNT: SIGNIFICANT CHANGE UP
ORGANISM # SPEC MICROSCOPIC CNT: SIGNIFICANT CHANGE UP

## 2020-05-26 NOTE — CONSULT NOTE ADULT - CONSULT REASON
Subjective: Patient seen and examined. No new events except as noted.     SUBJECTIVE/ROS:        MEDICATIONS:  MEDICATIONS  (STANDING):  acetaminophen  IVPB .. 1000 milliGRAM(s) IV Intermittent once  chlorhexidine 4% Liquid 1 Application(s) Topical <User Schedule>  cholestyramine Powder (Sugar-Free) 4 Gram(s) Oral two times a day  cloNIDine 0.2 milliGRAM(s) Oral three times a day  epoetin-norris-epbx (RETACRIT) Injectable [id number] Mynor"

## 2022-04-19 NOTE — CHART NOTE - NSCHARTNOTEFT_GEN_A_CORE
Family member contacted to update. Spoke with Marisol (daughter, [phone]). I explained that her father remains in the CCU in critical condition.     He is intubated on a ventilator.  His FiO2 had been decreased yesterday and will continue to do so today as well.   Advised that he is currently on the following Drips: Fentalyn, Propofol, Norepinephrine, Heparin and Insulin.    Renal function remains poor, I 2600, O 1800.  Will continue to monitor and is followed by nephrology, no decision for dialysis at this time.    WBC remains elevated with increase to 27 from 18.  Will continue receiving antibiotics and cultures were negative so far.   Plasma therapy protocol for COVID will be initiated.     All of her questions were answered. Daughter expressed understanding. I told her they would receive additional phone calls for acute events, but otherwise I would call back tomorrow for another update. .

## 2023-03-16 NOTE — PROCEDURAL SAFETY CHECKLIST WITH OR WITHOUT SEDATION - NSREADY4TIMEOUT_GEN_ALL_CORE
Pepcid has been helping to reduce abdominal discomfort. If pepcid dose cannot be tapered with resolution of reflux symptoms, then will refer to general surgery for upper EGD study.    done

## 2023-07-28 NOTE — PROGRESS NOTE ADULT - SUBJECTIVE AND OBJECTIVE BOX
24 h events noted  intubated/ventilated        PAST HISTORY  --------------------------------------------------------------------------------  No significant changes to PMH, PSH, FHx, SHx, unless otherwise noted    ALLERGIES & MEDICATIONS  --------------------------------------------------------------------------------  Allergies    No Known Allergies    Intolerances      Standing Inpatient Medications  calcium acetate 1334 milliGRAM(s) Oral three times a day with meals  caspofungin IVPB 50 milliGRAM(s) IV Intermittent every 24 hours  caspofungin IVPB      chlorhexidine 0.12% Liquid 15 milliLiter(s) Oral Mucosa every 12 hours  chlorhexidine 0.12% Liquid 15 milliLiter(s) Oral Mucosa two times a day  chlorhexidine 4% Liquid 1 Application(s) Topical <User Schedule>  dexMEDEtomidine Infusion 0.05 MICROgram(s)/kG/Hr IV Continuous <Continuous>  dextrose 5%. 1000 milliLiter(s) IV Continuous <Continuous>  dextrose 50% Injectable 12.5 Gram(s) IV Push once  dextrose 50% Injectable 25 Gram(s) IV Push once  dextrose 50% Injectable 25 Gram(s) IV Push once  heparin   Injectable 7500 Unit(s) SubCutaneous every 8 hours  insulin regular Infusion 3 Unit(s)/Hr IV Continuous <Continuous>  lactulose Syrup 20 Gram(s) Oral three times a day  meropenem  IVPB 750 milliGRAM(s) IV Intermittent every 24 hours  methylPREDNISolone sodium succinate Injectable 40 milliGRAM(s) IV Push two times a day  norepinephrine Infusion 0.05 MICROgram(s)/kG/Min IV Continuous <Continuous>  pantoprazole   Suspension 40 milliGRAM(s) Oral daily  propofol Infusion 10 MICROgram(s)/kG/Min IV Continuous <Continuous>  senna 2 Tablet(s) Oral at bedtime  sevelamer carbonate Powder 2400 milliGRAM(s) Oral three times a day with meals  sodium bicarbonate 650 milliGRAM(s) Oral every 6 hours  sodium zirconium cyclosilicate 10 Gram(s) Oral two times a day    PRN Inpatient Medications  acetaminophen   Tablet .. 650 milliGRAM(s) Oral every 6 hours PRN  dextrose 40% Gel 15 Gram(s) Oral once PRN  glucagon  Injectable 1 milliGRAM(s) IntraMuscular once PRN  glucagon  Injectable 1 milliGRAM(s) IntraMuscular once PRN  ondansetron Injectable 4 milliGRAM(s) IV Push every 8 hours PRN      VITALS/PHYSICAL EXAM  --------------------------------------------------------------------------------  T(C): 38 (04-24-20 @ 04:00), Max: 39 (04-23-20 @ 20:00)  HR: 102 (04-24-20 @ 06:00) (84 - 114)  BP: --  RR: 27 (04-24-20 @ 06:00) (27 - 31)  SpO2: 100% (04-24-20 @ 06:00) (91% - 100%)  Wt(kg): --        04-23-20 @ 07:01  -  04-24-20 @ 07:00  --------------------------------------------------------  IN: 2849.1 mL / OUT: 1455 mL / NET: 1394.1 mL      Physical Exam:  	Gen: intubated/ventilated     LABS/STUDIES  --------------------------------------------------------------------------------              10.6   20.29 >-----------<  256      [04-24-20 @ 03:50]              32.7     143  |  96  |  170  ----------------------------<  177      [04-24-20 @ 03:50]  4.7   |  19  |  10.4        Ca     7.9     [04-24-20 @ 03:50]      Mg     3.6     [04-24-20 @ 03:50]      Phos  11.6     [04-24-20 @ 03:50]    TPro  5.4  /  Alb  2.3  /  TBili  0.4  /  DBili  x   /  AST  40  /  ALT  30  /  AlkPhos  100  [04-24-20 @ 03:50]              [04-22-20 @ 23:30]    Creatinine Trend:  SCr 10.4 [04-24 @ 03:50]  SCr 9.4 [04-23 @ 05:30]  SCr 9.0 [04-22 @ 04:30]  SCr 8.9 [04-21 @ 04:30]  SCr 8.0 [04-20 @ 12:20]      Urine Creatinine 135      [04-18-20 @ 11:10]  Urine Protein 115      [04-18-20 @ 11:10]  Urine Sodium 44.0      [04-18-20 @ 11:10]  Urine Osmolality 384      [04-18-20 @ 11:10]    Ferritin 1787      [04-22-20 @ 23:30]  Lipid: chol --, , HDL --, LDL --      [04-24-20 @ 03:50] (3) slightly limited

## 2024-04-03 NOTE — PROGRESS NOTE ADULT - ASSESSMENT
INFECTIOUS DISEASE  PROGRESS NOTE            [facility]    Leonie Denney Patient Status:  Inpatient    1980 MRN [id number]   [facility] 3SW-A Attending Herberth Philip MD   Hosp Day # 4 PCP González Houser MD     Antibiotics: zosyn #4    Subjective:  I/D partial amp yesterday    Objective:  Temp:  [97.1 °F (36.2 °C)-98.4 °F (36.9 °C)] 98.4 °F (36.9 °C)  Pulse:  [76-89] 76  Resp:  [17-20] 17  BP: (110-141)/(68-82) 110/72  SpO2:  [92 %-100 %] 92 %    General: awake resting  Vital signs:Temp:  [97.1 °F (36.2 °C)-98.4 °F (36.9 °C)] 98.4 °F (36.9 °C)  Pulse:  [76-89] 76  Resp:  [17-20] 17  BP: (110-141)/(68-82) 110/72  SpO2:  [92 %-100 %] 92 %  HEENT: Moist mucous membranes. Extraocular muscles are intact.  Neck: supple no masses  Respiratory: Non labored, symmetric excursion, normal respirations  Musculoskeletal: foot dressed  Labs:     COVID-19 Lab Results    COVID-19  Lab Results   Component Value Date    COVID19 Not Detected 10/11/2022    COVID19 Not Detected 2022       Pro-Calcitonin  No results for input(s): \"PCT\" in the last 168 hours.    Cardiac  No results for input(s): \"TROP\", \"PBNP\" in the last 168 hours.    Creatinine Kinase  No results for input(s): \"CK\" in the last 168 hours.    Inflammatory Markers  Recent Labs   Lab 24  0540   CRP 16.00*       Recent Labs   Lab 24  0839   RBC 3.34*   HGB 9.0*   HCT 28.2*   MCV 84.4   MCH 26.9   MCHC 31.9   RDW 14.4   NEPRELIM 11.98*   WBC 15.6*   .0*         Recent Labs   Lab 24  0540 24  0446 24  0430   * 144* 202*   BUN 9 7* 8*   CREATSERUM 0.75 0.73 0.76   CA 8.3* 9.3 9.4   ALB 2.0* 2.1* 2.1*    139 136   K 4.2  4.2 3.8 4.4  4.3    107 104   CO2 26.0 27.0 24.0   ALKPHO 77 93 89   AST 11* 10* 12*   ALT 18 21 20   BILT 0.6 0.5 0.4   TP 6.6 7.2 7.3       Vancomycin Trough (ug/mL)   Date Value   2022 28.8 (H)     Microbiology    Hospital Encounter on 24   1. Tissue  Aerobic Culture     Status: Abnormal    Collection Time: 03/31/24  9:30 AM    Specimen: Toe; Tissue   Result Value Ref Range    Tissue Culture Result 4+ growth Pseudomonas aeruginosa (A) N/A    Tissue Culture Result (A) N/A     2+ growth Streptococcus agalactiae (Group B beta strep)    Tissue Culture Result 3+ growth Streptococcus anginosus (A) N/A    Tissue Smear 2+ WBCs seen (A) N/A    Tissue Smear 2+ Gram positive cocci in pairs and clusters (A) N/A    Tissue Smear 2+ Gram Negative Rods (A) N/A       Susceptibility    Pseudomonas aeruginosa -  (no method available)     Cefepime 4 Sensitive      Ceftazidime 2 Sensitive      Ciprofloxacin <=1 Sensitive      Meropenem <=1 Sensitive      Levofloxacin 0.5 Sensitive      Piperacillin + Tazobactam <=4 Sensitive      Tobramycin <=1 Sensitive    2. Anaerobic Culture     Status: None (Preliminary result)    Collection Time: 03/31/24  9:30 AM    Specimen: Toe; Other   Result Value Ref Range    Anaerobic Culture Pending N/A   3. Blood Culture     Status: None (Preliminary result)    Collection Time: 03/30/24  5:53 PM    Specimen: Blood,peripheral   Result Value Ref Range    Blood Culture Result No Growth 3 Days N/A           Problem list reviewed:  Patient Active Problem List   Diagnosis    Type 2 diabetes mellitus with hyperglycemia, with long-term current use of insulin ([facility])    Anxiety    Neuropathy    Meralgia paresthetica of left side    Vitamin D deficiency    B12 deficiency    Diabetic neuropathy (HCC)    Hyperglycemia due to type 2 diabetes mellitus (HCC)    Morbid obesity (HCC)    Other specified fetal and placental problems affecting management of mother, antepartum    Primary hypercoagulable state (HCC)    Uncontrolled type 2 diabetes mellitus    Noncompliance with medication regimen    Ulcer of right foot, limited to breakdown of skin (HCC)    Hyponatremia    Foot ulcer, right, with fat layer exposed ([facility])    Therapeutic drug monitoring    FLORENCIA (acute kidney  Assessment:    Acute hypoxemic respiratory failure secondary to SARS-COV-2 infection requiring invasive mechanical ventilation  sub q emphysema s/p blow hole  ARDS  Possible superimposed bacterial infection increase procal/ yeast in DTA  TELLO/ hyperkalemia worsening/ metabolic acidosis non oliguric      PLAN:    CNS: PROPOFOL, PRECEDEX , ADD FENTANYL    HEENT:  Oral care    PULMONARY:  HOB @ 45 degrees, keep sats 92-96%  solumedrol 40q 24(monitor FS),  / i RR 30, DEC fio2 100%, PEEP 10, CTsx F/UP,  e    CARDIOVASCULAR: bicarb po q 6h fornow    GI: GI prophylaxis Protonix, bowel regimen                                         Feeding: feeding, free wates    RENAL:  F/u  lytes.  Correct as needed. accurate I/O, renal F/UP, repeat CMP , renal fup    INFECTIOUS DISEASE: IV abx  violetta, ID f/u     HEMATOLOGICAL:  hep sq ( decrease clearance)    ENDOCRINE:  Follow up FS.  Insulin protocol if needed.    CODE STATUS: FULL CODE    DISPOSITION: Patient require continued monitoring in MICU  poor prognosis injury) (HCC)    Labial abscess    Cellulitis of labia    Hyperglycemia    Leukocytosis, unspecified type    Sepsis, due to unspecified organism, unspecified whether acute organ dysfunction present (HCC)    Cellulitis and abscess of foot             ASSESSMENT/PLAN:  1. Gangrene/infection right 1st toe  SP partial 1st ray amputation and I/D on 4/2  -preop culture Pseudomonas, group b strep    Continue zosyn  Duration of ivabx and timing of switch to PO depends on extent of resection, will dw podiatry        Dakota Koenig MD, MD [facility]  [phone]
